# Patient Record
Sex: MALE | Race: WHITE | Employment: FULL TIME | URBAN - METROPOLITAN AREA
[De-identification: names, ages, dates, MRNs, and addresses within clinical notes are randomized per-mention and may not be internally consistent; named-entity substitution may affect disease eponyms.]

---

## 2017-05-08 ENCOUNTER — APPOINTMENT (OUTPATIENT)
Dept: GENERAL RADIOLOGY | Age: 42
End: 2017-05-08

## 2017-05-08 ENCOUNTER — HOSPITAL ENCOUNTER (OUTPATIENT)
Age: 42
Setting detail: OBSERVATION
Discharge: HOME | End: 2017-05-10
Attending: EMERGENCY MEDICINE | Admitting: INTERNAL MEDICINE
Payer: MEDICAID

## 2017-05-08 DIAGNOSIS — I25.119 CORONARY ARTERY DISEASE WITH ANGINA PECTORIS, UNSPECIFIED VESSEL OR LESION TYPE, UNSPECIFIED WHETHER NATIVE OR TRANSPLANTED HEART (HCC): ICD-10-CM

## 2017-05-08 DIAGNOSIS — R07.9 CHEST PAIN, UNSPECIFIED: Primary | ICD-10-CM

## 2017-05-08 PROBLEM — Z13.1 DM (DIABETES MELLITUS SCREEN): Status: ACTIVE | Noted: 2017-05-08

## 2017-05-08 PROBLEM — F41.9 ANXIETY: Status: ACTIVE | Noted: 2017-05-08

## 2017-05-08 PROBLEM — I25.2 HISTORY OF MI (MYOCARDIAL INFARCTION): Status: ACTIVE | Noted: 2017-05-08

## 2017-05-08 PROBLEM — Z95.5 PRESENCE OF STENT IN LAD CORONARY ARTERY: Status: ACTIVE | Noted: 2017-05-08

## 2017-05-08 LAB
ABSOLUTE EOS #: 0.2 K/UL (ref 0–0.4)
ABSOLUTE LYMPH #: 1.4 K/UL (ref 1–4.8)
ABSOLUTE MONO #: 0.5 K/UL (ref 0.1–1.3)
AMPHETAMINE SCREEN URINE: NEGATIVE
ANION GAP SERPL CALCULATED.3IONS-SCNC: 15 MMOL/L (ref 9–17)
BARBITURATE SCREEN URINE: NEGATIVE
BASOPHILS # BLD: 1 %
BASOPHILS ABSOLUTE: 0 K/UL (ref 0–0.2)
BENZODIAZEPINE SCREEN, URINE: NEGATIVE
BUN BLDV-MCNC: 10 MG/DL (ref 6–20)
BUN/CREAT BLD: ABNORMAL (ref 9–20)
BUPRENORPHINE URINE: ABNORMAL
CALCIUM SERPL-MCNC: 8.9 MG/DL (ref 8.6–10.4)
CANNABINOID SCREEN URINE: POSITIVE
CHLORIDE BLD-SCNC: 100 MMOL/L (ref 98–107)
CO2: 23 MMOL/L (ref 20–31)
COCAINE METABOLITE, URINE: NEGATIVE
CREAT SERPL-MCNC: 0.73 MG/DL (ref 0.7–1.2)
D-DIMER QUANTITATIVE: 0.21 MG/L FEU
DIFFERENTIAL TYPE: NORMAL
EOSINOPHILS RELATIVE PERCENT: 3 %
GFR AFRICAN AMERICAN: >60 ML/MIN
GFR NON-AFRICAN AMERICAN: >60 ML/MIN
GFR SERPL CREATININE-BSD FRML MDRD: ABNORMAL ML/MIN/{1.73_M2}
GFR SERPL CREATININE-BSD FRML MDRD: ABNORMAL ML/MIN/{1.73_M2}
GLUCOSE BLD-MCNC: 166 MG/DL (ref 70–99)
GLUCOSE BLD-MCNC: 223 MG/DL (ref 75–110)
GLUCOSE BLD-MCNC: 229 MG/DL (ref 75–110)
HCT VFR BLD CALC: 46.6 % (ref 41–53)
HEMOGLOBIN: 16 G/DL (ref 13.5–17.5)
LYMPHOCYTES # BLD: 24 %
MAGNESIUM: 1.7 MG/DL (ref 1.6–2.6)
MCH RBC QN AUTO: 33.2 PG (ref 26–34)
MCHC RBC AUTO-ENTMCNC: 34.4 G/DL (ref 31–37)
MCV RBC AUTO: 96.5 FL (ref 80–100)
MDMA URINE: ABNORMAL
METHADONE SCREEN, URINE: NEGATIVE
METHAMPHETAMINE, URINE: ABNORMAL
MONOCYTES # BLD: 9 %
OPIATES, URINE: NEGATIVE
OXYCODONE SCREEN URINE: NEGATIVE
PDW BLD-RTO: 13 % (ref 11.5–14.9)
PHENCYCLIDINE, URINE: NEGATIVE
PLATELET # BLD: 286 K/UL (ref 150–450)
PLATELET ESTIMATE: NORMAL
PMV BLD AUTO: 6.9 FL (ref 6–12)
POTASSIUM SERPL-SCNC: 4.3 MMOL/L (ref 3.7–5.3)
PROPOXYPHENE, URINE: ABNORMAL
RBC # BLD: 4.83 M/UL (ref 4.5–5.9)
RBC # BLD: NORMAL 10*6/UL
SEG NEUTROPHILS: 63 %
SEGMENTED NEUTROPHILS ABSOLUTE COUNT: 3.7 K/UL (ref 1.3–9.1)
SODIUM BLD-SCNC: 138 MMOL/L (ref 135–144)
TEST INFORMATION: ABNORMAL
TRICYCLIC ANTIDEPRESSANTS, UR: ABNORMAL
TROPONIN INTERP: NORMAL
TROPONIN T: <0.03 NG/ML
WBC # BLD: 5.8 K/UL (ref 3.5–11)
WBC # BLD: NORMAL 10*3/UL

## 2017-05-08 PROCEDURE — 83735 ASSAY OF MAGNESIUM: CPT

## 2017-05-08 PROCEDURE — 99223 1ST HOSP IP/OBS HIGH 75: CPT | Performed by: INTERNAL MEDICINE

## 2017-05-08 PROCEDURE — 96374 THER/PROPH/DIAG INJ IV PUSH: CPT

## 2017-05-08 PROCEDURE — 6360000002 HC RX W HCPCS: Performed by: STUDENT IN AN ORGANIZED HEALTH CARE EDUCATION/TRAINING PROGRAM

## 2017-05-08 PROCEDURE — 80048 BASIC METABOLIC PNL TOTAL CA: CPT

## 2017-05-08 PROCEDURE — 85379 FIBRIN DEGRADATION QUANT: CPT

## 2017-05-08 PROCEDURE — 80307 DRUG TEST PRSMV CHEM ANLYZR: CPT

## 2017-05-08 PROCEDURE — G0378 HOSPITAL OBSERVATION PER HR: HCPCS

## 2017-05-08 PROCEDURE — 84484 ASSAY OF TROPONIN QUANT: CPT

## 2017-05-08 PROCEDURE — 6370000000 HC RX 637 (ALT 250 FOR IP): Performed by: STUDENT IN AN ORGANIZED HEALTH CARE EDUCATION/TRAINING PROGRAM

## 2017-05-08 PROCEDURE — 94760 N-INVAS EAR/PLS OXIMETRY 1: CPT

## 2017-05-08 PROCEDURE — 83036 HEMOGLOBIN GLYCOSYLATED A1C: CPT

## 2017-05-08 PROCEDURE — 6370000000 HC RX 637 (ALT 250 FOR IP): Performed by: FAMILY MEDICINE

## 2017-05-08 PROCEDURE — 71010 XR CHEST PORTABLE: CPT

## 2017-05-08 PROCEDURE — 99285 EMERGENCY DEPT VISIT HI MDM: CPT

## 2017-05-08 PROCEDURE — 85025 COMPLETE CBC W/AUTO DIFF WBC: CPT

## 2017-05-08 PROCEDURE — 2580000003 HC RX 258: Performed by: STUDENT IN AN ORGANIZED HEALTH CARE EDUCATION/TRAINING PROGRAM

## 2017-05-08 PROCEDURE — 82947 ASSAY GLUCOSE BLOOD QUANT: CPT

## 2017-05-08 PROCEDURE — 6370000000 HC RX 637 (ALT 250 FOR IP): Performed by: EMERGENCY MEDICINE

## 2017-05-08 PROCEDURE — 36415 COLL VENOUS BLD VENIPUNCTURE: CPT

## 2017-05-08 RX ORDER — MORPHINE SULFATE 2 MG/ML
2 INJECTION, SOLUTION INTRAMUSCULAR; INTRAVENOUS
Status: DISCONTINUED | OUTPATIENT
Start: 2017-05-08 | End: 2017-05-10 | Stop reason: HOSPADM

## 2017-05-08 RX ORDER — MAGNESIUM HYDROXIDE/ALUMINUM HYDROXICE/SIMETHICONE 120; 1200; 1200 MG/30ML; MG/30ML; MG/30ML
15 SUSPENSION ORAL ONCE
Status: COMPLETED | OUTPATIENT
Start: 2017-05-08 | End: 2017-05-08

## 2017-05-08 RX ORDER — POTASSIUM CHLORIDE 20MEQ/15ML
40 LIQUID (ML) ORAL PRN
Status: DISCONTINUED | OUTPATIENT
Start: 2017-05-08 | End: 2017-05-10 | Stop reason: HOSPADM

## 2017-05-08 RX ORDER — MORPHINE SULFATE 4 MG/ML
4 INJECTION, SOLUTION INTRAMUSCULAR; INTRAVENOUS
Status: DISCONTINUED | OUTPATIENT
Start: 2017-05-08 | End: 2017-05-10 | Stop reason: HOSPADM

## 2017-05-08 RX ORDER — DEXTROSE MONOHYDRATE 25 G/50ML
12.5 INJECTION, SOLUTION INTRAVENOUS PRN
Status: DISCONTINUED | OUTPATIENT
Start: 2017-05-08 | End: 2017-05-10 | Stop reason: HOSPADM

## 2017-05-08 RX ORDER — DEXTROSE MONOHYDRATE 50 MG/ML
100 INJECTION, SOLUTION INTRAVENOUS PRN
Status: DISCONTINUED | OUTPATIENT
Start: 2017-05-08 | End: 2017-05-10 | Stop reason: HOSPADM

## 2017-05-08 RX ORDER — AMINOPHYLLINE DIHYDRATE 25 MG/ML
100 INJECTION, SOLUTION INTRAVENOUS
Status: ACTIVE | OUTPATIENT
Start: 2017-05-09 | End: 2017-05-09

## 2017-05-08 RX ORDER — 0.9 % SODIUM CHLORIDE 0.9 %
250 INTRAVENOUS SOLUTION INTRAVENOUS ONCE
Status: DISCONTINUED | OUTPATIENT
Start: 2017-05-09 | End: 2017-05-10 | Stop reason: HOSPADM

## 2017-05-08 RX ORDER — SODIUM CHLORIDE 0.9 % (FLUSH) 0.9 %
10 SYRINGE (ML) INJECTION PRN
Status: ACTIVE | OUTPATIENT
Start: 2017-05-09 | End: 2017-05-09

## 2017-05-08 RX ORDER — ASPIRIN 81 MG/1
324 TABLET, CHEWABLE ORAL ONCE
Status: COMPLETED | OUTPATIENT
Start: 2017-05-08 | End: 2017-05-08

## 2017-05-08 RX ORDER — ACETAMINOPHEN 325 MG/1
650 TABLET ORAL EVERY 4 HOURS PRN
Status: DISCONTINUED | OUTPATIENT
Start: 2017-05-08 | End: 2017-05-10 | Stop reason: HOSPADM

## 2017-05-08 RX ORDER — DOCUSATE SODIUM 100 MG/1
100 CAPSULE, LIQUID FILLED ORAL 2 TIMES DAILY
Status: DISCONTINUED | OUTPATIENT
Start: 2017-05-08 | End: 2017-05-10 | Stop reason: HOSPADM

## 2017-05-08 RX ORDER — POTASSIUM CHLORIDE 7.45 MG/ML
10 INJECTION INTRAVENOUS PRN
Status: DISCONTINUED | OUTPATIENT
Start: 2017-05-08 | End: 2017-05-10 | Stop reason: HOSPADM

## 2017-05-08 RX ORDER — SODIUM CHLORIDE 0.9 % (FLUSH) 0.9 %
10 SYRINGE (ML) INJECTION PRN
Status: DISCONTINUED | OUTPATIENT
Start: 2017-05-08 | End: 2017-05-10 | Stop reason: HOSPADM

## 2017-05-08 RX ORDER — MAGNESIUM SULFATE 1 G/100ML
1 INJECTION INTRAVENOUS PRN
Status: DISCONTINUED | OUTPATIENT
Start: 2017-05-08 | End: 2017-05-10 | Stop reason: HOSPADM

## 2017-05-08 RX ORDER — BISACODYL 10 MG
10 SUPPOSITORY, RECTAL RECTAL DAILY PRN
Status: DISCONTINUED | OUTPATIENT
Start: 2017-05-08 | End: 2017-05-10 | Stop reason: HOSPADM

## 2017-05-08 RX ORDER — NITROGLYCERIN 0.4 MG/1
0.4 TABLET SUBLINGUAL EVERY 5 MIN PRN
Status: DISCONTINUED | OUTPATIENT
Start: 2017-05-08 | End: 2017-05-10 | Stop reason: HOSPADM

## 2017-05-08 RX ORDER — FAMOTIDINE 20 MG/1
20 TABLET, FILM COATED ORAL 2 TIMES DAILY
Status: DISCONTINUED | OUTPATIENT
Start: 2017-05-08 | End: 2017-05-10 | Stop reason: HOSPADM

## 2017-05-08 RX ORDER — HYDROXYZINE PAMOATE 25 MG/1
25 CAPSULE ORAL 4 TIMES DAILY PRN
COMMUNITY
End: 2017-11-03 | Stop reason: SDUPTHER

## 2017-05-08 RX ORDER — NITROGLYCERIN 0.4 MG/1
0.4 TABLET SUBLINGUAL EVERY 5 MIN PRN
Status: ACTIVE | OUTPATIENT
Start: 2017-05-09 | End: 2017-05-09

## 2017-05-08 RX ORDER — ACETAMINOPHEN 325 MG/1
650 TABLET ORAL EVERY 4 HOURS PRN
Status: CANCELLED | OUTPATIENT
Start: 2017-05-08

## 2017-05-08 RX ORDER — NITROGLYCERIN 0.4 MG/1
0.4 TABLET SUBLINGUAL EVERY 5 MIN PRN
Status: CANCELLED | OUTPATIENT
Start: 2017-05-08

## 2017-05-08 RX ORDER — SODIUM CHLORIDE 0.9 % (FLUSH) 0.9 %
10 SYRINGE (ML) INJECTION EVERY 12 HOURS SCHEDULED
Status: DISCONTINUED | OUTPATIENT
Start: 2017-05-08 | End: 2017-05-10 | Stop reason: HOSPADM

## 2017-05-08 RX ORDER — ONDANSETRON 2 MG/ML
4 INJECTION INTRAMUSCULAR; INTRAVENOUS EVERY 6 HOURS PRN
Status: DISCONTINUED | OUTPATIENT
Start: 2017-05-08 | End: 2017-05-10 | Stop reason: HOSPADM

## 2017-05-08 RX ORDER — POTASSIUM CHLORIDE 20 MEQ/1
40 TABLET, EXTENDED RELEASE ORAL PRN
Status: DISCONTINUED | OUTPATIENT
Start: 2017-05-08 | End: 2017-05-10 | Stop reason: HOSPADM

## 2017-05-08 RX ORDER — METOPROLOL TARTRATE 5 MG/5ML
2.5 INJECTION INTRAVENOUS PRN
Status: ACTIVE | OUTPATIENT
Start: 2017-05-09 | End: 2017-05-09

## 2017-05-08 RX ORDER — ASPIRIN 81 MG/1
81 TABLET, CHEWABLE ORAL DAILY
Status: CANCELLED | OUTPATIENT
Start: 2017-05-09

## 2017-05-08 RX ORDER — NICOTINE 21 MG/24HR
1 PATCH, TRANSDERMAL 24 HOURS TRANSDERMAL DAILY
Status: DISCONTINUED | OUTPATIENT
Start: 2017-05-08 | End: 2017-05-10 | Stop reason: HOSPADM

## 2017-05-08 RX ORDER — HYDROCODONE BITARTRATE AND ACETAMINOPHEN 5; 325 MG/1; MG/1
1 TABLET ORAL EVERY 4 HOURS PRN
Status: DISCONTINUED | OUTPATIENT
Start: 2017-05-08 | End: 2017-05-10 | Stop reason: HOSPADM

## 2017-05-08 RX ORDER — NITROGLYCERIN 0.4 MG/1
0.4 TABLET SUBLINGUAL EVERY 5 MIN PRN
Status: DISCONTINUED | OUTPATIENT
Start: 2017-05-08 | End: 2017-05-10 | Stop reason: SDUPTHER

## 2017-05-08 RX ORDER — SODIUM CHLORIDE 0.9 % (FLUSH) 0.9 %
10 SYRINGE (ML) INJECTION PRN
Status: CANCELLED | OUTPATIENT
Start: 2017-05-08

## 2017-05-08 RX ORDER — NICOTINE POLACRILEX 4 MG
15 LOZENGE BUCCAL PRN
Status: DISCONTINUED | OUTPATIENT
Start: 2017-05-08 | End: 2017-05-10 | Stop reason: HOSPADM

## 2017-05-08 RX ORDER — ASPIRIN 81 MG/1
81 TABLET, CHEWABLE ORAL DAILY
Status: DISCONTINUED | OUTPATIENT
Start: 2017-05-09 | End: 2017-05-10 | Stop reason: HOSPADM

## 2017-05-08 RX ORDER — HYDROXYZINE HYDROCHLORIDE 25 MG/1
25 TABLET, FILM COATED ORAL 4 TIMES DAILY PRN
Status: DISCONTINUED | OUTPATIENT
Start: 2017-05-08 | End: 2017-05-10 | Stop reason: HOSPADM

## 2017-05-08 RX ORDER — SODIUM CHLORIDE 0.9 % (FLUSH) 0.9 %
10 SYRINGE (ML) INJECTION EVERY 12 HOURS SCHEDULED
Status: CANCELLED | OUTPATIENT
Start: 2017-05-08

## 2017-05-08 RX ADMIN — NITROGLYCERIN 0.4 MG: 0.4 TABLET SUBLINGUAL at 14:57

## 2017-05-08 RX ADMIN — ALUMINUM HYDROXIDE, MAGNESIUM HYDROXIDE, AND SIMETHICONE 15 ML: 200; 200; 20 SUSPENSION ORAL at 16:05

## 2017-05-08 RX ADMIN — HYDROXYZINE HYDROCHLORIDE 25 MG: 25 TABLET, FILM COATED ORAL at 23:16

## 2017-05-08 RX ADMIN — Medication 10 ML: at 19:56

## 2017-05-08 RX ADMIN — FAMOTIDINE 20 MG: 20 TABLET ORAL at 19:55

## 2017-05-08 RX ADMIN — INSULIN LISPRO 2 UNITS: 100 INJECTION, SOLUTION INTRAVENOUS; SUBCUTANEOUS at 17:44

## 2017-05-08 RX ADMIN — METOPROLOL TARTRATE 25 MG: 25 TABLET ORAL at 19:55

## 2017-05-08 RX ADMIN — ASPIRIN 81 MG 324 MG: 81 TABLET ORAL at 14:57

## 2017-05-08 RX ADMIN — HYDROXYZINE HYDROCHLORIDE 25 MG: 25 TABLET, FILM COATED ORAL at 17:38

## 2017-05-08 RX ADMIN — MORPHINE SULFATE 2 MG: 2 INJECTION, SOLUTION INTRAMUSCULAR; INTRAVENOUS at 19:55

## 2017-05-08 RX ADMIN — ACETAMINOPHEN 650 MG: 325 TABLET, FILM COATED ORAL at 23:16

## 2017-05-08 RX ADMIN — INSULIN LISPRO 1 UNITS: 100 INJECTION, SOLUTION INTRAVENOUS; SUBCUTANEOUS at 21:10

## 2017-05-08 RX ADMIN — NITROGLYCERIN 0.4 MG: 0.4 TABLET SUBLINGUAL at 15:33

## 2017-05-08 RX ADMIN — DOCUSATE SODIUM 100 MG: 100 CAPSULE, LIQUID FILLED ORAL at 19:55

## 2017-05-08 ASSESSMENT — PAIN SCALES - GENERAL
PAINLEVEL_OUTOF10: 4
PAINLEVEL_OUTOF10: 8
PAINLEVEL_OUTOF10: 10
PAINLEVEL_OUTOF10: 4
PAINLEVEL_OUTOF10: 5
PAINLEVEL_OUTOF10: 3

## 2017-05-08 ASSESSMENT — PAIN DESCRIPTION - DESCRIPTORS: DESCRIPTORS: ACHING;PRESSURE

## 2017-05-08 ASSESSMENT — ENCOUNTER SYMPTOMS
NAUSEA: 1
EYE DISCHARGE: 0
ABDOMINAL PAIN: 0
BLOOD IN STOOL: 0
SHORTNESS OF BREATH: 1
DIARRHEA: 0
VOMITING: 0
SPUTUM PRODUCTION: 0
BLURRED VISION: 0
WHEEZING: 0
COUGH: 0

## 2017-05-08 ASSESSMENT — PAIN DESCRIPTION - ORIENTATION: ORIENTATION: LEFT

## 2017-05-08 ASSESSMENT — PAIN DESCRIPTION - LOCATION
LOCATION: CHEST
LOCATION: CHEST

## 2017-05-08 ASSESSMENT — PAIN DESCRIPTION - ONSET: ONSET: ON-GOING

## 2017-05-08 ASSESSMENT — PAIN DESCRIPTION - PAIN TYPE
TYPE: ACUTE PAIN
TYPE: ACUTE PAIN

## 2017-05-08 ASSESSMENT — PAIN DESCRIPTION - PROGRESSION: CLINICAL_PROGRESSION: GRADUALLY IMPROVING

## 2017-05-08 ASSESSMENT — PAIN DESCRIPTION - FREQUENCY: FREQUENCY: CONTINUOUS

## 2017-05-09 ENCOUNTER — APPOINTMENT (OUTPATIENT)
Dept: NUCLEAR MEDICINE | Age: 42
End: 2017-05-09

## 2017-05-09 LAB
ANION GAP SERPL CALCULATED.3IONS-SCNC: 11 MMOL/L (ref 9–17)
BUN BLDV-MCNC: 18 MG/DL (ref 6–20)
BUN/CREAT BLD: ABNORMAL (ref 9–20)
CALCIUM SERPL-MCNC: 9.1 MG/DL (ref 8.6–10.4)
CHLORIDE BLD-SCNC: 101 MMOL/L (ref 98–107)
CHOLESTEROL/HDL RATIO: 7.3
CHOLESTEROL: 196 MG/DL
CO2: 27 MMOL/L (ref 20–31)
CREAT SERPL-MCNC: 0.83 MG/DL (ref 0.7–1.2)
ESTIMATED AVERAGE GLUCOSE: 154 MG/DL
GFR AFRICAN AMERICAN: >60 ML/MIN
GFR NON-AFRICAN AMERICAN: >60 ML/MIN
GFR SERPL CREATININE-BSD FRML MDRD: ABNORMAL ML/MIN/{1.73_M2}
GFR SERPL CREATININE-BSD FRML MDRD: ABNORMAL ML/MIN/{1.73_M2}
GLUCOSE BLD-MCNC: 136 MG/DL (ref 75–110)
GLUCOSE BLD-MCNC: 148 MG/DL (ref 75–110)
GLUCOSE BLD-MCNC: 156 MG/DL (ref 70–99)
GLUCOSE BLD-MCNC: 218 MG/DL (ref 75–110)
HBA1C MFR BLD: 7 % (ref 4–6)
HCT VFR BLD CALC: 48.9 % (ref 41–53)
HDLC SERPL-MCNC: 27 MG/DL
HEMOGLOBIN: 16.4 G/DL (ref 13.5–17.5)
LDL CHOLESTEROL: 116 MG/DL (ref 0–130)
MCH RBC QN AUTO: 32.8 PG (ref 26–34)
MCHC RBC AUTO-ENTMCNC: 33.6 G/DL (ref 31–37)
MCV RBC AUTO: 97.5 FL (ref 80–100)
MYOGLOBIN: 24 NG/ML (ref 28–72)
MYOGLOBIN: 29 NG/ML (ref 28–72)
MYOGLOBIN: 47 NG/ML (ref 28–72)
PDW BLD-RTO: 13.3 % (ref 11.5–14.9)
PLATELET # BLD: 282 K/UL (ref 150–450)
PMV BLD AUTO: 7 FL (ref 6–12)
POTASSIUM SERPL-SCNC: 4.5 MMOL/L (ref 3.7–5.3)
RBC # BLD: 5.01 M/UL (ref 4.5–5.9)
SODIUM BLD-SCNC: 139 MMOL/L (ref 135–144)
TRIGL SERPL-MCNC: 264 MG/DL
TROPONIN INTERP: ABNORMAL
TROPONIN INTERP: NORMAL
TROPONIN INTERP: NORMAL
TROPONIN T: <0.03 NG/ML
VLDLC SERPL CALC-MCNC: ABNORMAL MG/DL (ref 1–30)
WBC # BLD: 7.1 K/UL (ref 3.5–11)

## 2017-05-09 PROCEDURE — 6360000002 HC RX W HCPCS: Performed by: STUDENT IN AN ORGANIZED HEALTH CARE EDUCATION/TRAINING PROGRAM

## 2017-05-09 PROCEDURE — 78452 HT MUSCLE IMAGE SPECT MULT: CPT

## 2017-05-09 PROCEDURE — 80061 LIPID PANEL: CPT

## 2017-05-09 PROCEDURE — 83874 ASSAY OF MYOGLOBIN: CPT

## 2017-05-09 PROCEDURE — A9500 TC99M SESTAMIBI: HCPCS | Performed by: INTERNAL MEDICINE

## 2017-05-09 PROCEDURE — 93017 CV STRESS TEST TRACING ONLY: CPT

## 2017-05-09 PROCEDURE — 96376 TX/PRO/DX INJ SAME DRUG ADON: CPT

## 2017-05-09 PROCEDURE — 93005 ELECTROCARDIOGRAM TRACING: CPT

## 2017-05-09 PROCEDURE — 2580000003 HC RX 258: Performed by: STUDENT IN AN ORGANIZED HEALTH CARE EDUCATION/TRAINING PROGRAM

## 2017-05-09 PROCEDURE — 82947 ASSAY GLUCOSE BLOOD QUANT: CPT

## 2017-05-09 PROCEDURE — 80048 BASIC METABOLIC PNL TOTAL CA: CPT

## 2017-05-09 PROCEDURE — 6360000002 HC RX W HCPCS: Performed by: FAMILY MEDICINE

## 2017-05-09 PROCEDURE — 6370000000 HC RX 637 (ALT 250 FOR IP): Performed by: FAMILY MEDICINE

## 2017-05-09 PROCEDURE — 84484 ASSAY OF TROPONIN QUANT: CPT

## 2017-05-09 PROCEDURE — G0378 HOSPITAL OBSERVATION PER HR: HCPCS

## 2017-05-09 PROCEDURE — 6370000000 HC RX 637 (ALT 250 FOR IP): Performed by: STUDENT IN AN ORGANIZED HEALTH CARE EDUCATION/TRAINING PROGRAM

## 2017-05-09 PROCEDURE — 85027 COMPLETE CBC AUTOMATED: CPT

## 2017-05-09 PROCEDURE — 3430000000 HC RX DIAGNOSTIC RADIOPHARMACEUTICAL: Performed by: INTERNAL MEDICINE

## 2017-05-09 PROCEDURE — 96375 TX/PRO/DX INJ NEW DRUG ADDON: CPT

## 2017-05-09 PROCEDURE — 2580000003 HC RX 258: Performed by: FAMILY MEDICINE

## 2017-05-09 PROCEDURE — 36415 COLL VENOUS BLD VENIPUNCTURE: CPT

## 2017-05-09 RX ORDER — AMINOPHYLLINE DIHYDRATE 25 MG/ML
100 INJECTION, SOLUTION INTRAVENOUS
Status: ACTIVE | OUTPATIENT
Start: 2017-05-09 | End: 2017-05-09

## 2017-05-09 RX ORDER — 0.9 % SODIUM CHLORIDE 0.9 %
250 INTRAVENOUS SOLUTION INTRAVENOUS ONCE
Status: DISCONTINUED | OUTPATIENT
Start: 2017-05-09 | End: 2017-05-10 | Stop reason: HOSPADM

## 2017-05-09 RX ORDER — METOPROLOL TARTRATE 5 MG/5ML
2.5 INJECTION INTRAVENOUS PRN
Status: CANCELLED | OUTPATIENT
Start: 2017-05-09 | End: 2017-05-10

## 2017-05-09 RX ORDER — METOPROLOL TARTRATE 5 MG/5ML
2.5 INJECTION INTRAVENOUS PRN
Status: ACTIVE | OUTPATIENT
Start: 2017-05-09 | End: 2017-05-10

## 2017-05-09 RX ORDER — AMINOPHYLLINE DIHYDRATE 25 MG/ML
100 INJECTION, SOLUTION INTRAVENOUS
Status: CANCELLED | OUTPATIENT
Start: 2017-05-09 | End: 2017-05-09

## 2017-05-09 RX ORDER — 0.9 % SODIUM CHLORIDE 0.9 %
250 INTRAVENOUS SOLUTION INTRAVENOUS ONCE
Status: CANCELLED | OUTPATIENT
Start: 2017-05-09 | End: 2017-05-09

## 2017-05-09 RX ORDER — ATORVASTATIN CALCIUM 40 MG/1
40 TABLET, FILM COATED ORAL NIGHTLY
Status: DISCONTINUED | OUTPATIENT
Start: 2017-05-09 | End: 2017-05-10 | Stop reason: HOSPADM

## 2017-05-09 RX ORDER — NITROGLYCERIN 0.4 MG/1
0.4 TABLET SUBLINGUAL EVERY 5 MIN PRN
Status: CANCELLED | OUTPATIENT
Start: 2017-05-09 | End: 2017-05-10

## 2017-05-09 RX ORDER — SODIUM CHLORIDE 0.9 % (FLUSH) 0.9 %
10 SYRINGE (ML) INJECTION PRN
Status: ACTIVE | OUTPATIENT
Start: 2017-05-09 | End: 2017-05-10

## 2017-05-09 RX ORDER — NITROGLYCERIN 0.4 MG/1
0.4 TABLET SUBLINGUAL EVERY 5 MIN PRN
Status: ACTIVE | OUTPATIENT
Start: 2017-05-09 | End: 2017-05-10

## 2017-05-09 RX ORDER — SODIUM CHLORIDE 0.9 % (FLUSH) 0.9 %
10 SYRINGE (ML) INJECTION PRN
Status: CANCELLED | OUTPATIENT
Start: 2017-05-09 | End: 2017-05-10

## 2017-05-09 RX ADMIN — FAMOTIDINE 20 MG: 20 TABLET ORAL at 08:22

## 2017-05-09 RX ADMIN — FAMOTIDINE 20 MG: 20 TABLET ORAL at 20:04

## 2017-05-09 RX ADMIN — Medication 10 ML: at 10:01

## 2017-05-09 RX ADMIN — METOPROLOL TARTRATE 25 MG: 25 TABLET ORAL at 14:56

## 2017-05-09 RX ADMIN — HYDROCODONE BITARTRATE AND ACETAMINOPHEN 1 TABLET: 5; 325 TABLET ORAL at 11:09

## 2017-05-09 RX ADMIN — TETRAKIS(2-METHOXYISOBUTYLISOCYANIDE)COPPER(I) TETRAFLUOROBORATE 30.5 MILLICURIE: 1 INJECTION, POWDER, LYOPHILIZED, FOR SOLUTION INTRAVENOUS at 10:08

## 2017-05-09 RX ADMIN — ONDANSETRON 4 MG: 2 INJECTION INTRAMUSCULAR; INTRAVENOUS at 11:49

## 2017-05-09 RX ADMIN — MORPHINE SULFATE 2 MG: 2 INJECTION, SOLUTION INTRAMUSCULAR; INTRAVENOUS at 21:54

## 2017-05-09 RX ADMIN — NITROGLYCERIN 0.4 MG: 0.4 TABLET SUBLINGUAL at 07:49

## 2017-05-09 RX ADMIN — HYDROCODONE BITARTRATE AND ACETAMINOPHEN 1 TABLET: 5; 325 TABLET ORAL at 23:40

## 2017-05-09 RX ADMIN — NITROGLYCERIN 0.4 MG: 0.4 TABLET SUBLINGUAL at 12:29

## 2017-05-09 RX ADMIN — REGADENOSON 0.4 MG: 0.08 INJECTION, SOLUTION INTRAVENOUS at 10:01

## 2017-05-09 RX ADMIN — ATORVASTATIN CALCIUM 40 MG: 40 TABLET, FILM COATED ORAL at 20:04

## 2017-05-09 RX ADMIN — Medication 10 ML: at 20:04

## 2017-05-09 RX ADMIN — MORPHINE SULFATE 2 MG: 2 INJECTION, SOLUTION INTRAMUSCULAR; INTRAVENOUS at 12:32

## 2017-05-09 RX ADMIN — INSULIN LISPRO 1 UNITS: 100 INJECTION, SOLUTION INTRAVENOUS; SUBCUTANEOUS at 20:05

## 2017-05-09 RX ADMIN — Medication 10 ML: at 15:10

## 2017-05-09 RX ADMIN — HYDROXYZINE HYDROCHLORIDE 25 MG: 25 TABLET, FILM COATED ORAL at 07:37

## 2017-05-09 RX ADMIN — HYDROCODONE BITARTRATE AND ACETAMINOPHEN 1 TABLET: 5; 325 TABLET ORAL at 15:10

## 2017-05-09 RX ADMIN — HYDROXYZINE HYDROCHLORIDE 25 MG: 25 TABLET, FILM COATED ORAL at 14:55

## 2017-05-09 RX ADMIN — ASPIRIN 81 MG 81 MG: 81 TABLET ORAL at 07:53

## 2017-05-09 RX ADMIN — NITROGLYCERIN 0.4 MG: 0.4 TABLET SUBLINGUAL at 07:14

## 2017-05-09 RX ADMIN — HYDROXYZINE HYDROCHLORIDE 25 MG: 25 TABLET, FILM COATED ORAL at 21:54

## 2017-05-09 RX ADMIN — TETRAKIS(2-METHOXYISOBUTYLISOCYANIDE)COPPER(I) TETRAFLUOROBORATE 10.3 MILLICURIE: 1 INJECTION, POWDER, LYOPHILIZED, FOR SOLUTION INTRAVENOUS at 09:08

## 2017-05-09 ASSESSMENT — PAIN SCALES - GENERAL
PAINLEVEL_OUTOF10: 6
PAINLEVEL_OUTOF10: 5
PAINLEVEL_OUTOF10: 5
PAINLEVEL_OUTOF10: 10
PAINLEVEL_OUTOF10: 10
PAINLEVEL_OUTOF10: 6
PAINLEVEL_OUTOF10: 6
PAINLEVEL_OUTOF10: 8

## 2017-05-09 ASSESSMENT — ENCOUNTER SYMPTOMS
DIARRHEA: 0
NAUSEA: 0
VOMITING: 0
SHORTNESS OF BREATH: 0
BLURRED VISION: 0
ABDOMINAL PAIN: 0

## 2017-05-09 ASSESSMENT — PAIN DESCRIPTION - PAIN TYPE
TYPE: ACUTE PAIN

## 2017-05-09 ASSESSMENT — PAIN DESCRIPTION - ORIENTATION: ORIENTATION: RIGHT;POSTERIOR

## 2017-05-09 ASSESSMENT — PAIN DESCRIPTION - LOCATION
LOCATION: CHEST
LOCATION: SHOULDER
LOCATION: CHEST

## 2017-05-10 VITALS
WEIGHT: 170 LBS | HEIGHT: 68 IN | HEART RATE: 70 BPM | SYSTOLIC BLOOD PRESSURE: 129 MMHG | BODY MASS INDEX: 25.76 KG/M2 | RESPIRATION RATE: 18 BRPM | OXYGEN SATURATION: 97 % | TEMPERATURE: 97.7 F | DIASTOLIC BLOOD PRESSURE: 76 MMHG

## 2017-05-10 LAB
ANION GAP SERPL CALCULATED.3IONS-SCNC: 12 MMOL/L (ref 9–17)
BUN BLDV-MCNC: 17 MG/DL (ref 6–20)
BUN/CREAT BLD: ABNORMAL (ref 9–20)
CALCIUM SERPL-MCNC: 9 MG/DL (ref 8.6–10.4)
CHLORIDE BLD-SCNC: 100 MMOL/L (ref 98–107)
CO2: 26 MMOL/L (ref 20–31)
CREAT SERPL-MCNC: 0.79 MG/DL (ref 0.7–1.2)
EKG ATRIAL RATE: 110 BPM
EKG ATRIAL RATE: 78 BPM
EKG P AXIS: 33 DEGREES
EKG P AXIS: 39 DEGREES
EKG P-R INTERVAL: 132 MS
EKG P-R INTERVAL: 138 MS
EKG Q-T INTERVAL: 316 MS
EKG Q-T INTERVAL: 358 MS
EKG QRS DURATION: 80 MS
EKG QRS DURATION: 84 MS
EKG QTC CALCULATION (BAZETT): 408 MS
EKG QTC CALCULATION (BAZETT): 427 MS
EKG R AXIS: 48 DEGREES
EKG R AXIS: 69 DEGREES
EKG T AXIS: 34 DEGREES
EKG T AXIS: 38 DEGREES
EKG VENTRICULAR RATE: 110 BPM
EKG VENTRICULAR RATE: 78 BPM
GFR AFRICAN AMERICAN: >60 ML/MIN
GFR NON-AFRICAN AMERICAN: >60 ML/MIN
GFR SERPL CREATININE-BSD FRML MDRD: ABNORMAL ML/MIN/{1.73_M2}
GFR SERPL CREATININE-BSD FRML MDRD: ABNORMAL ML/MIN/{1.73_M2}
GLUCOSE BLD-MCNC: 136 MG/DL (ref 70–99)
GLUCOSE BLD-MCNC: 238 MG/DL (ref 75–110)
HCT VFR BLD CALC: 47.9 % (ref 41–53)
HEMOGLOBIN: 16 G/DL (ref 13.5–17.5)
MCH RBC QN AUTO: 32.6 PG (ref 26–34)
MCHC RBC AUTO-ENTMCNC: 33.4 G/DL (ref 31–37)
MCV RBC AUTO: 97.4 FL (ref 80–100)
PDW BLD-RTO: 12.9 % (ref 11.5–14.9)
PLATELET # BLD: 276 K/UL (ref 150–450)
PMV BLD AUTO: 7.2 FL (ref 6–12)
POTASSIUM SERPL-SCNC: 4.5 MMOL/L (ref 3.7–5.3)
RBC # BLD: 4.92 M/UL (ref 4.5–5.9)
SODIUM BLD-SCNC: 138 MMOL/L (ref 135–144)
WBC # BLD: 5.2 K/UL (ref 3.5–11)

## 2017-05-10 PROCEDURE — 2580000003 HC RX 258: Performed by: STUDENT IN AN ORGANIZED HEALTH CARE EDUCATION/TRAINING PROGRAM

## 2017-05-10 PROCEDURE — 36415 COLL VENOUS BLD VENIPUNCTURE: CPT

## 2017-05-10 PROCEDURE — G0378 HOSPITAL OBSERVATION PER HR: HCPCS

## 2017-05-10 PROCEDURE — 93017 CV STRESS TEST TRACING ONLY: CPT

## 2017-05-10 PROCEDURE — 82947 ASSAY GLUCOSE BLOOD QUANT: CPT

## 2017-05-10 PROCEDURE — 6370000000 HC RX 637 (ALT 250 FOR IP): Performed by: STUDENT IN AN ORGANIZED HEALTH CARE EDUCATION/TRAINING PROGRAM

## 2017-05-10 PROCEDURE — 85027 COMPLETE CBC AUTOMATED: CPT

## 2017-05-10 PROCEDURE — 99239 HOSP IP/OBS DSCHRG MGMT >30: CPT | Performed by: INTERNAL MEDICINE

## 2017-05-10 PROCEDURE — 80048 BASIC METABOLIC PNL TOTAL CA: CPT

## 2017-05-10 PROCEDURE — 6370000000 HC RX 637 (ALT 250 FOR IP): Performed by: FAMILY MEDICINE

## 2017-05-10 PROCEDURE — 96376 TX/PRO/DX INJ SAME DRUG ADON: CPT

## 2017-05-10 PROCEDURE — 6360000002 HC RX W HCPCS: Performed by: STUDENT IN AN ORGANIZED HEALTH CARE EDUCATION/TRAINING PROGRAM

## 2017-05-10 RX ORDER — ATORVASTATIN CALCIUM 40 MG/1
40 TABLET, FILM COATED ORAL NIGHTLY
Qty: 30 TABLET | Refills: 3 | Status: SHIPPED | OUTPATIENT
Start: 2017-05-10 | End: 2017-11-03 | Stop reason: SDUPTHER

## 2017-05-10 RX ADMIN — MORPHINE SULFATE 2 MG: 2 INJECTION, SOLUTION INTRAMUSCULAR; INTRAVENOUS at 11:37

## 2017-05-10 RX ADMIN — MORPHINE SULFATE 2 MG: 2 INJECTION, SOLUTION INTRAMUSCULAR; INTRAVENOUS at 14:23

## 2017-05-10 RX ADMIN — MORPHINE SULFATE 2 MG: 2 INJECTION, SOLUTION INTRAMUSCULAR; INTRAVENOUS at 01:47

## 2017-05-10 RX ADMIN — FAMOTIDINE 20 MG: 20 TABLET ORAL at 08:11

## 2017-05-10 RX ADMIN — HYDROXYZINE HYDROCHLORIDE 25 MG: 25 TABLET, FILM COATED ORAL at 14:23

## 2017-05-10 RX ADMIN — MORPHINE SULFATE 4 MG: 4 INJECTION, SOLUTION INTRAMUSCULAR; INTRAVENOUS at 08:11

## 2017-05-10 RX ADMIN — Medication 10 ML: at 08:11

## 2017-05-10 RX ADMIN — INSULIN LISPRO 2 UNITS: 100 INJECTION, SOLUTION INTRAVENOUS; SUBCUTANEOUS at 11:32

## 2017-05-10 RX ADMIN — ASPIRIN 81 MG 81 MG: 81 TABLET ORAL at 08:11

## 2017-05-10 RX ADMIN — HYDROXYZINE HYDROCHLORIDE 25 MG: 25 TABLET, FILM COATED ORAL at 08:11

## 2017-05-10 RX ADMIN — HYDROCODONE BITARTRATE AND ACETAMINOPHEN 1 TABLET: 5; 325 TABLET ORAL at 06:16

## 2017-05-10 ASSESSMENT — PAIN DESCRIPTION - PAIN TYPE
TYPE: ACUTE PAIN

## 2017-05-10 ASSESSMENT — PAIN SCALES - GENERAL
PAINLEVEL_OUTOF10: 1
PAINLEVEL_OUTOF10: 6
PAINLEVEL_OUTOF10: 2
PAINLEVEL_OUTOF10: 6
PAINLEVEL_OUTOF10: 1
PAINLEVEL_OUTOF10: 3
PAINLEVEL_OUTOF10: 10
PAINLEVEL_OUTOF10: 5
PAINLEVEL_OUTOF10: 6
PAINLEVEL_OUTOF10: 8

## 2017-05-10 ASSESSMENT — PAIN DESCRIPTION - ORIENTATION
ORIENTATION: RIGHT

## 2017-05-10 ASSESSMENT — PAIN DESCRIPTION - LOCATION
LOCATION: SHOULDER

## 2017-05-10 ASSESSMENT — ENCOUNTER SYMPTOMS
VOMITING: 0
ABDOMINAL PAIN: 0
SHORTNESS OF BREATH: 0
DIARRHEA: 0
BLURRED VISION: 0
NAUSEA: 0

## 2017-05-11 LAB — GLUCOSE BLD-MCNC: 155 MG/DL (ref 75–110)

## 2017-07-22 ENCOUNTER — HOSPITAL ENCOUNTER (EMERGENCY)
Age: 42
Discharge: HOME | End: 2017-07-22
Attending: EMERGENCY MEDICINE
Payer: MEDICAID

## 2017-07-22 VITALS
DIASTOLIC BLOOD PRESSURE: 83 MMHG | TEMPERATURE: 98.1 F | WEIGHT: 170 LBS | HEART RATE: 106 BPM | RESPIRATION RATE: 16 BRPM | HEIGHT: 68 IN | BODY MASS INDEX: 25.76 KG/M2 | OXYGEN SATURATION: 97 % | SYSTOLIC BLOOD PRESSURE: 141 MMHG

## 2017-07-22 DIAGNOSIS — J45.901 ASTHMA EXACERBATION: Primary | ICD-10-CM

## 2017-07-22 PROCEDURE — 94664 DEMO&/EVAL PT USE INHALER: CPT

## 2017-07-22 PROCEDURE — 6370000000 HC RX 637 (ALT 250 FOR IP): Performed by: PHYSICIAN ASSISTANT

## 2017-07-22 PROCEDURE — 94640 AIRWAY INHALATION TREATMENT: CPT

## 2017-07-22 PROCEDURE — 99284 EMERGENCY DEPT VISIT MOD MDM: CPT

## 2017-07-22 PROCEDURE — 94760 N-INVAS EAR/PLS OXIMETRY 1: CPT

## 2017-07-22 RX ORDER — ALBUTEROL SULFATE 90 UG/1
2 AEROSOL, METERED RESPIRATORY (INHALATION) EVERY 6 HOURS PRN
Qty: 1 INHALER | Refills: 0 | Status: SHIPPED | OUTPATIENT
Start: 2017-07-22 | End: 2017-07-22

## 2017-07-22 RX ORDER — ALBUTEROL SULFATE 90 UG/1
2 AEROSOL, METERED RESPIRATORY (INHALATION) EVERY 6 HOURS PRN
Qty: 1 INHALER | Refills: 0 | Status: SHIPPED | OUTPATIENT
Start: 2017-07-22 | End: 2017-11-03 | Stop reason: SDUPTHER

## 2017-07-22 RX ORDER — IPRATROPIUM BROMIDE AND ALBUTEROL SULFATE 2.5; .5 MG/3ML; MG/3ML
1 SOLUTION RESPIRATORY (INHALATION)
Status: DISCONTINUED | OUTPATIENT
Start: 2017-07-22 | End: 2017-07-22 | Stop reason: HOSPADM

## 2017-07-22 RX ADMIN — IPRATROPIUM BROMIDE AND ALBUTEROL SULFATE 1 AMPULE: .5; 3 SOLUTION RESPIRATORY (INHALATION) at 15:46

## 2017-07-24 ENCOUNTER — APPOINTMENT (OUTPATIENT)
Dept: GENERAL RADIOLOGY | Age: 42
End: 2017-07-24
Payer: MEDICAID

## 2017-07-24 ENCOUNTER — APPOINTMENT (OUTPATIENT)
Dept: CT IMAGING | Age: 42
End: 2017-07-24
Payer: MEDICAID

## 2017-07-24 ENCOUNTER — HOSPITAL ENCOUNTER (OUTPATIENT)
Age: 42
Setting detail: OBSERVATION
Discharge: HOME | End: 2017-07-25
Attending: EMERGENCY MEDICINE | Admitting: INTERNAL MEDICINE
Payer: MEDICAID

## 2017-07-24 DIAGNOSIS — R07.9 CHEST PAIN, UNSPECIFIED TYPE: Primary | ICD-10-CM

## 2017-07-24 LAB
ABSOLUTE EOS #: 0.1 K/UL (ref 0–0.4)
ABSOLUTE LYMPH #: 1.2 K/UL (ref 1–4.8)
ABSOLUTE MONO #: 0.8 K/UL (ref 0.1–1.3)
ANION GAP SERPL CALCULATED.3IONS-SCNC: 15 MMOL/L (ref 9–17)
BASOPHILS # BLD: 1 %
BASOPHILS ABSOLUTE: 0.1 K/UL (ref 0–0.2)
BUN BLDV-MCNC: 7 MG/DL (ref 6–20)
BUN/CREAT BLD: ABNORMAL (ref 9–20)
CALCIUM SERPL-MCNC: 8.9 MG/DL (ref 8.6–10.4)
CHLORIDE BLD-SCNC: 96 MMOL/L (ref 98–107)
CO2: 24 MMOL/L (ref 20–31)
CREAT SERPL-MCNC: 0.8 MG/DL (ref 0.7–1.2)
DIFFERENTIAL TYPE: ABNORMAL
EOSINOPHILS RELATIVE PERCENT: 2 %
GFR AFRICAN AMERICAN: >60 ML/MIN
GFR NON-AFRICAN AMERICAN: >60 ML/MIN
GFR SERPL CREATININE-BSD FRML MDRD: ABNORMAL ML/MIN/{1.73_M2}
GFR SERPL CREATININE-BSD FRML MDRD: ABNORMAL ML/MIN/{1.73_M2}
GLUCOSE BLD-MCNC: 166 MG/DL (ref 75–110)
GLUCOSE BLD-MCNC: 409 MG/DL (ref 70–99)
HCT VFR BLD CALC: 46.7 % (ref 41–53)
HEMOGLOBIN: 16.2 G/DL (ref 13.5–17.5)
INR BLD: 1
LYMPHOCYTES # BLD: 15 %
MAGNESIUM: 2.1 MG/DL (ref 1.6–2.6)
MCH RBC QN AUTO: 34.9 PG (ref 26–34)
MCHC RBC AUTO-ENTMCNC: 34.7 G/DL (ref 31–37)
MCV RBC AUTO: 100.6 FL (ref 80–100)
MONOCYTES # BLD: 11 %
MYOGLOBIN: 36 NG/ML (ref 28–72)
MYOGLOBIN: 36 NG/ML (ref 28–72)
PARTIAL THROMBOPLASTIN TIME: 25.8 SEC (ref 23–31)
PDW BLD-RTO: 13.4 % (ref 11.5–14.9)
PHOSPHORUS: 3.9 MG/DL (ref 2.5–4.5)
PLATELET # BLD: 293 K/UL (ref 150–450)
PLATELET ESTIMATE: ABNORMAL
PMV BLD AUTO: 7.6 FL (ref 6–12)
POTASSIUM SERPL-SCNC: 4.4 MMOL/L (ref 3.7–5.3)
PROTHROMBIN TIME: 10.4 SEC (ref 9.7–12)
RBC # BLD: 4.64 M/UL (ref 4.5–5.9)
RBC # BLD: ABNORMAL 10*6/UL
SEG NEUTROPHILS: 71 %
SEGMENTED NEUTROPHILS ABSOLUTE COUNT: 5.5 K/UL (ref 1.3–9.1)
SODIUM BLD-SCNC: 135 MMOL/L (ref 135–144)
TROPONIN INTERP: NORMAL
TROPONIN INTERP: NORMAL
TROPONIN T: <0.03 NG/ML
TROPONIN T: <0.03 NG/ML
TSH SERPL DL<=0.05 MIU/L-ACNC: 2.18 MIU/L (ref 0.3–5)
WBC # BLD: 7.7 K/UL (ref 3.5–11)
WBC # BLD: ABNORMAL 10*3/UL

## 2017-07-24 PROCEDURE — 6370000000 HC RX 637 (ALT 250 FOR IP): Performed by: EMERGENCY MEDICINE

## 2017-07-24 PROCEDURE — 99285 EMERGENCY DEPT VISIT HI MDM: CPT

## 2017-07-24 PROCEDURE — 71260 CT THORAX DX C+: CPT

## 2017-07-24 PROCEDURE — 85025 COMPLETE CBC W/AUTO DIFF WBC: CPT

## 2017-07-24 PROCEDURE — 83735 ASSAY OF MAGNESIUM: CPT

## 2017-07-24 PROCEDURE — 84484 ASSAY OF TROPONIN QUANT: CPT

## 2017-07-24 PROCEDURE — 99220 PR INITIAL OBSERVATION CARE/DAY 70 MINUTES: CPT | Performed by: INTERNAL MEDICINE

## 2017-07-24 PROCEDURE — 85610 PROTHROMBIN TIME: CPT

## 2017-07-24 PROCEDURE — G0378 HOSPITAL OBSERVATION PER HR: HCPCS

## 2017-07-24 PROCEDURE — 6370000000 HC RX 637 (ALT 250 FOR IP): Performed by: FAMILY MEDICINE

## 2017-07-24 PROCEDURE — 71010 XR CHEST PORTABLE: CPT

## 2017-07-24 PROCEDURE — 82947 ASSAY GLUCOSE BLOOD QUANT: CPT

## 2017-07-24 PROCEDURE — S0028 INJECTION, FAMOTIDINE, 20 MG: HCPCS | Performed by: FAMILY MEDICINE

## 2017-07-24 PROCEDURE — 96374 THER/PROPH/DIAG INJ IV PUSH: CPT

## 2017-07-24 PROCEDURE — 80048 BASIC METABOLIC PNL TOTAL CA: CPT

## 2017-07-24 PROCEDURE — 36415 COLL VENOUS BLD VENIPUNCTURE: CPT

## 2017-07-24 PROCEDURE — 96375 TX/PRO/DX INJ NEW DRUG ADDON: CPT

## 2017-07-24 PROCEDURE — 2580000003 HC RX 258: Performed by: FAMILY MEDICINE

## 2017-07-24 PROCEDURE — 85730 THROMBOPLASTIN TIME PARTIAL: CPT

## 2017-07-24 PROCEDURE — 83874 ASSAY OF MYOGLOBIN: CPT

## 2017-07-24 PROCEDURE — 6360000002 HC RX W HCPCS: Performed by: FAMILY MEDICINE

## 2017-07-24 PROCEDURE — 6360000004 HC RX CONTRAST MEDICATION: Performed by: EMERGENCY MEDICINE

## 2017-07-24 PROCEDURE — 2580000003 HC RX 258: Performed by: EMERGENCY MEDICINE

## 2017-07-24 PROCEDURE — 2500000003 HC RX 250 WO HCPCS: Performed by: FAMILY MEDICINE

## 2017-07-24 PROCEDURE — 84443 ASSAY THYROID STIM HORMONE: CPT

## 2017-07-24 PROCEDURE — 94664 DEMO&/EVAL PT USE INHALER: CPT

## 2017-07-24 PROCEDURE — 84100 ASSAY OF PHOSPHORUS: CPT

## 2017-07-24 PROCEDURE — 96372 THER/PROPH/DIAG INJ SC/IM: CPT

## 2017-07-24 PROCEDURE — 6370000000 HC RX 637 (ALT 250 FOR IP): Performed by: INTERNAL MEDICINE

## 2017-07-24 PROCEDURE — 93005 ELECTROCARDIOGRAM TRACING: CPT

## 2017-07-24 RX ORDER — DEXTROSE MONOHYDRATE 50 MG/ML
100 INJECTION, SOLUTION INTRAVENOUS PRN
Status: DISCONTINUED | OUTPATIENT
Start: 2017-07-24 | End: 2017-07-25 | Stop reason: HOSPADM

## 2017-07-24 RX ORDER — ACETAMINOPHEN 325 MG/1
650 TABLET ORAL EVERY 4 HOURS PRN
Status: DISCONTINUED | OUTPATIENT
Start: 2017-07-24 | End: 2017-07-25 | Stop reason: HOSPADM

## 2017-07-24 RX ORDER — ASPIRIN 81 MG/1
81 TABLET ORAL DAILY
Status: DISCONTINUED | OUTPATIENT
Start: 2017-07-24 | End: 2017-07-25 | Stop reason: HOSPADM

## 2017-07-24 RX ORDER — ALBUTEROL SULFATE 90 UG/1
2 AEROSOL, METERED RESPIRATORY (INHALATION) EVERY 6 HOURS PRN
Status: DISCONTINUED | OUTPATIENT
Start: 2017-07-24 | End: 2017-07-25 | Stop reason: HOSPADM

## 2017-07-24 RX ORDER — NITROGLYCERIN 0.4 MG/1
0.4 TABLET SUBLINGUAL EVERY 5 MIN PRN
Status: DISCONTINUED | OUTPATIENT
Start: 2017-07-24 | End: 2017-07-24

## 2017-07-24 RX ORDER — ONDANSETRON 2 MG/ML
4 INJECTION INTRAMUSCULAR; INTRAVENOUS EVERY 6 HOURS PRN
Status: DISCONTINUED | OUTPATIENT
Start: 2017-07-24 | End: 2017-07-25 | Stop reason: HOSPADM

## 2017-07-24 RX ORDER — NICOTINE 21 MG/24HR
1 PATCH, TRANSDERMAL 24 HOURS TRANSDERMAL DAILY
Status: DISCONTINUED | OUTPATIENT
Start: 2017-07-24 | End: 2017-07-25 | Stop reason: HOSPADM

## 2017-07-24 RX ORDER — DEXTROSE MONOHYDRATE 25 G/50ML
12.5 INJECTION, SOLUTION INTRAVENOUS PRN
Status: DISCONTINUED | OUTPATIENT
Start: 2017-07-24 | End: 2017-07-25 | Stop reason: HOSPADM

## 2017-07-24 RX ORDER — NICOTINE POLACRILEX 4 MG
15 LOZENGE BUCCAL PRN
Status: DISCONTINUED | OUTPATIENT
Start: 2017-07-24 | End: 2017-07-25 | Stop reason: HOSPADM

## 2017-07-24 RX ORDER — 0.9 % SODIUM CHLORIDE 0.9 %
100 INTRAVENOUS SOLUTION INTRAVENOUS ONCE
Status: COMPLETED | OUTPATIENT
Start: 2017-07-24 | End: 2017-07-24

## 2017-07-24 RX ORDER — ATORVASTATIN CALCIUM 40 MG/1
40 TABLET, FILM COATED ORAL NIGHTLY
Status: DISCONTINUED | OUTPATIENT
Start: 2017-07-24 | End: 2017-07-25 | Stop reason: HOSPADM

## 2017-07-24 RX ORDER — SODIUM CHLORIDE 0.9 % (FLUSH) 0.9 %
10 SYRINGE (ML) INJECTION EVERY 12 HOURS SCHEDULED
Status: DISCONTINUED | OUTPATIENT
Start: 2017-07-24 | End: 2017-07-25 | Stop reason: HOSPADM

## 2017-07-24 RX ORDER — NITROGLYCERIN 0.4 MG/1
0.4 TABLET SUBLINGUAL EVERY 5 MIN PRN
Status: DISCONTINUED | OUTPATIENT
Start: 2017-07-24 | End: 2017-07-25 | Stop reason: HOSPADM

## 2017-07-24 RX ORDER — ASPIRIN 81 MG/1
324 TABLET, CHEWABLE ORAL ONCE
Status: COMPLETED | OUTPATIENT
Start: 2017-07-24 | End: 2017-07-24

## 2017-07-24 RX ORDER — SODIUM CHLORIDE 0.9 % (FLUSH) 0.9 %
10 SYRINGE (ML) INJECTION PRN
Status: DISCONTINUED | OUTPATIENT
Start: 2017-07-24 | End: 2017-07-24

## 2017-07-24 RX ORDER — MORPHINE SULFATE 2 MG/ML
2 INJECTION, SOLUTION INTRAMUSCULAR; INTRAVENOUS
Status: DISCONTINUED | OUTPATIENT
Start: 2017-07-24 | End: 2017-07-25 | Stop reason: HOSPADM

## 2017-07-24 RX ORDER — MORPHINE SULFATE 4 MG/ML
4 INJECTION, SOLUTION INTRAMUSCULAR; INTRAVENOUS
Status: DISCONTINUED | OUTPATIENT
Start: 2017-07-24 | End: 2017-07-25 | Stop reason: HOSPADM

## 2017-07-24 RX ORDER — SODIUM CHLORIDE 0.9 % (FLUSH) 0.9 %
10 SYRINGE (ML) INJECTION PRN
Status: DISCONTINUED | OUTPATIENT
Start: 2017-07-24 | End: 2017-07-25 | Stop reason: HOSPADM

## 2017-07-24 RX ADMIN — Medication 10 ML: at 16:00

## 2017-07-24 RX ADMIN — ONDANSETRON 4 MG: 2 INJECTION INTRAMUSCULAR; INTRAVENOUS at 22:27

## 2017-07-24 RX ADMIN — MORPHINE SULFATE 2 MG: 2 INJECTION, SOLUTION INTRAMUSCULAR; INTRAVENOUS at 18:47

## 2017-07-24 RX ADMIN — METOPROLOL TARTRATE 25 MG: 25 TABLET ORAL at 22:13

## 2017-07-24 RX ADMIN — Medication 10 ML: at 22:17

## 2017-07-24 RX ADMIN — FAMOTIDINE 20 MG: 10 INJECTION, SOLUTION INTRAVENOUS at 22:13

## 2017-07-24 RX ADMIN — IOVERSOL 100 ML: 741 INJECTION INTRA-ARTERIAL; INTRAVENOUS at 16:00

## 2017-07-24 RX ADMIN — INSULIN LISPRO 1 UNITS: 100 INJECTION, SOLUTION INTRAVENOUS; SUBCUTANEOUS at 22:11

## 2017-07-24 RX ADMIN — ALBUTEROL SULFATE 2 PUFF: 90 AEROSOL, METERED RESPIRATORY (INHALATION) at 19:18

## 2017-07-24 RX ADMIN — MORPHINE SULFATE 4 MG: 4 INJECTION, SOLUTION INTRAMUSCULAR; INTRAVENOUS at 22:01

## 2017-07-24 RX ADMIN — NITROGLYCERIN 0.4 MG: 0.4 TABLET SUBLINGUAL at 16:28

## 2017-07-24 RX ADMIN — ATORVASTATIN CALCIUM 40 MG: 40 TABLET, FILM COATED ORAL at 22:12

## 2017-07-24 RX ADMIN — ASPIRIN 81 MG 324 MG: 81 TABLET ORAL at 14:17

## 2017-07-24 RX ADMIN — NITROGLYCERIN 0.4 MG: 0.4 TABLET SUBLINGUAL at 18:12

## 2017-07-24 RX ADMIN — ENOXAPARIN SODIUM 40 MG: 40 INJECTION SUBCUTANEOUS at 19:16

## 2017-07-24 RX ADMIN — SODIUM CHLORIDE 100 ML: 9 INJECTION, SOLUTION INTRAVENOUS at 16:00

## 2017-07-24 ASSESSMENT — ENCOUNTER SYMPTOMS
COUGH: 0
WHEEZING: 0
SHORTNESS OF BREATH: 0
SORE THROAT: 0
DIARRHEA: 0
ABDOMINAL PAIN: 0
VOMITING: 0
TROUBLE SWALLOWING: 0
NAUSEA: 1
CHEST TIGHTNESS: 1
BLOOD IN STOOL: 0

## 2017-07-24 ASSESSMENT — PAIN SCALES - GENERAL
PAINLEVEL_OUTOF10: 6
PAINLEVEL_OUTOF10: 10
PAINLEVEL_OUTOF10: 7
PAINLEVEL_OUTOF10: 8
PAINLEVEL_OUTOF10: 8
PAINLEVEL_OUTOF10: 10
PAINLEVEL_OUTOF10: 6
PAINLEVEL_OUTOF10: 8
PAINLEVEL_OUTOF10: 6

## 2017-07-24 ASSESSMENT — PAIN DESCRIPTION - PAIN TYPE
TYPE: ACUTE PAIN

## 2017-07-24 ASSESSMENT — PAIN DESCRIPTION - DESCRIPTORS
DESCRIPTORS: ACHING
DESCRIPTORS: ACHING;SHARP

## 2017-07-24 ASSESSMENT — PAIN DESCRIPTION - ORIENTATION
ORIENTATION: LEFT
ORIENTATION: LEFT

## 2017-07-24 ASSESSMENT — PAIN DESCRIPTION - LOCATION
LOCATION: CHEST

## 2017-07-24 NOTE — IP AVS SNAPSHOT
Patient Information     Patient Name BRANDEN Tavarez 1975         This is your updated medication list to keep with you all times      TAKE these medications     * albuterol sulfate  (90 Base) MCG/ACT inhaler       * albuterol sulfate  (90 Base) MCG/ACT inhaler   Commonly known as:  PROVENTIL HFA   Inhale 1-2 puffs into the lungs every 4 hours as needed for Wheezing or Shortness of Breath (Space out to every 6 hours as symptoms improve). Space out to every 6 hours as symptoms improve. * albuterol sulfate  (90 Base) MCG/ACT inhaler   Commonly known as:  PROVENTIL HFA   Inhale 2 puffs into the lungs every 6 hours as needed for Wheezing       aspirin 81 MG tablet       atorvastatin 40 MG tablet   Commonly known as:  LIPITOR   Take 1 tablet by mouth nightly       hydrOXYzine 25 MG capsule   Commonly known as:  VISTARIL       metFORMIN 500 MG tablet   Commonly known as:  GLUCOPHAGE   Take 1 tablet by mouth 2 times daily (with meals)       metoprolol tartrate 25 MG tablet   Commonly known as:  LOPRESSOR   Take 1 tablet by mouth 2 times daily       nitroGLYCERIN 0.4 MG SL tablet   Commonly known as:  NITROSTAT   up to max of 3 total doses. If no relief after 1 dose, call 911. * Notice: This list has 3 medication(s) that are the same as other medications prescribed for you. Read the directions carefully, and ask your doctor or other care provider to review them with you.

## 2017-07-24 NOTE — IP AVS SNAPSHOT
aged 15-65 years at least once (lifetime) who have never been HIV tested. 4/4/1990    Tetanus Combination Vaccine (1 - Tdap) 4/4/1994    Pneumococcal Vaccine - Pneumovax for adults aged 19-64 years with: chronic heart disease, chronic lung disease, diabetes mellitus, alcoholism, chronic liver disease, or cigarette smoking. (1 of 1 - PPSV23) 4/4/1994    Yearly Flu Vaccine (1) 8/1/2017    Diabetes Screening 5/8/2020    Cholesterol Screening 5/9/2022                 Care Plan Once You Return Home    This section includes instructions you will need to follow once you leave the hospital.  Your care team will discuss these with you, so you and those caring for you know how to best care for your health needs at home. This section may also include educational information about certain health topics that may be of help to you. Diet Instructions     ? Good nutrition is important when healing from an illness, injury, or surgery. Follow any nutrition recommendations given to you during your hospital stay. ? If you were given an oral nutrition supplement while in the hospital, continue to take this supplement at home. You can take it with meals, in-between meals, and/or before bedtime. These supplements can be purchased at most local grocery stores, pharmacies, and chain super-stores. ? If you have any questions about your diet or nutrition, call the hospital and ask for the dietitian. Activity Instructions     As tolerated            Important information for a smoker       SMOKING: QUIT SMOKING. THIS IS THE MOST IMPORTANT ACTION YOU CAN TAKE TO IMPROVE YOUR CURRENT AND FUTURE HEALTH. Call the [address] [place] [address] at [place] NOW ([phone])    Smoking harms nonsmokers. When nonsmokers are around people who smoke, they absorb nicotine, carbon monoxide, and other ingredients of tobacco smoke.      DO NOT SMOKE AROUND CHILDREN Children exposed to secondhand smoke are at an increased risk of:  Sudden Infant Death Syndrome (SIDS), acute respiratory infections, inflammation of the middle ear, and severe asthma. Over a longer time, it causes heart disease and lung cancer. There is no safe level of exposure to secondhand smoke. Glasses [employer]hart Signup     [facility] allows you to send messages to your doctor, view your test results, renew your prescriptions, schedule appointments, view visit notes, and more. How Do I Sign Up? 1. In your Internet browser, go to [email or web address]  2. Click on the Sign Up Now link in the Sign In box. You will see the New Member Sign Up page. 3. Enter your [facility] Access Code exactly as it appears below. You will not need to use this code after youve completed the sign-up process. If you do not sign up before the expiration date, you must request a new code. [facility] Access Code: [email or web address]  Expires: 9/20/2017  4:00 PM    4. Enter your Social Security Number ([id number]) and Date of Birth (mm/dd/yyyy) as indicated and click Submit. You will be taken to the next sign-up page. 5. Create a [facility] ID. This will be your [facility] login ID and cannot be changed, so think of one that is secure and easy to remember. 6. Create a [facility] password. You can change your password at any time. 7. Enter your Password Reset Question and Answer. This can be used at a later time if you forget your password. 8. Enter your e-mail address. You will receive e-mail notification when new information is available in [address]. 9. Click Sign Up. You can now view your medical record. Additional Information  If you have questions, please contact the physician practice where you receive care. Remember, [facility] is NOT to be used for urgent needs. For medical emergencies, dial 911. For questions regarding your [facility] account call [phone].  If you If you have worse or different chest pain or pressure that lasts more than 5 minutes or you passed out (lost consciousness), call 911 or seek other emergency help right away. A medical visit is only one step in your treatment. Even if you feel better, you still need to do what your doctor recommends, such as going to all suggested follow-up appointments and taking medicines exactly as directed. This will help you recover and help prevent future problems. How can you care for yourself at home? · Rest until you feel better. · Take your medicine exactly as prescribed. Call your doctor if you think you are having a problem with your medicine. · Do not drive after taking a prescription pain medicine. When should you call for help? Call 911 if:  · You passed out (lost consciousness). · You have severe difficulty breathing. · You have symptoms of a heart attack. These may include:  ¨ Chest pain or pressure, or a strange feeling in your chest.  ¨ Sweating. ¨ Shortness of breath. ¨ Nausea or vomiting. ¨ Pain, pressure, or a strange feeling in your back, neck, jaw, or upper belly or in one or both shoulders or arms. ¨ Lightheadedness or sudden weakness. ¨ A fast or irregular heartbeat. After you call 911, the  may tell you to chew 1 adult-strength or 2 to 4 low-dose aspirin. Wait for an ambulance. Do not try to drive yourself. Call your doctor today if:  · You have any trouble breathing. · Your chest pain gets worse. · You are dizzy or lightheaded, or you feel like you may faint. · You are not getting better as expected. · You are having new or different chest pain. Where can you learn more? Go to [email or web address] and sign in to your [employer] account. Enter A120 in the Tachyus box to learn more about \"Chest Pain: Care Instructions. \"     If you do not have an account, please click on the \"Sign Up Now\" link.   Current as of: May 27, 2016 · Drink plenty of fluids to help your body flush out the dye. If you have kidney, heart, or liver disease and have to limit fluids, talk with your doctor before you increase the amount of fluids you drink. · Keep eating a heart-healthy diet that has lots of fruits, vegetables, and whole grains. If you have not been eating this way, talk to your doctor. You also may want to talk to a dietitian. This expert can help you to learn about healthy foods and plan meals. Medicines  · Your doctor will tell you if and when you can restart your medicines. He or she will also give you instructions about taking any new medicines. · If you take blood thinners, such as warfarin (Coumadin), clopidogrel (Plavix), or aspirin, be sure to talk to your doctor. He or she will tell you if and when to start taking those medicines again. Make sure that you understand exactly what your doctor wants you to do. · Your doctor may prescribe a blood-thinning medicine like aspirin or clopidogrel (Plavix). It is very important that you take these medicines exactly as directed in order to keep the coronary artery open and reduce your risk of a heart attack. Be safe with medicines. Call your doctor if you think you are having a problem with your medicine. Care of the catheter site  · For the first 3 days, keep a bandage over the spot where the catheter was inserted. · Put ice or a cold pack on the area for 10 to 20 minutes at a time to help with soreness or swelling. Put a thin cloth between the ice and your skin. Follow-up care is a key part of your treatment and safety. Be sure to make and go to all appointments, and call your doctor if you are having problems. It's also a good idea to know your test results and keep a list of the medicines you take. When should you call for help? Call 911 anytime you think you may need emergency care. For example, call if:  · You passed out (lost consciousness). · You have severe trouble breathing.

## 2017-07-24 NOTE — H&P
every 6 hours as symptoms improve. 3/4/15  Yes Holley Flynn MD   albuterol sulfate HFA (PROVENTIL HFA) 108 (90 Base) MCG/ACT inhaler Inhale 2 puffs into the lungs every 6 hours as needed for Wheezing 7/22/17   Lila Perry PA-C   metoprolol tartrate (LOPRESSOR) 25 MG tablet Take 1 tablet by mouth 2 times daily 5/10/17   Meghana Boykin MD   albuterol (PROVENTIL HFA;VENTOLIN HFA) 108 (90 BASE) MCG/ACT inhaler Inhale 2 puffs into the lungs every 6 hours as needed for Wheezing. Historical Provider, MD       ALLERGIES     Review of patient's allergies indicates no known allergies. SOCIAL HISTORY      reports that he has been smoking Cigarettes. He has been smoking about 0.50 packs per day. He does not have any smokeless tobacco history on file. He reports that he drinks alcohol. He reports that he does not use illicit drugs. FAMILY HISTORY     family history includes Arrhythmia in his brother; Diabetes in his father and mother. REVIEW OF SYSTEMS     Review of Systems   Constitutional: Negative for appetite change, chills, fever and unexpected weight change. HENT: Negative for congestion, sore throat and trouble swallowing. Eyes: Negative for visual disturbance. Respiratory: Positive for chest tightness. Negative for cough, shortness of breath and wheezing. Cardiovascular: Positive for chest pain. Negative for leg swelling. Gastrointestinal: Positive for nausea. Negative for abdominal pain, blood in stool, diarrhea and vomiting. Genitourinary: Negative for difficulty urinating, dysuria and hematuria. Skin: Negative for rash. Neurological: Positive for dizziness. Negative for syncope. PHYSICAL EXAM      BP (!) 151/80  Pulse 91  Temp 98.4 °F (36.9 °C) (Oral)   Resp 16  Ht 5' 8\" (1.727 m)  Wt 170 lb (77.1 kg)  SpO2 96%  BMI 25.85 kg/m2    Physical Exam   Constitutional: He is oriented to person, place, and time and well-developed, well-nourished, and in no distress.  No distress. Cardiovascular: Normal rate, regular rhythm and normal heart sounds. No murmur heard. Pulmonary/Chest: Effort normal and breath sounds normal. He has no wheezes. He exhibits tenderness. Abdominal: Soft. Bowel sounds are normal. There is no tenderness. Musculoskeletal: He exhibits no edema. Neurological: He is alert and oriented to person, place, and time. Skin: Skin is warm and dry. No rash noted. He is not diaphoretic.        DIAGNOSTICS     Laboratory Testing:  Recent Results (from the past 24 hour(s))   EKG 12 lead    Collection Time: 07/24/17  1:11 PM   Result Value Ref Range    Ventricular Rate 102 BPM    Atrial Rate 102 BPM    P-R Interval 148 ms    QRS Duration 82 ms    Q-T Interval 308 ms    QTc Calculation (Bazett) 401 ms    P Axis 53 degrees    R Axis 76 degrees    T Axis 35 degrees   CBC Auto Differential    Collection Time: 07/24/17  1:15 PM   Result Value Ref Range    WBC 7.7 3.5 - 11.0 k/uL    RBC 4.64 4.5 - 5.9 m/uL    Hemoglobin 16.2 13.5 - 17.5 g/dL    Hematocrit 46.7 41 - 53 %    .6 (H) 80 - 100 fL    MCH 34.9 (H) 26 - 34 pg    MCHC 34.7 31 - 37 g/dL    RDW 13.4 11.5 - 14.9 %    Platelets 715 473 - 117 k/uL    MPV 7.6 6.0 - 12.0 fL    Differential Type NOT REPORTED     Seg Neutrophils 71 %    Lymphocytes 15 %    Monocytes 11 %    Eosinophils % 2 %    Basophils 1 %    Segs Absolute 5.50 1.3 - 9.1 k/uL    Absolute Lymph # 1.20 1.0 - 4.8 k/uL    Absolute Mono # 0.80 0.1 - 1.3 k/uL    Absolute Eos # 0.10 0.0 - 0.4 k/uL    Basophils # 0.10 0.0 - 0.2 k/uL    WBC Morphology NOT REPORTED     RBC Morphology NOT REPORTED     Platelet Estimate NOT REPORTED    Basic Metabolic Prof    Collection Time: 07/24/17  1:15 PM   Result Value Ref Range    Glucose 409 (HH) 70 - 99 mg/dL    BUN 7 6 - 20 mg/dL    CREATININE 0.80 0.70 - 1.20 mg/dL    Bun/Cre Ratio NOT REPORTED 9 - 20    Calcium 8.9 8.6 - 10.4 mg/dL    Sodium 135 135 - 144 mmol/L    Potassium 4.4 3.7 - 5.3 mmol/L    Chloride 96 (L) 98 - 107 mmol/L    CO2 24 20 - 31 mmol/L    Anion Gap 15 9 - 17 mmol/L    GFR Non-African American >60 >60 mL/min    GFR African American >60 >60 mL/min    GFR Comment          GFR Staging NOT REPORTED    Trop/Myoglobin    Collection Time: 07/24/17  1:15 PM   Result Value Ref Range    Troponin T <0.03 <0.03 ng/mL    Troponin Interp          Myoglobin 36 28 - 72 ng/mL   APTT    Collection Time: 07/24/17  1:15 PM   Result Value Ref Range    PTT 25.8 23.0 - 31.0 sec   PT    Collection Time: 07/24/17  1:15 PM   Result Value Ref Range    Protime 10.4 9.7 - 12.0 sec    INR 1.0        Imaging/Diagonstics:  Xr Chest Portable  Result Date: 7/24/2017  Stable negative chest.     Ct Chest Pulmonary Embolism W Contrast  Result Date: 7/24/2017  No evidence of pulmonary embolism or acute pulmonary abnormality. ASSESSMENT     Principal Problem:    Chest pain  Active Problems:    Presence of stent in LAD coronary artery    History of MI (myocardial infarction)    DM (diabetes mellitus screen)      PLAN   1. Chest pain, troponin negative x1, no EKG change  - continue trending troponins q6x2  - CTA neg for PE  - asa, morphine prn  - h/o CP with neg troponins and neg stress in May  - cardio consult for recs on repeat stress test    2. H/o MI and LAD stent in 2010  - continue home statin, lopressor    3. DM type II , on metformin  - hyperglycemia in ED, 409  - HbA1c in May 7.0    4. Asthma  - resp eval and tx  - albuterol prn        DVT prophylaxis: Lovenox 40 mg SC  GI prophylaxis: Famotidine 20 mg BID    Consultations:   Consults: IP CONSULT TO SOCIAL WORK  IP CONSULT TO CARDIOLOGY  PT/OT    This plan will be discussed with the rounding attending: Dr. Oziel rosario. Mynor Monae MD PGY-1  7/24/2017 4:40 PM  Internal Medicine Inpatient Service  [facility]      IM Attending    Pt seen and examined today   I have discussed the care of pt , including pertinent history and exam findings,  with the resident.  I have

## 2017-07-24 NOTE — ED NOTES
Pt. Arrives to ED with complaints of chest pain. Pt. States he has had the CP since around 5 am.  Pt. States it started in the lt. Chest and progressed to the right as well and radiating down both arms. It is now just a heavy pressure in his lt. Chest.  Pt. States he does have a prior history of MI.      Sameer Dutton RN  07/24/17 3632

## 2017-07-24 NOTE — ED PROVIDER NOTES
content normal.   Nursing note and vitals reviewed. MEDICAL DECISION MAKING:     Patient with known coronary artery disease risk factors and chest pain without recent workup will get admitted to the hospital as a coronary artery disease rule out. DIAGNOSTIC RESULTS     EKG: All EKG's are interpreted by the Emergency Department Physician who either signs or Co-signs this chart in the absence of a cardiologist.    Normal sinus rhythm nonspecific nondiagnostic EKG with normal intervals and normal axis. RADIOLOGY:All plain film, CT, MRI, and formal ultrasound images (except ED bedside ultrasound) are read by the radiologist and the images and interpretations are directly viewed by the emergency physician. CT Chest Pulmonary Embolism W Contrast   Final Result   No evidence of pulmonary embolism or acute pulmonary abnormality. XR Chest Portable   Final Result   Stable negative chest.                   LABS: All lab results were reviewed by myself, and all abnormals are listed below.   Labs Reviewed   CBC WITH AUTO DIFFERENTIAL - Abnormal; Notable for the following:        Result Value    .6 (*)     MCH 34.9 (*)     All other components within normal limits   BASIC METABOLIC PANEL - Abnormal; Notable for the following:     Glucose 409 (*)     Chloride 96 (*)     All other components within normal limits   BASIC METABOLIC PANEL - Abnormal; Notable for the following:     Glucose 158 (*)     All other components within normal limits   CBC WITH AUTO DIFFERENTIAL - Abnormal; Notable for the following:     .1 (*)     All other components within normal limits   POC GLUCOSE FINGERSTICK - Abnormal; Notable for the following:     POC Glucose 166 (*)     All other components within normal limits   POC GLUCOSE FINGERSTICK - Abnormal; Notable for the following:     POC Glucose 160 (*)     All other components within normal limits   POC GLUCOSE FINGERSTICK - Abnormal; Notable for the following: POC Glucose 161 (*)     All other components within normal limits   POC GLUCOSE FINGERSTICK - Abnormal; Notable for the following:     POC Glucose 143 (*)     All other components within normal limits   TROP/MYOGLOBIN   APTT   PROTIME-INR   MAGNESIUM   PHOSPHORUS   TSH WITH REFLEX   TROP/MYOGLOBIN   TROP/MYOGLOBIN         EMERGENCY DEPARTMENT COURSE:   Vitals:    Vitals:    07/24/17 1856 07/24/17 2158 07/25/17 0616 07/25/17 1753   BP: (!) 143/89 (!) 185/91 (!) 155/97 (!) 154/96   Pulse: 83 92 74 73   Resp: 16 14 20 18   Temp: 98.7 °F (37.1 °C) 98.7 °F (37.1 °C) 97.9 °F (36.6 °C) 98.7 °F (37.1 °C)   TempSrc: Oral Oral Oral Oral   SpO2: 97% 98% 98% 100%   Weight:       Height:           The patient was given the following medications while in the emergency department:  Orders Placed This Encounter   Medications    aspirin chewable tablet 324 mg    DISCONTD: nitroGLYCERIN (NITROSTAT) SL tablet 0.4 mg    DISCONTD: insulin lispro (HUMALOG) injection vial 0-6 Units    DISCONTD: insulin lispro (HUMALOG) injection vial 0-3 Units    0.9 % sodium chloride bolus    DISCONTD: sodium chloride flush 0.9 % injection 10 mL    ioversol (OPTIRAY) 74 % injection 100 mL    DISCONTD: sodium chloride flush 0.9 % injection 10 mL    DISCONTD: sodium chloride flush 0.9 % injection 10 mL    DISCONTD: acetaminophen (TYLENOL) tablet 650 mg    DISCONTD: morphine (PF) injection 2 mg    DISCONTD: morphine (PF) injection 4 mg    DISCONTD: magnesium hydroxide (MILK OF MAGNESIA) 400 MG/5ML suspension 30 mL    DISCONTD: ondansetron (ZOFRAN) injection 4 mg    DISCONTD: famotidine (PEPCID) injection 20 mg    DISCONTD: enoxaparin (LOVENOX) injection 40 mg    DISCONTD: insulin lispro (HUMALOG) injection vial 0-12 Units    DISCONTD: insulin lispro (HUMALOG) injection vial 0-6 Units    DISCONTD: glucose (GLUTOSE) 40 % oral gel 15 g    DISCONTD: dextrose 50 % solution 12.5 g    DISCONTD: glucagon (rDNA) injection 1 mg    DISCONTD:

## 2017-07-25 ENCOUNTER — HOSPITAL ENCOUNTER (OUTPATIENT)
Dept: CARDIAC CATH/INVASIVE PROCEDURES | Age: 42
Discharge: HOME | End: 2017-07-25
Payer: MEDICAID

## 2017-07-25 VITALS
BODY MASS INDEX: 26.53 KG/M2 | HEART RATE: 73 BPM | WEIGHT: 175.04 LBS | HEIGHT: 68 IN | DIASTOLIC BLOOD PRESSURE: 96 MMHG | TEMPERATURE: 98.7 F | RESPIRATION RATE: 18 BRPM | SYSTOLIC BLOOD PRESSURE: 154 MMHG | OXYGEN SATURATION: 100 %

## 2017-07-25 VITALS
DIASTOLIC BLOOD PRESSURE: 61 MMHG | RESPIRATION RATE: 16 BRPM | SYSTOLIC BLOOD PRESSURE: 120 MMHG | HEART RATE: 72 BPM | TEMPERATURE: 97.9 F | OXYGEN SATURATION: 95 %

## 2017-07-25 LAB
ABSOLUTE EOS #: 0.3 K/UL (ref 0–0.4)
ABSOLUTE LYMPH #: 2 K/UL (ref 1–4.8)
ABSOLUTE MONO #: 0.8 K/UL (ref 0.1–1.3)
ANION GAP SERPL CALCULATED.3IONS-SCNC: 12 MMOL/L (ref 9–17)
BASOPHILS # BLD: 1 %
BASOPHILS ABSOLUTE: 0.1 K/UL (ref 0–0.2)
BUN BLDV-MCNC: 14 MG/DL (ref 6–20)
BUN/CREAT BLD: ABNORMAL (ref 9–20)
CALCIUM SERPL-MCNC: 9.3 MG/DL (ref 8.6–10.4)
CHLORIDE BLD-SCNC: 99 MMOL/L (ref 98–107)
CO2: 28 MMOL/L (ref 20–31)
CREAT SERPL-MCNC: 0.9 MG/DL (ref 0.7–1.2)
DIFFERENTIAL TYPE: ABNORMAL
EKG ATRIAL RATE: 102 BPM
EKG P AXIS: 53 DEGREES
EKG P-R INTERVAL: 148 MS
EKG Q-T INTERVAL: 308 MS
EKG QRS DURATION: 82 MS
EKG QTC CALCULATION (BAZETT): 401 MS
EKG R AXIS: 76 DEGREES
EKG T AXIS: 35 DEGREES
EKG VENTRICULAR RATE: 102 BPM
EOSINOPHILS RELATIVE PERCENT: 4 %
GFR AFRICAN AMERICAN: >60 ML/MIN
GFR NON-AFRICAN AMERICAN: >60 ML/MIN
GFR SERPL CREATININE-BSD FRML MDRD: ABNORMAL ML/MIN/{1.73_M2}
GFR SERPL CREATININE-BSD FRML MDRD: ABNORMAL ML/MIN/{1.73_M2}
GLUCOSE BLD-MCNC: 143 MG/DL (ref 75–110)
GLUCOSE BLD-MCNC: 158 MG/DL (ref 70–99)
GLUCOSE BLD-MCNC: 160 MG/DL (ref 75–110)
GLUCOSE BLD-MCNC: 161 MG/DL (ref 75–110)
HCT VFR BLD CALC: 48 % (ref 41–53)
HEMOGLOBIN: 16.3 G/DL (ref 13.5–17.5)
LYMPHOCYTES # BLD: 29 %
MCH RBC QN AUTO: 34 PG (ref 26–34)
MCHC RBC AUTO-ENTMCNC: 34 G/DL (ref 31–37)
MCV RBC AUTO: 100.1 FL (ref 80–100)
MONOCYTES # BLD: 12 %
MYOGLOBIN: 28 NG/ML (ref 28–72)
PDW BLD-RTO: 13.3 % (ref 11.5–14.9)
PLATELET # BLD: 302 K/UL (ref 150–450)
PLATELET ESTIMATE: ABNORMAL
PMV BLD AUTO: 7 FL (ref 6–12)
POTASSIUM SERPL-SCNC: 4.9 MMOL/L (ref 3.7–5.3)
RBC # BLD: 4.8 M/UL (ref 4.5–5.9)
RBC # BLD: ABNORMAL 10*6/UL
SEG NEUTROPHILS: 54 %
SEGMENTED NEUTROPHILS ABSOLUTE COUNT: 3.8 K/UL (ref 1.3–9.1)
SODIUM BLD-SCNC: 139 MMOL/L (ref 135–144)
TROPONIN INTERP: NORMAL
TROPONIN T: <0.03 NG/ML
WBC # BLD: 6.9 K/UL (ref 3.5–11)
WBC # BLD: ABNORMAL 10*3/UL

## 2017-07-25 PROCEDURE — C1769 GUIDE WIRE: HCPCS

## 2017-07-25 PROCEDURE — 7100000011 HC PHASE II RECOVERY - ADDTL 15 MIN

## 2017-07-25 PROCEDURE — C1894 INTRO/SHEATH, NON-LASER: HCPCS

## 2017-07-25 PROCEDURE — 93458 L HRT ARTERY/VENTRICLE ANGIO: CPT | Performed by: INTERNAL MEDICINE

## 2017-07-25 PROCEDURE — 6370000000 HC RX 637 (ALT 250 FOR IP): Performed by: INTERNAL MEDICINE

## 2017-07-25 PROCEDURE — 36415 COLL VENOUS BLD VENIPUNCTURE: CPT

## 2017-07-25 PROCEDURE — 7100000010 HC PHASE II RECOVERY - FIRST 15 MIN

## 2017-07-25 PROCEDURE — 2580000003 HC RX 258: Performed by: FAMILY MEDICINE

## 2017-07-25 PROCEDURE — 2500000003 HC RX 250 WO HCPCS: Performed by: FAMILY MEDICINE

## 2017-07-25 PROCEDURE — 85025 COMPLETE CBC W/AUTO DIFF WBC: CPT

## 2017-07-25 PROCEDURE — S0028 INJECTION, FAMOTIDINE, 20 MG: HCPCS | Performed by: FAMILY MEDICINE

## 2017-07-25 PROCEDURE — 99225 PR SBSQ OBSERVATION CARE/DAY 25 MINUTES: CPT | Performed by: INTERNAL MEDICINE

## 2017-07-25 PROCEDURE — 6370000000 HC RX 637 (ALT 250 FOR IP): Performed by: FAMILY MEDICINE

## 2017-07-25 PROCEDURE — 6360000002 HC RX W HCPCS

## 2017-07-25 PROCEDURE — 96376 TX/PRO/DX INJ SAME DRUG ADON: CPT

## 2017-07-25 PROCEDURE — 96372 THER/PROPH/DIAG INJ SC/IM: CPT

## 2017-07-25 PROCEDURE — G0378 HOSPITAL OBSERVATION PER HR: HCPCS

## 2017-07-25 PROCEDURE — 82947 ASSAY GLUCOSE BLOOD QUANT: CPT

## 2017-07-25 PROCEDURE — G8978 MOBILITY CURRENT STATUS: HCPCS

## 2017-07-25 PROCEDURE — C1725 CATH, TRANSLUMIN NON-LASER: HCPCS

## 2017-07-25 PROCEDURE — 97161 PT EVAL LOW COMPLEX 20 MIN: CPT

## 2017-07-25 PROCEDURE — 6360000002 HC RX W HCPCS: Performed by: FAMILY MEDICINE

## 2017-07-25 PROCEDURE — G8979 MOBILITY GOAL STATUS: HCPCS

## 2017-07-25 PROCEDURE — 80048 BASIC METABOLIC PNL TOTAL CA: CPT

## 2017-07-25 RX ORDER — ACETAMINOPHEN 325 MG/1
650 TABLET ORAL EVERY 4 HOURS PRN
OUTPATIENT
Start: 2017-07-25

## 2017-07-25 RX ORDER — SODIUM CHLORIDE 0.9 % (FLUSH) 0.9 %
10 SYRINGE (ML) INJECTION PRN
OUTPATIENT
Start: 2017-07-25

## 2017-07-25 RX ORDER — ONDANSETRON 2 MG/ML
4 INJECTION INTRAMUSCULAR; INTRAVENOUS EVERY 6 HOURS PRN
OUTPATIENT
Start: 2017-07-25

## 2017-07-25 RX ORDER — SODIUM CHLORIDE 9 MG/ML
INJECTION, SOLUTION INTRAVENOUS CONTINUOUS
OUTPATIENT
Start: 2017-07-25 | End: 2017-07-28

## 2017-07-25 RX ORDER — NITROGLYCERIN 0.4 MG/1
TABLET SUBLINGUAL
Qty: 25 TABLET | Refills: 3 | Status: SHIPPED | OUTPATIENT
Start: 2017-07-25 | End: 2020-10-18

## 2017-07-25 RX ORDER — SODIUM CHLORIDE 0.9 % (FLUSH) 0.9 %
10 SYRINGE (ML) INJECTION EVERY 12 HOURS SCHEDULED
OUTPATIENT
Start: 2017-07-25

## 2017-07-25 RX ADMIN — FAMOTIDINE 20 MG: 10 INJECTION, SOLUTION INTRAVENOUS at 08:19

## 2017-07-25 RX ADMIN — MORPHINE SULFATE 2 MG: 2 INJECTION, SOLUTION INTRAMUSCULAR; INTRAVENOUS at 01:29

## 2017-07-25 RX ADMIN — ONDANSETRON 4 MG: 2 INJECTION INTRAMUSCULAR; INTRAVENOUS at 10:12

## 2017-07-25 RX ADMIN — Medication 10 ML: at 10:12

## 2017-07-25 RX ADMIN — INSULIN LISPRO 2 UNITS: 100 INJECTION, SOLUTION INTRAVENOUS; SUBCUTANEOUS at 08:26

## 2017-07-25 RX ADMIN — MORPHINE SULFATE 4 MG: 4 INJECTION, SOLUTION INTRAMUSCULAR; INTRAVENOUS at 18:19

## 2017-07-25 RX ADMIN — MORPHINE SULFATE 4 MG: 4 INJECTION, SOLUTION INTRAMUSCULAR; INTRAVENOUS at 11:26

## 2017-07-25 RX ADMIN — ENOXAPARIN SODIUM 40 MG: 40 INJECTION SUBCUTANEOUS at 08:19

## 2017-07-25 RX ADMIN — MORPHINE SULFATE 4 MG: 4 INJECTION, SOLUTION INTRAMUSCULAR; INTRAVENOUS at 08:18

## 2017-07-25 RX ADMIN — MORPHINE SULFATE 2 MG: 2 INJECTION, SOLUTION INTRAMUSCULAR; INTRAVENOUS at 04:33

## 2017-07-25 RX ADMIN — METOPROLOL TARTRATE 25 MG: 25 TABLET ORAL at 08:19

## 2017-07-25 RX ADMIN — ASPIRIN 81 MG: 81 TABLET, COATED ORAL at 08:19

## 2017-07-25 ASSESSMENT — ENCOUNTER SYMPTOMS
ABDOMINAL PAIN: 0
COUGH: 0
WHEEZING: 0
NAUSEA: 1
ABDOMINAL DISTENTION: 0
DIARRHEA: 0
STRIDOR: 0
CHEST TIGHTNESS: 1
BACK PAIN: 0
SHORTNESS OF BREATH: 0
CONSTIPATION: 0

## 2017-07-25 ASSESSMENT — PAIN DESCRIPTION - FREQUENCY: FREQUENCY: CONTINUOUS

## 2017-07-25 ASSESSMENT — PAIN DESCRIPTION - DESCRIPTORS
DESCRIPTORS: SHARP;PRESSURE
DESCRIPTORS: SHARP
DESCRIPTORS: SHARP;ACHING

## 2017-07-25 ASSESSMENT — PAIN DESCRIPTION - PAIN TYPE
TYPE: ACUTE PAIN

## 2017-07-25 ASSESSMENT — PAIN SCALES - GENERAL
PAINLEVEL_OUTOF10: 9
PAINLEVEL_OUTOF10: 7
PAINLEVEL_OUTOF10: 8
PAINLEVEL_OUTOF10: 4
PAINLEVEL_OUTOF10: 9
PAINLEVEL_OUTOF10: 0
PAINLEVEL_OUTOF10: 10
PAINLEVEL_OUTOF10: 0

## 2017-07-25 ASSESSMENT — PAIN DESCRIPTION - ONSET: ONSET: ON-GOING

## 2017-07-25 ASSESSMENT — PAIN DESCRIPTION - LOCATION
LOCATION: CHEST

## 2017-07-25 ASSESSMENT — PAIN DESCRIPTION - PROGRESSION: CLINICAL_PROGRESSION: GRADUALLY IMPROVING

## 2017-07-25 NOTE — CONSULTS
[facility]   Consult Note                 Date:   7/25/2017  Date of admission:  7/24/2017  1:06 PM  MRN:   [id number]  YOB: 1975    Reason for Consult:  Chest pain    HISTORY OF PRESENT ILLNESS:    The patient is a 43 y.o.  male who is admitted to the hospital for:Patient woke up  chest pain   retrosternal area with radiation to left arm , neck ,associated with shortness of breath , palpitation,  nausea / vomiting or diaphoresis . Yes and No relation with exertion  . Yes worsening recently . Had  H/O  previous    Angina, MI , stent to LAD 2010. Past Medical History:   has a past medical history of Asthma; CAD (coronary artery disease); and Diabetes mellitus ([facility] [address] Past Surgical History:   has a past surgical history that includes Coronary angioplasty with stent (2010). Home Medications:    Prior to Admission medications    Medication Sig Start Date End Date Taking? Authorizing Provider   atorvastatin (LIPITOR) 40 MG tablet Take 1 tablet by mouth nightly 5/10/17  Yes Buffy Mcbride MD   metFORMIN (GLUCOPHAGE) 500 MG tablet Take 1 tablet by mouth 2 times daily (with meals) 5/10/17  Yes Buffy Mcbride MD   hydrOXYzine (VISTARIL) 25 MG capsule Take 25 mg by mouth 4 times daily as needed for Itching   Yes Historical Provider, MD   aspirin 81 MG tablet Take 81 mg by mouth daily. Yes Historical Provider, MD   albuterol (PROVENTIL HFA) 108 (90 BASE) MCG/ACT inhaler Inhale 1-2 puffs into the lungs every 4 hours as needed for Wheezing or Shortness of Breath (Space out to every 6 hours as symptoms improve). Space out to every 6 hours as symptoms improve.  3/4/15  Yes Saundra Nyhan Case, MD   albuterol sulfate HFA (PROVENTIL HFA) 108 (90 Base) MCG/ACT inhaler Inhale 2 puffs into the lungs every 6 hours as needed for Wheezing 7/22/17   Nancy Ritter PA-C   metoprolol tartrate (LOPRESSOR) 25 MG tablet Take 1 tablet by mouth 2 times daily 5/10/17   Buffy Mcbride MD   albuterol LIVER PROFILE:No results for input(s): AST, ALT, LABALBU in the last 72 hours. Intake/Output Summary (Last 24 hours) at 07/25/17 1122  Last data filed at 07/24/17 2300   Gross per 24 hour   Intake              240 ml   Output                0 ml   Net              240 ml       IMPRESSION:    Patient Active Problem List   Diagnosis    Chest pain    Presence of stent in LAD coronary artery    History of MI (myocardial infarction)    DM (diabetes mellitus screen)    Anxiety           RECOMMENDATIONS:  GIVEN TYPICAL SYMPTOMS OF UNSTABLE ANGINA , NOT ON ANY HEART MEDS , DIABETIC , SMOKER   PLAN TO DO THE CATH TODAY . HTN ON HIGHER SIDE , ON  BB WITH PARAMETERS   PT SHOULD ALSO BE ON ACE-I WILL START AFTER THE CATH  SMOKING CESSATION   RISK MODIFICATIONS            Discussed with patient and nursing.     Andrew M[facility]quez [facility]        [phone]

## 2017-07-25 NOTE — PROGRESS NOTES
Pt rates pain at 9/10. Pt describes sharp chest pain. No distress noted. Pt resting comfortably in bed playing on phone and watching tv. Will continue to monitor.

## 2017-07-25 NOTE — PROGRESS NOTES
Incentive Spirometry education and demonstration given by Respiratory Therapy. Minimum Predicted Vital Capacity is 2500 mL. Pt achieving 3500 mL at time of instruction.       Pauline Becca  9:11 PM

## 2017-07-25 NOTE — PLAN OF CARE
Problem: Pain:  Goal: Pain level will decrease  Pain level will decrease   Outcome: Ongoing  Nitro and MS given for c/0 of left chest pain with some relief of jacqueline

## 2017-07-25 NOTE — DISCHARGE SUMMARY
[address] [facility]    Discharge Summary      NAME:  Mk Bo  :  1975  MRN:  [id number]    Admit date:  2017  Discharge date:   2017    Admitting Physician:  Shreya Chapman MD    Primary Diagnosis on Admission:   Present on Admission:   Chest pain   Presence of stent in LAD coronary artery   History of MI (myocardial infarction)   DM (diabetes mellitus screen)      Secondary Diagnoses:   does not have any pertinent problems on file. Admission Condition: poor  Discharged Condition: stable    Hospital Course: The patient was admitted for the management of chest pain. On admission he was found to have negative Troponins and no acute changes on EKG suggesting an ACS, however due to his extensive cardiac history and risk factors cardiology consultation suggested a cardiac cath. Cath showed mild CAD, and patient was started on appropriate preventative medications. Today on day of discharge pt feels better with no further complaints. Vitals and Labs are at pts baseline. All consultants involved during this admission are agreeable to d/c. Consults:  cardiology    Significant Diagnostic/theraputic interventions: EKG, cath      Lab Results   Component Value Date    WBC 6.9 2017    HGB 16.3 2017    HCT 48.0 2017     2017    CHOL 196 2017    TRIG 264 (H) 2017    HDL 27 (L) 2017     2017    K 4.9 2017    CL 99 2017    CREATININE 0.90 2017    BUN 14 2017    CO2 28 2017    TSH 2.18 2017    INR 1.0 2017    LABA1C 7.0 (H) 2017         Disposition:  home    Instructions to Patient:   -take all meds as prescribed  -return to ED if symptoms worsen  f/u with PCP in 1 week      Follow up with No primary care provider on file. in 1 week    No follow-up provider specified.     Discharge Medications:     Kasandra Watson

## 2017-07-25 NOTE — PROGRESS NOTES
Physical Therapy    Facility/Department: [facility] MED SURG  Initial Assessment    NAME: Carmen Doyle  : 1975  MRN: [id number]    Date of Service: 2017    Patient Diagnosis(es):   Patient Active Problem List    Diagnosis Date Noted    Chest pain 2017    Presence of stent in LAD coronary artery 2017    History of MI (myocardial infarction) 2017    DM (diabetes mellitus screen) 2017    Anxiety 2017       Past Medical History:   Diagnosis Date    Asthma     CAD (coronary artery disease)     Diabetes mellitus ([facility] [address]      Past Surgical History:   Procedure Laterality Date    CORONARY ANGIOPLASTY WITH STENT PLACEMENT           Restrictions  Restrictions/Precautions  Restrictions/Precautions:  (NPO)  Required Braces or Orthoses?: No  Implants present? : Metal implants (cardiac stent)  Vision/Hearing  Vision: Impaired  Vision Exceptions:  (uses contacts)  Hearing: Within functional limits     Subjective  General  Patient assessed for rehabilitation services?: Yes  Response To Previous Treatment: Not applicable  Family / Caregiver Present: No  Referring Practitioner: Dr. Remy Shi  Referral Date : 17  Diagnosis: chest pain  Follows Commands: Within Functional Limits  Subjective  Subjective: pt reports that he had chest tightness on Monday morning. Pt reports that he has had chest pain and nausea since. Pt reports that he had a mild heart attack 7 years ago with stent placed.   Pain Screening  Patient Currently in Pain: Yes  Pain Assessment  Pain Assessment: 0-10  Pain Level: 4  Pain Type: Acute pain  Pain Location: Chest  Pain Descriptors: Sharp;Pressure  Pain Frequency: Continuous  Pain Onset: On-going  Clinical Progression: Gradually improving  Patient's Stated Pain Goal: No pain  Pain Intervention(s): Medication (see eMar);Repositioned (just had meds)  Response to Pain Intervention: Patient Satisfied  Multiple Pain Sites: No  Vital Signs  Patient Currently in Pain: Yes       Orientation  Orientation  Overall Orientation Status: Within Normal Limits    Social/Functional History  Social/Functional History  Lives With:  (roommate)  Type of Home: Apartment (ground level apartment)  Home Access: Level entry  Bathroom Shower/Tub: Tub/Shower unit, Curtain  Bathroom Toilet: Standard  Bathroom Equipment: Grab bars in shower, Hand-held shower  Home Equipment:  (no DME)  ADL Assistance: Independent  Homemaking Assistance: Independent (pt is primary for cooking, cleaning and laundry)  Homemaking Responsibilities: Yes (pt is primary for cooking, cleaning and laundry)  Ambulation Assistance: Independent (no device)  Transfer Assistance: Independent  Active : Yes  Mode of Transportation: Car  Occupation: Unemployed  Objective     Observation/Palpation  Posture: Good    AROM RLE (degrees)  RLE AROM: WFL  AROM LLE (degrees)  LLE AROM : WFL  AROM RUE (degrees)  RUE General AROM: see OT for UE assessment  AROM LUE (degrees)  LUE General AROM: see OT for UE assessment  Strength RLE  Strength RLE: WFL  Comment: 5/5  Strength LLE  Strength LLE: WFL  Comment: 5/5  Strength RUE  Comment: see OT for UE assessment  Strength LUE  Comment: see OT for UE assessment     Sensation  Overall Sensation Status:  (C/O intermittant numbness bilateral hands and right great toe)  Bed mobility  Rolling to Left: Independent  Supine to Sit: Independent  Sit to Supine: Independent  Scooting: Independent  Comment: dangled at the EOB x 5 mins independently  Transfers  Sit to Stand: Independent  Stand to sit:  Independent  Stand Pivot Transfers: Independent  Ambulation  Ambulation?: Yes  Ambulation 1  Surface: level tile  Device: No Device  Quality of Gait: further gait distance deferred due to C/O nausea and dizziness- pt was returned to bed  Distance: 15' away from bed independently but then returned w/ CGA to bed due to C/O dizziness  Comments: pt does not feel that he needs PT at current time- C/O dizziness from NPO status  Stairs/Curb  Stairs?: No (denies need for practice)     Balance  Sitting - Static: Good  Sitting - Dynamic: Good  Standing - Static: Good  Standing - Dynamic: Good;-        Assessment   Treatment Diagnosis: chest pain  Specific instructions for Next Treatment: 7-25-17 D/C PT as pt is independent in bed mobility, transfers and gait, pt feels no need for skilled PT  Prognosis: Excellent  Decision Making: Low Complexity  History: presented w/ C/O chest pain, dizziness and nausea  Exam: ROM, MMT, balance and mobility assessed  Clinical Presentation: pt had dizzy spell while walking and was returned to bed, pt feels that he does not need skilled PT at this time  Patient Education: POC  Barriers to Learning: none  No Skilled PT: Independent with functional mobility   REQUIRES PT FOLLOW UP: No  Activity Tolerance  Activity Tolerance: Other (C/O dizziness limiting distance to day but otherwise independent)  PT Equipment Recommendations  Equipment Needed: No     Discharge Recommendations:  Home with assist PRN      Plan   Plan  Times per week: D/C PT as pt is independent in bed mobility, transfers and gait, pt feels no need for skilled PT  Specific instructions for Next Treatment: 7-25-17 D/C PT as pt is independent in bed mobility, transfers and gait, pt feels no need for skilled PT  Safety Devices  Type of devices: Call light within reach, Left in bed    G-Code  PT G-Codes  Functional Assessment Tool Used: [place] Functional Outcomes  Score: 21  Functional Limitation: Mobility: Walking and moving around  Mobility: Walking and Moving Around Current Status ():  At least 20 percent but less than 40 percent impaired, limited or restricted  Mobility: Walking and Moving Around Goal Status (): 0 percent impaired, limited or restricted    OutComes Score                                             Goals  Short term goals  Time Frame for Short term goals: D/C PT as pt is independent in bed mobility, transfers and gait, pt feels no need for skilled PT  Patient Goals   Patient goals : return home at D/C       Therapy Time   Individual Concurrent Group Co-treatment   Time In 0842         Time Out 0855         Minutes Gloria, PT

## 2017-07-25 NOTE — PROGRESS NOTES
[address].    PROGRESS NOTE             Date:   7/25/2017  Patient name:  Matthew Wilcox  Date of admission:  7/24/2017  1:06 PM  MRN:   [id number]  YOB: 1975    SUBJECTIVE/ LAST 24 HOURS     No acute overnight events. Patient seen and examined at bedside. Reports continued chest pain, same as yesterday. Denies SOB, nausea, vomiting, diarrhea, constipation. Denies further needs at this time. NPO due to possible repeat stress test.     CHIEF COMPLAINT     Chief Complaint   Patient presents with    Chest Pain     History Obtained From:  Patient and chart review. HISTORY OF PRESENT ILLNESS      The patient is a 43 y.o.  male who is admitted to the hospital for    [facility]       has a past medical history of Asthma; CAD (coronary artery disease); and Diabetes mellitus ([facility] [address] PAST SURGICAL HISTORY       has a past surgical history that includes Coronary angioplasty with stent (2010). HOME MEDICATIONS        Prior to Admission medications    Medication Sig Start Date End Date Taking? Authorizing Provider   atorvastatin (LIPITOR) 40 MG tablet Take 1 tablet by mouth nightly 5/10/17  Yes Michelle Zepeda MD   metFORMIN (GLUCOPHAGE) 500 MG tablet Take 1 tablet by mouth 2 times daily (with meals) 5/10/17  Yes Michelle Zepeda MD   hydrOXYzine (VISTARIL) 25 MG capsule Take 25 mg by mouth 4 times daily as needed for Itching   Yes Historical Provider, MD   aspirin 81 MG tablet Take 81 mg by mouth daily. Yes Historical Provider, MD   albuterol (PROVENTIL HFA) 108 (90 BASE) MCG/ACT inhaler Inhale 1-2 puffs into the lungs every 4 hours as needed for Wheezing or Shortness of Breath (Space out to every 6 hours as symptoms improve). Space out to every 6 hours as symptoms improve.  3/4/15  Yes Joni Flynn MD   albuterol sulfate HFA (PROVENTIL HFA) 108 (90 Base) MCG/ACT inhaler Inhale 2 puffs into the lungs every 6 hours as needed for Wheezing 7/22/17   Christiano Wright PA-C   metoprolol tartrate (LOPRESSOR) 25 MG tablet Take 1 tablet by mouth 2 times daily 5/10/17   Maryan Lucio MD   albuterol (PROVENTIL HFA;VENTOLIN HFA) 108 (90 BASE) MCG/ACT inhaler Inhale 2 puffs into the lungs every 6 hours as needed for Wheezing. Historical Provider, MD       ALLERGIES      Review of patient's allergies indicates no known allergies. SOCIAL HISTORY       reports that he has been smoking Cigarettes. He has been smoking about 1.00 pack per day. He does not have any smokeless tobacco history on file. He reports that he drinks alcohol. He reports that he does not use illicit drugs. FAMILY HISTORY      family history includes Arrhythmia in his brother; Diabetes in his father and mother. REVIEW OF SYSTEMS      Review of Systems   Constitutional: Negative for diaphoresis and fatigue. HENT: Negative for congestion. Respiratory: Positive for chest tightness. Negative for cough, shortness of breath, wheezing and stridor. Cardiovascular: Positive for chest pain. Negative for palpitations and leg swelling. Gastrointestinal: Positive for nausea. Negative for abdominal distention, abdominal pain, constipation and diarrhea. Genitourinary: Negative for dysuria. Musculoskeletal: Negative for arthralgias and back pain. Neurological: Negative for dizziness, light-headedness and headaches. Psychiatric/Behavioral: Negative for agitation and confusion. PHYSICAL EXAM    BP (!) 155/97  Pulse 74  Temp 97.9 °F (36.6 °C) (Oral)   Resp 20  Ht 5' 8\" (1.727 m)  Wt 175 lb 0.7 oz (79.4 kg)  SpO2 98%  BMI 26.62 kg/m2     · General appearance: well nourished  · HEENT: Head: Normocephalic, no lesions, without obvious abnormality.  no icterus no redness  · Lungs: clear to auscultation bilaterally  · Heart: regular rate and rhythm, S1, S2 normal, no murmur, click, rub or gallop  · Abdomen: soft, non-tender; bowel sounds normal; no masses,  no organomegaly  · Extremities: extremities normal, atraumatic, no cyanosis or edema  · Neurological: No facial droop  · Skin: No rash, no lump   · Psych: Normal affect   · Lymphatic: No lymphadenopathy no splenomegaly     DIAGNOSTICS      Laboratory Testing:  Recent Results (from the past 24 hour(s))   EKG 12 lead    Collection Time: 07/24/17  1:11 PM   Result Value Ref Range    Ventricular Rate 102 BPM    Atrial Rate 102 BPM    P-R Interval 148 ms    QRS Duration 82 ms    Q-T Interval 308 ms    QTc Calculation (Bazett) 401 ms    P Axis 53 degrees    R Axis 76 degrees    T Axis 35 degrees   CBC Auto Differential    Collection Time: 07/24/17  1:15 PM   Result Value Ref Range    WBC 7.7 3.5 - 11.0 k/uL    RBC 4.64 4.5 - 5.9 m/uL    Hemoglobin 16.2 13.5 - 17.5 g/dL    Hematocrit 46.7 41 - 53 %    .6 (H) 80 - 100 fL    MCH 34.9 (H) 26 - 34 pg    MCHC 34.7 31 - 37 g/dL    RDW 13.4 11.5 - 14.9 %    Platelets 153 876 - 361 k/uL    MPV 7.6 6.0 - 12.0 fL    Differential Type NOT REPORTED     Seg Neutrophils 71 %    Lymphocytes 15 %    Monocytes 11 %    Eosinophils % 2 %    Basophils 1 %    Segs Absolute 5.50 1.3 - 9.1 k/uL    Absolute Lymph # 1.20 1.0 - 4.8 k/uL    Absolute Mono # 0.80 0.1 - 1.3 k/uL    Absolute Eos # 0.10 0.0 - 0.4 k/uL    Basophils # 0.10 0.0 - 0.2 k/uL    WBC Morphology NOT REPORTED     RBC Morphology NOT REPORTED     Platelet Estimate NOT REPORTED    Basic Metabolic Prof    Collection Time: 07/24/17  1:15 PM   Result Value Ref Range    Glucose 409 (HH) 70 - 99 mg/dL    BUN 7 6 - 20 mg/dL    CREATININE 0.80 0.70 - 1.20 mg/dL    Bun/Cre Ratio NOT REPORTED 9 - 20    Calcium 8.9 8.6 - 10.4 mg/dL    Sodium 135 135 - 144 mmol/L    Potassium 4.4 3.7 - 5.3 mmol/L    Chloride 96 (L) 98 - 107 mmol/L    CO2 24 20 - 31 mmol/L    Anion Gap 15 9 - 17 mmol/L    GFR Non-African American >60 >60 mL/min    GFR African American >60 >60 mL/min    GFR Comment          GFR Staging NOT REPORTED Trop/Myoglobin    Collection Time: 07/24/17  1:15 PM   Result Value Ref Range    Troponin T <0.03 <0.03 ng/mL    Troponin Interp          Myoglobin 36 28 - 72 ng/mL   APTT    Collection Time: 07/24/17  1:15 PM   Result Value Ref Range    PTT 25.8 23.0 - 31.0 sec   PT    Collection Time: 07/24/17  1:15 PM   Result Value Ref Range    Protime 10.4 9.7 - 12.0 sec    INR 1.0    Magnesium    Collection Time: 07/24/17  6:35 PM   Result Value Ref Range    Magnesium 2.1 1.6 - 2.6 mg/dL   Phosphorus, Inorg.     Collection Time: 07/24/17  6:35 PM   Result Value Ref Range    Phosphorus 3.9 2.5 - 4.5 mg/dL   TSH w/reflex to FT4    Collection Time: 07/24/17  6:35 PM   Result Value Ref Range    TSH 2.18 0.30 - 5.00 mIU/L   TROP/MYOGLOBIN    Collection Time: 07/24/17  6:35 PM   Result Value Ref Range    Troponin T <0.03 <0.03 ng/mL    Troponin Interp          Myoglobin 36 28 - 72 ng/mL   POC Glucose Fingerstick    Collection Time: 07/24/17  8:02 PM   Result Value Ref Range    POC Glucose 166 (H) 75 - 110 mg/dL   TROP/MYOGLOBIN    Collection Time: 07/24/17 11:54 PM   Result Value Ref Range    Troponin T <0.03 <0.03 ng/mL    Troponin Interp          Myoglobin 28 28 - 72 ng/mL   Basic metabolic panel daily start tomorrow    Collection Time: 07/25/17  5:47 AM   Result Value Ref Range    Glucose 158 (H) 70 - 99 mg/dL    BUN 14 6 - 20 mg/dL    CREATININE 0.90 0.70 - 1.20 mg/dL    Bun/Cre Ratio NOT REPORTED 9 - 20    Calcium 9.3 8.6 - 10.4 mg/dL    Sodium 139 135 - 144 mmol/L    Potassium 4.9 3.7 - 5.3 mmol/L    Chloride 99 98 - 107 mmol/L    CO2 28 20 - 31 mmol/L    Anion Gap 12 9 - 17 mmol/L    GFR Non-African American >60 >60 mL/min    GFR African American >60 >60 mL/min    GFR Comment          GFR Staging NOT REPORTED    CBC auto differential daily start tomorrow    Collection Time: 07/25/17  5:47 AM   Result Value Ref Range    WBC 6.9 3.5 - 11.0 k/uL    RBC 4.80 4.5 - 5.9 m/uL    Hemoglobin 16.3 13.5 - 17.5 g/dL    Hematocrit 48.0

## 2017-07-25 NOTE — CARE COORDINATION
CASE MANAGEMENT NOTE:    Admission Date:  7/24/2017 Rajinder Cuevas is a 43 y.o.  male    Admitted for : Chest pain [R07.9]    Met with:  Patient    PCP:  None, wants assisting finding one, Agreeable to Dr. Yee La, informed about the oop copay, is agreeable, Is starting new job                                Insurance:  Self Pay, [employer], from [employer], following      Current Residence/ Living Arrangements:  independently at home             Current Services PTA:  No    Is patient agreeable to VNS: No    Freedom of choice provided: No         VNS chosen:  No    DME:  none    Home Oxygen: No    Nebulizer: No    Supplier: N/A    Potential Assistance Needed: Yes, Needs PCP, follow for possible Med Assist    SNF needed: No    Pharmacy:  [facility]       Does Patient want to use MEDS to BEDS? Yes, will follow if med assist is needed    Family Members/Caregivers that pt would like involved in their care:    No    If yes, list name here:      Transportation Provider:  Patient                      Discharge Plan:  7/25/17 Self Pay Pt., [employer] following. Lives in an apt. W/ friend. No DME. Needs PCP, Agreeable to Dr. Yee La, Cardiology to see, ? Stress test, couldn't afford some meds. Will be starting new job soon.  Will continue to follow for needs//KB               Readmission Risk              Readmission Risk:        2       Age 72 or Greater:  0    Admitted from SNF or Requires Paid or Family Care:  0    Currently has CHF,COPD,ARF,CRI,or is on dialysis:  0    Takes more than 5 Prescription Medications:  0    Takes Digoxin,Insulin,Anticoagulants,Narcotics or ASA/Plavix:  [address] [place] [address] in Past 12 Months:  0    On Disability:  0    Patient Considers own Health:  0          Electronically signed by: Daquan Nicole RN on 7/25/2017 at 10:30 AM

## 2017-08-22 ASSESSMENT — ENCOUNTER SYMPTOMS
FACIAL SWELLING: 0
COUGH: 0
SORE THROAT: 0
COLOR CHANGE: 0
CHEST TIGHTNESS: 0
NAUSEA: 0
DIARRHEA: 0
SHORTNESS OF BREATH: 0
CONSTIPATION: 0
SINUS PRESSURE: 0
VOMITING: 0

## 2017-09-14 ENCOUNTER — APPOINTMENT (OUTPATIENT)
Dept: GENERAL RADIOLOGY | Age: 42
End: 2017-09-14
Payer: MEDICAID

## 2017-09-14 ENCOUNTER — HOSPITAL ENCOUNTER (EMERGENCY)
Age: 42
Discharge: HOME | End: 2017-09-14
Attending: EMERGENCY MEDICINE
Payer: MEDICAID

## 2017-09-14 VITALS
SYSTOLIC BLOOD PRESSURE: 141 MMHG | TEMPERATURE: 98.2 F | HEART RATE: 96 BPM | OXYGEN SATURATION: 96 % | WEIGHT: 170 LBS | BODY MASS INDEX: 25.76 KG/M2 | DIASTOLIC BLOOD PRESSURE: 82 MMHG | RESPIRATION RATE: 19 BRPM | HEIGHT: 68 IN

## 2017-09-14 DIAGNOSIS — F17.200 SMOKING ADDICTION: ICD-10-CM

## 2017-09-14 DIAGNOSIS — R00.0 TACHYCARDIA: Primary | ICD-10-CM

## 2017-09-14 DIAGNOSIS — R73.9 HYPERGLYCEMIA: ICD-10-CM

## 2017-09-14 LAB
ABSOLUTE EOS #: 0.6 K/UL (ref 0–0.4)
ABSOLUTE LYMPH #: 1.6 K/UL (ref 1–4.8)
ABSOLUTE MONO #: 0.7 K/UL (ref 0.1–1.3)
ALBUMIN SERPL-MCNC: 4.5 G/DL (ref 3.5–5.2)
ALBUMIN/GLOBULIN RATIO: ABNORMAL (ref 1–2.5)
ALP BLD-CCNC: 73 U/L (ref 40–129)
ALT SERPL-CCNC: 27 U/L (ref 5–41)
AMPHETAMINE SCREEN URINE: NEGATIVE
ANION GAP SERPL CALCULATED.3IONS-SCNC: 14 MMOL/L (ref 9–17)
AST SERPL-CCNC: 16 U/L
BARBITURATE SCREEN URINE: NEGATIVE
BASOPHILS # BLD: 1 %
BASOPHILS ABSOLUTE: 0 K/UL (ref 0–0.2)
BENZODIAZEPINE SCREEN, URINE: NEGATIVE
BILIRUB SERPL-MCNC: 0.79 MG/DL (ref 0.3–1.2)
BILIRUBIN URINE: NEGATIVE
BUN BLDV-MCNC: 16 MG/DL (ref 6–20)
BUN/CREAT BLD: ABNORMAL (ref 9–20)
BUPRENORPHINE URINE: NORMAL
CALCIUM SERPL-MCNC: 10.5 MG/DL (ref 8.6–10.4)
CANNABINOID SCREEN URINE: NEGATIVE
CHLORIDE BLD-SCNC: 96 MMOL/L (ref 98–107)
CO2: 27 MMOL/L (ref 20–31)
COCAINE METABOLITE, URINE: NEGATIVE
COLOR: YELLOW
COMMENT UA: ABNORMAL
CREAT SERPL-MCNC: 0.97 MG/DL (ref 0.7–1.2)
D-DIMER QUANTITATIVE: 0.21 MG/L FEU
DIFFERENTIAL TYPE: ABNORMAL
EOSINOPHILS RELATIVE PERCENT: 9 %
GFR AFRICAN AMERICAN: >60 ML/MIN
GFR NON-AFRICAN AMERICAN: >60 ML/MIN
GFR SERPL CREATININE-BSD FRML MDRD: ABNORMAL ML/MIN/{1.73_M2}
GFR SERPL CREATININE-BSD FRML MDRD: ABNORMAL ML/MIN/{1.73_M2}
GLUCOSE BLD-MCNC: 326 MG/DL (ref 70–99)
GLUCOSE URINE: ABNORMAL
HCT VFR BLD CALC: 49.4 % (ref 41–53)
HEMOGLOBIN: 17.1 G/DL (ref 13.5–17.5)
KETONES, URINE: NEGATIVE
LEUKOCYTE ESTERASE, URINE: NEGATIVE
LYMPHOCYTES # BLD: 24 %
MCH RBC QN AUTO: 33.9 PG (ref 26–34)
MCHC RBC AUTO-ENTMCNC: 34.5 G/DL (ref 31–37)
MCV RBC AUTO: 98.2 FL (ref 80–100)
MDMA URINE: NORMAL
METHADONE SCREEN, URINE: NEGATIVE
METHAMPHETAMINE, URINE: NORMAL
MONOCYTES # BLD: 10 %
NITRITE, URINE: NEGATIVE
OPIATES, URINE: NEGATIVE
OXYCODONE SCREEN URINE: NEGATIVE
PDW BLD-RTO: 12.8 % (ref 11.5–14.9)
PH UA: 7 (ref 5–8)
PHENCYCLIDINE, URINE: NEGATIVE
PLATELET # BLD: 307 K/UL (ref 150–450)
PLATELET ESTIMATE: ABNORMAL
PMV BLD AUTO: 7.1 FL (ref 6–12)
POTASSIUM SERPL-SCNC: 4.5 MMOL/L (ref 3.7–5.3)
PROPOXYPHENE, URINE: NORMAL
PROTEIN UA: NEGATIVE
RBC # BLD: 5.03 M/UL (ref 4.5–5.9)
RBC # BLD: ABNORMAL 10*6/UL
SEG NEUTROPHILS: 56 %
SEGMENTED NEUTROPHILS ABSOLUTE COUNT: 3.8 K/UL (ref 1.3–9.1)
SODIUM BLD-SCNC: 137 MMOL/L (ref 135–144)
SPECIFIC GRAVITY UA: 1.03 (ref 1–1.03)
TEST INFORMATION: NORMAL
TOTAL PROTEIN: 6.9 G/DL (ref 6.4–8.3)
TRICYCLIC ANTIDEPRESSANTS, UR: NORMAL
TROPONIN INTERP: NORMAL
TROPONIN INTERP: NORMAL
TROPONIN T: <0.03 NG/ML
TROPONIN T: <0.03 NG/ML
TURBIDITY: CLEAR
URINE HGB: NEGATIVE
UROBILINOGEN, URINE: NORMAL
WBC # BLD: 6.8 K/UL (ref 3.5–11)
WBC # BLD: ABNORMAL 10*3/UL

## 2017-09-14 PROCEDURE — 99285 EMERGENCY DEPT VISIT HI MDM: CPT

## 2017-09-14 PROCEDURE — 85025 COMPLETE CBC W/AUTO DIFF WBC: CPT

## 2017-09-14 PROCEDURE — 2580000003 HC RX 258: Performed by: EMERGENCY MEDICINE

## 2017-09-14 PROCEDURE — 96372 THER/PROPH/DIAG INJ SC/IM: CPT

## 2017-09-14 PROCEDURE — 71020 XR CHEST STANDARD TWO VW: CPT

## 2017-09-14 PROCEDURE — 85379 FIBRIN DEGRADATION QUANT: CPT

## 2017-09-14 PROCEDURE — 84484 ASSAY OF TROPONIN QUANT: CPT

## 2017-09-14 PROCEDURE — 96374 THER/PROPH/DIAG INJ IV PUSH: CPT

## 2017-09-14 PROCEDURE — 93005 ELECTROCARDIOGRAM TRACING: CPT

## 2017-09-14 PROCEDURE — 36415 COLL VENOUS BLD VENIPUNCTURE: CPT

## 2017-09-14 PROCEDURE — 81003 URINALYSIS AUTO W/O SCOPE: CPT

## 2017-09-14 PROCEDURE — 6370000000 HC RX 637 (ALT 250 FOR IP): Performed by: EMERGENCY MEDICINE

## 2017-09-14 PROCEDURE — 6360000002 HC RX W HCPCS: Performed by: EMERGENCY MEDICINE

## 2017-09-14 PROCEDURE — 80053 COMPREHEN METABOLIC PANEL: CPT

## 2017-09-14 PROCEDURE — 80307 DRUG TEST PRSMV CHEM ANLYZR: CPT

## 2017-09-14 RX ORDER — ASPIRIN 81 MG/1
324 TABLET, CHEWABLE ORAL ONCE
Status: COMPLETED | OUTPATIENT
Start: 2017-09-14 | End: 2017-09-14

## 2017-09-14 RX ORDER — 0.9 % SODIUM CHLORIDE 0.9 %
1000 INTRAVENOUS SOLUTION INTRAVENOUS ONCE
Status: COMPLETED | OUTPATIENT
Start: 2017-09-14 | End: 2017-09-14

## 2017-09-14 RX ORDER — KETOROLAC TROMETHAMINE 30 MG/ML
30 INJECTION, SOLUTION INTRAMUSCULAR; INTRAVENOUS ONCE
Status: COMPLETED | OUTPATIENT
Start: 2017-09-14 | End: 2017-09-14

## 2017-09-14 RX ADMIN — SODIUM CHLORIDE 1000 ML: 9 INJECTION, SOLUTION INTRAVENOUS at 16:16

## 2017-09-14 RX ADMIN — INSULIN LISPRO 4 UNITS: 100 INJECTION, SOLUTION INTRAVENOUS; SUBCUTANEOUS at 17:44

## 2017-09-14 RX ADMIN — KETOROLAC TROMETHAMINE 30 MG: 30 INJECTION, SOLUTION INTRAMUSCULAR at 17:44

## 2017-09-14 RX ADMIN — ASPIRIN 81 MG 324 MG: 81 TABLET ORAL at 14:41

## 2017-09-14 ASSESSMENT — PAIN DESCRIPTION - ORIENTATION: ORIENTATION: MID

## 2017-09-14 ASSESSMENT — ENCOUNTER SYMPTOMS
NAUSEA: 0
EYE PAIN: 0
BACK PAIN: 0
ABDOMINAL PAIN: 0
DIARRHEA: 0
SHORTNESS OF BREATH: 0
VOMITING: 0
COUGH: 0
SORE THROAT: 0

## 2017-09-14 ASSESSMENT — PAIN DESCRIPTION - LOCATION: LOCATION: BACK

## 2017-09-14 ASSESSMENT — PAIN SCALES - GENERAL: PAINLEVEL_OUTOF10: 5

## 2017-09-14 ASSESSMENT — PAIN DESCRIPTION - PAIN TYPE: TYPE: ACUTE PAIN

## 2017-09-20 LAB
EKG ATRIAL RATE: 115 BPM
EKG P AXIS: 38 DEGREES
EKG P-R INTERVAL: 130 MS
EKG Q-T INTERVAL: 316 MS
EKG QRS DURATION: 80 MS
EKG QTC CALCULATION (BAZETT): 437 MS
EKG R AXIS: 80 DEGREES
EKG T AXIS: 3 DEGREES
EKG VENTRICULAR RATE: 115 BPM

## 2017-10-13 ENCOUNTER — HOSPITAL ENCOUNTER (EMERGENCY)
Age: 42
Discharge: HOME | End: 2017-10-13
Attending: EMERGENCY MEDICINE
Payer: MEDICAID

## 2017-10-13 ENCOUNTER — APPOINTMENT (OUTPATIENT)
Dept: GENERAL RADIOLOGY | Age: 42
End: 2017-10-13
Payer: MEDICAID

## 2017-10-13 VITALS
SYSTOLIC BLOOD PRESSURE: 135 MMHG | WEIGHT: 170 LBS | OXYGEN SATURATION: 97 % | RESPIRATION RATE: 20 BRPM | DIASTOLIC BLOOD PRESSURE: 74 MMHG | BODY MASS INDEX: 25.76 KG/M2 | HEIGHT: 68 IN | HEART RATE: 104 BPM | TEMPERATURE: 98.2 F

## 2017-10-13 DIAGNOSIS — M77.8 LEFT ELBOW TENDONITIS: Primary | ICD-10-CM

## 2017-10-13 PROCEDURE — 99283 EMERGENCY DEPT VISIT LOW MDM: CPT

## 2017-10-13 PROCEDURE — 96372 THER/PROPH/DIAG INJ SC/IM: CPT

## 2017-10-13 PROCEDURE — 73080 X-RAY EXAM OF ELBOW: CPT

## 2017-10-13 PROCEDURE — 6360000002 HC RX W HCPCS: Performed by: NURSE PRACTITIONER

## 2017-10-13 RX ORDER — NAPROXEN 500 MG/1
500 TABLET ORAL 2 TIMES DAILY
Qty: 20 TABLET | Refills: 0 | Status: SHIPPED | OUTPATIENT
Start: 2017-10-13 | End: 2018-04-10 | Stop reason: ALTCHOICE

## 2017-10-13 RX ORDER — KETOROLAC TROMETHAMINE 30 MG/ML
30 INJECTION, SOLUTION INTRAMUSCULAR; INTRAVENOUS ONCE
Status: COMPLETED | OUTPATIENT
Start: 2017-10-13 | End: 2017-10-13

## 2017-10-13 RX ORDER — ACETAMINOPHEN AND CODEINE PHOSPHATE 300; 30 MG/1; MG/1
1 TABLET ORAL EVERY 8 HOURS PRN
Qty: 10 TABLET | Refills: 0 | Status: SHIPPED | OUTPATIENT
Start: 2017-10-13 | End: 2017-11-03 | Stop reason: ALTCHOICE

## 2017-10-13 RX ADMIN — KETOROLAC TROMETHAMINE 30 MG: 30 INJECTION, SOLUTION INTRAMUSCULAR at 14:00

## 2017-10-13 ASSESSMENT — ENCOUNTER SYMPTOMS
VOMITING: 0
NAUSEA: 0
TROUBLE SWALLOWING: 0
SHORTNESS OF BREATH: 0
COUGH: 0

## 2017-10-13 ASSESSMENT — PAIN SCALES - GENERAL: PAINLEVEL_OUTOF10: 10

## 2017-10-13 NOTE — ED PROVIDER NOTES
[address]  eMERGENCY dEPARTMENT eNCOUnter      Pt Name: Paulina Landeros  MRN: [id number]  [place] 1975  Date of evaluation: 10/13/2017  Provider: Car Figueroa [id number]       Chief Complaint   Patient presents with    Arm Injury     left elbow pain         HISTORY OF PRESENT ILLNESS  (Location/Symptom, Timing/Onset, Context/Setting, Quality, Duration, Modifying Factors, Severity.)   Paulina Landeros is a 43 y.o. male who presents to the emergency department for evaluation of  orthopedic pain:    Location/Symptom:  Left elbow pain  Timing/Onset:  2 days  Context/Setting:  Patient presents to ED with complaints of left elbow pain. States he was working on the other day and injured it. Patient has had some left lateral elbow pain for the past 2 weeks but has gotten much worse after injury 2 days ago. Reports pain in left lateral elbow. No numbness or tingling. States it hurts to lift things and extend his wrist.  No swelling. No erythema. No ecchymosis. No fever or chills. Quality:   Achy, sharp  Duration:   constant  Modifying Factors: Worse with movement, better with rest  Severity:   Mild-moderate      Nursing Notes were reviewed and I agree. REVIEW OF SYSTEMS    (2-9 systems for level 4, 10 or more for level 5)     Review of Systems   Constitutional: Negative for chills and fever. HENT: Negative for trouble swallowing. Respiratory: Negative for cough and shortness of breath. Cardiovascular: Negative for chest pain and palpitations. Gastrointestinal: Negative for nausea and vomiting. Musculoskeletal:        Left elbow pain     Except as noted above the remainder of the review of systems was reviewed and negative. PAST MEDICAL HISTORY         Diagnosis Date    Asthma     CAD (coronary artery disease)     Diabetes mellitus ([facility] [address]      Reviewed.   SURGICAL HISTORY           Procedure Laterality Date    CORONARY ANGIOPLASTY WITH STENT PLACEMENT been smoking about 1.00 pack per day. He has never used smokeless tobacco. He reports that he drinks alcohol. He reports that he does not use drugs. Reviewed. PHYSICAL EXAM    (up to 7 for level 4, 8 or more for level 5)     ED Triage Vitals [10/13/17 1345]   BP Temp Temp Source Pulse Resp SpO2 Height Weight   (!) 141/91 98.2 °F (36.8 °C) Oral 116 20 97 % 5' 8\" (1.727 m) 170 lb (77.1 kg)       Physical Exam   Constitutional: He is oriented to person, place, and time. He appears well-developed and well-nourished. HENT:   Head: Normocephalic and atraumatic. Right Ear: External ear normal.   Left Ear: External ear normal.   Nose: Nose normal.   Eyes: Right eye exhibits no discharge. Left eye exhibits no discharge. No scleral icterus. Neck: Normal range of motion. Cardiovascular: Regular rhythm. Tachycardia present. Pulmonary/Chest: Effort normal and breath sounds normal. No stridor. No respiratory distress. Musculoskeletal: Normal range of motion. He exhibits no edema. Left elbow: He exhibits normal range of motion, no swelling and no effusion. Tenderness found. Lateral epicondyle tenderness noted. Left lateral elbow tenderness on palpation. No swelling, no erythema. Full range of motion. Pain worsened by wrist extension. Radial pulse +2. Cap refill less than 2 seconds. Sensation intact. Neurological: He is alert and oriented to person, place, and time. Coordination normal.   Skin: Skin is warm and dry. He is not diaphoretic. Psychiatric: He has a normal mood and affect. His behavior is normal.       DIAGNOSTIC RESULTS     RADIOLOGY:   [] Visualized by me  And No att. providers found       Xr Elbow Left (min 3 Views)    Result Date: 10/13/2017  EXAMINATION: 3 VIEWS OF THE LEFT ELBOW 10/13/2017 1:55 pm COMPARISON: None. HISTORY: ORDERING SYSTEM PROVIDED HISTORY: injury Acuity: Acute Type of Exam: Initial Mechanism of Injury: Left lateral elbow pain x 2 days after working out. FINDINGS: There is no evidence of acute fracture. There is normal alignment. No acute joint abnormality. No focal osseous lesion. No focal soft tissue abnormality. Negative left elbow with no acute osseous abnormality. LABS:  Labs Reviewed - No data to display    All other labs were within normal range or not returned as of this dictation. EMERGENCY DEPARTMENT COURSE and DIFFERENTIAL DIAGNOSIS/MDM:   Patient to ED for evaluation of Elbow injury. X-ray negative for acute osseous abnormality. Symptoms likely related to overuse/tendinitis. Will provide Ace bandage and short course of pain medication. Recommend ice pack. Avoid strenuous activity and heavy lifting as symptoms improve. Okay to discharge home. Follow-up with ortho as directed. Return to ED if worse. Vitals:    Vitals:    10/13/17 1345 10/13/17 1352 10/13/17 1436   BP: (!) 141/91  135/74   Pulse: 116 113 104   Resp: 20     Temp: 98.2 °F (36.8 °C)     TempSrc: Oral     SpO2: 97%     Weight: 170 lb (77.1 kg)     Height: 5' 8\" (1.727 m)           Vitals reviewed. Tachycardic pulse. Patient states that his pulse is always elevated and he has been evaluated for it recently. Denies chest pain or shortness of breath. States that he smoked prior to arrival to ED. PROCEDURES:  Ace wrap per RN    FINAL IMPRESSION      1.  Left elbow tendonitis          DISPOSITION/PLAN   DISPOSITION     PATIENT REFERRED TO:  Booker Ontiveros MD  [address] [place] [address]  [address]  [address] [place] [address]  [phone]            DISCHARGE MEDICATIONS:  Discharge Medication List as of 10/13/2017  2:28 PM      START taking these medications    Details   acetaminophen-codeine (TYLENOL/CODEINE #3) 300-30 MG per tablet Take 1 tablet by mouth every 8 hours as needed for Pain, Disp-10 tablet, R-0Print      naproxen (NAPROSYN) 500 MG tablet Take 1 tablet by mouth 2 times daily, Disp-20 tablet, R-0Print             (Please note that portions of this note were completed with a voice recognition program.  Efforts were made to edit the dictations but occasionally words are mis-transcribed.)    Cristy Yates, [place], [place]  10/13/17 4887

## 2017-10-24 ENCOUNTER — HOSPITAL ENCOUNTER (EMERGENCY)
Age: 42
Discharge: HOME | End: 2017-10-24
Attending: EMERGENCY MEDICINE
Payer: MEDICAID

## 2017-10-24 VITALS
WEIGHT: 172 LBS | DIASTOLIC BLOOD PRESSURE: 98 MMHG | TEMPERATURE: 98.9 F | OXYGEN SATURATION: 99 % | HEART RATE: 121 BPM | RESPIRATION RATE: 18 BRPM | SYSTOLIC BLOOD PRESSURE: 158 MMHG | BODY MASS INDEX: 26.07 KG/M2 | HEIGHT: 68 IN

## 2017-10-24 DIAGNOSIS — F41.1 ANXIETY STATE: Primary | ICD-10-CM

## 2017-10-24 DIAGNOSIS — E11.65 TYPE 2 DIABETES MELLITUS WITH HYPERGLYCEMIA, WITHOUT LONG-TERM CURRENT USE OF INSULIN (HCC): ICD-10-CM

## 2017-10-24 LAB
CHP ED QC CHECK: YES
GLUCOSE BLD-MCNC: 291 MG/DL
GLUCOSE BLD-MCNC: 291 MG/DL (ref 75–110)

## 2017-10-24 PROCEDURE — 6370000000 HC RX 637 (ALT 250 FOR IP): Performed by: EMERGENCY MEDICINE

## 2017-10-24 PROCEDURE — 99283 EMERGENCY DEPT VISIT LOW MDM: CPT

## 2017-10-24 PROCEDURE — 82947 ASSAY GLUCOSE BLOOD QUANT: CPT

## 2017-10-24 RX ORDER — HYDROXYZINE HYDROCHLORIDE 25 MG/1
25 TABLET, FILM COATED ORAL ONCE
Status: COMPLETED | OUTPATIENT
Start: 2017-10-24 | End: 2017-10-24

## 2017-10-24 RX ADMIN — METFORMIN HYDROCHLORIDE 500 MG: 500 TABLET, FILM COATED ORAL at 17:58

## 2017-10-24 RX ADMIN — HYDROXYZINE HYDROCHLORIDE 25 MG: 25 TABLET, FILM COATED ORAL at 17:59

## 2017-10-24 NOTE — ED PROVIDER NOTES
as needed for Wheezing or Shortness of Breath (Space out to every 6 hours as symptoms improve). Space out to every 6 hours as symptoms improve., Disp-1 Inhaler, R-0      !! albuterol sulfate HFA (PROVENTIL HFA) 108 (90 Base) MCG/ACT inhaler Inhale 2 puffs into the lungs every 6 hours as needed for Wheezing, Disp-1 Inhaler, R-0Print      atorvastatin (LIPITOR) 40 MG tablet Take 1 tablet by mouth nightly, Disp-30 tablet, R-3Print      metoprolol tartrate (LOPRESSOR) 25 MG tablet Take 1 tablet by mouth 2 times daily, Disp-60 tablet, R-3Print      !! albuterol (PROVENTIL HFA;VENTOLIN HFA) 108 (90 BASE) MCG/ACT inhaler Inhale 2 puffs into the lungs every 6 hours as needed for Wheezing. !! - Potential duplicate medications found. Please discuss with provider. ALLERGIES     has No Known Allergies. FAMILY HISTORY     indicated that his mother is alive. He indicated that his father is alive. He indicated that his sister is alive. He indicated that his brother is alive. SOCIAL HISTORY      reports that he has been smoking Cigarettes. He has been smoking about 1.00 pack per day. He has never used smokeless tobacco. He reports that he drinks alcohol. He reports that he does not use drugs. PHYSICAL EXAM     INITIAL VITALS: BP (!) 158/98   Pulse 121   Temp 98.9 °F (37.2 °C) (Oral)   Resp 18   Ht 5' 8\" (1.727 m)   Wt 172 lb (78 kg)   SpO2 99%   BMI 26.15 kg/m²    Physical Exam   Constitutional: He is oriented to person, place, and time. He appears well-developed and well-nourished. No distress. HENT:   Head: Normocephalic and atraumatic. Nose: Nose normal.   Mouth/Throat: Oropharynx is clear and moist.   Eyes: Conjunctivae and EOM are normal. Pupils are equal, round, and reactive to light. Neck: Normal range of motion. Neck supple. Cardiovascular: Regular rhythm, normal heart sounds and intact distal pulses. Exam reveals no gallop and no friction rub. No murmur heard.   Tachycardia

## 2017-11-03 ENCOUNTER — OFFICE VISIT (OUTPATIENT)
Dept: FAMILY MEDICINE CLINIC | Age: 42
End: 2017-11-03
Payer: MEDICAID

## 2017-11-03 ENCOUNTER — TELEPHONE (OUTPATIENT)
Dept: FAMILY MEDICINE CLINIC | Age: 42
End: 2017-11-03

## 2017-11-03 VITALS
OXYGEN SATURATION: 98 % | BODY MASS INDEX: 25.01 KG/M2 | DIASTOLIC BLOOD PRESSURE: 78 MMHG | WEIGHT: 165 LBS | HEART RATE: 120 BPM | TEMPERATURE: 98.3 F | SYSTOLIC BLOOD PRESSURE: 139 MMHG | RESPIRATION RATE: 16 BRPM | HEIGHT: 68 IN

## 2017-11-03 VITALS
DIASTOLIC BLOOD PRESSURE: 70 MMHG | BODY MASS INDEX: 25.83 KG/M2 | SYSTOLIC BLOOD PRESSURE: 124 MMHG | RESPIRATION RATE: 20 BRPM | HEART RATE: 107 BPM | OXYGEN SATURATION: 97 % | WEIGHT: 164.6 LBS | HEIGHT: 67 IN | TEMPERATURE: 97.7 F

## 2017-11-03 DIAGNOSIS — M77.12 LATERAL EPICONDYLITIS OF BOTH ELBOWS: ICD-10-CM

## 2017-11-03 DIAGNOSIS — R00.0 TACHYCARDIA: ICD-10-CM

## 2017-11-03 DIAGNOSIS — I10 ESSENTIAL HYPERTENSION: ICD-10-CM

## 2017-11-03 DIAGNOSIS — M25.521 PAIN OF BOTH ELBOWS: ICD-10-CM

## 2017-11-03 DIAGNOSIS — E83.52 HYPERCALCEMIA: ICD-10-CM

## 2017-11-03 DIAGNOSIS — R06.00 DYSPNEA, UNSPECIFIED TYPE: ICD-10-CM

## 2017-11-03 DIAGNOSIS — F41.9 ANXIETY: ICD-10-CM

## 2017-11-03 DIAGNOSIS — Z11.4 SCREENING FOR HIV (HUMAN IMMUNODEFICIENCY VIRUS): ICD-10-CM

## 2017-11-03 DIAGNOSIS — M54.2 NECK PAIN: Primary | ICD-10-CM

## 2017-11-03 DIAGNOSIS — E11.41 TYPE 2 DIABETES MELLITUS WITH DIABETIC MONONEUROPATHY, WITHOUT LONG-TERM CURRENT USE OF INSULIN (HCC): Primary | ICD-10-CM

## 2017-11-03 DIAGNOSIS — M79.10 MYALGIA: ICD-10-CM

## 2017-11-03 DIAGNOSIS — E78.2 MIXED HYPERLIPIDEMIA: ICD-10-CM

## 2017-11-03 DIAGNOSIS — M25.522 PAIN OF BOTH ELBOWS: ICD-10-CM

## 2017-11-03 DIAGNOSIS — M77.11 LATERAL EPICONDYLITIS OF BOTH ELBOWS: ICD-10-CM

## 2017-11-03 LAB — HBA1C MFR BLD: 6.8 %

## 2017-11-03 PROCEDURE — 99204 OFFICE O/P NEW MOD 45 MIN: CPT | Performed by: NURSE PRACTITIONER

## 2017-11-03 PROCEDURE — G8484 FLU IMMUNIZE NO ADMIN: HCPCS | Performed by: FAMILY MEDICINE

## 2017-11-03 PROCEDURE — 4004F PT TOBACCO SCREEN RCVD TLK: CPT | Performed by: NURSE PRACTITIONER

## 2017-11-03 PROCEDURE — G8427 DOCREV CUR MEDS BY ELIG CLIN: HCPCS | Performed by: NURSE PRACTITIONER

## 2017-11-03 PROCEDURE — 3044F HG A1C LEVEL LT 7.0%: CPT | Performed by: NURSE PRACTITIONER

## 2017-11-03 PROCEDURE — G8598 ASA/ANTIPLAT THER USED: HCPCS | Performed by: NURSE PRACTITIONER

## 2017-11-03 PROCEDURE — G8419 CALC BMI OUT NRM PARAM NOF/U: HCPCS | Performed by: FAMILY MEDICINE

## 2017-11-03 PROCEDURE — 90732 PPSV23 VACC 2 YRS+ SUBQ/IM: CPT | Performed by: NURSE PRACTITIONER

## 2017-11-03 PROCEDURE — G8484 FLU IMMUNIZE NO ADMIN: HCPCS | Performed by: NURSE PRACTITIONER

## 2017-11-03 PROCEDURE — 4004F PT TOBACCO SCREEN RCVD TLK: CPT | Performed by: FAMILY MEDICINE

## 2017-11-03 PROCEDURE — 99214 OFFICE O/P EST MOD 30 MIN: CPT | Performed by: FAMILY MEDICINE

## 2017-11-03 PROCEDURE — G8419 CALC BMI OUT NRM PARAM NOF/U: HCPCS | Performed by: NURSE PRACTITIONER

## 2017-11-03 PROCEDURE — G8427 DOCREV CUR MEDS BY ELIG CLIN: HCPCS | Performed by: FAMILY MEDICINE

## 2017-11-03 PROCEDURE — G8598 ASA/ANTIPLAT THER USED: HCPCS | Performed by: FAMILY MEDICINE

## 2017-11-03 PROCEDURE — 90471 IMMUNIZATION ADMIN: CPT | Performed by: NURSE PRACTITIONER

## 2017-11-03 PROCEDURE — 83036 HEMOGLOBIN GLYCOSYLATED A1C: CPT | Performed by: NURSE PRACTITIONER

## 2017-11-03 RX ORDER — HYDROXYZINE PAMOATE 25 MG/1
25 CAPSULE ORAL 3 TIMES DAILY PRN
Qty: 90 CAPSULE | Refills: 1 | Status: SHIPPED | OUTPATIENT
Start: 2017-11-03 | End: 2017-12-29 | Stop reason: SDUPTHER

## 2017-11-03 RX ORDER — NITROGLYCERIN 0.4 MG/1
TABLET SUBLINGUAL
Qty: 25 TABLET | Refills: 3 | Status: CANCELLED | OUTPATIENT
Start: 2017-11-03

## 2017-11-03 RX ORDER — ALBUTEROL SULFATE 90 UG/1
1-2 AEROSOL, METERED RESPIRATORY (INHALATION) EVERY 6 HOURS PRN
Qty: 1 INHALER | Refills: 0 | Status: SHIPPED | OUTPATIENT
Start: 2017-11-03 | End: 2017-11-27 | Stop reason: SDUPTHER

## 2017-11-03 RX ORDER — ATORVASTATIN CALCIUM 20 MG/1
40 TABLET, FILM COATED ORAL NIGHTLY
Qty: 90 TABLET | Refills: 1 | Status: SHIPPED | OUTPATIENT
Start: 2017-11-03 | End: 2017-11-03 | Stop reason: CLARIF

## 2017-11-03 RX ORDER — METHYLPREDNISOLONE 4 MG/1
TABLET ORAL
Qty: 1 KIT | Refills: 0 | Status: SHIPPED | OUTPATIENT
Start: 2017-11-03 | End: 2018-02-05 | Stop reason: ALTCHOICE

## 2017-11-03 RX ORDER — CYCLOBENZAPRINE HCL 10 MG
10 TABLET ORAL 2 TIMES DAILY
Qty: 20 TABLET | Refills: 0 | Status: SHIPPED | OUTPATIENT
Start: 2017-11-03 | End: 2017-11-13

## 2017-11-03 RX ORDER — IBUPROFEN 800 MG/1
800 TABLET ORAL EVERY 6 HOURS PRN
Qty: 90 TABLET | Refills: 3 | Status: SHIPPED | OUTPATIENT
Start: 2017-11-03 | End: 2020-05-11 | Stop reason: ALTCHOICE

## 2017-11-03 RX ORDER — TRAMADOL HYDROCHLORIDE 50 MG/1
50 TABLET ORAL NIGHTLY
Qty: 10 TABLET | Refills: 0 | Status: SHIPPED | OUTPATIENT
Start: 2017-11-03 | End: 2017-11-13

## 2017-11-03 ASSESSMENT — ENCOUNTER SYMPTOMS
ABDOMINAL PAIN: 0
GASTROINTESTINAL NEGATIVE: 1
COUGH: 0
SHORTNESS OF BREATH: 0
BACK PAIN: 1
EYES NEGATIVE: 1
NAUSEA: 0
ALLERGIC/IMMUNOLOGIC NEGATIVE: 1
RESPIRATORY NEGATIVE: 1

## 2017-11-03 NOTE — ADDENDUM NOTE
Encounter addended by:  Héctor Herring MD on: 11/3/2017  4:12 PM<BR>    Actions taken: Pend clinical note

## 2017-11-03 NOTE — PROGRESS NOTES
[address] [facility]   [address] [address]  Dept: [phone]  Dept Fax: [phone]    Elaina Finney is a 43 y.o. male who presents today for his medical conditions/complaints as noted below. Elaina Finney is c/o of   Chief Complaint   Patient presents with    Elbow Injury     pt states he is having pain in both elbows. The right elbow began about 4 yrs ago and the left about 3 yes ago. pt states it is painful to move to the side and when picking anything up    Neck Pain     pt states his neck has been hurting about a year ago and now through out the day it is sharp pain that last for about 5 min and comes and goes         HPI:     This patient is a 80-year-old white male with a chronic pain history significant for pain in his neck and both elbows. Patient states that these injuries occurred a long time ago while working and doing heavy labor. Presents today with bilateral elbow pain 7 out of 10 and laterally along with neck stiffness. His pain scale averages from 6-8 out of 10. His pain is described as sharp achy with radiations to the upper arm. On occasions he has lower back discomfort. Patient states that he has had steroid injections in his elbows bilaterally which did help his pain at that time. We'll evaluate and treat. We'll make the appropriate referrals. Neck Pain    This is a chronic problem. The current episode started more than 1 year ago. The problem occurs intermittently. The problem has been gradually worsening. The pain is associated with lifting a heavy object. The pain is present in the midline and occipital region. The quality of the pain is described as aching and shooting. The pain is at a severity of 7/10. The pain is moderate. The symptoms are aggravated by twisting, position and bending. The pain is worse during the day. Stiffness is present all day.  Associated symptoms include chest pain, numbness, paresis, tingling and weakness. He has tried NSAIDs for the symptoms. The treatment provided mild relief. Hemoglobin A1C (%)   Date Value   11/03/2017 6.8   05/08/2017 7.0 (H)             ( goal A1C is < 7)   No results found for: LABMICR  LDL Cholesterol (mg/dL)   Date Value   05/09/2017 116       (goal LDL is <100)   AST (U/L)   Date Value   09/14/2017 16     ALT (U/L)   Date Value   09/14/2017 27     BUN (mg/dL)   Date Value   09/14/2017 16     BP Readings from Last 3 Encounters:   11/03/17 (!) 159/100   11/03/17 124/70   10/24/17 (!) 158/98          (goal 120/80)    Past Medical History:   Diagnosis Date    Anxiety     Asthma     CAD (coronary artery disease)     Chronic back pain     Diabetes mellitus ([facility] [address]     GERD (gastroesophageal reflux disease)      heart burn    Neuropathy (HCC)       Past Surgical History:   Procedure Laterality Date    CORONARY ANGIOPLASTY WITH STENT PLACEMENT  2010    TONSILLECTOMY         Family History   Problem Relation Age of Onset    Diabetes Mother     Diabetes Father     Arrhythmia Brother        Social History   Substance Use Topics    Smoking status: Current Every Day Smoker     Packs/day: 0.50     Years: 15.00     Types: Cigarettes    Smokeless tobacco: Never Used    Alcohol use Yes      Comment:  socially occ beer      Current Outpatient Prescriptions   Medication Sig Dispense Refill    albuterol sulfate HFA (PROVENTIL HFA) 108 (90 Base) MCG/ACT inhaler Inhale 1-2 puffs into the lungs every 6 hours as needed for Wheezing or Shortness of Breath (Space out to every 6 hours as symptoms improve) Space out to every 6 hours as symptoms improve.  1 Inhaler 0    metFORMIN (GLUCOPHAGE) 500 MG tablet Take 1 tablet by mouth every morning 2 tabs in the morning and 1 tablet in the evening, with meals 180 tablet 1    metoprolol tartrate (LOPRESSOR) 25 MG tablet Take half tab po bid 90 tablet 1    hydrOXYzine (VISTARIL) 25 MG capsule Take 1 capsule by mouth 3 times daily as needed for Itching 90 capsule 1    albuterol-ipratropium (COMBIVENT RESPIMAT)  MCG/ACT AERS inhaler Inhale 1 puff into the lungs every 6 hours as needed for Wheezing 1 Inhaler 3    ibuprofen (ADVIL;MOTRIN) 800 MG tablet Take 1 tablet by mouth every 6 hours as needed for Pain 90 tablet 3    cyclobenzaprine (FLEXERIL) 10 MG tablet Take 1 tablet by mouth 2 times daily for 10 days 20 tablet 0    traMADol (ULTRAM) 50 MG tablet Take 1 tablet by mouth nightly for 10 days 10 tablet 0    methylPREDNISolone (MEDROL, MARCO,) 4 MG tablet By mouth. 1 kit 0    nitroGLYCERIN (NITROSTAT) 0.4 MG SL tablet up to max of 3 total doses. If no relief after 1 dose, call 911. 25 tablet 3    aspirin 81 MG tablet Take 81 mg by mouth daily.  naproxen (NAPROSYN) 500 MG tablet Take 1 tablet by mouth 2 times daily 20 tablet 0     No current facility-administered medications for this visit. No Known Allergies    Health Maintenance   Topic Date Due    Diabetic foot exam  04/04/1985    Diabetic retinal exam  04/04/1985    HIV screen  04/04/1990    Diabetic microalbuminuria test  04/04/1993    DTaP/Tdap/Td vaccine (1 - Tdap) 04/04/1994    Flu vaccine (1) 11/30/2017 (Originally 9/1/2017)    Lipid screen  05/09/2018    Diabetic hemoglobin A1C test  11/03/2018    Pneumococcal med risk  Completed       Subjective:      Review of Systems   HENT: Negative. Eyes: Negative. Respiratory: Negative. Cardiovascular: Positive for chest pain. Gastrointestinal: Negative. Endocrine: Negative. Genitourinary: Negative. Musculoskeletal: Positive for arthralgias, back pain, joint swelling, myalgias, neck pain and neck stiffness. Pain in both elbows laterally. Skin: Negative. Allergic/Immunologic: Negative. Neurological: Positive for tingling, weakness and numbness. Hematological: Negative. Psychiatric/Behavioral: Negative.         Objective:     Physical Exam   Constitutional: He is oriented to person, place, and time. He appears well-developed and well-nourished. HENT:   Head: Normocephalic and atraumatic. Right Ear: External ear normal.   Left Ear: External ear normal.   Nose: Nose normal.   Mouth/Throat: Oropharynx is clear and moist.   Eyes: Conjunctivae and EOM are normal. Pupils are equal, round, and reactive to light. Neck: Normal range of motion. Neck supple. Cardiovascular: Normal rate, regular rhythm, normal heart sounds and intact distal pulses. Pulmonary/Chest: Effort normal.   Abdominal: Soft. Bowel sounds are normal.   Musculoskeletal:        Cervical back: He exhibits decreased range of motion, tenderness, bony tenderness, pain and spasm. Back:         Right forearm: He exhibits tenderness, bony tenderness and swelling. Left forearm: He exhibits tenderness, bony tenderness and swelling. Arms:  Neurological: He is alert and oriented to person, place, and time. He has normal reflexes. Skin: Skin is warm and dry. Psychiatric: He has a normal mood and affect. His behavior is normal. Judgment and thought content normal.   Nursing note and vitals reviewed. BP (!) 159/100 (Site: Left Arm, Position: Sitting, Cuff Size: Large Adult)   Pulse 120   Temp 98.3 °F (36.8 °C) (Oral)   Resp 16   Ht 5' 8\" (1.727 m)   Wt 165 lb (74.8 kg)   SpO2 98%   BMI 25.09 kg/m²     Assessment:      1. Neck pain  Jaswant Al MD, Physical Medicine and Rehabilitation [facility] External Referral To Orthopedic Surgery    ibuprofen (ADVIL;MOTRIN) 800 MG tablet    cyclobenzaprine (FLEXERIL) 10 MG tablet    traMADol (ULTRAM) 50 MG tablet    methylPREDNISolone (MEDROL, MARCO,) 4 MG tablet    CBC With Auto Differential    Comprehensive Metabolic Panel    Sedimentation Rate    C-Reactive Protein    TSH with Reflex   2.  Pain of both Elif Preston MD, Physical Medicine and Rehabilitation [place]    Amb External Referral To Orthopedic Surgery    ibuprofen (ADVIL;MOTRIN) 800 MG tablet    cyclobenzaprine (FLEXERIL) 10 MG tablet    traMADol (ULTRAM) 50 MG tablet    methylPREDNISolone (MEDROL, AMRCO,) 4 MG tablet    CBC With Auto Differential    Comprehensive Metabolic Panel    Sedimentation Rate    C-Reactive Protein    TSH with Reflex   3. Deloris Mann MD, Physical Medicine and Rehabilitation [place]    Amb External Referral To Orthopedic Surgery    ibuprofen (ADVIL;MOTRIN) 800 MG tablet    cyclobenzaprine (FLEXERIL) 10 MG tablet    traMADol (ULTRAM) 50 MG tablet    methylPREDNISolone (MEDROL, MARCO,) 4 MG tablet    CBC With Auto Differential    Comprehensive Metabolic Panel    Sedimentation Rate    C-Reactive Protein    TSH with Reflex   4. Lateral epicondylitis of both elbows  Art Mccauley MD, Physical Medicine and Rehabilitation [place]    Amb External Referral To Orthopedic Surgery    ibuprofen (ADVIL;MOTRIN) 800 MG tablet    cyclobenzaprine (FLEXERIL) 10 MG tablet    traMADol (ULTRAM) 50 MG tablet    methylPREDNISolone (MEDROL, MARCO,) 4 MG tablet    CBC With Auto Differential    Comprehensive Metabolic Panel    Sedimentation Rate    C-Reactive Protein    TSH with Reflex             Plan:      Return if symptoms worsen or fail to improve.     Orders Placed This Encounter   Procedures    CBC With Auto Differential     Standing Status:   Future     Standing Expiration Date:   11/3/2018    Comprehensive Metabolic Panel     Standing Status:   Future     Standing Expiration Date:   11/3/2018    Sedimentation Rate     Standing Status:   Future     Standing Expiration Date:   11/3/2018    C-Reactive Protein     Standing Status:   Future     Standing Expiration Date:   11/3/2018    TSH with Reflex     Standing Status:   Future     Standing Expiration Date:   11/3/2018   Cl Warren MD, Physical Medicine and Rehabilitation [place]     Referral Priority:   Routine     Referral Type:   Consult for

## 2017-11-03 NOTE — PROGRESS NOTES
Subjective:      Patient ID: Kris Rand is a 43 y.o. male. HPI  43year old white male presents with management of followin. DM HTN HLD tachycardia and anxiety: has been diabetic for 7 years and was on low dose metformin but has been off for a while secondary to no insurance. A1c is 6.8 today. Is on metoprolol but states it makes him very fatigued. Noted to have tachycardia which has been going for years. Was evaluated in ER recently for atypical chest pain and exertional sob and cardiac work up was unremarkable. Pt is a current smoker but has not desire to quit. Currently is on rescue inhaler and states he takes it 4 times daily. 2. Noted to have hypercalcemia per chart review. TSH was normal.   3. Bilateral epicondylitis - pain for years. Used to follow up with ortho for joint injections. He felt better but pain came back recently. Has difficulty lifting objects. Review of Systems   Constitutional: Negative for chills, diaphoresis and fever. Eyes: Negative for visual disturbance. Respiratory: Negative for cough and shortness of breath. Cardiovascular: Negative for chest pain and palpitations. Gastrointestinal: Negative for abdominal pain and nausea. Endocrine: Negative for polydipsia and polyuria. Genitourinary: Negative for dysuria and hematuria. Musculoskeletal: Positive for arthralgias (bilateral elbow pain). Skin: Negative for wound. Neurological: Positive for numbness (in hands). Negative for dizziness and weakness. Psychiatric/Behavioral: Negative for agitation and behavioral problems. The patient is nervous/anxious. Objective:   Physical Exam   Constitutional: He appears well-nourished. No distress. HENT:   Nose: Nose normal.   Mouth/Throat: Oropharynx is clear and moist.   Eyes: Conjunctivae are normal.   Neck: Neck supple. Cardiovascular: Normal rate and normal heart sounds.     tachycardia   Pulmonary/Chest: Effort normal and breath sounds normal. No respiratory distress. Abdominal: Soft. There is no tenderness. Musculoskeletal: He exhibits tenderness (in bilateral epicondyles. has good ROM. hand  equal. sensation intact, cap refills wnl.). He exhibits no edema. Lymphadenopathy:     He has no cervical adenopathy. Neurological: He is alert. No cranial nerve deficit. Skin: Skin is warm and dry. Psychiatric: His behavior is normal. Judgment and thought content normal.   Appears anxious   Nursing note and vitals reviewed. Assessment:          1. Type 2 diabetes mellitus with diabetic mononeuropathy, without long-term current use of insulin ([address]    2. Essential hypertension    3. Mixed hyperlipidemia    4. Tachycardia    5. Dyspnea, unspecified type    6. Anxiety    7. Hypercalcemia    8. Screening for HIV (human immunodeficiency virus)    9. Lateral epicondylitis of both elbows        Plan:      BP Readings from Last 3 Encounters:   11/03/17 124/70   10/24/17 (!) 158/98   10/13/17 135/74     /70   Pulse 107   Temp 97.7 °F (36.5 °C) (Tympanic)   Resp 20   Ht 5' 7.25\" (1.708 m)   Wt 164 lb 9.6 oz (74.7 kg)   SpO2 97%   BMI 25.59 kg/m²   Lab Results   Component Value Date    WBC 6.8 09/14/2017    HGB 17.1 09/14/2017    HCT 49.4 09/14/2017     09/14/2017    CHOL 196 05/09/2017    TRIG 264 (H) 05/09/2017    HDL 27 (L) 05/09/2017    ALT 27 09/14/2017    AST 16 09/14/2017     09/14/2017    K 4.5 09/14/2017    CL 96 (L) 09/14/2017    CREATININE 0.97 09/14/2017    BUN 16 09/14/2017    CO2 27 09/14/2017    TSH 2.18 07/24/2017    INR 1.0 07/24/2017    LABA1C 6.8 11/03/2017     Lab Results   Component Value Date    CALCIUM 10.5 (H) 09/14/2017    PHOS 3.9 07/24/2017     Lab Results   Component Value Date    LDLCHOLESTEROL 116 05/09/2017         1. Type 2 diabetes mellitus with diabetic mononeuropathy, without long-term current use of insulin (HCC)  - stable. Cont metformin.   - metFORMIN (GLUCOPHAGE) 500 MG tablet;  Take 500 mg by mouth 2 times daily (with meals)  - POCT glycosylated hemoglobin (Hb A1C)  - Microalbumin, Ur; Future    2. Essential hypertension/ 4. Tachycardia  - lower metoprolol to 12.5 mg bid  - resume metoprolol tartrate (LOPRESSOR) 25 MG tablet; Take half tab po bid  Dispense: 90 tablet; Refill: 1    3. Mixed hyperlipidemia  - cont statin ( lower to 20 mg)   - atorvastatin (LIPITOR) 20 MG tablet; Take 1 tablets by mouth nightly  Dispense: 90 tablet; Refill: 1  - Lipid Panel; Future    5. Dyspnea, unspecified type  - discussed the ADRs of rescue inhalers. Start combivent. - lbuterol-ipratropium (COMBIVENT RESPIMAT)  MCG/ACT AERS inhaler; Inhale 1 puff into the lungs every 6 hours as needed for Wheezing  Dispense: 1 Inhaler; Refill: 3  - FULL PFT STUDY; Future  - counseling on smoking cessation    6. Anxiety  - cont hydrOXYzine (VISTARIL) 25 MG capsule; Take 1 capsule by mouth 3 times daily as needed for Itching  Dispense: 90 capsule; Refill: 1    7. Hypercalcemia  - PTH, Intact With Ionized Calcium; Future    8. Lateral epicondylitis of both elbows  - [facility] [place] L[place], [id number] [facility]*    9. Screening for HIV (human immunodeficiency virus)  - HIV Screen; Future      Requested Prescriptions     Signed Prescriptions Disp Refills    albuterol sulfate HFA (PROVENTIL HFA) 108 (90 Base) MCG/ACT inhaler 1 Inhaler 0     Sig: Inhale 1-2 puffs into the lungs every 6 hours as needed for Wheezing or Shortness of Breath (Space out to every 6 hours as symptoms improve) Space out to every 6 hours as symptoms improve.     metFORMIN (GLUCOPHAGE) 500 MG tablet 180 tablet 1     Sig: Take 1 tablet by mouth every morning 2 tabs in the morning and 1 tablet in the evening, with meals    metoprolol tartrate (LOPRESSOR) 25 MG tablet 90 tablet 1     Sig: Take half tab po bid    hydrOXYzine (VISTARIL) 25 MG capsule 90 capsule 1     Sig: Take 1 capsule by mouth 3 times daily as needed for Itching    albuterol-ipratropium (COMBIVENT RESPIMAT)  MCG/ACT AERS inhaler 1 Inhaler 3     Sig: Inhale 1 puff into the lungs every 6 hours as needed for Wheezing       Medications Discontinued During This Encounter   Medication Reason    acetaminophen-codeine (TYLENOL/CODEINE #3) 300-30 MG per tablet Therapy completed    albuterol (PROVENTIL HFA;VENTOLIN HFA) 108 (90 BASE) MCG/ACT inhaler Duplicate Order    albuterol sulfate HFA (PROVENTIL HFA) 108 (90 Base) MCG/ACT inhaler Duplicate Order    metFORMIN (GLUCOPHAGE) 500 MG tablet Dose adjustment    metFORMIN (GLUCOPHAGE) 500 MG tablet Therapy completed    metoprolol tartrate (LOPRESSOR) 25 MG tablet Therapy completed    albuterol (PROVENTIL HFA) 108 (90 BASE) MCG/ACT inhaler Reorder    atorvastatin (LIPITOR) 40 MG tablet Reorder    metFORMIN (GLUCOPHAGE) 474 MG tablet Duplicate Order    metFORMIN (GLUCOPHAGE) 253 MG tablet Duplicate Order    atorvastatin (LIPITOR) 20 MG tablet Error       David received counseling on the following healthy behaviors: nutrition, exercise and tobacco cessation  Reviewed prior labs and health maintenance. Continue current medications, diet and exercise. Discussed use, benefit, and side effects of prescribed medications. Barriers to medication compliance addressed. Patient given educational materials - see patient instructions. All patient questions answered. Patient voiced understanding.

## 2017-11-03 NOTE — PROGRESS NOTES
Subjective:      Patient ID: Patrick Hinkle is a 43 y.o. male.     HPI    Review of Systems    Objective:   Physical Exam    Assessment:            Plan:

## 2017-11-27 DIAGNOSIS — R00.0 TACHYCARDIA: ICD-10-CM

## 2017-11-27 RX ORDER — ALBUTEROL SULFATE 90 UG/1
1-2 AEROSOL, METERED RESPIRATORY (INHALATION) EVERY 6 HOURS PRN
Qty: 1 INHALER | Refills: 3 | Status: SHIPPED | OUTPATIENT
Start: 2017-11-27 | End: 2018-02-05 | Stop reason: SDUPTHER

## 2017-12-29 DIAGNOSIS — F41.9 ANXIETY: ICD-10-CM

## 2017-12-29 RX ORDER — HYDROXYZINE PAMOATE 25 MG/1
25 CAPSULE ORAL 3 TIMES DAILY PRN
Qty: 90 CAPSULE | Refills: 1 | Status: SHIPPED | OUTPATIENT
Start: 2017-12-29 | End: 2018-01-12

## 2018-02-05 ENCOUNTER — OFFICE VISIT (OUTPATIENT)
Dept: FAMILY MEDICINE CLINIC | Age: 43
End: 2018-02-05
Payer: MEDICAID

## 2018-02-05 VITALS
OXYGEN SATURATION: 97 % | HEIGHT: 68 IN | WEIGHT: 177.4 LBS | HEART RATE: 109 BPM | BODY MASS INDEX: 26.89 KG/M2 | RESPIRATION RATE: 20 BRPM | DIASTOLIC BLOOD PRESSURE: 84 MMHG | TEMPERATURE: 97.1 F | SYSTOLIC BLOOD PRESSURE: 120 MMHG

## 2018-02-05 DIAGNOSIS — Z95.5 PRESENCE OF STENT IN LAD CORONARY ARTERY: ICD-10-CM

## 2018-02-05 DIAGNOSIS — J45.20 MILD INTERMITTENT ASTHMA WITHOUT COMPLICATION: ICD-10-CM

## 2018-02-05 DIAGNOSIS — F41.9 ANXIETY: ICD-10-CM

## 2018-02-05 DIAGNOSIS — F17.200 SMOKING: ICD-10-CM

## 2018-02-05 DIAGNOSIS — E11.9 TYPE 2 DIABETES MELLITUS WITHOUT COMPLICATION, WITHOUT LONG-TERM CURRENT USE OF INSULIN (HCC): Primary | ICD-10-CM

## 2018-02-05 DIAGNOSIS — E78.2 MIXED HYPERLIPIDEMIA: ICD-10-CM

## 2018-02-05 PROBLEM — R07.9 CHEST PAIN: Status: RESOLVED | Noted: 2017-05-08 | Resolved: 2018-02-05

## 2018-02-05 PROCEDURE — G8484 FLU IMMUNIZE NO ADMIN: HCPCS | Performed by: FAMILY MEDICINE

## 2018-02-05 PROCEDURE — 99214 OFFICE O/P EST MOD 30 MIN: CPT | Performed by: FAMILY MEDICINE

## 2018-02-05 PROCEDURE — 4004F PT TOBACCO SCREEN RCVD TLK: CPT | Performed by: FAMILY MEDICINE

## 2018-02-05 PROCEDURE — G8427 DOCREV CUR MEDS BY ELIG CLIN: HCPCS | Performed by: FAMILY MEDICINE

## 2018-02-05 PROCEDURE — G8419 CALC BMI OUT NRM PARAM NOF/U: HCPCS | Performed by: FAMILY MEDICINE

## 2018-02-05 PROCEDURE — 3046F HEMOGLOBIN A1C LEVEL >9.0%: CPT | Performed by: FAMILY MEDICINE

## 2018-02-05 RX ORDER — HYDROXYZINE PAMOATE 25 MG/1
25 CAPSULE ORAL 3 TIMES DAILY PRN
COMMUNITY
End: 2018-02-05 | Stop reason: SDUPTHER

## 2018-02-05 RX ORDER — ALBUTEROL SULFATE 90 UG/1
1-2 AEROSOL, METERED RESPIRATORY (INHALATION) EVERY 6 HOURS PRN
Qty: 1 INHALER | Refills: 3 | Status: SHIPPED | OUTPATIENT
Start: 2018-02-05 | End: 2018-06-08 | Stop reason: SDUPTHER

## 2018-02-05 RX ORDER — ATORVASTATIN CALCIUM 40 MG/1
40 TABLET, FILM COATED ORAL DAILY
Qty: 30 TABLET | Refills: 3 | Status: SHIPPED | OUTPATIENT
Start: 2018-02-05 | End: 2018-07-02 | Stop reason: SDUPTHER

## 2018-02-05 RX ORDER — HYDROXYZINE PAMOATE 50 MG/1
50 CAPSULE ORAL 3 TIMES DAILY PRN
Qty: 180 CAPSULE | Refills: 2 | Status: SHIPPED | OUTPATIENT
Start: 2018-02-05 | End: 2020-05-11 | Stop reason: SDUPTHER

## 2018-02-05 ASSESSMENT — PATIENT HEALTH QUESTIONNAIRE - PHQ9
1. LITTLE INTEREST OR PLEASURE IN DOING THINGS: 0
SUM OF ALL RESPONSES TO PHQ9 QUESTIONS 1 & 2: 0
SUM OF ALL RESPONSES TO PHQ QUESTIONS 1-9: 0
2. FEELING DOWN, DEPRESSED OR HOPELESS: 0

## 2018-02-05 ASSESSMENT — ENCOUNTER SYMPTOMS
NAUSEA: 0
BACK PAIN: 0
SINUS PAIN: 0
WHEEZING: 0
COUGH: 0
ABDOMINAL DISTENTION: 0
EYE REDNESS: 0
BLOOD IN STOOL: 0
PHOTOPHOBIA: 0
VOMITING: 0
SHORTNESS OF BREATH: 0
RHINORRHEA: 0
SINUS PRESSURE: 0
CONSTIPATION: 0
ABDOMINAL PAIN: 0

## 2018-02-05 NOTE — PROGRESS NOTES
Refill: 3  - Hemoglobin A1C; Future  - Microalbumin, Ur  - HIV Screen; Future  - Lipid Panel; Future    2. Presence of stent in LAD coronary artery  -Stable continue same medications    3. Mixed hyperlipidemia  -Started on Lipitor and will repeat lipid panel. Discussed with patient about the benefits of statins  - Lipid Panel; Future  - atorvastatin (LIPITOR) 40 MG tablet; Take 1 tablet by mouth daily  Dispense: 30 tablet; Refill: 3    4. Mild intermittent asthma without complication  -Asthma controlled, no PFTs done, continue same therapy for now. - albuterol-ipratropium (COMBIVENT RESPIMAT)  MCG/ACT AERS inhaler; Inhale 1 puff into the lungs every 6 hours as needed for Wheezing  Dispense: 1 Inhaler; Refill: 3  - albuterol sulfate HFA (PROVENTIL HFA) 108 (90 Base) MCG/ACT inhaler; Inhale 1-2 puffs into the lungs every 6 hours as needed for Wheezing or Shortness of Breath (Space out to every 6 hours as symptoms improve)  Dispense: 1 Inhaler; Refill: 3  - FULL PFT STUDY WITH PRE AND POST; Future    5. Anxiety  -Discussed with patient and about the different medications and the side effects of benzodiazepines, discussed with patient we will about psychiatrist.  Increase Vistaril 50 mg.  - hydrOXYzine (VISTARIL) 50 MG capsule; Take 1 capsule by mouth 3 times daily as needed for Anxiety  Dispense: 180 capsule; Refill: 2  - Genesis Jordan MD, Psychiatry [facility]    6. Smoking  -Counseling and education done, will discuss more in next appointment.       Orders Placed This Encounter   Procedures    Hemoglobin A1C     Standing Status:   Future     Standing Expiration Date:   2/5/2019   Chepe Antoine    HIV Screen     Standing Status:   Future     Standing Expiration Date:   2/5/2019    Lipid Panel     Standing Status:   Future     Standing Expiration Date:   2/5/2019     Order Specific Question:   Is Patient Fasting?/# of Hours     Answer:   8   Genesis Jordan MD, Psychiatry [facility]     Referral Priority:   Routine     Referral Type:   Consult for Advice and Opinion     Referral Reason:   Specialty Services Required     Referred to Provider:   Catalina Dupree MD     Requested Specialty:   Psychiatry     Number of Visits Requested:   1    FULL PFT STUDY WITH PRE AND POST     Standing Status:   Future     Standing Expiration Date:   2/5/2019         Medications Discontinued During This Encounter   Medication Reason    methylPREDNISolone (MEDROL, MARCO,) 4 MG tablet Therapy completed    metFORMIN (GLUCOPHAGE) 500 MG tablet Reorder    albuterol-ipratropium (COMBIVENT RESPIMAT)  MCG/ACT AERS inhaler Reorder    albuterol sulfate HFA (PROVENTIL HFA) 108 (90 Base) MCG/ACT inhaler Reorder    hydrOXYzine (VISTARIL) 25 MG capsule Reorder       Jenny Leigh received counseling on the following healthy behaviors: nutrition, exercise, medication adherence and tobacco cessation  Reviewed prior labs and health maintenance  Continue current medications, diet and exercise. Discussed use, benefit, and side effects of prescribed medications. Barriers to medication compliance addressed. Patient given educational materials - see patient instructions  Was a self-tracking handout given in paper form or via [employer]?  Yes    Requested Prescriptions     Signed Prescriptions Disp Refills    metFORMIN (GLUCOPHAGE) 500 MG tablet 60 tablet 3     Sig: Take 1 tablet by mouth 2 times daily (with meals)    albuterol-ipratropium (COMBIVENT RESPIMAT)  MCG/ACT AERS inhaler 1 Inhaler 3     Sig: Inhale 1 puff into the lungs every 6 hours as needed for Wheezing    albuterol sulfate HFA (PROVENTIL HFA) 108 (90 Base) MCG/ACT inhaler 1 Inhaler 3     Sig: Inhale 1-2 puffs into the lungs every 6 hours as needed for Wheezing or Shortness of Breath (Space out to every 6 hours as symptoms improve)    hydrOXYzine (VISTARIL) 50 MG capsule 180 capsule 2     Sig: Take 1 capsule by mouth 3 times daily as needed for Anxiety    atorvastatin (LIPITOR) 40 MG tablet 30 tablet 3     Sig: Take 1 tablet by mouth daily       All patient questions answered. Patient voiced understanding. Quality Measures    Body mass index is 26.97 kg/m². Normal. Weight control planned discussed Healthy diet and regular exercise. BP: 120/84 (manual) Blood pressure is normal. Treatment plan consists of No treatment change needed. Lab Results   Component Value Date    LDLCHOLESTEROL 116 05/09/2017    (goal LDL reduction with dx if diabetes is 50% LDL reduction)      PHQ Scores 2/5/2018   PHQ2 Score 0   PHQ9 Score 0     Interpretation of Total Score Depression Severity: 1-4 = Minimal depression, 5-9 = Mild depression, 10-14 = Moderate depression, 15-19 = Moderately severe depression, 20-27 = Severe depression      The patient's past medical, surgical, social, and family history as well as his   current medications and allergies were reviewed as documented in today's encounter. Medications, labs, diagnostic studies, consultations and follow-up as documented in this encounter. Return in about 4 weeks (around 3/5/2018) for for lab work, htn, hld. .    Patient was seen with total face to face time of  25 minutes. More than 50% of this visit was counseling and education. Future Appointments  Date Time Provider Tamika Zuluaga   3/6/2018 10:00 AM Valeria Paniagua MD  jessica Painting     This note was completed by using the assistance of a speech-recognition program. However, inadvertent computerized transcription errors may be present. Although every effort was made to ensure accuracy, no guarantees can be provided that every mistake has been identified and corrected by editing.   Electronically signed by Valeria Paniagua MD on 2/5/2018  10:27 AM

## 2018-04-10 ENCOUNTER — OFFICE VISIT (OUTPATIENT)
Dept: DERMATOLOGY | Age: 43
End: 2018-04-10
Payer: MEDICAID

## 2018-04-10 VITALS
BODY MASS INDEX: 25.79 KG/M2 | OXYGEN SATURATION: 96 % | HEIGHT: 68 IN | WEIGHT: 170.2 LBS | SYSTOLIC BLOOD PRESSURE: 148 MMHG | HEART RATE: 110 BPM | DIASTOLIC BLOOD PRESSURE: 90 MMHG

## 2018-04-10 DIAGNOSIS — L72.0 EPIDERMOID CYST: Primary | ICD-10-CM

## 2018-04-10 PROCEDURE — G8419 CALC BMI OUT NRM PARAM NOF/U: HCPCS | Performed by: DERMATOLOGY

## 2018-04-10 PROCEDURE — 4004F PT TOBACCO SCREEN RCVD TLK: CPT | Performed by: DERMATOLOGY

## 2018-04-10 PROCEDURE — G8427 DOCREV CUR MEDS BY ELIG CLIN: HCPCS | Performed by: DERMATOLOGY

## 2018-04-10 PROCEDURE — 99202 OFFICE O/P NEW SF 15 MIN: CPT | Performed by: DERMATOLOGY

## 2018-05-02 ENCOUNTER — PROCEDURE VISIT (OUTPATIENT)
Dept: DERMATOLOGY | Age: 43
End: 2018-05-02
Payer: MEDICAID

## 2018-05-02 ENCOUNTER — HOSPITAL ENCOUNTER (OUTPATIENT)
Age: 43
Setting detail: SPECIMEN
Discharge: HOME | End: 2018-05-02
Payer: MEDICAID

## 2018-05-02 VITALS — DIASTOLIC BLOOD PRESSURE: 89 MMHG | SYSTOLIC BLOOD PRESSURE: 139 MMHG

## 2018-05-02 DIAGNOSIS — L72.3 INFLAMED EPIDERMOID CYST OF SKIN: Primary | ICD-10-CM

## 2018-05-02 PROCEDURE — 11443 EXC FACE-MM B9+MARG 2.1-3 CM: CPT | Performed by: DERMATOLOGY

## 2018-05-02 PROCEDURE — 12052 INTMD RPR FACE/MM 2.6-5.0 CM: CPT | Performed by: DERMATOLOGY

## 2018-05-02 RX ORDER — LIDOCAINE HYDROCHLORIDE AND EPINEPHRINE 10; 10 MG/ML; UG/ML
6 INJECTION, SOLUTION INFILTRATION; PERINEURAL ONCE
Status: COMPLETED | OUTPATIENT
Start: 2018-05-02 | End: 2018-05-02

## 2018-05-02 RX ADMIN — LIDOCAINE HYDROCHLORIDE AND EPINEPHRINE 6 ML: 10; 10 INJECTION, SOLUTION INFILTRATION; PERINEURAL at 10:49

## 2018-05-04 ENCOUNTER — TELEPHONE (OUTPATIENT)
Dept: DERMATOLOGY | Age: 43
End: 2018-05-04

## 2018-05-04 LAB — DERMATOLOGY PATHOLOGY REPORT: NORMAL

## 2018-05-09 ENCOUNTER — OFFICE VISIT (OUTPATIENT)
Dept: DERMATOLOGY | Age: 43
End: 2018-05-09

## 2018-05-09 DIAGNOSIS — I10 ESSENTIAL HYPERTENSION: ICD-10-CM

## 2018-05-09 DIAGNOSIS — R00.0 TACHYCARDIA: ICD-10-CM

## 2018-05-09 DIAGNOSIS — Z48.02 VISIT FOR SUTURE REMOVAL: Primary | ICD-10-CM

## 2018-05-09 PROCEDURE — 99024 POSTOP FOLLOW-UP VISIT: CPT | Performed by: DERMATOLOGY

## 2018-05-27 ENCOUNTER — HOSPITAL ENCOUNTER (EMERGENCY)
Age: 43
Discharge: HOME | End: 2018-05-27
Attending: EMERGENCY MEDICINE
Payer: MEDICAID

## 2018-05-27 ENCOUNTER — APPOINTMENT (OUTPATIENT)
Dept: GENERAL RADIOLOGY | Age: 43
End: 2018-05-27
Payer: MEDICAID

## 2018-05-27 VITALS
OXYGEN SATURATION: 97 % | BODY MASS INDEX: 25.76 KG/M2 | WEIGHT: 170 LBS | DIASTOLIC BLOOD PRESSURE: 95 MMHG | RESPIRATION RATE: 16 BRPM | HEART RATE: 106 BPM | SYSTOLIC BLOOD PRESSURE: 146 MMHG | TEMPERATURE: 98.3 F | HEIGHT: 68 IN

## 2018-05-27 DIAGNOSIS — S40.011A CONTUSION OF RIGHT SHOULDER, INITIAL ENCOUNTER: ICD-10-CM
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
## 2018-05-27 DIAGNOSIS — S50.01XA CONTUSION OF RIGHT ELBOW, INITIAL ENCOUNTER: ICD-10-CM

## 2018-05-27 PROCEDURE — 99283 EMERGENCY DEPT VISIT LOW MDM: CPT

## 2018-05-27 PROCEDURE — 73030 X-RAY EXAM OF SHOULDER: CPT

## 2018-05-27 RX ORDER — CYCLOBENZAPRINE HCL 10 MG
10 TABLET ORAL 3 TIMES DAILY PRN
Qty: 15 TABLET | Refills: 0 | Status: SHIPPED | OUTPATIENT
Start: 2018-05-27 | End: 2020-05-11 | Stop reason: SDUPTHER

## 2018-05-27 RX ORDER — IBUPROFEN 800 MG/1
800 TABLET ORAL EVERY 8 HOURS PRN
Qty: 20 TABLET | Refills: 0 | Status: SHIPPED | OUTPATIENT
Start: 2018-05-27 | End: 2020-10-18

## 2018-05-27 ASSESSMENT — ENCOUNTER SYMPTOMS
NAUSEA: 0
TROUBLE SWALLOWING: 0
COUGH: 0
SHORTNESS OF BREATH: 0
VOMITING: 0

## 2018-05-27 ASSESSMENT — PAIN SCALES - GENERAL: PAINLEVEL_OUTOF10: 10

## 2018-05-27 ASSESSMENT — PAIN DESCRIPTION - LOCATION: LOCATION: SHOULDER

## 2018-05-27 ASSESSMENT — PAIN DESCRIPTION - ORIENTATION: ORIENTATION: RIGHT

## 2018-06-08 DIAGNOSIS — J45.20 MILD INTERMITTENT ASTHMA WITHOUT COMPLICATION: ICD-10-CM

## 2018-06-08 RX ORDER — ALBUTEROL SULFATE 90 UG/1
1-2 AEROSOL, METERED RESPIRATORY (INHALATION) EVERY 6 HOURS PRN
Qty: 1 INHALER | Refills: 3 | Status: SHIPPED | OUTPATIENT
Start: 2018-06-08 | End: 2020-05-11 | Stop reason: SDUPTHER

## 2018-06-20 ENCOUNTER — TELEPHONE (OUTPATIENT)
Dept: FAMILY MEDICINE CLINIC | Age: 43
End: 2018-06-20

## 2018-06-20 DIAGNOSIS — E11.9 TYPE 2 DIABETES MELLITUS WITHOUT COMPLICATION, WITHOUT LONG-TERM CURRENT USE OF INSULIN (HCC): Primary | ICD-10-CM

## 2018-07-02 DIAGNOSIS — E78.2 MIXED HYPERLIPIDEMIA: ICD-10-CM

## 2018-07-02 RX ORDER — ATORVASTATIN CALCIUM 40 MG/1
40 TABLET, FILM COATED ORAL DAILY
Qty: 30 TABLET | Refills: 3 | Status: SHIPPED | OUTPATIENT
Start: 2018-07-02 | End: 2020-05-11 | Stop reason: ALTCHOICE

## 2018-07-23 ENCOUNTER — HOSPITAL ENCOUNTER (EMERGENCY)
Age: 43
Discharge: HOME | End: 2018-07-23
Attending: EMERGENCY MEDICINE

## 2018-07-23 VITALS
WEIGHT: 170 LBS | OXYGEN SATURATION: 98 % | DIASTOLIC BLOOD PRESSURE: 90 MMHG | BODY MASS INDEX: 25.76 KG/M2 | TEMPERATURE: 98.6 F | SYSTOLIC BLOOD PRESSURE: 138 MMHG | RESPIRATION RATE: 18 BRPM | HEART RATE: 115 BPM | HEIGHT: 68 IN

## 2018-07-23 DIAGNOSIS — H10.32 ACUTE CONJUNCTIVITIS OF LEFT EYE, UNSPECIFIED ACUTE CONJUNCTIVITIS TYPE: Primary | ICD-10-CM

## 2018-07-23 PROCEDURE — 99282 EMERGENCY DEPT VISIT SF MDM: CPT

## 2018-07-23 PROCEDURE — 6370000000 HC RX 637 (ALT 250 FOR IP): Performed by: PHYSICIAN ASSISTANT

## 2018-07-23 RX ORDER — ERYTHROMYCIN 5 MG/G
OINTMENT OPHTHALMIC ONCE
Status: COMPLETED | OUTPATIENT
Start: 2018-07-23 | End: 2018-07-23

## 2018-07-23 RX ORDER — ERYTHROMYCIN 5 MG/G
OINTMENT OPHTHALMIC
Qty: 1 TUBE | Refills: 0 | Status: SHIPPED | OUTPATIENT
Start: 2018-07-23 | End: 2018-08-02

## 2018-07-23 RX ORDER — GABAPENTIN 300 MG/1
300 CAPSULE ORAL 2 TIMES DAILY
COMMUNITY
End: 2020-05-11 | Stop reason: ALTCHOICE

## 2018-07-23 RX ADMIN — ERYTHROMYCIN: 5 OINTMENT OPHTHALMIC at 17:31

## 2018-07-23 ASSESSMENT — PAIN DESCRIPTION - DESCRIPTORS: DESCRIPTORS: BURNING

## 2018-07-23 ASSESSMENT — PAIN SCALES - GENERAL: PAINLEVEL_OUTOF10: 10

## 2018-07-23 ASSESSMENT — PAIN DESCRIPTION - LOCATION: LOCATION: EYE

## 2018-07-23 ASSESSMENT — PAIN DESCRIPTION - PAIN TYPE: TYPE: ACUTE PAIN

## 2018-07-23 ASSESSMENT — PAIN DESCRIPTION - ORIENTATION: ORIENTATION: LEFT

## 2018-07-23 NOTE — ED PROVIDER NOTES
[address] ED  eMERGENCY dEPARTMENT eNCOUnter      Pt Name: Tim Mtz  MRN: [id number]  [place] 1975  Date of evaluation: 7/23/2018  Provider: Angel Wilkinson 128 COMPLAINT       Chief Complaint   Patient presents with    Eye Pain     left; onset today           HISTORY OF PRESENT ILLNESS  (Location/Symptom, Timing/Onset, Context/Setting, Quality, Duration, Modifying Factors, Severity.)   Tim Mtz is a 37 y.o. male who presents to the emergency department c/o left eye irritation that started this morning. States eye had discharge this morning and was matted shut. Denies any eye injury or any vision changes. Denies any foreign body sensation. Denies any other complaint       Nursing Notes were reviewed. REVIEW OF SYSTEMS    (2-9 systems for level 4, 10 or more for level 5)     Review of Systems   Complaining of left eye irritation    Except as noted above the remainder of the review of systems was reviewed and negative. PAST MEDICAL HISTORY     Past Medical History:   Diagnosis Date    Anxiety     Asthma     CAD (coronary artery disease)     Chronic back pain     Diabetes mellitus (HCC)     GERD (gastroesophageal reflux disease)      heart burn    Neuropathy ([facility] [address]      None otherwise stated in nurses notes    SURGICAL HISTORY       Past Surgical History:   Procedure Laterality Date    CORONARY ANGIOPLASTY WITH STENT PLACEMENT  2010    TONSILLECTOMY       None otherwise stated in nurses notes    CURRENT MEDICATIONS       Previous Medications    ALBUTEROL SULFATE HFA (PROVENTIL HFA) 108 (90 BASE) MCG/ACT INHALER    Inhale 1-2 puffs into the lungs every 6 hours as needed for Wheezing or Shortness of Breath (Space out to every 6 hours as symptoms improve)    ALBUTEROL-IPRATROPIUM (COMBIVENT RESPIMAT)  MCG/ACT AERS INHALER    Inhale 1 puff into the lungs every 6 hours as needed for Wheezing    ASPIRIN 81 MG TABLET    Take 81 mg by mouth daily. ATORVASTATIN (LIPITOR) 40 MG TABLET    Take 1 tablet by mouth daily    CYCLOBENZAPRINE (FLEXERIL) 10 MG TABLET    Take 1 tablet by mouth 3 times daily as needed for Muscle spasms    GABAPENTIN (NEURONTIN) 300 MG CAPSULE    Take 300 mg by mouth 2 times daily. Dawna Ascencion HYDROXYZINE (VISTARIL) 50 MG CAPSULE    Take 1 capsule by mouth 3 times daily as needed for Anxiety    IBUPROFEN (ADVIL;MOTRIN) 800 MG TABLET    Take 1 tablet by mouth every 6 hours as needed for Pain    IBUPROFEN (ADVIL;MOTRIN) 800 MG TABLET    Take 1 tablet by mouth every 8 hours as needed for Pain    METFORMIN (GLUCOPHAGE) 500 MG TABLET    Take 1 tablet by mouth 2 times daily (with meals)    METOPROLOL TARTRATE (LOPRESSOR) 25 MG TABLET    Take half tab po bid    NITROGLYCERIN (NITROSTAT) 0.4 MG SL TABLET    up to max of 3 total doses. If no relief after 1 dose, call 911. ALLERGIES     Patient has no known allergies. FAMILY HISTORY       Family History   Problem Relation Age of Onset    Diabetes Mother     Diabetes Father     Arrhythmia Brother      Family Status   Relation Status    Mother Alive    Father Alive    Brother Alive    Sister Alive      None otherwise stated in nurses notes    SOCIAL HISTORY      reports that he has been smoking Cigarettes. He has a 7.50 pack-year smoking history. He has never used smokeless tobacco. He reports that he drinks alcohol. He reports that he does not use drugs. lives at home with others     PHYSICAL EXAM    (up to 7 for level 4, 8 or more for level 5)     ED Triage Vitals [07/23/18 1711]   BP Temp Temp Source Pulse Resp SpO2 Height Weight   (!) 138/101 98.6 °F (37 °C) Oral 126 18 98 % 5' 8\" (1.727 m) 170 lb (77.1 kg)       Physical Exam   Nursing note and vitals reviewed. Constitutional: Oriented to person, place, and time and well-developed, well-nourished. Head: Normocephalic and atraumatic. Ear: External ears normal.   Nose: Nose normal and midline.    Eyes: Conjunctivae of left eye injected with yellow discharge, no FB, EOM are normal. Pupils are equal, round, and reactive to light. Neck: Normal range of motion. Cardiovascular: Normal rate, regular rhythm, normal heart sounds and intact distal pulses. Pulmonary/Chest: Effort normal and breath sounds normal. No respiratory distress. No wheezes. No rales. No chest tenderness. Musculoskeletal: Normal range of motion. Neurological: Alert and oriented to person, place, and time. GCS score is 15. Skin: Skin is warm and dry. No rash noted. No erythema. No pallor. DIAGNOSTIC RESULTS       LABS:  Labs Reviewed - No data to display    All other labs were within normal range or not returned as of this dictation. EMERGENCY DEPARTMENT COURSE and DIFFERENTIAL DIAGNOSIS/MDM:   Vitals:    Vitals:    07/23/18 1711 07/23/18 1747   BP: (!) 138/101 (!) 138/90   Pulse: 126 115   Resp: 18    Temp: 98.6 °F (37 °C)    TempSrc: Oral    SpO2: 98% 98%   Weight: 170 lb (77.1 kg)    Height: 5' 8\" (1.727 m)        Will d/c with antibiotic eyedrops. Instructed patient not to share any towels, and to make sure they wash their hands thoroughly if he touched her eye. Instructed to follow-up with eye doctor within the next 2 days to ensure resolution. Instructed not to wear contacts if they are used and to dispose of the current contacts if patient has been wearing them. Unable to obtain visual acuity due to patient not having contacts or glasses. Pre-hypertension/Hypertension: The patient has been informed that they may have pre-hypertension or Hypertension based on a blood pressure reading in the emergency department.  I recommend that the patient call the primary care provider listed on their discharge instructions or a physician of their choice this week to arrange follow up for further evaluation of possible pre-hypertension or Hypertension      Patient instructed to return to the emergency room if symptoms worsen, return, or any other concern right away which is agreed by the patient    ED MEDS:  Orders Placed This Encounter   Medications    erythromycin (ROMYCIN) ophthalmic ointment    erythromycin (ROMYCIN) 5 MG/GM ophthalmic ointment     Sig: Apply to affected eye 3 times a day for 5-7 days until symptoms clear. Dispense:  1 Tube     Refill:  0         CONSULTS:  None    PROCEDURES:  None      FINAL IMPRESSION      1. Acute conjunctivitis of left eye, unspecified acute conjunctivitis type          DISPOSITION/PLAN   DISPOSITION Decision To Discharge    PATIENT REFERRED TO:  Nusrat Park MD  [facility] [address] [facility] [address]  [phone]    Schedule an appointment as soon as possible for a visit       [facility] ED  [facility] [address] [phone]    For worsening symptoms, or any other concern      DISCHARGE MEDICATIONS:  New Prescriptions    ERYTHROMYCIN (ROMYCIN) 5 MG/GM OPHTHALMIC OINTMENT    Apply to affected eye 3 times a day for 5-7 days until symptoms clear.        (Please note that portions of this note were completed with a voice recognition program.  Efforts were made to edit the dictations but occasionally words are mis-transcribed.)    Nigel Norman, [address] [place], [place]  07/23/18 4011

## 2018-07-24 ENCOUNTER — CARE COORDINATION (OUTPATIENT)
Dept: CARE COORDINATION | Age: 43
End: 2018-07-24

## 2018-09-17 DIAGNOSIS — E11.9 TYPE 2 DIABETES MELLITUS WITHOUT COMPLICATION, WITHOUT LONG-TERM CURRENT USE OF INSULIN (HCC): ICD-10-CM

## 2018-09-26 PROBLEM — Z13.1 ENCOUNTER FOR SCREENING FOR DIABETES MELLITUS: Status: RESOLVED | Noted: 2017-05-08 | Resolved: 2018-09-26

## 2019-02-27 ENCOUNTER — HOSPITAL ENCOUNTER (EMERGENCY)
Facility: HOSPITAL | Age: 44
Discharge: HOME | End: 2019-02-27
Attending: EMERGENCY MEDICINE | Admitting: EMERGENCY MEDICINE

## 2019-02-27 ENCOUNTER — APPOINTMENT (OUTPATIENT)
Dept: GENERAL RADIOLOGY | Facility: HOSPITAL | Age: 44
End: 2019-02-27

## 2019-02-27 VITALS
WEIGHT: 170 LBS | SYSTOLIC BLOOD PRESSURE: 148 MMHG | TEMPERATURE: 98.3 F | HEIGHT: 68 IN | OXYGEN SATURATION: 95 % | DIASTOLIC BLOOD PRESSURE: 93 MMHG | RESPIRATION RATE: 16 BRPM | HEART RATE: 106 BPM | BODY MASS INDEX: 25.76 KG/M2

## 2019-02-27 DIAGNOSIS — J45.21 MILD INTERMITTENT ASTHMA WITH ACUTE EXACERBATION: Primary | ICD-10-CM

## 2019-02-27 DIAGNOSIS — R73.09 ELEVATED GLUCOSE: ICD-10-CM

## 2019-02-27 DIAGNOSIS — F41.9 ANXIETY: ICD-10-CM

## 2019-02-27 DIAGNOSIS — J45.30 MILD PERSISTENT ASTHMA WITHOUT COMPLICATION: ICD-10-CM

## 2019-02-27 LAB
ALBUMIN SERPL-MCNC: 4.75 G/DL (ref 3.2–4.8)
ALBUMIN/GLOB SERPL: 2.2 G/DL (ref 1.5–2.5)
ALP SERPL-CCNC: 81 U/L (ref 25–100)
ALT SERPL W P-5'-P-CCNC: 23 U/L (ref 7–40)
ANION GAP SERPL CALCULATED.3IONS-SCNC: 8 MMOL/L (ref 3–11)
AST SERPL-CCNC: 15 U/L (ref 0–33)
BASOPHILS # BLD AUTO: 0.02 10*3/MM3 (ref 0–0.2)
BASOPHILS NFR BLD AUTO: 0.3 % (ref 0–1)
BILIRUB SERPL-MCNC: 0.3 MG/DL (ref 0.3–1.2)
BNP SERPL-MCNC: 4 PG/ML (ref 0–100)
BUN BLD-MCNC: 9 MG/DL (ref 9–23)
BUN/CREAT SERPL: 10.3 (ref 7–25)
CALCIUM SPEC-SCNC: 9.5 MG/DL (ref 8.7–10.4)
CHLORIDE SERPL-SCNC: 104 MMOL/L (ref 99–109)
CO2 SERPL-SCNC: 24 MMOL/L (ref 20–31)
CREAT BLD-MCNC: 0.87 MG/DL (ref 0.6–1.3)
DEPRECATED RDW RBC AUTO: 45.7 FL (ref 37–54)
EOSINOPHIL # BLD AUTO: 0.1 10*3/MM3 (ref 0–0.3)
EOSINOPHIL NFR BLD AUTO: 1.3 % (ref 0–3)
ERYTHROCYTE [DISTWIDTH] IN BLOOD BY AUTOMATED COUNT: 13.5 % (ref 11.3–14.5)
GFR SERPL CREATININE-BSD FRML MDRD: 96 ML/MIN/1.73
GLOBULIN UR ELPH-MCNC: 2.2 GM/DL
GLUCOSE BLD-MCNC: 239 MG/DL (ref 70–100)
HCT VFR BLD AUTO: 44.3 % (ref 38.9–50.9)
HGB BLD-MCNC: 15.5 G/DL (ref 13.1–17.5)
HOLD SPECIMEN: NORMAL
HOLD SPECIMEN: NORMAL
IMM GRANULOCYTES # BLD AUTO: 0.03 10*3/MM3 (ref 0–0.05)
IMM GRANULOCYTES NFR BLD AUTO: 0.4 % (ref 0–0.6)
LYMPHOCYTES # BLD AUTO: 1.9 10*3/MM3 (ref 0.6–4.8)
LYMPHOCYTES NFR BLD AUTO: 24.6 % (ref 24–44)
MCH RBC QN AUTO: 32.4 PG (ref 27–31)
MCHC RBC AUTO-ENTMCNC: 35 G/DL (ref 32–36)
MCV RBC AUTO: 92.5 FL (ref 80–99)
MONOCYTES # BLD AUTO: 0.49 10*3/MM3 (ref 0–1)
MONOCYTES NFR BLD AUTO: 6.4 % (ref 0–12)
NEUTROPHILS # BLD AUTO: 5.2 10*3/MM3 (ref 1.5–8.3)
NEUTROPHILS NFR BLD AUTO: 67.4 % (ref 41–71)
PLATELET # BLD AUTO: 292 10*3/MM3 (ref 150–450)
PMV BLD AUTO: 9.3 FL (ref 6–12)
POTASSIUM BLD-SCNC: 4 MMOL/L (ref 3.5–5.5)
PROT SERPL-MCNC: 6.9 G/DL (ref 5.7–8.2)
RBC # BLD AUTO: 4.79 10*6/MM3 (ref 4.2–5.76)
SODIUM BLD-SCNC: 136 MMOL/L (ref 132–146)
TROPONIN I SERPL-MCNC: 0 NG/ML (ref 0–0.07)
WBC NRBC COR # BLD: 7.71 10*3/MM3 (ref 3.5–10.8)
WHOLE BLOOD HOLD SPECIMEN: NORMAL
WHOLE BLOOD HOLD SPECIMEN: NORMAL

## 2019-02-27 PROCEDURE — 80053 COMPREHEN METABOLIC PANEL: CPT | Performed by: EMERGENCY MEDICINE

## 2019-02-27 PROCEDURE — 25010000002 METHYLPREDNISOLONE PER 40 MG: Performed by: EMERGENCY MEDICINE

## 2019-02-27 PROCEDURE — 96374 THER/PROPH/DIAG INJ IV PUSH: CPT

## 2019-02-27 PROCEDURE — 99285 EMERGENCY DEPT VISIT HI MDM: CPT

## 2019-02-27 PROCEDURE — 93005 ELECTROCARDIOGRAM TRACING: CPT | Performed by: EMERGENCY MEDICINE

## 2019-02-27 PROCEDURE — 93005 ELECTROCARDIOGRAM TRACING: CPT

## 2019-02-27 PROCEDURE — 71045 X-RAY EXAM CHEST 1 VIEW: CPT

## 2019-02-27 PROCEDURE — 85025 COMPLETE CBC W/AUTO DIFF WBC: CPT | Performed by: EMERGENCY MEDICINE

## 2019-02-27 PROCEDURE — 84484 ASSAY OF TROPONIN QUANT: CPT

## 2019-02-27 PROCEDURE — 83880 ASSAY OF NATRIURETIC PEPTIDE: CPT | Performed by: EMERGENCY MEDICINE

## 2019-02-27 RX ORDER — ALBUTEROL SULFATE 90 UG/1
2 AEROSOL, METERED RESPIRATORY (INHALATION) ONCE
Status: COMPLETED | OUTPATIENT
Start: 2019-02-27 | End: 2019-02-27

## 2019-02-27 RX ORDER — SODIUM CHLORIDE 0.9 % (FLUSH) 0.9 %
10 SYRINGE (ML) INJECTION AS NEEDED
Status: DISCONTINUED | OUTPATIENT
Start: 2019-02-27 | End: 2019-02-27 | Stop reason: HOSPADM

## 2019-02-27 RX ORDER — HYDROXYZINE PAMOATE 25 MG/1
25 CAPSULE ORAL 4 TIMES DAILY PRN
Qty: 120 CAPSULE | Refills: 0 | Status: SHIPPED | OUTPATIENT
Start: 2019-02-27

## 2019-02-27 RX ORDER — ALBUTEROL SULFATE 90 UG/1
2 AEROSOL, METERED RESPIRATORY (INHALATION) EVERY 4 HOURS PRN
Qty: 6.7 G | Refills: 0 | Status: SHIPPED | OUTPATIENT
Start: 2019-02-27

## 2019-02-27 RX ORDER — METHYLPREDNISOLONE SODIUM SUCCINATE 40 MG/ML
80 INJECTION, POWDER, LYOPHILIZED, FOR SOLUTION INTRAMUSCULAR; INTRAVENOUS ONCE
Status: COMPLETED | OUTPATIENT
Start: 2019-02-27 | End: 2019-02-27

## 2019-02-27 RX ADMIN — ALBUTEROL SULFATE 2 PUFF: 90 AEROSOL, METERED RESPIRATORY (INHALATION) at 13:01

## 2019-02-27 RX ADMIN — METHYLPREDNISOLONE SODIUM SUCCINATE 80 MG: 40 INJECTION, POWDER, FOR SOLUTION INTRAMUSCULAR; INTRAVENOUS at 13:02

## 2019-03-29 ENCOUNTER — HOSPITAL ENCOUNTER (EMERGENCY)
Facility: HOSPITAL | Age: 44
Discharge: HOME | End: 2019-03-29
Attending: EMERGENCY MEDICINE | Admitting: EMERGENCY MEDICINE

## 2019-03-29 VITALS
OXYGEN SATURATION: 98 % | SYSTOLIC BLOOD PRESSURE: 148 MMHG | WEIGHT: 170 LBS | BODY MASS INDEX: 25.76 KG/M2 | RESPIRATION RATE: 18 BRPM | TEMPERATURE: 97.9 F | HEART RATE: 95 BPM | DIASTOLIC BLOOD PRESSURE: 75 MMHG | HEIGHT: 68 IN

## 2019-03-29 DIAGNOSIS — J45.901 MILD ASTHMA WITH EXACERBATION, UNSPECIFIED WHETHER PERSISTENT: Primary | ICD-10-CM

## 2019-03-29 PROCEDURE — 99283 EMERGENCY DEPT VISIT LOW MDM: CPT

## 2019-03-29 PROCEDURE — 94640 AIRWAY INHALATION TREATMENT: CPT

## 2019-03-29 RX ORDER — ALBUTEROL SULFATE 90 UG/1
2 AEROSOL, METERED RESPIRATORY (INHALATION) EVERY 4 HOURS PRN
Qty: 1 INHALER | Refills: 0 | Status: SHIPPED | OUTPATIENT
Start: 2019-03-29

## 2019-03-29 RX ORDER — ALBUTEROL SULFATE 90 UG/1
2 AEROSOL, METERED RESPIRATORY (INHALATION) ONCE
Status: COMPLETED | OUTPATIENT
Start: 2019-03-29 | End: 2019-03-29

## 2019-03-29 RX ORDER — IPRATROPIUM BROMIDE AND ALBUTEROL SULFATE 2.5; .5 MG/3ML; MG/3ML
3 SOLUTION RESPIRATORY (INHALATION) ONCE
Status: COMPLETED | OUTPATIENT
Start: 2019-03-29 | End: 2019-03-29

## 2019-03-29 RX ADMIN — ALBUTEROL SULFATE 2 PUFF: 90 AEROSOL, METERED RESPIRATORY (INHALATION) at 11:48

## 2019-03-29 RX ADMIN — IPRATROPIUM BROMIDE AND ALBUTEROL SULFATE 3 ML: 2.5; .5 SOLUTION RESPIRATORY (INHALATION) at 10:45

## 2019-09-16 ENCOUNTER — TELEPHONE (OUTPATIENT)
Dept: FAMILY MEDICINE CLINIC | Age: 44
End: 2019-09-16

## 2019-10-07 ENCOUNTER — HOSPITAL ENCOUNTER (EMERGENCY)
Age: 44
Discharge: HOME | End: 2019-10-07
Attending: EMERGENCY MEDICINE

## 2019-10-07 VITALS
RESPIRATION RATE: 18 BRPM | BODY MASS INDEX: 25.76 KG/M2 | HEART RATE: 99 BPM | DIASTOLIC BLOOD PRESSURE: 93 MMHG | TEMPERATURE: 98.3 F | OXYGEN SATURATION: 99 % | WEIGHT: 170 LBS | SYSTOLIC BLOOD PRESSURE: 151 MMHG | HEIGHT: 68 IN

## 2019-10-07 DIAGNOSIS — S39.012A STRAIN OF LUMBAR REGION, INITIAL ENCOUNTER: Primary | ICD-10-CM

## 2019-10-07 PROCEDURE — 6360000002 HC RX W HCPCS: Performed by: PHYSICIAN ASSISTANT

## 2019-10-07 PROCEDURE — 99282 EMERGENCY DEPT VISIT SF MDM: CPT

## 2019-10-07 PROCEDURE — 96372 THER/PROPH/DIAG INJ SC/IM: CPT

## 2019-10-07 RX ORDER — KETOROLAC TROMETHAMINE 30 MG/ML
30 INJECTION, SOLUTION INTRAMUSCULAR; INTRAVENOUS ONCE
Status: DISCONTINUED | OUTPATIENT
Start: 2019-10-07 | End: 2019-10-07

## 2019-10-07 RX ORDER — KETOROLAC TROMETHAMINE 30 MG/ML
30 INJECTION, SOLUTION INTRAMUSCULAR; INTRAVENOUS ONCE
Status: COMPLETED | OUTPATIENT
Start: 2019-10-07 | End: 2019-10-07

## 2019-10-07 RX ORDER — CYCLOBENZAPRINE HCL 10 MG
10 TABLET ORAL 2 TIMES DAILY PRN
Qty: 10 TABLET | Refills: 0 | Status: SHIPPED | OUTPATIENT
Start: 2019-10-07 | End: 2019-10-12

## 2019-10-07 RX ORDER — PREDNISONE 20 MG/1
20 TABLET ORAL DAILY
Qty: 5 TABLET | Refills: 0 | Status: SHIPPED | OUTPATIENT
Start: 2019-10-07 | End: 2019-10-12

## 2019-10-07 RX ADMIN — KETOROLAC TROMETHAMINE 30 MG: 30 INJECTION, SOLUTION INTRAMUSCULAR at 15:02

## 2019-10-07 ASSESSMENT — PAIN SCALES - GENERAL: PAINLEVEL_OUTOF10: 6

## 2019-10-10 ASSESSMENT — ENCOUNTER SYMPTOMS
ABDOMINAL SWELLING: 0
ABDOMINAL PAIN: 0
BACK PAIN: 1
BOWEL INCONTINENCE: 0

## 2020-04-06 ENCOUNTER — APPOINTMENT (OUTPATIENT)
Dept: GENERAL RADIOLOGY | Age: 45
End: 2020-04-06
Payer: MEDICAID

## 2020-04-06 ENCOUNTER — HOSPITAL ENCOUNTER (EMERGENCY)
Age: 45
Discharge: HOME | End: 2020-04-06
Attending: EMERGENCY MEDICINE
Payer: MEDICAID

## 2020-04-06 VITALS
TEMPERATURE: 97.8 F | WEIGHT: 170 LBS | HEART RATE: 102 BPM | OXYGEN SATURATION: 96 % | HEIGHT: 68 IN | SYSTOLIC BLOOD PRESSURE: 156 MMHG | RESPIRATION RATE: 18 BRPM | BODY MASS INDEX: 25.76 KG/M2 | DIASTOLIC BLOOD PRESSURE: 95 MMHG

## 2020-04-06 LAB
ABSOLUTE EOS #: 0.2 K/UL (ref 0–0.4)
ABSOLUTE IMMATURE GRANULOCYTE: ABNORMAL K/UL (ref 0–0.3)
ABSOLUTE LYMPH #: 1.1 K/UL (ref 1–4.8)
ABSOLUTE MONO #: 0.5 K/UL (ref 0.1–1.3)
ALBUMIN SERPL-MCNC: 4.1 G/DL (ref 3.5–5.2)
ALBUMIN/GLOBULIN RATIO: ABNORMAL (ref 1–2.5)
ALP BLD-CCNC: 74 U/L (ref 40–129)
ALT SERPL-CCNC: 25 U/L (ref 5–41)
ANION GAP SERPL CALCULATED.3IONS-SCNC: 13 MMOL/L (ref 9–17)
AST SERPL-CCNC: 24 U/L
BASOPHILS # BLD: 1 % (ref 0–2)
BASOPHILS ABSOLUTE: 0 K/UL (ref 0–0.2)
BILIRUB SERPL-MCNC: 0.42 MG/DL (ref 0.3–1.2)
BUN BLDV-MCNC: 9 MG/DL (ref 6–20)
BUN/CREAT BLD: ABNORMAL (ref 9–20)
CALCIUM SERPL-MCNC: 9.2 MG/DL (ref 8.6–10.4)
CHLORIDE BLD-SCNC: 103 MMOL/L (ref 98–107)
CO2: 23 MMOL/L (ref 20–31)
CREAT SERPL-MCNC: 0.75 MG/DL (ref 0.7–1.2)
DIFFERENTIAL TYPE: ABNORMAL
EOSINOPHILS RELATIVE PERCENT: 3 % (ref 0–4)
GFR AFRICAN AMERICAN: >60 ML/MIN
GFR NON-AFRICAN AMERICAN: >60 ML/MIN
GFR SERPL CREATININE-BSD FRML MDRD: ABNORMAL ML/MIN/{1.73_M2}
GFR SERPL CREATININE-BSD FRML MDRD: ABNORMAL ML/MIN/{1.73_M2}
GLUCOSE BLD-MCNC: 202 MG/DL (ref 75–110)
GLUCOSE BLD-MCNC: 209 MG/DL (ref 70–99)
HCT VFR BLD CALC: 46.8 % (ref 41–53)
HEMOGLOBIN: 16.1 G/DL (ref 13.5–17.5)
IMMATURE GRANULOCYTES: ABNORMAL %
LYMPHOCYTES # BLD: 23 % (ref 24–44)
MCH RBC QN AUTO: 33.6 PG (ref 26–34)
MCHC RBC AUTO-ENTMCNC: 34.4 G/DL (ref 31–37)
MCV RBC AUTO: 97.7 FL (ref 80–100)
MONOCYTES # BLD: 10 % (ref 1–7)
NRBC AUTOMATED: ABNORMAL PER 100 WBC
PDW BLD-RTO: 13.5 % (ref 11.5–14.9)
PLATELET # BLD: 284 K/UL (ref 150–450)
PLATELET ESTIMATE: ABNORMAL
PMV BLD AUTO: 6.8 FL (ref 6–12)
POTASSIUM SERPL-SCNC: 4.1 MMOL/L (ref 3.7–5.3)
RBC # BLD: 4.79 M/UL (ref 4.5–5.9)
RBC # BLD: ABNORMAL 10*6/UL
SEG NEUTROPHILS: 63 % (ref 36–66)
SEGMENTED NEUTROPHILS ABSOLUTE COUNT: 3 K/UL (ref 1.3–9.1)
SODIUM BLD-SCNC: 139 MMOL/L (ref 135–144)
TOTAL PROTEIN: 6.9 G/DL (ref 6.4–8.3)
WBC # BLD: 4.7 K/UL (ref 3.5–11)
WBC # BLD: ABNORMAL 10*3/UL

## 2020-04-06 PROCEDURE — 80053 COMPREHEN METABOLIC PANEL: CPT

## 2020-04-06 PROCEDURE — 99285 EMERGENCY DEPT VISIT HI MDM: CPT

## 2020-04-06 PROCEDURE — 36415 COLL VENOUS BLD VENIPUNCTURE: CPT

## 2020-04-06 PROCEDURE — 2580000003 HC RX 258: Performed by: EMERGENCY MEDICINE

## 2020-04-06 PROCEDURE — 71045 X-RAY EXAM CHEST 1 VIEW: CPT

## 2020-04-06 PROCEDURE — 82947 ASSAY GLUCOSE BLOOD QUANT: CPT

## 2020-04-06 PROCEDURE — 93005 ELECTROCARDIOGRAM TRACING: CPT | Performed by: EMERGENCY MEDICINE

## 2020-04-06 PROCEDURE — 85025 COMPLETE CBC W/AUTO DIFF WBC: CPT

## 2020-04-06 RX ORDER — 0.9 % SODIUM CHLORIDE 0.9 %
1000 INTRAVENOUS SOLUTION INTRAVENOUS ONCE
Status: COMPLETED | OUTPATIENT
Start: 2020-04-06 | End: 2020-04-06

## 2020-04-06 RX ADMIN — SODIUM CHLORIDE 1000 ML: 9 INJECTION, SOLUTION INTRAVENOUS at 12:03

## 2020-04-06 NOTE — ED NOTES
Pt ambulated in hallway without difficulty. Pt had no dizziness, gait steady.      Derrek Ba RN  04/06/20 8973

## 2020-04-07 ENCOUNTER — CARE COORDINATION (OUTPATIENT)
Dept: CARE COORDINATION | Age: 45
End: 2020-04-07

## 2020-04-07 LAB
EKG ATRIAL RATE: 101 BPM
EKG P AXIS: 25 DEGREES
EKG P-R INTERVAL: 134 MS
EKG Q-T INTERVAL: 330 MS
EKG QRS DURATION: 82 MS
EKG QTC CALCULATION (BAZETT): 427 MS
EKG R AXIS: 43 DEGREES
EKG T AXIS: 30 DEGREES
EKG VENTRICULAR RATE: 101 BPM

## 2020-04-07 PROCEDURE — 93010 ELECTROCARDIOGRAM REPORT: CPT | Performed by: INTERNAL MEDICINE

## 2020-04-08 ENCOUNTER — CARE COORDINATION (OUTPATIENT)
Dept: CARE COORDINATION | Age: 45
End: 2020-04-08

## 2020-09-15 ENCOUNTER — TELEPHONE (OUTPATIENT)
Dept: GASTROENTEROLOGY | Age: 45
End: 2020-09-15

## 2020-09-15 NOTE — TELEPHONE ENCOUNTER
LVM to call and schedule from referral - 1st attempt  Letter sent to call and schedule from referral - 2nd attempt

## 2020-10-17 ENCOUNTER — HOSPITAL ENCOUNTER (EMERGENCY)
Age: 45
Discharge: HOME | End: 2020-10-18
Attending: EMERGENCY MEDICINE
Payer: MEDICARE

## 2020-10-17 VITALS
HEART RATE: 136 BPM | WEIGHT: 175 LBS | OXYGEN SATURATION: 95 % | BODY MASS INDEX: 26.52 KG/M2 | RESPIRATION RATE: 20 BRPM | SYSTOLIC BLOOD PRESSURE: 156 MMHG | TEMPERATURE: 98.1 F | HEIGHT: 68 IN | DIASTOLIC BLOOD PRESSURE: 107 MMHG

## 2020-10-17 PROCEDURE — 99285 EMERGENCY DEPT VISIT HI MDM: CPT

## 2020-10-17 PROCEDURE — 96372 THER/PROPH/DIAG INJ SC/IM: CPT

## 2020-10-17 PROCEDURE — 99284 EMERGENCY DEPT VISIT MOD MDM: CPT

## 2020-10-17 ASSESSMENT — PAIN SCALES - GENERAL: PAINLEVEL_OUTOF10: 10

## 2020-10-17 ASSESSMENT — PAIN DESCRIPTION - ORIENTATION: ORIENTATION: RIGHT

## 2020-10-17 ASSESSMENT — PAIN DESCRIPTION - ONSET: ONSET: SUDDEN

## 2020-10-17 ASSESSMENT — PAIN DESCRIPTION - LOCATION: LOCATION: HAND

## 2020-10-18 ENCOUNTER — APPOINTMENT (OUTPATIENT)
Dept: GENERAL RADIOLOGY | Age: 45
End: 2020-10-18
Payer: MEDICARE

## 2020-10-18 PROCEDURE — 6360000002 HC RX W HCPCS: Performed by: EMERGENCY MEDICINE

## 2020-10-18 PROCEDURE — 6370000000 HC RX 637 (ALT 250 FOR IP): Performed by: EMERGENCY MEDICINE

## 2020-10-18 PROCEDURE — 73130 X-RAY EXAM OF HAND: CPT

## 2020-10-18 PROCEDURE — 90715 TDAP VACCINE 7 YRS/> IM: CPT | Performed by: EMERGENCY MEDICINE

## 2020-10-18 PROCEDURE — 90471 IMMUNIZATION ADMIN: CPT | Performed by: EMERGENCY MEDICINE

## 2020-10-18 RX ORDER — IBUPROFEN 800 MG/1
800 TABLET ORAL EVERY 8 HOURS PRN
Qty: 30 TABLET | Refills: 0 | Status: SHIPPED | OUTPATIENT
Start: 2020-10-18 | End: 2022-09-08

## 2020-10-18 RX ORDER — ACETAMINOPHEN 500 MG
1000 TABLET ORAL ONCE
Status: COMPLETED | OUTPATIENT
Start: 2020-10-18 | End: 2020-10-18

## 2020-10-18 RX ORDER — HYDROXYZINE HYDROCHLORIDE 50 MG/ML
50 INJECTION, SOLUTION INTRAMUSCULAR ONCE
Status: COMPLETED | OUTPATIENT
Start: 2020-10-18 | End: 2020-10-18

## 2020-10-18 RX ORDER — IBUPROFEN 800 MG/1
800 TABLET ORAL ONCE
Status: COMPLETED | OUTPATIENT
Start: 2020-10-18 | End: 2020-10-18

## 2020-10-18 RX ORDER — ACETAMINOPHEN 500 MG
1000 TABLET ORAL EVERY 8 HOURS PRN
Qty: 30 TABLET | Refills: 0 | Status: SHIPPED | OUTPATIENT
Start: 2020-10-18 | End: 2021-06-10 | Stop reason: ALTCHOICE

## 2020-10-18 RX ADMIN — ACETAMINOPHEN 1000 MG: 500 TABLET, FILM COATED ORAL at 00:13

## 2020-10-18 RX ADMIN — TETANUS TOXOID, REDUCED DIPHTHERIA TOXOID AND ACELLULAR PERTUSSIS VACCINE, ADSORBED 0.5 ML: 5; 2.5; 8; 8; 2.5 SUSPENSION INTRAMUSCULAR at 00:50

## 2020-10-18 RX ADMIN — IBUPROFEN 800 MG: 800 TABLET, FILM COATED ORAL at 00:13

## 2020-10-18 RX ADMIN — HYDROXYZINE HYDROCHLORIDE 50 MG: 50 INJECTION, SOLUTION INTRAMUSCULAR at 00:51

## 2020-10-18 ASSESSMENT — ENCOUNTER SYMPTOMS
SORE THROAT: 0
SHORTNESS OF BREATH: 0
COUGH: 0

## 2020-10-18 NOTE — ED PROVIDER NOTES
[facility] ED  Emergency Department Encounter  Emergency Medicine Attending Note     Pt Name: Jace Garcia  MRN: [id number]  [place] 1975   Date of evaluation: 10/17/2020  PCP:  Chris Martinez MD    CHIEF COMPLAINT       Chief Complaint   Patient presents with    Hand Injury       HISTORY OF PRESENT ILLNESS  (Location/Symptom, Timing/Onset, Context/Setting, Quality, Duration, Modifying Factors, Severity.)      Jace Garcia is a 39 y.o. male who presents with right hand pain after punching a wall. Patient's father  today, and he was upset and punched a wall. Complaining of right hand pain is concerned that he may have broken his hand. States it is painful over the knuckles, and pain with movement. Has not taken anything for the pain yet. Uncertain was last tetanus shot was. PAST MEDICAL / SURGICAL / SOCIAL / FAMILY HISTORY     Past Medical History:  has a past medical history of Anxiety, Asthma, CAD (coronary artery disease), Chronic back pain, Diabetes mellitus ([address] GERD (gastroesophageal reflux disease), and Neuropathy. Past Surgical History:  has a past surgical history that includes Coronary angioplasty with stent (); Tonsillectomy; and Cardiac surgery. Allergies:  Statins     Home Meds:   Prior to Visit Medications    Medication Sig Taking?  Authorizing Provider   acetaminophen (TYLENOL) 500 MG tablet Take 2 tablets by mouth every 8 hours as needed for Pain or Fever Yes Jovan Warren MD   ibuprofen (ADVIL;MOTRIN) 800 MG tablet Take 1 tablet by mouth every 8 hours as needed for Pain Yes Jovan Warren MD   cyclobenzaprine (FLEXERIL) 10 MG tablet Take 1 tablet by mouth 3 times daily as needed for Muscle spasms Yes Chris Martinez MD   omeprazole (PRILOSEC) 20 MG delayed release capsule Take 1 capsule by mouth 2 times daily (before meals) Yes SEAN Sheriff NP   glipiZIDE (GLUCOTROL) 10 MG tablet TAKE 1 TABLET BY MOUTH IN THE MORNING AND 1 TABLET AT SUPPER Yes Historical Provider, MD   albuterol sulfate HFA (PROVENTIL HFA) 108 (90 Base) MCG/ACT inhaler Inhale 1-2 puffs into the lungs every 6 hours as needed for Wheezing or Shortness of Breath (Space out to every 6 hours as symptoms improve) Yes Anisa Alcantara MD   hydrOXYzine (VISTARIL) 50 MG capsule Take 1 capsule by mouth 3 times daily as needed for Anxiety Yes Anisa Alcantara MD   metFORMIN (GLUCOPHAGE) 500 MG tablet Take 1 tablet by mouth 2 times daily (with meals) Yes Anisa Alcantara MD   nicotine polacrilex (GOODSENSE NICOTINE) 2 MG gum Take 1 each by mouth as needed for Smoking cessation  SEAN Sierra NP   sildenafil (VIAGRA) 100 MG tablet Take 1 tablet by mouth daily as needed for Erectile Dysfunction  Anisa Alcantara MD   metoprolol tartrate (LOPRESSOR) 25 MG tablet Take half tab po bid  Anisa Alcantara MD   aspirin 81 MG tablet Take 81 mg by mouth daily. Historical Provider, MD     Please note that medications prescribed at discharge will auto-populate into this medication list when note is refreshed. Please look at prescription date andprescriber to clarify. Family History:family history includes Arrhythmia in his brother; Diabetes in his father and mother. Social History: He reports that he has been smoking cigarettes. He has a 9.00 pack-year smoking history. He has never used smokeless tobacco. He reports current alcohol use. He reports current drug use. Drug: Marijuana. He has no history on file for sexual activity. REVIEW OF SYSTEMS    (2-9 systems for level 4, 10 or more for level 5)      Review of Systems   Constitutional: Negative for fever. HENT: Negative for sore throat. Respiratory: Negative for cough and shortness of breath. Musculoskeletal: Positive for arthralgias and myalgias. Skin: Positive for wound. Neurological: Negative for weakness and numbness.    Psychiatric/Behavioral: The patient is nervous/anxious. PHYSICAL EXAM   (up to 7 for level 4, 8 or more for level 5)      Initial Vitals   ED Triage Vitals [10/17/20 2355]   BP Temp Temp Source Pulse Resp SpO2 Height Weight   (!) 156/107 98.1 °F (36.7 °C) Oral 136 20 95 % 5' 8\" (1.727 m) 175 lb (79.4 kg)       Physical Exam  Vitals signs reviewed. Constitutional:       General: He is not in acute distress. Appearance: Normal appearance. HENT:      Head: Normocephalic and atraumatic. Mouth/Throat:      Comments: Patient is currently wearing a facial mask. Given that patient is not complaining of sore throat or difficulty swallowing, mask was left in place to decrease risk of aersolization of particles in the setting of current CoVID-19 pandemic    Cardiovascular:      Pulses: Normal pulses. Musculoskeletal:      Comments: Patient has edema and ecchymosis over the MCP joints of third fourth and fifth digits on the right hand with open abrasions over the fourth and fifth. Pain with extension of the digits, but is able to fully extend them. No wrist tenderness. Skin:     Capillary Refill: Capillary refill takes less than 2 seconds. Comments: Abrasions over the knuckles. Neurological:      Mental Status: He is alert and oriented to person, place, and time. Sensory: No sensory deficit. Psychiatric:      Comments: Tearful and anxious         DIFFERENTIAL DIAGNOSIS/IMPRESSION     DDX: Abrasion, contusion, fracture    Impression: 39 y.o. male who presents with right hand pain after punching a wall. Patient's father  today, he is upset and punched a wall. Has swelling and tenderness over the MCP joint of the third fourth and fifth digit of the right hand with abrasions over the fifth digit. Has normal sensation and pulses intact. Contusion versus boxer's fracture. X-ray, analgesics, ice, and update tetanus shot given open wound.     DIAGNOSTIC RESULTS     EKG: All EKG's are interpreted by the Emergency Department Physician who either signs or Co-signs this chart in the absence of a cardiologist.    Not clinically indicated at this time. LABS: Labswere reviewed by me and abnormal results are displayed above     Labs Reviewed - No data to display    RADIOLOGY: All plain film, CT, MRI, and formal ultrasound images (except ED bedside ultrasound) are read by the radiologist, see reports below, unless otherwise noted in ED Course, MDM or here. Xr Hand Right (min 3 Views)    Result Date: 10/18/2020  EXAMINATION: THREE XRAY VIEWS OF THE RIGHT HAND 10/18/2020 12:22 am COMPARISON: None. HISTORY: ORDERING SYSTEM PROVIDED HISTORY: Punched a wall TECHNOLOGIST PROVIDED HISTORY: Punched a wall Reason for Exam: Pt states he punched a wall tonight, now c/o pain to posterior surface of hand, abrasions to 4th and 5th knuckles. Acuity: Acute Type of Exam: Initial Mechanism of Injury: Pt states he punched a wall tonight, now c/o pain to posterior surface of hand, abrasions to 4th and 5th knuckles. FINDINGS: Osseous structures are normally aligned. No acute fracture or dislocation is present. Soft tissue swelling is seen dorsal to the 5th metacarpal head. No foreign bodies are noted. Soft tissue swelling, without evidence of an acute fracture or dislocation, right hand        BEDSIDE ULTRASOUND:  Not clinically indicated at this time.       ED COURSE      ED Medication Orders (From admission, onward)    Start Ordered     Status Ordering Provider    10/18/20 0045 10/18/20 0042  hydrOXYzine (VISTARIL) injection 50 mg  ONCE      Last MAR action:  Given - by Tia Davis on 10/18/20 at [address]    10/18/20 0045 10/18/20 0042  Tetanus-Diphth-Acell Pertussis (BOOSTRIX) injection 0.5 mL  ONCE      Last MAR action:  Given - by Ammons Parisay on 10/18/20 at [address] [facility]    10/18/20 0015 10/18/20 0011  acetaminophen (TYLENOL) tablet 1,000 mg  ONCE      Last MAR action:  Given - by Ammons Candy on 10/18/20 at [facility] J[facility] MIGUEL MENDEZ    10/18/20 0015 10/18/20 0011  ibuprofen (ADVIL;MOTRIN) tablet 800 mg  ONCE      Last MAR action:  Given - by Tia Davis on 10/18/20 at [place], [facility]IG[facility] E          EMERGENCYDEPARTMENT COURSE:    Xrays negative for any acute fracture. Patient updated on the findings. I did discuss with the patient that xrays can miss early fractures and if they still have significant pain after 7 days they should follow-up with their primary care for repeat x-rays. He is feeling very anxious and only takes Atarax at home, will give a shot of Vistaril to help with anxiety. Given prescription for Tylenol and Motrin. Follow-up PCP. Return if any concerns arise. PROCEDURES:  None     CONSULTS:  None    CRITICAL CARE  None     FINAL IMPRESSION      1.  Contusion of right hand, initial encounter          DISPOSITION / PLAN     DISPOSITION Decision To Discharge 10/18/2020 12:42:29 AM      PATIENT REFERRED TO:  Maira Kennedy MD  [facility] [address]  [address] [address]  [phone]    Schedule an appointment as soon as possible for a visit       [facility] ED  [place] [address] [phone]  Go to   As needed, If symptoms worsen      DISCHARGE MEDICATIONS:  Discharge Medication List as of 10/18/2020 12:54 AM      START taking these medications    Details   acetaminophen (TYLENOL) 500 MG tablet Take 2 tablets by mouth every 8 hours as needed for Pain or Fever, Disp-30 tablet,R-0Print             Diana Suh MD  Emergency Medicine Attending      (Please note that portions of this note were completed with a voice recognition program.  Efforts were made to edit the dictations but occasionallywords are mis-transcribed.)         Diana Suh MD  10/18/20 5185

## 2020-10-18 NOTE — ED NOTES
Mode of arrival (squad #, walk in, police, etc) : walk-in        Chief complaint(s): hand injury        Arrival Note (brief scenario, treatment PTA, etc). : Pt arrives to ED c/o right hand pain following punching a wall. Small, shallow lacs visible on two knuckles. C= \"Have you ever felt that you should Cut down on your drinking? \"  No  A= \"Have people Annoyed you by criticizing your drinking? \"  No  G= \"Have you ever felt bad or Guilty about your drinking? \"  No  E= \"Have you ever had a drink as an Eye-opener first thing in the morning to steady your nerves or to help a hangover? \"  No      Deferred []      Reason for deferring: N/A    *If yes to two or more: probable alcohol abuse. Esperanza Dumont RN  10/18/20 7418

## 2020-10-23 ENCOUNTER — TELEPHONE (OUTPATIENT)
Dept: GASTROENTEROLOGY | Age: 45
End: 2020-10-23

## 2021-02-17 ENCOUNTER — HOSPITAL ENCOUNTER (EMERGENCY)
Age: 46
Discharge: HOME | End: 2021-02-17
Attending: EMERGENCY MEDICINE
Payer: COMMERCIAL

## 2021-02-17 VITALS
HEIGHT: 68 IN | BODY MASS INDEX: 25.76 KG/M2 | OXYGEN SATURATION: 97 % | HEART RATE: 96 BPM | WEIGHT: 170 LBS | TEMPERATURE: 98.2 F | SYSTOLIC BLOOD PRESSURE: 134 MMHG | DIASTOLIC BLOOD PRESSURE: 86 MMHG | RESPIRATION RATE: 16 BRPM

## 2021-02-17 DIAGNOSIS — M54.50 ACUTE EXACERBATION OF CHRONIC LOW BACK PAIN: Primary | ICD-10-CM

## 2021-02-17 DIAGNOSIS — G89.29 ACUTE EXACERBATION OF CHRONIC LOW BACK PAIN: Primary | ICD-10-CM

## 2021-02-17 PROCEDURE — 6370000000 HC RX 637 (ALT 250 FOR IP): Performed by: EMERGENCY MEDICINE

## 2021-02-17 PROCEDURE — 99282 EMERGENCY DEPT VISIT SF MDM: CPT

## 2021-02-17 RX ORDER — LIDOCAINE 50 MG/G
1 PATCH TOPICAL DAILY
Qty: 5 PATCH | Refills: 0 | Status: SHIPPED | OUTPATIENT
Start: 2021-02-17 | End: 2021-02-22

## 2021-02-17 RX ORDER — CYCLOBENZAPRINE HCL 5 MG
5 TABLET ORAL 2 TIMES DAILY PRN
Qty: 10 TABLET | Refills: 0 | Status: SHIPPED | OUTPATIENT
Start: 2021-02-17 | End: 2021-02-27

## 2021-02-17 RX ORDER — LIDOCAINE 4 G/G
1 PATCH TOPICAL ONCE
Status: DISCONTINUED | OUTPATIENT
Start: 2021-02-17 | End: 2021-02-17 | Stop reason: HOSPADM

## 2021-02-17 ASSESSMENT — ENCOUNTER SYMPTOMS
CONSTIPATION: 0
COUGH: 0
DIARRHEA: 0
SHORTNESS OF BREATH: 0
TROUBLE SWALLOWING: 0
COLOR CHANGE: 0
BACK PAIN: 1
BLOOD IN STOOL: 0
ABDOMINAL PAIN: 0
NAUSEA: 0
SORE THROAT: 0
VOMITING: 0

## 2021-02-17 ASSESSMENT — PAIN SCALES - GENERAL: PAINLEVEL_OUTOF10: 10

## 2021-02-17 NOTE — ED PROVIDER NOTES
[address]  EMERGENCY DEPARTMENT ENCOUNTER    Pt Name: Stephy Mayo  MRN: [id number]  YOB: 1975  Date of evaluation:2/17/21  PCP: Jennifer Man MD    CHIEF COMPLAINT       Chief Complaint   Patient presents with    Back Pain       HISTORY OF PRESENT ILLNESS    Stephy Mayo is a 39 y.o. male who presents with a chief complaint of back pain. Patient states he was shoveling snow yesterday and ever since last night has had pain in his lower back and also his mid back on the right side. Pain is exacerbated by movement. Nothing really alleviates the symptoms. He denies any pain in his legs. No numbness, tingling, weakness in his lower extremities. No saddle anesthesia. No fevers, chills other illnesses. No abdominal pain, nausea or vomiting. States he has a long history of back pain and sometimes even small little musculoskeletal movements will \"throw out my back\" for several days. Symptoms are acute on chronic. Symptoms are moderate. Nothing else make symptoms better or worse. Patient has no other complaints at this time. REVIEW OF SYSTEMS       Review of Systems   Constitutional: Negative for chills, fatigue and fever. HENT: Negative for congestion, ear pain, sore throat and trouble swallowing. Eyes: Negative for visual disturbance. Respiratory: Negative for cough and shortness of breath. Cardiovascular: Negative for chest pain, palpitations and leg swelling. Gastrointestinal: Negative for abdominal pain, blood in stool, constipation, diarrhea, nausea and vomiting. Genitourinary: Negative for dysuria and flank pain. Musculoskeletal: Positive for back pain. Negative for arthralgias, myalgias and neck pain. Skin: Negative for color change, rash and wound. Neurological: Negative for dizziness, weakness, light-headedness, numbness and headaches. Psychiatric/Behavioral: Negative for confusion. All other systems reviewed and are negative.     Negative in 10 essential Systems except as mentioned above and in the HPI. PAST MEDICAL HISTORY     Past Medical History:   Diagnosis Date    Anxiety     Asthma     CAD (coronary artery disease)     Chronic back pain     Diabetes mellitus ([address]     GERD (gastroesophageal reflux disease)      heart burn    Hx of heart artery stent     heart stent x 1    Neuropathy          SURGICAL HISTORY      has a past surgical history that includes Coronary angioplasty with stent (2010); Tonsillectomy; and Cardiac surgery. CURRENT MEDICATIONS       Previous Medications    ACETAMINOPHEN (TYLENOL) 500 MG TABLET    Take 2 tablets by mouth every 8 hours as needed for Pain or Fever    ALBUTEROL SULFATE HFA (PROVENTIL HFA) 108 (90 BASE) MCG/ACT INHALER    Inhale 1-2 puffs into the lungs every 6 hours as needed for Wheezing or Shortness of Breath (Space out to every 6 hours as symptoms improve)    ASPIRIN 81 MG TABLET    Take 81 mg by mouth daily.     CYCLOBENZAPRINE (FLEXERIL) 10 MG TABLET    Take 1 tablet by mouth 3 times daily as needed for Muscle spasms    GLIPIZIDE (GLUCOTROL) 10 MG TABLET    TAKE 1 TABLET BY MOUTH IN THE MORNING AND 1 TABLET AT SUPPER    HYDROXYZINE (VISTARIL) 50 MG CAPSULE    Take 1 capsule by mouth 3 times daily as needed for Anxiety    IBUPROFEN (ADVIL;MOTRIN) 800 MG TABLET    Take 1 tablet by mouth every 8 hours as needed for Pain    METFORMIN (GLUCOPHAGE) 500 MG TABLET    Take 1 tablet by mouth 2 times daily (with meals)    METOPROLOL TARTRATE (LOPRESSOR) 25 MG TABLET    Take half tab po bid    OMEPRAZOLE (PRILOSEC) 20 MG DELAYED RELEASE CAPSULE    Take 1 capsule by mouth 2 times daily (before meals)    SILDENAFIL (VIAGRA) 100 MG TABLET    Take 1 tablet by mouth daily as needed for Erectile Dysfunction    TESTOSTERONE (ANDROGEL; TESTIM) 50 MG/5GM (1%) GEL 1% GEL    APPLY 1 PACKET TO SHOULDERS ONCE DAILY    VITAMIN D (ERGOCALCIFEROL) 1.25 MG (35156 UT) CAPS CAPSULE    TAKE 1 CAPSULE BY MOUTH EVERY OTHER WEEK       ALLERGIES     is allergic to statins. FAMILY HISTORY     He indicated that his mother is alive. He indicated that his father is alive. He indicated that his sister is alive. He indicated that his brother is alive. family history includes Arrhythmia in his brother; Diabetes in his father and mother. SOCIAL HISTORY      reports that he has been smoking cigarettes. He has a 10.50 pack-year smoking history. He has never used smokeless tobacco. He reports current alcohol use. He reports current drug use. Drug: Marijuana. PHYSICAL EXAM     INITIAL VITALS:  height is 5' 8\" (1.727 m) and weight is 170 lb (77.1 kg). His oral temperature is 98.2 °F (36.8 °C). His blood pressure is 134/86 and his pulse is 96. His respiration is 16 and oxygen saturation is 97%. Physical Exam  Vitals signs and nursing note reviewed. Constitutional:       General: He is not in acute distress. HENT:      Head: Normocephalic and atraumatic. Eyes:      Conjunctiva/sclera: Conjunctivae normal.      Pupils: Pupils are equal, round, and reactive to light. Neck:      Musculoskeletal: Neck supple. Cardiovascular:      Rate and Rhythm: Normal rate and regular rhythm. Heart sounds: Normal heart sounds. No murmur. Pulmonary:      Effort: Pulmonary effort is normal. No respiratory distress. Breath sounds: Normal breath sounds. Abdominal:      General: Bowel sounds are normal. There is no distension. Palpations: Abdomen is soft. Tenderness: There is no abdominal tenderness. Musculoskeletal:      Thoracic back: He exhibits tenderness. He exhibits no bony tenderness. Lumbar back: He exhibits tenderness. He exhibits no bony tenderness. Back:    Lymphadenopathy:      Cervical: No cervical adenopathy. Skin:     General: Skin is warm and dry. Findings: No rash. Neurological:      General: No focal deficit present.       Mental Status: He is alert and oriented to person, place, and time. Sensory: No sensory deficit. Motor: No weakness. Psychiatric:         Judgment: Judgment normal.           DIFFERENTIAL DIAGNOSIS/MDM:   70-year-old male presents with back pain ever since shoveling snow last night. He is afebrile, nontoxic, normal vital signs. No acute distress. He has a normal neurologic exam.  He has no weakness, sensory loss in his lower extremities. Pain is exacerbated when he is sitting up and with palpation of his lumbar paraspinal muscles bilaterally. I strongly suspect musculoskeletal back pain exacerbated by the physical activity yesterday. We will treat with NSAIDs, muscle relaxers, lidocaine patch. He has had establish care with a PCP. Advised him to follow-up with Dr. Gregoria Morin, return here if any symptoms worsen. May need a referral to PT or possibly orthopedic surgery if he continues to have these back problems. He is agreeable with plan will be discharged home today. DIAGNOSTIC RESULTS     EKG: All EKG's are interpreted by the Emergency Department Physician who either signs or Co-signs this chart in the absence of a cardiologist.        RADIOLOGY:   I directly visualized the following  images and reviewed the radiologist interpretations:  No orders to display           ED BEDSIDE ULTRASOUND:      LABS:  Labs Reviewed - No data to display      EMERGENCY DEPARTMENT COURSE:   Vitals:    Vitals:    02/17/21 0815   BP: 134/86   Pulse: 96   Resp: 16   Temp: 98.2 °F (36.8 °C)   TempSrc: Oral   SpO2: 97%   Weight: 170 lb (77.1 kg)   Height: 5' 8\" (1.727 m)              CRITICALCARE:      CONSULTS:  None      PROCEDURES:      FINAL IMPRESSION      1.  Acute exacerbation of chronic low back pain            DISPOSITION/PLAN   DISPOSITION Decision To Discharge 02/17/2021 08:18:49 AM          PATIENT REFERRED TO:  Aaron Ann, [address]  [address] [facility]  [phone]    Schedule an appointment as soon as possible for a visit       [facility]  [address]  [phone]    As needed, If symptoms worsen      DISCHARGE MEDICATIONS:  New Prescriptions    CYCLOBENZAPRINE (FLEXERIL) 5 MG TABLET    Take 1 tablet by mouth 2 times daily as needed for Muscle spasms    LIDOCAINE (LIDODERM) 5 %    Place 1 patch onto the skin daily for 5 days 12 hours on, 12 hours off.        (Please note that portions ofthis note were completed with a voice recognition program.  Efforts were made to edit the dictations but occasionally words are mis-transcribed.)    Erika Hampton DO  Attending Emergency Physician          Erika Hampton DO  02/17/21 1577

## 2021-02-17 NOTE — ED NOTES
Pt comes to this ER with c/o lower back pain that started after he was shoveling snow yesterday. Pt denies any other injuries, and he has no obvious deformities visible. Pt arrives A+O x 4, GCS = 15, PMS x 4 intact, eupneic, and PWD.      Patsy Singh RN  02/17/21 8201

## 2021-02-18 ENCOUNTER — CARE COORDINATION (OUTPATIENT)
Dept: CARE COORDINATION | Age: 46
End: 2021-02-18

## 2021-02-18 NOTE — CARE COORDINATION
ED follow up call attempted for Covid-19 screen. Patient was not available. Message left requesting return call. Call back information provided. Patient was seen for acute exacerbation of chronic low back pain. No indication of any viral type illness. Will resolve episode if no response received.

## 2021-05-17 ENCOUNTER — HOSPITAL ENCOUNTER (EMERGENCY)
Age: 46
Discharge: HOME | End: 2021-05-18
Attending: EMERGENCY MEDICINE
Payer: COMMERCIAL

## 2021-05-17 ENCOUNTER — APPOINTMENT (OUTPATIENT)
Dept: GENERAL RADIOLOGY | Age: 46
End: 2021-05-17
Payer: COMMERCIAL

## 2021-05-17 VITALS
HEIGHT: 68 IN | BODY MASS INDEX: 26.37 KG/M2 | SYSTOLIC BLOOD PRESSURE: 141 MMHG | HEART RATE: 96 BPM | RESPIRATION RATE: 19 BRPM | DIASTOLIC BLOOD PRESSURE: 85 MMHG | TEMPERATURE: 98 F | WEIGHT: 174 LBS | OXYGEN SATURATION: 100 %

## 2021-05-17 DIAGNOSIS — B34.9 VIRAL ILLNESS: Primary | ICD-10-CM

## 2021-05-17 LAB
ABSOLUTE EOS #: 0.1 K/UL (ref 0–0.4)
ABSOLUTE IMMATURE GRANULOCYTE: ABNORMAL K/UL (ref 0–0.3)
ABSOLUTE LYMPH #: 1.6 K/UL (ref 1–4.8)
ABSOLUTE MONO #: 0.7 K/UL (ref 0.1–1.3)
ALBUMIN SERPL-MCNC: 4.3 G/DL (ref 3.5–5.2)
ALBUMIN/GLOBULIN RATIO: ABNORMAL (ref 1–2.5)
ALP BLD-CCNC: 77 U/L (ref 40–129)
ALT SERPL-CCNC: 24 U/L (ref 5–41)
ANION GAP SERPL CALCULATED.3IONS-SCNC: 15 MMOL/L (ref 9–17)
AST SERPL-CCNC: 16 U/L
BASOPHILS # BLD: 1 % (ref 0–2)
BASOPHILS ABSOLUTE: 0.1 K/UL (ref 0–0.2)
BILIRUB SERPL-MCNC: 0.52 MG/DL (ref 0.3–1.2)
BNP INTERPRETATION: NORMAL
BUN BLDV-MCNC: 9 MG/DL (ref 6–20)
BUN/CREAT BLD: ABNORMAL (ref 9–20)
CALCIUM SERPL-MCNC: 9.4 MG/DL (ref 8.6–10.4)
CHLORIDE BLD-SCNC: 97 MMOL/L (ref 98–107)
CO2: 24 MMOL/L (ref 20–31)
CREAT SERPL-MCNC: 0.96 MG/DL (ref 0.7–1.2)
D-DIMER QUANTITATIVE: 0.3 MG/L FEU (ref 0–0.59)
DIFFERENTIAL TYPE: ABNORMAL
EOSINOPHILS RELATIVE PERCENT: 2 % (ref 0–4)
GFR AFRICAN AMERICAN: >60 ML/MIN
GFR NON-AFRICAN AMERICAN: >60 ML/MIN
GFR SERPL CREATININE-BSD FRML MDRD: ABNORMAL ML/MIN/{1.73_M2}
GFR SERPL CREATININE-BSD FRML MDRD: ABNORMAL ML/MIN/{1.73_M2}
GLUCOSE BLD-MCNC: 180 MG/DL (ref 70–99)
HCT VFR BLD CALC: 44.3 % (ref 41–53)
HEMOGLOBIN: 15 G/DL (ref 13.5–17.5)
IMMATURE GRANULOCYTES: ABNORMAL %
LACTIC ACID: 2.1 MMOL/L (ref 0.5–2.2)
LACTIC ACID: 3.1 MMOL/L (ref 0.5–2.2)
LYMPHOCYTES # BLD: 22 % (ref 24–44)
MAGNESIUM: 1.9 MG/DL (ref 1.6–2.6)
MCH RBC QN AUTO: 32.3 PG (ref 26–34)
MCHC RBC AUTO-ENTMCNC: 33.9 G/DL (ref 31–37)
MCV RBC AUTO: 95.4 FL (ref 80–100)
MONOCYTES # BLD: 9 % (ref 1–7)
NRBC AUTOMATED: ABNORMAL PER 100 WBC
PDW BLD-RTO: 12.9 % (ref 11.5–14.9)
PLATELET # BLD: 295 K/UL (ref 150–450)
PLATELET ESTIMATE: ABNORMAL
PMV BLD AUTO: 6.9 FL (ref 6–12)
POTASSIUM SERPL-SCNC: 4.3 MMOL/L (ref 3.7–5.3)
PRO-BNP: 33 PG/ML
RBC # BLD: 4.64 M/UL (ref 4.5–5.9)
RBC # BLD: ABNORMAL 10*6/UL
SARS-COV-2, RAPID: NOT DETECTED
SEG NEUTROPHILS: 66 % (ref 36–66)
SEGMENTED NEUTROPHILS ABSOLUTE COUNT: 5.1 K/UL (ref 1.3–9.1)
SODIUM BLD-SCNC: 136 MMOL/L (ref 135–144)
SPECIMEN DESCRIPTION: NORMAL
TOTAL PROTEIN: 7.3 G/DL (ref 6.4–8.3)
TROPONIN INTERP: NORMAL
TROPONIN T: NORMAL NG/ML
TROPONIN, HIGH SENSITIVITY: 7 NG/L (ref 0–22)
WBC # BLD: 7.6 K/UL (ref 3.5–11)
WBC # BLD: ABNORMAL 10*3/UL

## 2021-05-17 PROCEDURE — 84484 ASSAY OF TROPONIN QUANT: CPT

## 2021-05-17 PROCEDURE — 6370000000 HC RX 637 (ALT 250 FOR IP): Performed by: EMERGENCY MEDICINE

## 2021-05-17 PROCEDURE — 87040 BLOOD CULTURE FOR BACTERIA: CPT

## 2021-05-17 PROCEDURE — 36415 COLL VENOUS BLD VENIPUNCTURE: CPT

## 2021-05-17 PROCEDURE — 80053 COMPREHEN METABOLIC PANEL: CPT

## 2021-05-17 PROCEDURE — 83880 ASSAY OF NATRIURETIC PEPTIDE: CPT

## 2021-05-17 PROCEDURE — 87635 SARS-COV-2 COVID-19 AMP PRB: CPT

## 2021-05-17 PROCEDURE — 2580000003 HC RX 258: Performed by: EMERGENCY MEDICINE

## 2021-05-17 PROCEDURE — 83605 ASSAY OF LACTIC ACID: CPT

## 2021-05-17 PROCEDURE — 83735 ASSAY OF MAGNESIUM: CPT

## 2021-05-17 PROCEDURE — 85025 COMPLETE CBC W/AUTO DIFF WBC: CPT

## 2021-05-17 PROCEDURE — 93005 ELECTROCARDIOGRAM TRACING: CPT | Performed by: STUDENT IN AN ORGANIZED HEALTH CARE EDUCATION/TRAINING PROGRAM

## 2021-05-17 PROCEDURE — 99285 EMERGENCY DEPT VISIT HI MDM: CPT

## 2021-05-17 PROCEDURE — 85379 FIBRIN DEGRADATION QUANT: CPT

## 2021-05-17 PROCEDURE — 82800 BLOOD PH: CPT

## 2021-05-17 PROCEDURE — 71045 X-RAY EXAM CHEST 1 VIEW: CPT

## 2021-05-17 PROCEDURE — 2580000003 HC RX 258: Performed by: STUDENT IN AN ORGANIZED HEALTH CARE EDUCATION/TRAINING PROGRAM

## 2021-05-17 RX ORDER — 0.9 % SODIUM CHLORIDE 0.9 %
1000 INTRAVENOUS SOLUTION INTRAVENOUS ONCE
Status: COMPLETED | OUTPATIENT
Start: 2021-05-17 | End: 2021-05-17

## 2021-05-17 RX ORDER — 0.9 % SODIUM CHLORIDE 0.9 %
500 INTRAVENOUS SOLUTION INTRAVENOUS ONCE
Status: COMPLETED | OUTPATIENT
Start: 2021-05-17 | End: 2021-05-17

## 2021-05-17 RX ORDER — LORAZEPAM 1 MG/1
1 TABLET ORAL ONCE
Status: COMPLETED | OUTPATIENT
Start: 2021-05-17 | End: 2021-05-17

## 2021-05-17 RX ORDER — OXYMETAZOLINE HYDROCHLORIDE 0.05 G/100ML
2 SPRAY NASAL ONCE
Status: COMPLETED | OUTPATIENT
Start: 2021-05-17 | End: 2021-05-17

## 2021-05-17 RX ADMIN — SODIUM CHLORIDE 500 ML: 9 INJECTION, SOLUTION INTRAVENOUS at 21:28

## 2021-05-17 RX ADMIN — LORAZEPAM 1 MG: 1 TABLET ORAL at 21:27

## 2021-05-17 RX ADMIN — OXYMETAZOLINE HCL 2 SPRAY: 0.05 SPRAY NASAL at 22:11

## 2021-05-17 RX ADMIN — SODIUM CHLORIDE 1000 ML: 9 INJECTION, SOLUTION INTRAVENOUS at 21:28

## 2021-05-17 RX ADMIN — SODIUM CHLORIDE 500 ML: 9 INJECTION, SOLUTION INTRAVENOUS at 20:46

## 2021-05-17 NOTE — LETTER
[facility]  [address] [place] [address]  Phone: [phone]             May 18, 2021    Patient: Dario Sandoval   YOB: 1975   Date of Visit: 5/17/2021       To Whom It May Concern:    Marsha Anderson was seen and treated in our emergency department on 5/17/2021. He may return to work once symptoms have resolved.       Sincerely,             Signature:__________________________________

## 2021-05-18 LAB
EKG ATRIAL RATE: 100 BPM
EKG P AXIS: 58 DEGREES
EKG P-R INTERVAL: 146 MS
EKG Q-T INTERVAL: 332 MS
EKG QRS DURATION: 84 MS
EKG QTC CALCULATION (BAZETT): 428 MS
EKG R AXIS: 58 DEGREES
EKG T AXIS: 39 DEGREES
EKG VENTRICULAR RATE: 100 BPM

## 2021-05-18 PROCEDURE — 93010 ELECTROCARDIOGRAM REPORT: CPT | Performed by: INTERNAL MEDICINE

## 2021-05-18 RX ORDER — BENZONATATE 100 MG/1
100 CAPSULE ORAL 3 TIMES DAILY PRN
Qty: 30 CAPSULE | Refills: 0 | Status: SHIPPED | OUTPATIENT
Start: 2021-05-18 | End: 2021-05-28

## 2021-05-18 NOTE — ED TRIAGE NOTES
Pt presents in ED c/o SOB ongoing since noon today; pt stated that he is a medication compliant diabetic; pt denies further complaints; upon assessment pt hunched over in the wheelchair hyperventilating; fiance present. Pt placed on cardiac monitor.

## 2021-05-18 NOTE — ED PROVIDER NOTES
EMERGENCY DEPARTMENT ENCOUNTER   ATTENDING ATTESTATION     Pt Name: Dario Sandoval  MRN: [id number]  [place] 1975  Date of evaluation: 21       Dario Sandoval is a 55 y.o. male who presents with Shortness of Breath and Concern For COVID-19      MDM:   9:36 PM EDT  This is a 15-year-old male with a history of anxiety CAD diabetes and asthma who comes in today with difficulty in breathing. Patient appears very anxious. Spoke to navneet at length who says that the patient has been very anxious because he has had multiple family members who have either  or not done so well with Covid. Rapid Covid test is negative. Lab work is unremarkable except for a lactic acidosis of 3.1 most likely secondary to his tachypnea. We will give him a dose of Ativan and reassess lungs are clear. 12:02 AM EDT  Patient's respiratory status improved much with a dose of Ativan his lactate has come down suspect patient became anxious after concerned that he had Covid. No pneumonia no leukocytosis clear lungs patient will be discharged. Vitals:   Vitals:    21 2115 21 2130 21 2145 21 2200   BP:  (!) 142/91  (!) 141/85   Pulse: 99 114 88 96   Resp: 20 27 19 19   Temp:       SpO2: 100%  100% 100%   Weight:       Height:             I personally evaluated and examined the patient in conjunction with the resident and agree with the assessment, treatment plan, and disposition of the patient as recorded by the resident. I performed a history and physical examination of the patient and discussed management with the resident. I reviewed the residents note and agree with the documented findings and plan of care. Any areas of disagreement are noted on the chart. I was personally present for the key portions of any procedures. I have documented in the chart those procedures where I was not present during the key portions.  I have personally reviewed all images and agree with the resident's interpretation. I have reviewed the emergency nurses triage note. I agree with the chief complaint, past medical history, past surgical history, allergies, medications, social and family history as documented unless otherwise noted.     Suzanna Lewis MD  Attending Emergency Physician             Suzanna Lewis MD  05/18/21 8779

## 2021-05-18 NOTE — ED PROVIDER NOTES
symptoms improve) 3/16/21   SEAN Renee NP   cyclobenzaprine (FLEXERIL) 10 MG tablet Take 1 tablet by mouth 3 times daily as needed for Muscle spasms 3/16/21   SEAN Renee NP   metFORMIN (GLUCOPHAGE) 500 MG tablet Take 1 tablet by mouth 2 times daily (with meals) 3/16/21   SEAN Renee NP   hydrOXYzine (VISTARIL) 50 MG capsule Take 1 capsule by mouth 3 times daily as needed for Anxiety 3/16/21   SEAN Renee NP   naproxen (NAPROSYN) 500 MG tablet Take 1 tablet by mouth 2 times daily (with meals) 3/16/21   SEAN Sheriff NP   testosterone (ANDROGEL; TESTIM) 50 MG/5GM (1%) GEL 1% gel APPLY 1 PACKET TO SHOULDERS ONCE DAILY 9/29/20   Historical Provider, MD   vitamin D (ERGOCALCIFEROL) 1.25 MG (90078 UT) CAPS capsule TAKE 1 CAPSULE BY MOUTH EVERY OTHER WEEK 11/5/20   Historical Provider, MD   acetaminophen (TYLENOL) 500 MG tablet Take 2 tablets by mouth every 8 hours as needed for Pain or Fever 10/18/20   Paul Mcdaniel MD   ibuprofen (ADVIL;MOTRIN) 800 MG tablet Take 1 tablet by mouth every 8 hours as needed for Pain 10/18/20   Paul Mcdaniel MD   sildenafil (VIAGRA) 100 MG tablet Take 1 tablet by mouth daily as needed for Erectile Dysfunction 5/12/20   Radha Pappas MD   glipiZIDE (GLUCOTROL) 10 MG tablet TAKE 1 TABLET BY MOUTH IN THE MORNING AND 1 TABLET AT SUPPER 4/28/20   Historical Provider, MD   metoprolol tartrate (LOPRESSOR) 25 MG tablet Take half tab po bid 5/11/20   Radha Pappas MD   aspirin 81 MG tablet Take 81 mg by mouth daily. Historical Provider, MD       REVIEW OF SYSTEMS    (2-9 systems for level 4, 10 or more for level 5)      Review of Systems   Positive for generalized fatigue, generalized weakness, chills, rhinorrhea, sore throat, cough, loss of taste and smell, shortness of breath, nausea. Patient denies fevers, chest pain, abdominal pain, dysuria, diarrhea, lower extremity swelling or pain.     PHYSICAL EXAM   (up to 7 for level 4, 8 or more for level 5)      INITIAL VITALS:   BP (!) 141/85   Pulse 96   Temp 98 °F (36.7 °C)   Resp 19   Ht 5' 8\" (1.727 m)   Wt 174 lb (78.9 kg)   SpO2 100%   BMI 26.46 kg/m²     Physical Exam   Patient is alert and oriented, openly communicative, appears to be anxious, tachypneic, tachycardic, head is atraumatic, ears are clear, patient is actively coughing on exam, EOMI, PERRLA, lungs are clear in all fields, no rales, no wheezing, tachycardic, regular rhythm, no extra heart sounds, no abdominal tenderness, distal pulses intact and equal bilaterally, no lower extremity swelling or pain. DIFFERENTIAL  DIAGNOSIS     PLAN (LABS / IMAGING / EKG):  Orders Placed This Encounter   Procedures    COVID-19, Rapid    Culture, Blood 1    Culture, Blood 1    XR CHEST PORTABLE    CBC Auto Differential    Comprehensive Metabolic Panel    Magnesium    Troponin    D-Dimer, Quantitative    Brain Natriuretic Peptide    Lactic Acid    Lactic Acid    Please repeat lactate after 2L fluids thank you    EKG 12 Lead       MEDICATIONS ORDERED:  Orders Placed This Encounter   Medications    0.9 % sodium chloride bolus    0.9 % sodium chloride bolus    LORazepam (ATIVAN) tablet 1 mg    oxymetazoline (AFRIN) 0.05 % nasal spray 2 spray    0.9 % sodium chloride bolus    benzonatate (TESSALON PERLES) 100 MG capsule     Sig: Take 1 capsule by mouth 3 times daily as needed for Cough     Dispense:  30 capsule     Refill:  0       DDX:     DIAGNOSTIC RESULTS / EMERGENCY DEPARTMENT COURSE / MDM   LAB RESULTS:  Results for orders placed or performed during the hospital encounter of 05/17/21   COVID-19, Rapid    Specimen: Nasopharyngeal Swab   Result Value Ref Range    Specimen Description . NASOPHARYNGEAL SWAB     SARS-CoV-2, Rapid Not Detected Not Detected   CBC Auto Differential   Result Value Ref Range    WBC 7.6 3.5 - 11.0 k/uL    RBC 4.64 4.5 - 5.9 m/uL    Hemoglobin 15.0 13.5 - 17.5 g/dL    Hematocrit 44.3 41 - 53 % MCV 95.4 80 - 100 fL    MCH 32.3 26 - 34 pg    MCHC 33.9 31 - 37 g/dL    RDW 12.9 11.5 - 14.9 %    Platelets 515 622 - 011 k/uL    MPV 6.9 6.0 - 12.0 fL    NRBC Automated NOT REPORTED per 100 WBC    Differential Type NOT REPORTED     Seg Neutrophils 66 36 - 66 %    Lymphocytes 22 (L) 24 - 44 %    Monocytes 9 (H) 1 - 7 %    Eosinophils % 2 0 - 4 %    Basophils 1 0 - 2 %    Immature Granulocytes NOT REPORTED 0 %    Segs Absolute 5.10 1.3 - 9.1 k/uL    Absolute Lymph # 1.60 1.0 - 4.8 k/uL    Absolute Mono # 0.70 0.1 - 1.3 k/uL    Absolute Eos # 0.10 0.0 - 0.4 k/uL    Basophils Absolute 0.10 0.0 - 0.2 k/uL    Absolute Immature Granulocyte NOT REPORTED 0.00 - 0.30 k/uL    WBC Morphology NOT REPORTED     RBC Morphology NOT REPORTED     Platelet Estimate NOT REPORTED    Comprehensive Metabolic Panel   Result Value Ref Range    Glucose 180 (H) 70 - 99 mg/dL    BUN 9 6 - 20 mg/dL    CREATININE 0.96 0.70 - 1.20 mg/dL    Bun/Cre Ratio NOT REPORTED 9 - 20    Calcium 9.4 8.6 - 10.4 mg/dL    Sodium 136 135 - 144 mmol/L    Potassium 4.3 3.7 - 5.3 mmol/L    Chloride 97 (L) 98 - 107 mmol/L    CO2 24 20 - 31 mmol/L    Anion Gap 15 9 - 17 mmol/L    Alkaline Phosphatase 77 40 - 129 U/L    ALT 24 5 - 41 U/L    AST 16 <40 U/L    Total Bilirubin 0.52 0.3 - 1.2 mg/dL    Total Protein 7.3 6.4 - 8.3 g/dL    Albumin 4.3 3.5 - 5.2 g/dL    Albumin/Globulin Ratio NOT REPORTED 1.0 - 2.5    GFR Non-African American >60 >60 mL/min    GFR African American >60 >60 mL/min    GFR Comment          GFR Staging NOT REPORTED    Magnesium   Result Value Ref Range    Magnesium 1.9 1.6 - 2.6 mg/dL   Troponin   Result Value Ref Range    Troponin, High Sensitivity 7 0 - 22 ng/L    Troponin T NOT REPORTED <0.03 ng/mL    Troponin Interp NOT REPORTED    D-Dimer, Quantitative   Result Value Ref Range    D-Dimer, Quant 0.30 0.00 - 0.59 mg/L FEU   Brain Natriuretic Peptide   Result Value Ref Range    Pro-BNP 33 <300 pg/mL    BNP Interpretation Pro-BNP Reference

## 2021-05-19 ENCOUNTER — CARE COORDINATION (OUTPATIENT)
Dept: CARE COORDINATION | Age: 46
End: 2021-05-19

## 2021-05-19 NOTE — ACP (ADVANCE CARE PLANNING)
Advance Care Planning   Healthcare Decision Maker:    Primary Decision Maker: Francis Martinez - [facility] - [phone]    Click here to complete Healthcare Decision Makers including selection of the Healthcare Decision Maker Relationship (ie \"Primary\"). Today we documented desired Decision Maker(s), who is (are) NOT Legal Next of Kin. ACP documents are required for decision maker authority.   Patient has requested referral to ACP Clinical Specialist.

## 2021-05-19 NOTE — CARE COORDINATION
Patient contacted regarding COVID-19 exposure, diagnosis, pulse oximeter ordered at discharge and monoclonal antibody infusion follow up. Discussed COVID-19 related testing which was available at this time. Test results were negative. Patient informed of results, if available? Yes      Ambulatory Care Manager contacted the patient by telephone to perform post discharge assessment. Call within 2 business days of discharge: Yes. Verified name and  with patient as identifiers. Provided introduction to self, and explanation of the CTN/ACM role, and reason for call due to risk factors for infection and/or exposure to COVID-19. Symptoms reviewed with patient who verbalized the following symptoms: fatigue, pain or aching joints, shortness of breath, loss of taste or smell and congestion. Due to no new or worsening symptoms encounter was not routed to provider for escalation. Discussed follow-up appointments. If no appointment was previously scheduled, appointment scheduling offered: [facility] follow up appointment(s): No future appointments. Non-[facility] follow up appointment(s):      Non-face-to-face services provided:  Reviewed and followed up on pending diagnostic tests and treatments-Covid-19 testing - NEGATIVE  Education of patient/family/caregiver/guardian to support self-management-CDC and self-quarantine guidelines     Advance Care Planning:   Does patient have an Advance Directive:  referral to internal ACP facilitator. Educated patient about risk for severe COVID-19 due to risk factors according to CDC guidelines. ACM reviewed discharge instructions, medical action plan and red flag symptoms patient who verbalized understanding. Discussed COVID vaccination status No. Education provided on COVID-19 vaccination as appropriate. Discussed exposure protocols and quarantine with CDC Guidelines.  Patient was given an opportunity to verbalize any questions and concerns and agrees to contact ACM or health care provider for questions related to their healthcare. Reviewed and educated patient on any new and changed medications related to discharge diagnosis     Was patient discharged with a pulse oximeter? No Discussed and confirmed pulse oximeter discharge instructions and when to notify provider or seek emergency care. For patients discharged due to monoclonal antibody infusion or pulse ox at discharge; information provided for remote patient monitoring, and agrees to enroll for follow up No.      ACM provided contact information. Plan for follow-up call in 5-7 days based on severity of symptoms and risk factors. Advance Care Planning  People with COVID-19 may have no symptoms, mild symptoms, such as fever, cough, and shortness of breath or they may have more severe illness, developing severe and fatal pneumonia. As a result, Advance Care Planning with attention to naming a health care decision maker (someone you trust to make healthcare decisions for you if you could not speak for yourself) and sharing other health care preferences is important BEFORE a possible health crisis. Please contact your Primary Care Provider to discuss Advance Care Planning. Preventing the Spread of Coronavirus Disease 2019 in Homes and Residential Communities  For the most recent information go to [email or web address]    Prevention steps for People with confirmed or suspected COVID-19 (including persons under investigation) who do not need to be hospitalized  and   People with confirmed COVID-19 who were hospitalized and determined to be medically stable to go home    Your healthcare provider and public health staff will evaluate whether you can be cared for at home. If it is determined that you do not need to be hospitalized and can be isolated at home, you will be monitored by staff from your local or state health department.  You should follow the prevention steps below until a healthcare provider or local or [facility] health department says you can return to your normal activities. Stay home except to get medical care  People who are mildly ill with COVID-19 are able to isolate at home during their illness. You should restrict activities outside your home, except for getting medical care. Do not go to work, school, or public areas. Avoid using public transportation, ride-sharing, or taxis. Separate yourself from other people and animals in your home  People: As much as possible, you should stay in a specific room and away from other people in your home. Also, you should use a separate bathroom, if available. Animals: You should restrict contact with pets and other animals while you are sick with COVID-19, just like you would around other people. Although there have not been reports of pets or other animals becoming sick with COVID-19, it is still recommended that people sick with COVID-19 limit contact with animals until more information is known about the virus. When possible, have another member of your household care for your animals while you are sick. If you are sick with COVID-19, avoid contact with your pet, including petting, snuggling, being kissed or licked, and sharing food. If you must care for your pet or be around animals while you are sick, wash your hands before and after you interact with pets and wear a facemask. Call ahead before visiting your doctor  If you have a medical appointment, call the healthcare provider and tell them that you have or may have COVID-19. This will help the healthcare providers office take steps to keep other people from getting infected or exposed. Wear a facemask  You should wear a facemask when you are around other people (e.g., sharing a room or vehicle) or pets and before you enter a healthcare providers office.  If you are not able to wear a facemask (for example, because it causes trouble breathing), then people who live with you provider and tell them that you have, or are being evaluated for, COVID-19. Put on a facemask before you enter the facility. These steps will help the healthcare providers office to keep other people in the office or waiting room from getting infected or exposed. Ask your healthcare provider to call the local or state health department. Persons who are placed under active monitoring or facilitated self-monitoring should follow instructions provided by their local health department or occupational health professionals, as appropriate. When working with your local health department check their available hours. If you have a medical emergency and need to call 911, notify the dispatch personnel that you have, or are being evaluated for COVID-19. If possible, put on a facemask before emergency medical services arrive. Discontinuing home isolation  Patients with confirmed COVID-19 should remain under home isolation precautions until the risk of secondary transmission to others is thought to be low. The decision to discontinue home isolation precautions should be made on a case-by-case basis, in consultation with healthcare providers and state and local health departments.

## 2021-05-24 ENCOUNTER — CARE COORDINATION (OUTPATIENT)
Dept: CARE COORDINATION | Age: 46
End: 2021-05-24

## 2021-05-24 LAB
CULTURE: NORMAL
CULTURE: NORMAL
Lab: NORMAL
Lab: NORMAL
SPECIMEN DESCRIPTION: NORMAL
SPECIMEN DESCRIPTION: NORMAL

## 2021-05-24 NOTE — ACP (ADVANCE CARE PLANNING)
Advance Care Planning    Ambulatory ACP Specialist Patient Outreach    Date:  5/24/2021  ACP Specialist:  Boogie Quinonez    Outreach call to patient in follow-up to ACP Specialist referral from: Holley Kiran MD/[facility]     [] PCP  [] Provider   [x] Ambulatory Care Management [] Other for Reason:    [] Continued Conversation for ACP decision making / Goals of Care  [] Code Status Discussion  [] Completion of Adv Directive  [] Completion of Portable DNR order  [] Other (Specify)    Date Referral Received:05/19/21  Today's Outreach:  [x] First   [] Second  [] Third                               Third outreach made by []  phone  [x] email []   [email or web address]     Intervention:  [] Spoke with Patient  [x] Left VM requesting return call      Outcome: There was no answer, HC left a message and provided contact information for a return call. Next Step:   [] ACP scheduled conversation  [x] Outreach again in one week               [] Email / Mail ACP Info Sheets  [] Email / Mail Advance Directive            [] Close Referral. Routing closure to referring provider/staff and to ACP Specialist .      Thank you for this referral.

## 2021-08-12 ENCOUNTER — TELEPHONE (OUTPATIENT)
Dept: ORTHOPEDIC SURGERY | Age: 46
End: 2021-08-12

## 2021-08-12 ENCOUNTER — OFFICE VISIT (OUTPATIENT)
Dept: ORTHOPEDIC SURGERY | Age: 46
End: 2021-08-12
Payer: COMMERCIAL

## 2021-08-12 VITALS — BODY MASS INDEX: 26.52 KG/M2 | WEIGHT: 175 LBS | HEIGHT: 68 IN

## 2021-08-12 DIAGNOSIS — M54.41 CHRONIC RIGHT-SIDED LOW BACK PAIN WITH RIGHT-SIDED SCIATICA: ICD-10-CM

## 2021-08-12 DIAGNOSIS — G89.29 CHRONIC RIGHT-SIDED LOW BACK PAIN WITH RIGHT-SIDED SCIATICA: ICD-10-CM

## 2021-08-12 DIAGNOSIS — M54.50 LUMBAR PAIN: Primary | ICD-10-CM

## 2021-08-12 PROCEDURE — 99204 OFFICE O/P NEW MOD 45 MIN: CPT | Performed by: PHYSICIAN ASSISTANT

## 2021-08-12 PROCEDURE — G8419 CALC BMI OUT NRM PARAM NOF/U: HCPCS | Performed by: PHYSICIAN ASSISTANT

## 2021-08-12 PROCEDURE — G8427 DOCREV CUR MEDS BY ELIG CLIN: HCPCS | Performed by: PHYSICIAN ASSISTANT

## 2021-08-12 PROCEDURE — 4004F PT TOBACCO SCREEN RCVD TLK: CPT | Performed by: PHYSICIAN ASSISTANT

## 2021-08-12 NOTE — TELEPHONE ENCOUNTER
MABLE Hartman letting her know that since Dr. Randy Fu is in the [facility] system, she already has access to all of David's records and images from our office. Advised her to call us back with any additional questions.

## 2021-08-12 NOTE — TELEPHONE ENCOUNTER
Minnie is requesting David's report along with images be faxed to Dr Ward's office. Fax - [phone]. Thank you.

## 2021-08-12 NOTE — PROGRESS NOTES
[facility], [address] [place] [address] Amy Pickering, [address] [place] [address], [address] [facility]           Dept Phone: [phone]           Dept Fax:  [phone] [address] [facility], [place]          Dept Phone: [phone]           Dept Fax:  [phone]      Chief Compliant:  Chief Complaint   Patient presents with    Back Problem        History of Present Illness: This is a 55 y.o. male who presents to the clinic today for evaluation of chronic low back pain. Patient reports she has had pain in his low back for nearly 10 years however since last winter has been significantly worse. Patient reports exacerbation of pain greatly over the last 1 month in fact he has been off of work for the last 3 weeks secondary to this pain. 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 Pain is mostly to the right paralumbar area as well as the right SI area. Occasionally notes pain down the anterior aspect of the right thigh with intermittent numbness and tingling. Patient reports the tingling has only happened a couple times over the last month and is not present all the time. Pain is aggravated by lifting, prolonged standing and twisting and turning of the low back. Pain does seem to be relieved by rest.    Patient has tried ibuprofen with minimal relief of pain    Patient has completed approximately 1 month of chiropractic care with minimal relief of pain. He has not completed physical therapy or injections. No history of surgical intervention of low back. .     Past History:    Current Outpatient Medications:     cyclobenzaprine (FLEXERIL) 10 MG tablet, Take 1 tablet by mouth three times daily as needed for muscle spasm, Disp: 60 tablet, Rfl: 0   metoprolol tartrate (LOPRESSOR) 25 MG tablet, Take 1/2 (one-half) tablet by mouth twice daily, Disp: 30 tablet, Rfl: 11    sildenafil (VIAGRA) 100 MG tablet, Take 1 tablet by mouth daily as needed for Erectile Dysfunction, Disp: 10 tablet, Rfl: 11    metFORMIN (GLUCOPHAGE) 500 MG tablet, Take 1 tablet by mouth 2 times daily (with meals), Disp: 180 tablet, Rfl: 3    omeprazole (PRILOSEC) 20 MG delayed release capsule, Take 1 capsule by mouth 2 times daily (before meals), Disp: 180 capsule, Rfl: 1    albuterol sulfate HFA (PROVENTIL HFA) 108 (90 Base) MCG/ACT inhaler, Inhale 1-2 puffs into the lungs every 6 hours as needed for Wheezing or Shortness of Breath (Space out to every 6 hours as symptoms improve), Disp: 3 Inhaler, Rfl: 3    hydrOXYzine (VISTARIL) 50 MG capsule, Take 1 capsule by mouth 3 times daily as needed for Anxiety, Disp: 180 capsule, Rfl: 3    naproxen (NAPROSYN) 500 MG tablet, Take 1 tablet by mouth 2 times daily (with meals), Disp: 180 tablet, Rfl: 1    testosterone (ANDROGEL; TESTIM) 50 MG/5GM (1%) GEL 1% gel, APPLY 1 PACKET TO SHOULDERS ONCE DAILY, Disp: , Rfl:     vitamin D (ERGOCALCIFEROL) 1.25 MG (01741 UT) CAPS capsule, TAKE 1 CAPSULE BY MOUTH EVERY OTHER WEEK, Disp: , Rfl:     ibuprofen (ADVIL;MOTRIN) 800 MG tablet, Take 1 tablet by mouth every 8 hours as needed for Pain, Disp: 30 tablet, Rfl: 0    glipiZIDE (GLUCOTROL) 10 MG tablet, TAKE 1 TABLET BY MOUTH IN THE MORNING AND 1 TABLET AT SUPPER, Disp: , Rfl:     aspirin 81 MG tablet, Take 81 mg by mouth daily. , Disp: , Rfl:   Allergies   Allergen Reactions    Statins      \"don't feel right\"     Social History     Socioeconomic History    Marital status: Single     Spouse name: Not on file    Number of children: Not on file    Years of education: Not on file    Highest education level: Not on file   Occupational History    Not on file   Tobacco Use    Smoking status: Current Every Day Smoker     Packs/day: 0.50     Years: 21.00 for ear ache, epistaxis, or sore throat. Respiratory/Cardio: Negative for Chest pain, palpitations, SOB, or cough. Gastrointestinal: Negative for abdominal pain, N/V/D. Genitourinary: Negative for dysuria, frequency, urgency, or hematuria. Neurological: Negative for headache, numbness, or weakness. Integumentary: Negative for rash, itching, laceration, or abrasion. Musculoskeletal: Positive for Back Problem       Physical Exam:  Constitutional: Patient is oriented to person, place, and time. Patient appears well-developed and well nourished. HENT: Negative otherwise noted  Head: Normocephalic and Atraumatic  Nose: Normal  Eyes: Conjunctivae and EOM are normal  Neck: Normal range of motion Neck supple. Respiratory/Cardio: Effort normal. No respiratory distress. Musculoskeletal:    LUMBAR/SACRAL EXAMINATION:  · Inspection: Local inspection shows no step-off or bruising. Lumbar alignment is normal.  Sagittal and Coronal balance is neutral.      · Palpation: Moderate tenderness to the right paraspinal musculature. No evidence of tenderness at the midline. No tenderness bilaterally trochanters. There is no step-off or paraspinal spasm. · Range of Motion: Lumbar flexion, extension and rotation are mildly limited due to pain. · Strength:   Strength testing is 5/5 in all muscle groups tested. · Special Tests:   Straight leg raise and crossed SLR negative. Leg length and pelvis level.  0 out of 5 Ashlyn's signs. · Skin: There are no rashes, ulcerations or lesions. · Reflexes: Reflexes are symmetrically 2+ at the patellar and ankle tendons. Clonus absent bilaterally at the feet. · Gait & station: normal, patient ambulates without assistance  · Additional Examinations:   · RIGHT LOWER EXTREMITY: Inspection/examination of the right lower extremity does not show any tenderness, deformity or injury. Range of motion is unremarkable. There is no gross instability.   There are no rashes, ulcerations or lesions. Strength and tone are normal.  · LEFT LOWER EXTREMITY:  Inspection/examination of the left lower extremity does not show any tenderness, deformity or injury. Range of motion is unremarkable. There is no gross instability. There are no rashes, ulcerations or lesions. Strength and tone are normal.    Neurological: Patient is alert and oriented to person, place, and time. Normal strenght. No sensory deficit. Skin: Skin is warm and dry  Psychiatric: Behavior is normal. Thought content normal.  Nursing note and vitals reviewed. Labs and Imaging:     XR taken today:  No results found. Previous Imaging:  Radiographs done at patient's chiropractor's office are physically available for review these were done on 7/26/2021 and independently reviewed by me demonstrate mild lumbar DDD most at L4-5 and L5-S1. Neural foraminal narrowing at L4-5 and L5-S1 exaggeration of lumbar lordosis noted. No evidence of acute fracture    X-rays taken in clinic today and preliminarily reviewed by me:  Lumbar flexion extension views taken in clinic today on 8/12/2021 independently reviewed by me again demonstrate mild lumbar disc space narrowing L4-5 and L5-S1 with mild neural foraminal narrowing. There is no evidence of spondylitic instability. Orders Placed This Encounter   Procedures    XR LUMBAR SPINE FLEXION AND EXTENSION ONLY     Standing Status:   Future     Number of Occurrences:   1     Standing Expiration Date:   8/12/2022       Assessment and Plan:  1. Lumbar pain    2. Chronic right-sided low back pain with right-sided sciatica          PLAN:  Kalyn Ceballos is a 55 y.o. old malewho presents today for evaluation of low back pain. Pain has been present for evaluation of chronic right-sided low back pain. .  Differential includes lumbar strain, fracture, spondylitic spondylolisthesis, lumbar DDD, lumbar lumbar radiculopathy, epidural abscess, epidural hematoma and cauda equina syndrome. Based on examination there is no evidence of cord compression syndrome or infectious etiology. Examination is consistent with lumbar DDD versus lumbar disc bulge/herniation. Without evidence of fracture or spondylitic deformity on radiographs today and notes reviewed previously. Patient has failed chiropractic care and multiple medication management with minimal improvement of pain. We discussed treatment options available to him, including nonoperative and operative intervention. At this time I believe patient will benefit from further diagnostic evaluation with an MRI of the lumbar spine. Did discuss possibility of lumbar epidural steroid injections and would be happy to provide referral however we will see what the MRI shows to ensure there is no lumbar disc herniation given this patient's history and examination. Please note that this chart was generated using voice recognition Dragon dictation software. Although every effort was made to ensure the accuracy of this automated transcription, some errors in transcription may have occurred.

## 2021-08-16 ENCOUNTER — TELEPHONE (OUTPATIENT)
Dept: ORTHOPEDIC SURGERY | Age: 46
End: 2021-08-16

## 2021-08-16 DIAGNOSIS — M54.41 CHRONIC RIGHT-SIDED LOW BACK PAIN WITH RIGHT-SIDED SCIATICA: Primary | ICD-10-CM

## 2021-08-16 DIAGNOSIS — M54.50 LUMBAR PAIN: ICD-10-CM

## 2021-08-16 DIAGNOSIS — G89.29 CHRONIC RIGHT-SIDED LOW BACK PAIN WITH RIGHT-SIDED SCIATICA: Primary | ICD-10-CM

## 2021-08-16 NOTE — TELEPHONE ENCOUNTER
PT says that Fawn Briscoe was supposed to place an order for prednisone and muscle relaxer for him after last apt. [facility].

## 2021-08-18 RX ORDER — PREDNISONE 10 MG/1
10 TABLET ORAL
Qty: 15 TABLET | Refills: 0 | Status: SHIPPED | OUTPATIENT
Start: 2021-08-18 | End: 2021-08-23

## 2021-08-28 ENCOUNTER — HOSPITAL ENCOUNTER (OUTPATIENT)
Dept: MRI IMAGING | Age: 46
Discharge: HOME | End: 2021-08-30
Payer: COMMERCIAL

## 2021-08-28 DIAGNOSIS — G89.29 CHRONIC RIGHT-SIDED LOW BACK PAIN WITH RIGHT-SIDED SCIATICA: ICD-10-CM

## 2021-08-28 DIAGNOSIS — M54.41 CHRONIC RIGHT-SIDED LOW BACK PAIN WITH RIGHT-SIDED SCIATICA: ICD-10-CM

## 2021-08-28 PROCEDURE — 72148 MRI LUMBAR SPINE W/O DYE: CPT

## 2021-08-31 ENCOUNTER — TELEPHONE (OUTPATIENT)
Dept: ORTHOPEDIC SURGERY | Age: 46
End: 2021-08-31

## 2021-08-31 ENCOUNTER — OFFICE VISIT (OUTPATIENT)
Dept: ORTHOPEDIC SURGERY | Age: 46
End: 2021-08-31
Payer: COMMERCIAL

## 2021-08-31 VITALS — WEIGHT: 175 LBS | RESPIRATION RATE: 18 BRPM | HEIGHT: 68 IN | BODY MASS INDEX: 26.52 KG/M2

## 2021-08-31 DIAGNOSIS — G89.29 CHRONIC RIGHT-SIDED LOW BACK PAIN WITH RIGHT-SIDED SCIATICA: ICD-10-CM

## 2021-08-31 DIAGNOSIS — M54.50 LUMBAR PAIN: ICD-10-CM

## 2021-08-31 DIAGNOSIS — M51.36 LUMBAR DEGENERATIVE DISC DISEASE: Primary | ICD-10-CM

## 2021-08-31 DIAGNOSIS — M54.41 CHRONIC RIGHT-SIDED LOW BACK PAIN WITH RIGHT-SIDED SCIATICA: ICD-10-CM

## 2021-08-31 PROCEDURE — 99213 OFFICE O/P EST LOW 20 MIN: CPT | Performed by: ORTHOPAEDIC SURGERY

## 2021-08-31 PROCEDURE — G8419 CALC BMI OUT NRM PARAM NOF/U: HCPCS | Performed by: ORTHOPAEDIC SURGERY

## 2021-08-31 PROCEDURE — 4004F PT TOBACCO SCREEN RCVD TLK: CPT | Performed by: ORTHOPAEDIC SURGERY

## 2021-08-31 PROCEDURE — G8427 DOCREV CUR MEDS BY ELIG CLIN: HCPCS | Performed by: ORTHOPAEDIC SURGERY

## 2021-08-31 RX ORDER — GABAPENTIN 300 MG/1
300 CAPSULE ORAL 3 TIMES DAILY
Qty: 90 CAPSULE | Refills: 3 | Status: SHIPPED | OUTPATIENT
Start: 2021-08-31 | End: 2022-01-26

## 2021-08-31 NOTE — TELEPHONE ENCOUNTER
Patients wife called and stated that pt is to have EMG completed and they called Dr Abraham Kenney and he is full until November- but pt is able to get in and have Dr Moses Brasher office sooner and they need the order faxed to them as soon as possible- fax number is [phone], thank you

## 2021-08-31 NOTE — PROGRESS NOTES
3    omeprazole (PRILOSEC) 20 MG delayed release capsule, Take 1 capsule by mouth 2 times daily (before meals), Disp: 180 capsule, Rfl: 1    albuterol sulfate HFA (PROVENTIL HFA) 108 (90 Base) MCG/ACT inhaler, Inhale 1-2 puffs into the lungs every 6 hours as needed for Wheezing or Shortness of Breath (Space out to every 6 hours as symptoms improve), Disp: 3 Inhaler, Rfl: 3    hydrOXYzine (VISTARIL) 50 MG capsule, Take 1 capsule by mouth 3 times daily as needed for Anxiety, Disp: 180 capsule, Rfl: 3    naproxen (NAPROSYN) 500 MG tablet, Take 1 tablet by mouth 2 times daily (with meals), Disp: 180 tablet, Rfl: 1    testosterone (ANDROGEL; TESTIM) 50 MG/5GM (1%) GEL 1% gel, APPLY 1 PACKET TO SHOULDERS ONCE DAILY, Disp: , Rfl:     vitamin D (ERGOCALCIFEROL) 1.25 MG (51935 UT) CAPS capsule, TAKE 1 CAPSULE BY MOUTH EVERY OTHER WEEK, Disp: , Rfl:     ibuprofen (ADVIL;MOTRIN) 800 MG tablet, Take 1 tablet by mouth every 8 hours as needed for Pain, Disp: 30 tablet, Rfl: 0    glipiZIDE (GLUCOTROL) 10 MG tablet, TAKE 1 TABLET BY MOUTH IN THE MORNING AND 1 TABLET AT SUPPER, Disp: , Rfl:     aspirin 81 MG tablet, Take 81 mg by mouth daily. , Disp: , Rfl:   Allergies   Allergen Reactions    Statins      \"don't feel right\"     Social History     Socioeconomic History    Marital status: Single     Spouse name: Not on file    Number of children: Not on file    Years of education: Not on file    Highest education level: Not on file   Occupational History    Not on file   Tobacco Use    Smoking status: Current Every Day Smoker     Packs/day: 0.50     Years: 21.00     Pack years: 10.50     Types: Cigarettes    Smokeless tobacco: Never Used   Vaping Use    Vaping Use: Never used   Substance and Sexual Activity    Alcohol use: Yes     Comment:  socially occ beer    Drug use: Yes     Types: Marijuana    Sexual activity: Not on file   Other Topics Concern    Not on file   Social History Narrative    Not on file Social Determinants of Health     Financial Resource Strain:     Difficulty of Paying Living Expenses:    Food Insecurity:     Worried About Running Out of Food in the Last Year:     [address] in the Last Year:    Transportation Needs:     Lack of Transportation (Medical):  Lack of Transportation (Non-Medical):    Physical Activity:     Days of Exercise per Week:     Minutes of Exercise per Session:    Stress:     Feeling of Stress :    Social Connections:     Frequency of Communication with Friends and Family:     Frequency of Social Gatherings with Friends and Family:     Attends Hinduism Services:     Active Member of Clubs or Organizations:     Attends Club or Organization Meetings:     Marital Status:    Intimate Partner Violence:     Fear of Current or Ex-Partner:     Emotionally Abused:     Physically Abused:     Sexually Abused:      Past Medical History:   Diagnosis Date    Anxiety     Asthma     CAD (coronary artery disease)     Chronic back pain     Diabetes mellitus ([address]     GERD (gastroesophageal reflux disease)      heart burn    Hx of heart artery stent     heart stent x 1    Neuropathy      Past Surgical History:   Procedure Laterality Date    CARDIAC SURGERY      CORONARY ANGIOPLASTY WITH STENT PLACEMENT  2010    heart stent x 1    TONSILLECTOMY       Family History   Problem Relation Age of Onset    Diabetes Mother     Diabetes Father     Arrhythmia Brother         Physical Exam:  Vitals signs and nursing note reviewed. Constitutional:       Appearance: well-developed. HENT:      Head: Normocephalic and atraumatic. Nose: Nose normal.   Eyes:      Conjunctiva/sclera: Conjunctivae normal.   Neck:      Musculoskeletal: Normal range of motion and neck supple. Pulmonary:      Effort: Pulmonary effort is normal. No respiratory distress. Musculoskeletal:      Comments: Normal gait     Skin:     General: Skin is warm and dry.    Neurological: Mental Status: Alert and oriented to person, place, and time. Sensory: No sensory deficit. Psychiatric:         Behavior: Behavior normal.         Thought Content: Thought content normal.        Provider Attestation:  Woo Berrios, personally performed the services described in this documentation. All medical record entries made by the scribe were at my direction and in my presence. I have reviewed the chart and discharge instructions and agree that the records reflect my personal performance and is accurate and complete. Jessica Bolton MD 8/31/21     Scribe Attestation:  By signing my name below, Gayatri Bhavesh, attest that this documentation has been prepared under the direction and in the presence of Dr. Alisia Celestin. Electronically signed: Luz Madrid, 8/31/21     Please note that this chart was generated using voice recognition Dragon dictation software. Although every effort was made to ensure the accuracy of this automated transcription, some errors in transcription may have occurred.

## 2021-09-10 ENCOUNTER — TELEPHONE (OUTPATIENT)
Dept: ORTHOPEDIC SURGERY | Age: 46
End: 2021-09-10

## 2021-09-10 NOTE — TELEPHONE ENCOUNTER
Patient was seen by Dr Lizzy Bass 8/31/21 as a follow up to MRI    ----- [facility] [place] [facility] at 8/30/2021  8:19 AM EDT -----  Moderate lumbar DDD with disc protrusion at L4-5. Please have patient follow up with Dr. Lizzy Bass to discuss MRI results and treatment options.

## 2021-09-20 ENCOUNTER — TELEPHONE (OUTPATIENT)
Dept: PAIN MANAGEMENT | Age: 46
End: 2021-09-20

## 2021-09-20 ENCOUNTER — TELEPHONE (OUTPATIENT)
Dept: ORTHOPEDIC SURGERY | Age: 46
End: 2021-09-20

## 2021-09-20 NOTE — TELEPHONE ENCOUNTER
Patient's spouse called in requesting that another referral be sent to Dr Mini Isaac for physical rehab. Patient has completed his EMG and the last referral was closed so the office is requesting a new referral. Please advise and address thank you!

## 2021-09-20 NOTE — TELEPHONE ENCOUNTER
[facility]. Pt. Outreached to schedule consultation. Apt. Given for 10/12/ 2021 1350. Pt instructed to wear a mask on entrance into the hospital, location, and entances open for patients at this time. Pt. Instructed to not bring a  unless he had a care giver. Pt. Informed writer he would be coming alone. No questions or concerns verbalized.

## 2021-09-22 NOTE — TELEPHONE ENCOUNTER
I went in and changed the status of the referral from Closed to Open, so it would fall back into their work que. Called [facility] and left voice message that the referral is Open and ready to be scheduled.

## 2021-09-30 ENCOUNTER — OFFICE VISIT (OUTPATIENT)
Dept: ORTHOPEDIC SURGERY | Age: 46
End: 2021-09-30
Payer: COMMERCIAL

## 2021-09-30 VITALS — WEIGHT: 175 LBS | HEIGHT: 68 IN | RESPIRATION RATE: 16 BRPM | BODY MASS INDEX: 26.52 KG/M2

## 2021-09-30 DIAGNOSIS — M54.50 LUMBAR PAIN: ICD-10-CM

## 2021-09-30 DIAGNOSIS — M51.36 LUMBAR DEGENERATIVE DISC DISEASE: ICD-10-CM

## 2021-09-30 DIAGNOSIS — M54.41 CHRONIC RIGHT-SIDED LOW BACK PAIN WITH RIGHT-SIDED SCIATICA: Primary | ICD-10-CM

## 2021-09-30 DIAGNOSIS — G89.29 CHRONIC RIGHT-SIDED LOW BACK PAIN WITH RIGHT-SIDED SCIATICA: Primary | ICD-10-CM

## 2021-09-30 PROCEDURE — G8419 CALC BMI OUT NRM PARAM NOF/U: HCPCS | Performed by: ORTHOPAEDIC SURGERY

## 2021-09-30 PROCEDURE — G8427 DOCREV CUR MEDS BY ELIG CLIN: HCPCS | Performed by: ORTHOPAEDIC SURGERY

## 2021-09-30 PROCEDURE — 99213 OFFICE O/P EST LOW 20 MIN: CPT | Performed by: ORTHOPAEDIC SURGERY

## 2021-09-30 PROCEDURE — 4004F PT TOBACCO SCREEN RCVD TLK: CPT | Performed by: ORTHOPAEDIC SURGERY

## 2021-09-30 NOTE — PROGRESS NOTES
Patient ID: Alex Carson is a 55 y.o. male    Chief Compliant:  No chief complaint on file. Diagnostic imaging:        Assessment and Plan:  1. Chronic right-sided low back pain with right-sided sciatica    2. Lumbar pain    3. Lumbar degenerative disc disease      EMGs reveal the patient have diabetic polyneuropathy as well as some L5 radiculopathy    Patient is scheduled to be seen by Dr. Emeka Scott in 2 weeks            Smoking cessation advised    PT    Follow up 12 weeks    HPI:  This is a 55 y.o. male who presents to the clinic today for EMG results. Patient with ongoing right sided lower back pain as well as medial dysesthetic foot pain    Patient is scheduled to see Dr. Teagan Godwin in 2 weeks. Review of Systems   All other systems reviewed and are negative. Past History:    Current Outpatient Medications:     cyclobenzaprine (FLEXERIL) 10 MG tablet, Take 1 tablet by mouth three times daily as needed for muscle spasm, Disp: 60 tablet, Rfl: 0    gabapentin (NEURONTIN) 300 MG capsule, Take 1 capsule by mouth 3 times daily for 30 days. , Disp: 90 capsule, Rfl: 3    metoprolol tartrate (LOPRESSOR) 25 MG tablet, Take 1/2 (one-half) tablet by mouth twice daily, Disp: 30 tablet, Rfl: 11    sildenafil (VIAGRA) 100 MG tablet, Take 1 tablet by mouth daily as needed for Erectile Dysfunction, Disp: 10 tablet, Rfl: 11    metFORMIN (GLUCOPHAGE) 500 MG tablet, Take 1 tablet by mouth 2 times daily (with meals), Disp: 180 tablet, Rfl: 3    omeprazole (PRILOSEC) 20 MG delayed release capsule, Take 1 capsule by mouth 2 times daily (before meals), Disp: 180 capsule, Rfl: 1    albuterol sulfate HFA (PROVENTIL HFA) 108 (90 Base) MCG/ACT inhaler, Inhale 1-2 puffs into the lungs every 6 hours as needed for Wheezing or Shortness of Breath (Space out to every 6 hours as symptoms improve), Disp: 3 Inhaler, Rfl: 3    hydrOXYzine (VISTARIL) 50 MG capsule, Take 1 capsule by mouth 3 times daily as needed for Anxiety, Disp: 180 capsule, Rfl: 3    naproxen (NAPROSYN) 500 MG tablet, Take 1 tablet by mouth 2 times daily (with meals), Disp: 180 tablet, Rfl: 1    testosterone (ANDROGEL; TESTIM) 50 MG/5GM (1%) GEL 1% gel, APPLY 1 PACKET TO SHOULDERS ONCE DAILY, Disp: , Rfl:     vitamin D (ERGOCALCIFEROL) 1.25 MG (39573 UT) CAPS capsule, TAKE 1 CAPSULE BY MOUTH EVERY OTHER WEEK, Disp: , Rfl:     ibuprofen (ADVIL;MOTRIN) 800 MG tablet, Take 1 tablet by mouth every 8 hours as needed for Pain, Disp: 30 tablet, Rfl: 0    glipiZIDE (GLUCOTROL) 10 MG tablet, TAKE 1 TABLET BY MOUTH IN THE MORNING AND 1 TABLET AT SUPPER, Disp: , Rfl:     aspirin 81 MG tablet, Take 81 mg by mouth daily. , Disp: , Rfl:   Allergies   Allergen Reactions    Statins      \"don't feel right\"     Social History     Socioeconomic History    Marital status: Single     Spouse name: Not on file    Number of children: Not on file    Years of education: Not on file    Highest education level: Not on file   Occupational History    Not on file   Tobacco Use    Smoking status: Current Every Day Smoker     Packs/day: 0.50     Years: 21.00     Pack years: 10.50     Types: Cigarettes    Smokeless tobacco: Never Used   Vaping Use    Vaping Use: Never used   Substance and Sexual Activity    Alcohol use: Yes     Comment:  socially occ beer    Drug use: Yes     Types: Marijuana    Sexual activity: Not on file   Other Topics Concern    Not on file   Social History Narrative    Not on file     Social Determinants of Health     Financial Resource Strain:     Difficulty of Paying Living Expenses:    Food Insecurity:     Worried About Running Out of Food in the Last Year:     Ran Out of Food in the Last Year:    Transportation Needs:     Lack of Transportation (Medical):      Lack of Transportation (Non-Medical):    Physical Activity:     Days of Exercise per Week:     Minutes of Exercise per Session:    Stress:     Feeling of Stress :    Social Connections:     Frequency of Communication with Friends and Family:     Frequency of Social Gatherings with Friends and Family:     Attends Taoist Services:     Active Member of Clubs or Organizations:     Attends Club or Organization Meetings:     Marital Status:    Intimate Partner Violence:     Fear of Current or Ex-Partner:     Emotionally Abused:     Physically Abused:     Sexually Abused:      Past Medical History:   Diagnosis Date    Anxiety     Asthma     CAD (coronary artery disease)     Chronic back pain     Diabetes mellitus ([address]     GERD (gastroesophageal reflux disease)      heart burn    Hx of heart artery stent     heart stent x 1    Neuropathy      Past Surgical History:   Procedure Laterality Date    CARDIAC SURGERY      CORONARY ANGIOPLASTY WITH STENT PLACEMENT  2010    heart stent x 1    TONSILLECTOMY       Family History   Problem Relation Age of Onset    Diabetes Mother     Diabetes Father     Arrhythmia Brother         Physical Exam:  Vitals signs and nursing note reviewed. Constitutional:       Appearance: well-developed. HENT:      Head: Normocephalic and atraumatic. Nose: Nose normal.   Eyes:      Conjunctiva/sclera: Conjunctivae normal.   Neck:      Musculoskeletal: Normal range of motion and neck supple. Pulmonary:      Effort: Pulmonary effort is normal. No respiratory distress. Musculoskeletal:      Comments: Normal gait     Skin:     General: Skin is warm and dry. Neurological:      Mental Status: Alert and oriented to person, place, and time. Sensory: No sensory deficit. Psychiatric:         Behavior: Behavior normal.         Thought Content: Thought content normal.        Provider Attestation:  Madeleine Berrios personally performed the services described in this documentation. All medical record entries made by the scribe were at my direction and in my presence.  I have reviewed the chart and discharge instructions and agree that the records reflect my personal performance and is accurate and complete. Maylin Bass MD 9/30/21     Scribe Attestation:  By signing my name below, Eladia Daniel, attest that this documentation has been prepared under the direction and in the presence of Dr. Johanne Campos. Electronically signed: Luz Fernandez, 9/30/21     Please note that this chart was generated using voice recognition Dragon dictation software. Although every effort was made to ensure the accuracy of this automated transcription, some errors in transcription may have occurred.

## 2021-10-07 ENCOUNTER — TELEPHONE (OUTPATIENT)
Dept: PAIN MANAGEMENT | Age: 46
End: 2021-10-07

## 2021-10-07 ASSESSMENT — PAIN DESCRIPTION - PROGRESSION: CLINICAL_PROGRESSION: GRADUALLY WORSENING

## 2021-10-07 ASSESSMENT — PAIN DESCRIPTION - PAIN TYPE: TYPE: CHRONIC PAIN

## 2021-10-07 ASSESSMENT — PAIN DESCRIPTION - FREQUENCY: FREQUENCY: CONTINUOUS

## 2021-10-07 ASSESSMENT — PAIN SCALES - GENERAL: PAINLEVEL_OUTOF10: 10

## 2021-10-07 ASSESSMENT — PAIN - FUNCTIONAL ASSESSMENT: PAIN_FUNCTIONAL_ASSESSMENT: PREVENTS OR INTERFERES WITH MANY ACTIVE NOT PASSIVE ACTIVITIES

## 2021-10-07 ASSESSMENT — PAIN DESCRIPTION - ORIENTATION: ORIENTATION: LEFT

## 2021-10-07 ASSESSMENT — PAIN DESCRIPTION - DIRECTION: RADIATING_TOWARDS: LEFT LEG

## 2021-10-07 ASSESSMENT — PAIN DESCRIPTION - ONSET: ONSET: ON-GOING

## 2021-10-07 ASSESSMENT — PAIN DESCRIPTION - LOCATION: LOCATION: BACK;BUTTOCKS;HIP;LEG

## 2021-10-07 NOTE — PROGRESS NOTES
[facility] Pain Management  Patient Pain Assessment  Consultation - Dr. Brice Pappas    Primary Care Physician: Twan Kaur MD    Chief complaint:   Chief Complaint   Patient presents with    Back Pain   . Olamide Jackson is a 55 y.o. male evaluated on 10/12/2021. Patient is referred by Olga Dhillon MD      Patient identification was verified at the start of the visit: Yes    Chief complaint: Olamide Jackson is 55 y.o.,  male, with  Back Pain  . HISTORY OF PRESENT ILLNESS:  Olamide Jackson is 55 y.o. male with    Referring diagnosis  M54.41, G89.29 (ICD-10-CM) - Chronic right-sided low back pain with right-sided sciatica  M54.50 (ICD-10-CM) - Lumbar pain  M51.36 (ICD-10-CM) - Lumbar degenerative disc disease      Patient is a 26-year-old male referred to the pain clinic in consultation for pain involving low back of longstanding duration. Patient reports he had low back pain for a long time off-and-on for years. He was seen apparently in the pain clinic in [facility] and at that time apparently had a 4 charts in his low back (possible facet joint injections) which helped the pain. The pain at that time was mostly axial in nature. Patient reports he was shoveling snow in January and developed pain in the low back area. The pain started radiating to the left lower extremity for the last 5 to 6 months. Pain is gradually getting worse and reports he lost some strength in his left lower extremity. He previously has done chiropractic treatment and physical therapy without much improvement the pain. 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 Patient's pain is decreased to a certain extent leaning forwards and sitting. Back Pain  This is a chronic problem. The current episode started more than 1 month ago. The problem occurs constantly. The problem has been gradually worsening since onset.  The pain is present in the lumbar spine. The quality of the pain is described as aching (throbbing. sharp, pulsating). The pain radiates to the left foot (occational;ly to rt foot). The pain is at a severity of 10/10. The pain is severe. The pain is the same all the time. The symptoms are aggravated by bending, standing, sitting, twisting and lying down (walking, Lifting, ADLs). Stiffness is present in the morning. Associated symptoms include numbness, tingling and weakness. Pertinent negatives include no abdominal pain, chest pain, dysuria or fever. (Lt. leg) Risk factors include lack of exercise (smoker). RX Monitoring 10/12/2021   Periodic Controlled Substance Monitoring No signs of potential drug abuse or diversion identified. ;Assessed functional status.      Current Pain Assessment  Pain Assessment  Pain Assessment: 0-10  Pain Level: 10  Patient's Stated Pain Goal: 2 (increase physical activity)  Pain Type: Chronic pain  Pain Location: Back, Buttocks, Hip, Leg  Pain Orientation: Left  Pain Radiating Towards: left leg  Pain Descriptors: Tingling, Numbness, Constant, Aching, Sharp, Nagging, Jabbing, Radiating, Spasm  Pain Frequency: Continuous  Pain Onset: On-going  Clinical Progression: Gradually worsening  Functional Pain Assessment: Prevents or interferes with many active not passive activities  Non-Pharmaceutical Pain Intervention(s): Repositioned, Rest, Shower     BP (!) 144/92   Pulse 105   Temp 96.8 °F (36 °C) (Skin)   Resp 18   Ht 5' 8\" (1.727 m)   Wt 175 lb (79.4 kg)   SpO2 97%   BMI 26.61 kg/m²       ADVERSE MEDICATION EFFECTS:   Constipation: no  Bowel Regimen: No:   Diet: common adult low carb  Appetite:  ok  Sedation:  no  Urinary Retention: no    FOCUSED PAIN SCALE:  Highest : 10  Lowest :5  Average: Range-8  When and What  was your last procedure:    5-6 years ago in [facility]; 2-3 years ago at [facility] pain clinic  Was your procedure effective:  Yes steroid injection in elbow and was effective and shots in back    ACTIVITY/SOCIAL/EMOTIONAL:  Sleep Pattern: 3 hours per night.nightime awakenings  Energy Level: Tired/Fatigued  Currently attending Physical Therapy:  Yes at [facility] Exercises: stretches yoga yoga and lifting weights  Mobility: yes  Do you use assistive devices? No  Have you fallen in the last 30 days?:  No  Currently seeing a Psychiatrist or Psychologist:  No  Emotional Issues: normal   Mood: appropriate     ABERRANT BEHAVIORS SINCE LAST VISIT:  Have you ever been treated in another Pain Clinic yes  Refills for prescriptions appropriate: not applicable  Lost rx/pills: not applicable  Taking more medication than prescribed:  not applicable  Are you receiving PAIN medications from  other doctors: not applicable  Last Urine/Serum Drug Screen :  Was Serum/UDS as anticipated? No See UDS 5/18/2017. Will collect today if needed  Brought pill bottles in :not applicable   Was Pill count appropriate? :not applicable   Are currently pregnant? not applicable  Recent ER visits: No  Have you had a COVID 19 Vaccine?: No  Total Soap:5    Past Medical History      Diagnosis Date    Anxiety     Asthma     CAD (coronary artery disease)     Chronic back pain     Diabetes mellitus (HCC)     GERD (gastroesophageal reflux disease)      heart burn    History of MI (myocardial infarction) 5/8/2017    Hx of heart artery stent     heart stent x 1    Neuropathy        Surgical History  Past Surgical History:   Procedure Laterality Date    CARDIAC SURGERY      CORONARY ANGIOPLASTY WITH STENT PLACEMENT  2010    heart stent x 1    TONSILLECTOMY         Medications  Current Outpatient Medications   Medication Sig Dispense Refill    cyclobenzaprine (FLEXERIL) 10 MG tablet Take 1 tablet by mouth three times daily as needed for muscle spasm 60 tablet 0    gabapentin (NEURONTIN) 300 MG capsule Take 1 capsule by mouth 3 times daily for 30 days.  90 capsule 3    metoprolol tartrate (LOPRESSOR) 25 MG tablet Take 1/2 (one-half) tablet by mouth twice daily 30 tablet 11    sildenafil (VIAGRA) 100 MG tablet Take 1 tablet by mouth daily as needed for Erectile Dysfunction 10 tablet 11    metFORMIN (GLUCOPHAGE) 500 MG tablet Take 1 tablet by mouth 2 times daily (with meals) 180 tablet 3    omeprazole (PRILOSEC) 20 MG delayed release capsule Take 1 capsule by mouth 2 times daily (before meals) 180 capsule 1    albuterol sulfate HFA (PROVENTIL HFA) 108 (90 Base) MCG/ACT inhaler Inhale 1-2 puffs into the lungs every 6 hours as needed for Wheezing or Shortness of Breath (Space out to every 6 hours as symptoms improve) 3 Inhaler 3    hydrOXYzine (VISTARIL) 50 MG capsule Take 1 capsule by mouth 3 times daily as needed for Anxiety 180 capsule 3    naproxen (NAPROSYN) 500 MG tablet Take 1 tablet by mouth 2 times daily (with meals) 180 tablet 1    testosterone (ANDROGEL; TESTIM) 50 MG/5GM (1%) GEL 1% gel APPLY 1 PACKET TO SHOULDERS ONCE DAILY      vitamin D (ERGOCALCIFEROL) 1.25 MG (64787 UT) CAPS capsule TAKE 1 CAPSULE BY MOUTH EVERY OTHER WEEK      ibuprofen (ADVIL;MOTRIN) 800 MG tablet Take 1 tablet by mouth every 8 hours as needed for Pain 30 tablet 0    glipiZIDE (GLUCOTROL) 10 MG tablet TAKE 1 TABLET BY MOUTH IN THE MORNING AND 1 TABLET AT SUPPER      aspirin 81 MG tablet Take 81 mg by mouth daily. No current facility-administered medications for this encounter. Allergies  Statins    Family History  family history includes Arrhythmia in his brother; Diabetes in his father and mother.     Social History  Social History     Socioeconomic History    Marital status: Single     Spouse name: None    Number of children: None    Years of education: None    Highest education level: None   Occupational History    None   Tobacco Use    Smoking status: Current Every Day Smoker     Packs/day: 0.50     Years: 21.00     Pack years: 10.50     Types: Cigarettes    Smokeless tobacco: Never Used   Vaping Use  Vaping Use: Never used   Substance and Sexual Activity    Alcohol use: Yes     Comment:  socially occ beer    Drug use: Yes     Types: Marijuana     Comment: pt states a year ago    Sexual activity: None   Other Topics Concern    None   Social History Narrative    None     Social Determinants of Health     Financial Resource Strain:     Difficulty of Paying Living Expenses:    Food Insecurity:     Worried About Running Out of Food in the Last Year:     Ran Out of Food in the Last Year:    Transportation Needs:     Lack of Transportation (Medical):  Lack of Transportation (Non-Medical):    Physical Activity:     Days of Exercise per Week:     Minutes of Exercise per Session:    Stress:     Feeling of Stress :    Social Connections:     Frequency of Communication with Friends and Family:     Frequency of Social Gatherings with Friends and Family:     Attends Congregation Services:     Active Member of Clubs or Organizations:     Attends Club or Organization Meetings:     Marital Status:    Intimate Partner Violence:     Fear of Current or Ex-Partner:     Emotionally Abused:     Physically Abused:     Sexually Abused:       reports current drug use. Drug: Marijuana. REVIEW OF SYSTEMS:      Review of Systems   Constitutional: Positive for appetite change. Negative for activity change, fatigue, fever and unexpected weight change. HENT: Negative. Negative for congestion and sore throat. Eyes: Negative for photophobia and pain. Wears corrective lens   Respiratory: Negative for cough and shortness of breath. Asthma   Cardiovascular: Negative. Negative for chest pain and palpitations. History of coronary artery disease status post placement of coronary stents   Gastrointestinal: Negative for abdominal pain, constipation, diarrhea, nausea and rectal pain. Acid reflux   Endocrine: Negative for heat intolerance and polyuria.         Type II diabetes Genitourinary: Negative. Negative for dysuria and hematuria. Musculoskeletal: Positive for back pain. Negative for arthralgias. Skin: Negative. Negative for rash. Allergic/Immunologic: Positive for environmental allergies. Neurological: Positive for tingling, weakness and numbness. Hematological: Negative. Psychiatric/Behavioral: Negative. Negative for self-injury, sleep disturbance and suicidal ideas. The patient is not nervous/anxious. GENERAL PHYSICAL EXAM:  Vitals: BP (!) 144/92   Pulse 105   Temp 96.8 °F (36 °C) (Skin)   Resp 18   Ht 5' 8\" (1.727 m)   Wt 175 lb (79.4 kg)   SpO2 97%   BMI 26.61 kg/m² , Body mass index is 26.61 kg/m². Physical Exam  Constitutional:       Appearance: Normal appearance. HENT:      Head: Normocephalic and atraumatic. Nose: Nose normal.   Eyes:      Extraocular Movements: Extraocular movements intact. Pupils: Pupils are equal, round, and reactive to light. Cardiovascular:      Rate and Rhythm: Normal rate and regular rhythm. Pulses: Normal pulses. Pulmonary:      Effort: Pulmonary effort is normal.      Breath sounds: Normal breath sounds. Abdominal:      General: There is no distension. Tenderness: There is no abdominal tenderness. Musculoskeletal:      Cervical back: No rigidity or tenderness. Lumbar back: Positive left straight leg raise test. Negative right straight leg raise test.      Right lower leg: No edema. Left lower leg: No edema. Lymphadenopathy:      Cervical: No cervical adenopathy. Skin:     General: Skin is warm. Findings: No rash. Neurological:      Mental Status: He is alert and oriented to person, place, and time. Cranial Nerves: Cranial nerves are intact. No cranial nerve deficit. Sensory: Sensation is intact. No sensory deficit. Motor: Motor function is intact. No weakness, tremor or abnormal muscle tone.       Deep Tendon Reflexes:      Reflex Scores: Patellar reflexes are 2+ on the right side and 2+ on the left side. Achilles reflexes are 2+ on the right side and 2+ on the left side. Psychiatric:         Mood and Affect: Mood normal.         Speech: Speech normal.         Behavior: Behavior normal.         Thought Content: Thought content normal.      Right Ankle Exam     Muscle Strength   The patient has normal right ankle strength. Left Ankle Exam     Muscle Strength   The patient has normal left ankle strength. Right Knee Exam     Muscle Strength   The patient has normal right knee strength. Left Knee Exam     Muscle Strength   The patient has normal left knee strength. Right Hip Exam     Muscle Strength   The patient has normal right hip strength. Left Hip Exam     Muscle Strength   The patient has normal left hip strength. Back Exam     Tenderness   The patient is experiencing tenderness in the lumbar and sacroiliac. Range of Motion   Back extension: Limited and painful. Back flexion: Limited and painful. Back lateral bend right: Limited and painful. Back lateral bend left: Limited and painful. Back rotation right: Limited and painful. Back rotation left: Limited and painful.      Tests   Straight leg raise right: negative  Straight leg raise left: positive    Other   Sensation: normal  Gait: antalgic   Erythema: no back redness  Scars: absent    Comments:  Skin --normal appearance  Surgical Scar --Absent   kyphosis absent and scoliosis absent  Alignment of her shoulders, scapulae and iliac crests--symmetric  Paraspinal tenderness:Present  Lumbar lordosis-----------Normal  Movements of the lumbar spine indicated above are diminished range with pain   Facet loading---  : Right side--    Pain-Moderate                                   Left side----   Pain  Moderate  Son's test -------------- Right side---negative                                           Left side-----positive              Nurses Notes and Vital Signs reviewed.     DATA  Labs:  Benzodiazepine Screen, Urine   Date Value Ref Range Status   09/14/2017 NEGATIVE NEG Final     Comment:           (Positive cutoff 200 ng/mL)                  5/17/2021  9:09 PM - Yasmany Ferrara Incoming Lab Results From [facility]    Component Value Ref Range & Units Status Collected Lab   Glucose 180High   70 - 99 mg/dL Final 05/17/2021  8:20  [facility]   BUN 9  6 - 20 mg/dL Final 05/17/2021  8:20  [facility]   CREATININE 0.96  0.70 - 1.20 mg/dL Final 05/17/2021  8:20  [facility]   Bun/Cre Ratio NOT REPORTED  9 - 20 Final 05/17/2021  8:20  [facility]   Calcium 9.4  8.6 - 10.4 mg/dL Final 05/17/2021  8:20  [facility]   Sodium 136  135 - 144 mmol/L Final 05/17/2021  8:20  [facility]   Potassium 4.3  3.7 - 5.3 mmol/L Final 05/17/2021  8:20  [facility]   Chloride 97Low   98 - 107 mmol/L Final 05/17/2021  8:20  [facility]   CO2 24  20 - 31 mmol/L Final 05/17/2021  8:20  [facility]   Anion Gap 15  9 - 17 mmol/L Final 05/17/2021  8:20  [facility]   Alkaline Phosphatase 77  40 - 129 U/L Final 05/17/2021  8:20  [facility]   ALT 24  5 - 41 U/L Final 05/17/2021  8:20  [facility]   AST 16  <40 U/L Final 05/17/2021  8:20  [facility]   Total Bilirubin 0.52  0.3 - 1.2 mg/dL Final 05/17/2021  8:20  [facility]   Total Protein 7.3  6.4 - 8.3 g/dL Final 05/17/2021  8:20  [facility]   Albumin 4.3  3.5 - 5.2 g/dL Final 05/17/2021  8:20  [facility]   Albumin/Globulin Ratio NOT REPORTED  1.0 - 2.5 Final 05/17/2021  8:20  [facility]   GFR Non-African American >60  >60 mL/min Final 05/17/2021  8:20  [facility]   GFR African American >60  >60 mL/min Final 05/17/2021  8:20  [facility]   GFR Comment        Final           Imaging:  Radiology Images and Reports reviewed where indicated and necessary  EXAMINATION:   MRI OF THE LUMBAR SPINE WITHOUT CONTRAST, 8/28/2021 2:18 pm       TECHNIQUE:   Multiplanar multisequence MRI of the lumbar spine was performed without the   administration of intravenous contrast.       COMPARISON:   None. HISTORY:   ORDERING SYSTEM PROVIDED HISTORY: Chronic right-sided low back pain with   right-sided sciatica   TECHNOLOGIST PROVIDED HISTORY:   Reason for Exam: Chronic right-sided low back pain with right-sided sciatica;   patient states he hurt his back shoveling snow about 6 months ago; patient   c/o left leg pain and numbness into left foot       FINDINGS:   BONES/ALIGNMENT: Vertebral body heights are maintained. Alignment is normal.   Edematous degenerative endplate changes are present at L5-S1. No focal   suspect osseous lesion is evident. SPINAL CORD: The conus terminates normally at the L1 level. The visualized   spinal cord has normal signal and morphology. No evidence of mass or   abnormal fluid collection within the spinal canal.       SOFT TISSUES: Paraspinal soft tissues are unremarkable. L1-L2: Disc height and signal maintained. No neural foraminal narrowing or   spinal canal stenosis. L2-L3: Disc height and signal maintained. No neural foraminal narrowing or   spinal canal stenosis. L3-L4: Disc height and signal maintained. No neural foraminal narrowing or   spinal canal stenosis. L4-L5: Moderate disc height loss and desiccation. No left neural foraminal   narrowing. Mild right neural foraminal narrowing secondary to disc bulge. Mild spinal canal stenosis and mild bilateral lateral recess narrowing   secondary to a broad-based posterior disc protrusion with annular fissuring   that contacts the traversing L5 nerve roots.        L5-S1: Moderate disc height loss status. (Lisa Guillaume MD)    The following screens were also reviewed  SOAPP- the score is 5. (Values:cates patient is  <4minimal potential  4-7 Moderate potential  >7 High potential  for drug addiction  Counselling/Preventive measures for pain  Control:    [x]  Spine strengthening exercises are discussed with patient in detail. Patient is counseled on importance of exercise and,core strengthening. Some  specific exercises to strengthen the abdominal muscles and low back muscles Were discussed. Also aquatic (water) physical therapy and benefits were explained to patient. including \" Water supports the body and minimizes the effect of gravity, making it easier for patients to start an exercise program.\"   The following important principles were discussed with patient:  1. Limit Bed Rest--Studies show that people with short-term low-back pain who rest feel more pain and have a harder time with daily tasks than those who stay active. 2. Keep Exercising-patient is advised to stay away from strenuous activities like gardening and avoid whatever motion caused the pain in the first place.   3. Maintain Good Posture-Exercises  to maintain good posture were shown to patient. 4. To do exercises learned in PT regularly. []Information (handout) on exercise was  given to patient. [x]  Patient is counseled about  ill effects of smoking for 8 minutes -Patient is strongly urged to quit smoking   Following health benefits were discussed:  People who stop smoking greatly reduce their risk for disease and premature death. Lowered risk for lung cancer and many other types of cancer. Reduced risk for coronary heart disease, stroke, and peripheral vascular disease. Reduced coronary heart disease risk within 1 to 2 years of quitting. Reduced respiratory symptoms, such as coughing, wheezing, and shortness of breath.  The rate of decline in lung function is slower among people who quit smoking than among those who continue to smoke. Reduced risk of developing chronic obstructive pulmonary disease (COPD), one of the leading causes of death in the [place]. Reduced risk for infertility in women of reproductive age. Women who stop smoking during pregnancy also reduce their risk of having a low birth weight baby. Reduced risk of bone and joint damage. Methods to Quit Smoking  The majority of cigarette smokers quit without using evidence-based   Counseling and medication are both effective for treating tobacco dependence, and using them together is more effective than using either one alone. Patient is advised to follow up with his physician for further management. The following treatment plan was developed after discussion with patient:    We discussed Lumbar Epidural steroid Injections x 1  at L4 - L5. Patient tried and failed NSAIDS,Home exercises, Physical Therapy, Chiropractic manipulations without relief. Patient exhibited signs of radiculopathy with positive straight leg raising test on left    Patient has not had prior Lumbar Surgery. We will see the patient in 2 weeks after the procedures and re-evaluate symptoms.           Orders Placed This Encounter   Procedures    Lumbar Epidural Steroid Injection/Caudal     Standing Status:   Future     Standing Expiration Date:   10/12/2022    Lumbar Epidural Steroid Injection/Caudal     Standing Status:   Future     Standing Expiration Date:   10/12/2022    FLUORO FOR SURGICAL PROCEDURES     Standing Status:   Future     Standing Expiration Date:   10/12/2022    FLUORO FOR SURGICAL PROCEDURES     Standing Status:   Future     Standing Expiration Date:   10/12/2022    DRUG SCREEN, PAIN     Standing Status:   Standing     Number of Occurrences:   1    Saline lock IV     Standing Status:   Future     Standing Expiration Date:   4/12/2023    Saline lock IV     Standing Status:   Future     Standing Expiration Date:   4/12/2023       Decision Making Process : Patient's health history and referral records thoroughly reviewed before focused physical examination and discussion with patient. Over 50% of today's visit is spent on examining the patient and counseling. Level of complexity of date to be reviewed is Moderate. The chart date reviewed include the following: Imaging Reports. Summary of Care. Time spent reviewing with patient the below reports:   Medication safety, Treatment options. Level of diagnosis and management options of this case is multiple: involving the following management options: Interventions as needed, medication management as appropriate, future visits, activity modification, heat/ice as needed, Urine drug screen as required. [x]The patient's questions were answered to the best of my abilities. Documentation:  I communicated with the patient and/or health care decision maker about plan of care  Details of this discussion including any medical advice provided: Total Time: 45-55 minutes    I affirm this is a Patient Initiated Episode with an Established Patient who has not had a related appointment within my department in the past 7 days or scheduled within the next 24 hours. This note was created using voice recognition software. There may be inaccuracies of transcription  that are inadvertently overlooked prior to the signature. There is any questions about the transcription please contact me.     Electronically signed by Lima Luther MD on 10/12/2021 at 3:33 PM

## 2021-10-07 NOTE — TELEPHONE ENCOUNTER
[facility]. Attempted to outreach pt to review medical record. VM obtained, apt. Reminder left along with a request for return call.

## 2021-10-11 PROBLEM — I25.2 HISTORY OF MI (MYOCARDIAL INFARCTION): Status: RESOLVED | Noted: 2017-05-08 | Resolved: 2021-10-11

## 2021-10-11 ASSESSMENT — ENCOUNTER SYMPTOMS: BACK PAIN: 1

## 2021-10-12 ENCOUNTER — HOSPITAL ENCOUNTER (OUTPATIENT)
Dept: PAIN MANAGEMENT | Age: 46
Discharge: HOME | End: 2021-10-12
Payer: COMMERCIAL

## 2021-10-12 VITALS
BODY MASS INDEX: 26.52 KG/M2 | RESPIRATION RATE: 18 BRPM | WEIGHT: 175 LBS | HEART RATE: 105 BPM | HEIGHT: 68 IN | SYSTOLIC BLOOD PRESSURE: 144 MMHG | DIASTOLIC BLOOD PRESSURE: 92 MMHG | TEMPERATURE: 96.8 F | OXYGEN SATURATION: 97 %

## 2021-10-12 DIAGNOSIS — M48.061 NEUROFORAMINAL STENOSIS OF LUMBAR SPINE: ICD-10-CM

## 2021-10-12 DIAGNOSIS — M51.36 LUMBAR DEGENERATIVE DISC DISEASE: ICD-10-CM

## 2021-10-12 DIAGNOSIS — M54.16 LUMBAR RADICULOPATHY: Primary | ICD-10-CM

## 2021-10-12 PROCEDURE — 80307 DRUG TEST PRSMV CHEM ANLYZR: CPT

## 2021-10-12 PROCEDURE — 99204 OFFICE O/P NEW MOD 45 MIN: CPT | Performed by: PAIN MEDICINE

## 2021-10-12 PROCEDURE — 99205 OFFICE O/P NEW HI 60 MIN: CPT

## 2021-10-12 ASSESSMENT — ENCOUNTER SYMPTOMS
PHOTOPHOBIA: 0
CONSTIPATION: 0
DIARRHEA: 0
SHORTNESS OF BREATH: 0
ABDOMINAL PAIN: 0
SORE THROAT: 0
RECTAL PAIN: 0
NAUSEA: 0
COUGH: 0
EYE PAIN: 0

## 2021-10-18 LAB
6-ACETYLMORPHINE, UR: NOT DETECTED
7-AMINOCLONAZEPAM, URINE: NOT DETECTED
ALPHA-OH-ALPRAZ, URINE: NOT DETECTED
ALPHA-OH-MIDAZOLAM, URINE: NOT DETECTED
ALPRAZOLAM, URINE: NOT DETECTED
AMPHETAMINES, URINE: NOT DETECTED
BARBITURATES, URINE: NOT DETECTED
BENZOYLECGONINE, UR: NOT DETECTED
BUPRENORPHINE URINE: NOT DETECTED
CARISOPRODOL, UR: NOT DETECTED
CLONAZEPAM, URINE: NOT DETECTED
CODEINE, URINE: NOT DETECTED
CREATININE URINE: 207.8 MG/DL (ref 20–400)
DIAZEPAM, URINE: NOT DETECTED
DRUGS EXPECTED, UR: NORMAL
EER HI RES INTERP UR: NORMAL
ETHYL GLUCURONIDE UR: PRESENT
FENTANYL URINE: NOT DETECTED
GABAPENTIN: PRESENT
HYDROCODONE, URINE: NOT DETECTED
HYDROMORPHONE, URINE: NOT DETECTED
LORAZEPAM, URINE: NOT DETECTED
MARIJUANA METAB, UR: PRESENT
MDA, UR: NOT DETECTED
MDEA, EVE, UR: NOT DETECTED
MDMA URINE: NOT DETECTED
MEPERIDINE METAB, UR: NOT DETECTED
METHADONE, URINE: NOT DETECTED
METHAMPHETAMINE, URINE: NOT DETECTED
METHYLPHENIDATE: NOT DETECTED
MIDAZOLAM, URINE: NOT DETECTED
MORPHINE URINE: NOT DETECTED
NALOXONE URINE: NOT DETECTED
NORBUPRENORPHINE, URINE: NOT DETECTED
NORDIAZEPAM, URINE: NOT DETECTED
NORFENTANYL, URINE: NOT DETECTED
NORHYDROCODONE, URINE: NOT DETECTED
NOROXYCODONE, URINE: NOT DETECTED
NOROXYMORPHONE, URINE: NOT DETECTED
OXAZEPAM, URINE: NOT DETECTED
OXYCODONE URINE: NOT DETECTED
OXYMORPHONE, URINE: NOT DETECTED
PAIN MANAGEMENT DRUG PANEL INTERP, URINE: NORMAL
PAIN MGT DRUG PANEL, HI RES, UR: NORMAL
PCP,URINE: NOT DETECTED
PHENTERMINE, UR: NOT DETECTED
PREGABALIN: NOT DETECTED
TAPENTADOL, URINE: NOT DETECTED
TAPENTADOL-O-SULFATE, URINE: NOT DETECTED
TEMAZEPAM, URINE: NOT DETECTED
TRAMADOL, URINE: NOT DETECTED
ZOLPIDEM METABOLITE (ZCA), URINE: NOT DETECTED
ZOLPIDEM, URINE: NOT DETECTED

## 2021-10-21 ENCOUNTER — TELEPHONE (OUTPATIENT)
Dept: PAIN MANAGEMENT | Age: 46
End: 2021-10-21

## 2021-11-02 NOTE — PRE-PROCEDURE INSTRUCTIONS
Called patient for pre procedure instructions. No answer. Message left.    nformation given where to be dropped off, a person to remain in the car and which door to enter in. Temp must be taken. Patient advised to avoid vaccines 2 weeks prior and after Injection.

## 2021-11-04 ENCOUNTER — HOSPITAL ENCOUNTER (OUTPATIENT)
Dept: PAIN MANAGEMENT | Age: 46
Discharge: HOME | End: 2021-11-04
Payer: COMMERCIAL

## 2021-11-04 ENCOUNTER — HOSPITAL ENCOUNTER (OUTPATIENT)
Dept: GENERAL RADIOLOGY | Age: 46
Discharge: HOME | End: 2021-11-06
Payer: COMMERCIAL

## 2021-11-04 VITALS
DIASTOLIC BLOOD PRESSURE: 89 MMHG | HEART RATE: 101 BPM | RESPIRATION RATE: 16 BRPM | HEIGHT: 68 IN | TEMPERATURE: 97.8 F | BODY MASS INDEX: 27.43 KG/M2 | WEIGHT: 181 LBS | SYSTOLIC BLOOD PRESSURE: 139 MMHG | OXYGEN SATURATION: 95 %

## 2021-11-04 DIAGNOSIS — M48.061 NEUROFORAMINAL STENOSIS OF LUMBAR SPINE: ICD-10-CM

## 2021-11-04 DIAGNOSIS — M51.36 LUMBAR DEGENERATIVE DISC DISEASE: ICD-10-CM

## 2021-11-04 DIAGNOSIS — M54.16 LUMBAR RADICULOPATHY: Primary | ICD-10-CM

## 2021-11-04 DIAGNOSIS — M54.16 LUMBAR RADICULOPATHY: ICD-10-CM

## 2021-11-04 PROCEDURE — 6360000002 HC RX W HCPCS: Performed by: PAIN MEDICINE

## 2021-11-04 PROCEDURE — 62323 NJX INTERLAMINAR LMBR/SAC: CPT | Performed by: PAIN MEDICINE

## 2021-11-04 PROCEDURE — 3209999900 FLUORO FOR SURGICAL PROCEDURES

## 2021-11-04 PROCEDURE — 62323 NJX INTERLAMINAR LMBR/SAC: CPT

## 2021-11-04 PROCEDURE — 6360000004 HC RX CONTRAST MEDICATION: Performed by: PAIN MEDICINE

## 2021-11-04 RX ORDER — TRIAMCINOLONE ACETONIDE 40 MG/ML
INJECTION, SUSPENSION INTRA-ARTICULAR; INTRAMUSCULAR
Status: COMPLETED | OUTPATIENT
Start: 2021-11-04 | End: 2021-11-04

## 2021-11-04 RX ORDER — MIDAZOLAM HYDROCHLORIDE 1 MG/ML
INJECTION INTRAMUSCULAR; INTRAVENOUS
Status: COMPLETED | OUTPATIENT
Start: 2021-11-04 | End: 2021-11-04

## 2021-11-04 RX ADMIN — IOHEXOL 2 ML: 180 INJECTION INTRAVENOUS at 12:28

## 2021-11-04 RX ADMIN — MIDAZOLAM 2 MG: 1 INJECTION INTRAMUSCULAR; INTRAVENOUS at 12:23

## 2021-11-04 RX ADMIN — TRIAMCINOLONE ACETONIDE 80 MG: 40 INJECTION, SUSPENSION INTRA-ARTICULAR; INTRAMUSCULAR at 12:29

## 2021-11-04 ASSESSMENT — PAIN - FUNCTIONAL ASSESSMENT
PAIN_FUNCTIONAL_ASSESSMENT: 0-10
PAIN_FUNCTIONAL_ASSESSMENT: PREVENTS OR INTERFERES SOME ACTIVE ACTIVITIES AND ADLS

## 2021-11-04 ASSESSMENT — PAIN DESCRIPTION - ORIENTATION: ORIENTATION: LOWER;LEFT

## 2021-11-04 ASSESSMENT — PAIN DESCRIPTION - ONSET: ONSET: ON-GOING

## 2021-11-04 ASSESSMENT — PAIN DESCRIPTION - FREQUENCY: FREQUENCY: CONTINUOUS

## 2021-11-04 ASSESSMENT — PAIN DESCRIPTION - PAIN TYPE: TYPE: CHRONIC PAIN

## 2021-11-04 ASSESSMENT — PAIN DESCRIPTION - LOCATION: LOCATION: BACK

## 2021-11-04 ASSESSMENT — PAIN DESCRIPTION - PROGRESSION: CLINICAL_PROGRESSION: NOT CHANGED

## 2021-11-04 ASSESSMENT — PAIN SCALES - GENERAL: PAINLEVEL_OUTOF10: 8

## 2021-11-04 NOTE — PROGRESS NOTES
Discharge criteria met. Patient alert and oriented x3  Post procedure dressing dry and intact. Sensory and motor function intact as per pre-procedure. Instructions and follow up reviewed with pt at patient at discharge. Patient discharged per wheelchair.  Gait steady, Discharged with -Minnie  @ [phone]

## 2021-11-04 NOTE — PROCEDURES
Pre-Procedure Note    Patient Name: Katharine Coates   YOB: 1975  Room/Bed: Room/bed info not found  Medical Record Number: [id number]  Date: 11/4/2021       Indication:    1. Lumbar radiculopathy    2. Lumbar degenerative disc disease    3. Neuroforaminal stenosis of lumbar spine        Consent: On file. Vital Signs:   Vitals:    11/04/21 1229   BP: (!) 145/99   Pulse: 105   Resp: 16   Temp:    SpO2: 96%       Past Medical History:   has a past medical history of Anxiety, Asthma, CAD (coronary artery disease), Chronic back pain, Diabetes mellitus ([address] GERD (gastroesophageal reflux disease), History of MI (myocardial infarction), Hx of heart artery stent, and Neuropathy. Past Surgical History:   has a past surgical history that includes Coronary angioplasty with stent (2010); Tonsillectomy; and Cardiac surgery. Pre-Sedation Documentation and Exam:   Vital signs have been reviewed (see flow sheet for vitals). Mallampati Airway Assessment:  normal    ASA Classification:  Class 2 - A normal healthy patient with mild systemic disease    Sedation/ Anesthesia Plan:   intravenous sedation   as needed. Medications Planned:   midazolam (Versed) / Fentanyl  Intravenously  as needed. Patient is an appropriate candidate for plan of sedation: yes  Patient's History and Physical examination was reviewed and there is no change. Electronically signed by Sunshine Merchant MD on 11/4/2021 at 12:35 PM        Preoperative Diagnosis:    1. Lumbar radiculopathy    2. Lumbar degenerative disc disease    3. Neuroforaminal stenosis of lumbar spine        Postoperative Diagnosis:    1. Lumbar radiculopathy    2. Lumbar degenerative disc disease    3. Neuroforaminal stenosis of lumbar spine        Procedure Performed:  Lumbar epidural steroid injection under fluoroscopy guidance with IV sedation. Indication for the Procedure:   The patient failed conservative management  for pain in the low back radiating to lower extremities. .    As the patient is not responding to conservative management and it is interfering with activities of daily living we decided to proceed with lumbar epidural steroid injection. The procedure and risks were discussed with the patient and an informed consent was obtained  Current Pain Assessment  Pain Assessment  Pain Assessment: 0-10  Pain Level: 8  Patient's Stated Pain Goal: 2  Pain Type: Chronic pain  Pain Location: Back  Pain Orientation: Lower, Left  Pain Radiating Towards: left low back, buttock, leg to foot  Pain Descriptors: Aching, Sharp, Jabbing, Tingling  Pain Frequency: Continuous  Pain Onset: On-going  Clinical Progression: Not changed  Functional Pain Assessment: Prevents or interferes some active activities and ADLs  Non-Pharmaceutical Pain Intervention(s): Rest, Repositioned, Shower  POSS Score (Patient Ctrl Analgesia): 1     Procedure:    After starting an IV, the patient was sedated with 2 mg of Midazolam and 0 mcg of Fentanyl Intravenously by the RN under my direct supervision. Patient's vital signs including BP, EKG and SaO2 were monitored by the RN and they remained stable during the procedure. A meaningful communication was kept up with the patient throughout  the procedure. The patient is placed in prone position. Skin over the back was prepped and draped in sterile manner. Then using fluoroscopy the L4, L5 interspace was observed and the skin and deep tissues in the left paramedian area were infiltrated with 4 ml of 1% lidocaine. The #20-gauge, 3-1/2 inch Tuohy needle was inserted through the skin wheal and the epidural space was identified using loss of resistance technique with normal saline. This was confirmed with AP and lateral views using fluoroscopy after injecting about 2 ml of Omnipaque-180 and observing the spread of the contrast in the epidural space.    Then after negative aspiration a total of 80 mg of triamcinolone with 8 ml of normal saline was injected into the epidural space. The needle is removed and a Band-Aid was placed over the needle insertion site. Patient's vital signs remained stable and the patient tolerated the procedure well. The patient was discharged home in stable condition and will be followed in the pain clinic in the next few weeks for further planning.     Electronically signed by Ramon Linder MD on 11/4/2021 at 12:35 PM

## 2021-11-05 ENCOUNTER — TELEPHONE (OUTPATIENT)
Dept: PAIN MANAGEMENT | Age: 46
End: 2021-11-05

## 2021-11-05 NOTE — TELEPHONE ENCOUNTER
Post Procedure follow up call made. No answer. no identifying names on answering service.  No message left

## 2021-11-19 ENCOUNTER — HOSPITAL ENCOUNTER (OUTPATIENT)
Dept: PAIN MANAGEMENT | Age: 46
Discharge: HOME | End: 2021-11-19
Payer: COMMERCIAL

## 2021-11-19 DIAGNOSIS — M51.36 DDD (DEGENERATIVE DISC DISEASE), LUMBAR: ICD-10-CM

## 2021-11-19 DIAGNOSIS — M54.16 LUMBAR RADICULOPATHY: Primary | ICD-10-CM

## 2021-11-19 DIAGNOSIS — M48.061 NEUROFORAMINAL STENOSIS OF LUMBAR SPINE: ICD-10-CM

## 2021-11-19 PROBLEM — M51.369 DDD (DEGENERATIVE DISC DISEASE), LUMBAR: Status: ACTIVE | Noted: 2021-11-19

## 2021-11-19 PROCEDURE — 99441 PR PHYS/QHP TELEPHONE EVALUATION 5-10 MIN: CPT | Performed by: NURSE PRACTITIONER

## 2021-11-19 PROCEDURE — 99213 OFFICE O/P EST LOW 20 MIN: CPT

## 2021-11-19 ASSESSMENT — ENCOUNTER SYMPTOMS
EYES NEGATIVE: 1
BACK PAIN: 1
GASTROINTESTINAL NEGATIVE: 1

## 2021-11-19 NOTE — PROGRESS NOTES
[facility] PROGRESS NOTE      Patient  completed []  video visit   [x]   phone call:    7     Minutes :       []    to  review Medication Agreement    [x]  Follow up after procedure   []  Discuss treatment options      Location:  Provider:  working from    [x]    home    []   [facility] ,   patient at home    Chief Complaint: low back pain    He c/o low back pain,which is not radiating. He states pain has resolved mostly in his left leg. He reports numbness is almost gone left foot. He has no history of lumbar surgery. He has been through PT and chiropractic treatment without relief. He had lumbar epidural injection  L4, L5 on 11-4-2021 with 95% relief. He reports he has back pain for 10 years and then flared up for 6 months, He reports sleep is good. He is back to work  full time. Back Pain  This is a chronic problem. The problem occurs intermittently. The problem has been gradually improving since onset. The pain is present in the lumbar spine. The pain does not radiate. The pain is at a severity of 0/10. Exacerbated by: certain movement. Treatments tried: leaning forward. Treatment goals:  Functional status:  Goal met      Aberrancy:   Any alcoholic beverages     yes      Any illegal drugs   marijuana      Analgesia:      0               Adverse  Effects :n/a    ADL;s :employed      Data:    When was thelast UDS:   10-         Was the UDS appropriate:  [] yes [x]   no      Record/Diagnostics Review:      As above, I did review the imaging       DRUG SCREEN, PAIN  Order: [id number]   Status: Final result     Visible to patient: Yes (not seen)     Next appt:  Today at 02:00 PM in Pain [facility])     1 Result Note     Ref Range & Units 10/12/21 1458 Resulting Agency   Pain Management Drug Panel Interp, Urine  See Note  [facility]   Comment: (NOTE)   ________________________________________________________________   NO DRUGS PROVIDED ________________________________________________________________   Please call Elie Clay at [phone] for   Medical Director interpretation if applicable. Alternatively,   please consider the PAIN HYB U ([id number]) which does not require   medication information or provide compliance interpretation. ________________________________________________________________   INTERPRETIVE INFORMATION: Targeted drug profile Interp   Interpretation depends on accuracy and completeness of patient   medication information submitted by client. 6-Acetylmorphine, Ur  Not Detected  [facility]   7-Aminoclonazepam, Urine  Not Detected  [facility]   Alpha-OH-Alpraz, Urine  Not Detected  [facility]   Alprazolam, Urine  Not Detected  [facility]   Amphetamines, urine  Not Detected  [facility]   Barbiturates, Ur  Not Detected  [facility]   Benzoylecgonine, Ur  Not Detected  [facility]   Buprenorphine Urine  Not Detected  [facility]   Carisoprodol, Ur  Not Detected  [facility]   Comment: (NOTE)   The carisoprodol immunoassay has cross-reactivity to carisoprodol   and meprobamate.     Clonazepam, Urine  Not Detected  [facility]   Codeine, Urine  Not Detected  [facility]   MDA, Ur  Not Detected  [facility]   Diazepam, Urine  Not Detected  [facility]   Ethyl Glucuronide Ur  Present  [facility]   Fentanyl, Ur  Not Detected  [facility]   Hydrocodone, Urine  Not Detected  [facility]   Hydromorphone, Urine  Not Detected  [facility]   Lorazepam, Urine  Not Detected  [facility]   Marijuana Metab, Ur  Present  [facility]   MDEA, ABIEL, Ur  Not Detected  [facility]   MDMA, Urine  Not Detected  [facility]   Meperidine Metab, Ur  Not Detected  [facility]   Methadone, Urine  Not Detected  [facility]   Methamphetamine, Urine  Not Detected  [facility]   Methylphenidate  Not Detected  [facility]   Midazolam, Urine  Not Detected  [facility]   Morphine Urine  Not Detected  [facility]   Norbuprenorphine, Urine  Not Detected  [facility]   Nordiazepam, Urine  Not Detected  [facility]   Norfentanyl, Urine  Not Detected  [facility]   NORHYDROCODONE, URINE  Not Detected  [facility]   Noroxycodone, Urine  Not Detected clinical purposes. EER Hi Res Interp Ur  See Note  [facility]   Comment: (NOTE)           EXAMINATION:   MRI OF THE LUMBAR SPINE WITHOUT CONTRAST, 8/28/2021 2:18 pm       TECHNIQUE:   Multiplanar multisequence MRI of the lumbar spine was performed without the   administration of intravenous contrast.       COMPARISON:   None.       HISTORY:   ORDERING SYSTEM PROVIDED HISTORY: Chronic right-sided low back pain with   right-sided sciatica   TECHNOLOGIST PROVIDED HISTORY:   Reason for Exam: Chronic right-sided low back pain with right-sided sciatica;   patient states he hurt his back shoveling snow about 6 months ago; patient   c/o left leg pain and numbness into left foot       FINDINGS:   BONES/ALIGNMENT: Vertebral body heights are maintained.  Alignment is normal.   Edematous degenerative endplate changes are present at L5-S1.  No focal   suspect osseous lesion is evident.       SPINAL CORD: The conus terminates normally at the L1 level.  The visualized   spinal cord has normal signal and morphology.  No evidence of mass or   abnormal fluid collection within the spinal canal.       SOFT TISSUES: Paraspinal soft tissues are unremarkable.       L1-L2: Disc height and signal maintained.  No neural foraminal narrowing or   spinal canal stenosis.       L2-L3: Disc height and signal maintained.  No neural foraminal narrowing or   spinal canal stenosis.       L3-L4: Disc height and signal maintained.  No neural foraminal narrowing or   spinal canal stenosis.       L4-L5: Moderate disc height loss and desiccation.  No left neural foraminal   narrowing.  Mild right neural foraminal narrowing secondary to disc bulge.    Mild spinal canal stenosis and mild bilateral lateral recess narrowing   secondary to a broad-based posterior disc protrusion with annular fissuring   that contacts the traversing L5 nerve roots.       L5-S1: Moderate disc height loss and desiccation.  Moderate bilateral neural   foraminal narrowing secondary to disc bulge and facet hypertrophy.  No spinal   canal stenosis.         Impression   1. Moderate L4-5 and L5-S1 degenerative disc height loss and desiccation. 2. Broad-based posterior L4-5 disc protrusion with annular fissuring causes   mild spinal canal stenosis and mild bilateral lateral recess narrowing   contacting traversing L5 nerve roots. 3. Mild right L4 and moderate bilateral L5 neural foraminal narrowing.                     Pill count: appropriate    fill date : n/a  Morphine equivalent dose as reported on OARRS:     Review ofOARRS does not show any aberrant prescription behavior. Medication is helping the patient stay active. Patient denies any side effects and reports adequate analgesia. No sign of misuse/abuse. Past Medical History:   Diagnosis Date    Anxiety     Asthma     CAD (coronary artery disease)     Chronic back pain     Diabetes mellitus (HCC)     GERD (gastroesophageal reflux disease)      heart burn    History of MI (myocardial infarction) 5/8/2017    Hx of heart artery stent     heart stent x 1    Neuropathy        Past Surgical History:   Procedure Laterality Date    CARDIAC SURGERY      CORONARY ANGIOPLASTY WITH STENT PLACEMENT  2010    heart stent x 1    TONSILLECTOMY         Allergies   Allergen Reactions    Statins      \"don't feel right\"         Current Outpatient Medications:     gabapentin (NEURONTIN) 300 MG capsule, Take 1 capsule by mouth 3 times daily for 30 days. , Disp: 90 capsule, Rfl: 3    metoprolol tartrate (LOPRESSOR) 25 MG tablet, Take 1/2 (one-half) tablet by mouth twice daily, Disp: 30 tablet, Rfl: 11    metFORMIN (GLUCOPHAGE) 500 MG tablet, Take 1 tablet by mouth 2 times daily (with meals), Disp: 180 tablet, Rfl: 3    omeprazole (PRILOSEC) 20 MG delayed release capsule, Take 1 capsule by mouth 2 times daily (before meals), Disp: 180 capsule, Rfl: 1    testosterone (ANDROGEL; TESTIM) 50 MG/5GM (1%) GEL 1% gel, APPLY 1 PACKET TO SHOULDERS ONCE DAILY, Disp: , Rfl:     vitamin D (ERGOCALCIFEROL) 1.25 MG (80824 UT) CAPS capsule, TAKE 1 CAPSULE BY MOUTH EVERY OTHER WEEK, Disp: , Rfl:     glipiZIDE (GLUCOTROL) 10 MG tablet, TAKE 1 TABLET BY MOUTH IN THE MORNING AND 1 TABLET AT SUPPER, Disp: , Rfl:     aspirin 81 MG tablet, Take 81 mg by mouth daily. , Disp: , Rfl:     cyclobenzaprine (FLEXERIL) 10 MG tablet, Take 1 tablet by mouth three times daily as needed for muscle spasm, Disp: 60 tablet, Rfl: 0    sildenafil (VIAGRA) 100 MG tablet, Take 1 tablet by mouth daily as needed for Erectile Dysfunction, Disp: 10 tablet, Rfl: 11    albuterol sulfate HFA (PROVENTIL HFA) 108 (90 Base) MCG/ACT inhaler, Inhale 1-2 puffs into the lungs every 6 hours as needed for Wheezing or Shortness of Breath (Space out to every 6 hours as symptoms improve), Disp: 3 Inhaler, Rfl: 3    hydrOXYzine (VISTARIL) 50 MG capsule, Take 1 capsule by mouth 3 times daily as needed for Anxiety, Disp: 180 capsule, Rfl: 3    naproxen (NAPROSYN) 500 MG tablet, Take 1 tablet by mouth 2 times daily (with meals), Disp: 180 tablet, Rfl: 1    ibuprofen (ADVIL;MOTRIN) 800 MG tablet, Take 1 tablet by mouth every 8 hours as needed for Pain, Disp: 30 tablet, Rfl: 0    Family History   Problem Relation Age of Onset    Diabetes Mother     Diabetes Father     Arrhythmia Brother        Social History     Socioeconomic History    Marital status: Single     Spouse name: Not on file    Number of children: Not on file    Years of education: Not on file    Highest education level: Not on file   Occupational History    Not on file   Tobacco Use    Smoking status: Current Every Day Smoker     Packs/day: 0.50     Years: 21.00     Pack years: 10.50     Types: Cigarettes    Smokeless tobacco: Never Used   Vaping Use    Vaping Use: Never used   Substance and Sexual Activity    Alcohol use: Yes     Comment:  socially occ beer    Drug use: Yes     Types: Marijuana (Weed)     Comment: uses edibles last use 11-2-21    Sexual activity: Not on file   Other Topics Concern    Not on file   Social History Narrative    Not on file     Social Determinants of Health     Financial Resource Strain:     Difficulty of Paying Living Expenses: Not on file   Food Insecurity:     Worried About Running Out of Food in the Last Year: Not on file    [place] of Food in the Last Year: Not on file   Transportation Needs:     Lack of Transportation (Medical): Not on file    Lack of Transportation (Non-Medical): Not on file   Physical Activity:     Days of Exercise per Week: Not on file    Minutes of Exercise per Session: Not on file   Stress:     Feeling of Stress : Not on file   Social Connections:     Frequency of Communication with Friends and Family: Not on file    Frequency of Social Gatherings with Friends and Family: Not on file    Attends [employer] Services: Not on file    Active Member of [address] [place] [address] [employer] or Organizations: Not on file    Attends Club or Organization Meetings: Not on file    Marital Status: Not on file   Intimate Partner Violence:     Fear of Current or Ex-Partner: Not on file    Emotionally Abused: Not on file    Physically Abused: Not on file    Sexually Abused: Not on file   Housing Stability:     Unable to Pay for Housing in the Last Year: Not on file    Number of [place] in the Last Year: Not on file    Unstable Housing in the Last Year: Not on file         Review of Systems:  Review of Systems   Constitutional: Negative. HENT: Negative. Eyes: Negative. Cardiovascular: Negative. Endocrine:        Diabetic,    Hematologic/Lymphatic: Bruises/bleeds easily. Skin: Negative. Musculoskeletal: Positive for back pain and joint pain. Gastrointestinal: Negative. Genitourinary: Negative. Neurological: Negative. Psychiatric/Behavioral: Negative. Physical Exam:  There were no vitals taken for this visit.     Physical Exam  Skin:         Neurological: Mental Status: He is alert and oriented to person, place, and time. Psychiatric:         Mood and Affect: Mood normal.         Thought Content: Thought content normal.           Assessment:  Problem List Items Addressed This Visit     Neuroforaminal stenosis of lumbar spine    Lumbar radiculopathy - Primary    DDD (degenerative disc disease), lumbar              Treatment Plan:  DISCUSSION: Treatment options discussed withpatient and all questions answered to patient's satisfaction. Possible side effects, risk of tolerance and or dependence and alternative treatments discussed    Obtaining appropriate analgesic effect of treatment   No signs of potential drug abuse or diversion identified    [x] Ill effects of being on chronic pain medications such as sleep disturbances, respiratory depression, hormonal changes, withdrawal symptoms, chronic opioid dependence and tolerance as well as risk of taking opioids with Benzodiazepines and taking opioids along with alcohol,  werediscussed with patient. I had asked the patient to minimize medication use and utilize pain medications only for uncontrolled rest pain or pain with exertional activities. I advised patient not to self-escalate painmedications without consulting with us. At each of patient's future visits we will try to taper pain medications, while adjusting the adjunct medications, and re-evaluating for Physical Therapy to improve spinal andjoint strength. We will continue to have discussions to decrease pain medications as tolerated. Counseled patient on effects their pain medication and /or their medical condition mayhave on their  ability to drive or operate machinery. Instructed not to drive or operate machinery if drowsy     I also discussed with the patient regarding the dangers of combining narcotic pain medication with tranquilizers, alcohol or illegal drugs or taking the medication any way other than prescribed.  The dangers were discussed including respiratory depression and death. Patient was told to tell  all  physicians regarding the medications he is getting from pain clinic. Patient is warned not to take any unprescribed medications over-the-countermedications that can depress breathing . Patient is advised to talk to the pharmacist or physicians if planning to take any over-the-counter medications before  takeing them. Patient is strongly advised to avoid tranquilizers or  relaxants, illegal drugs  or any medications that can depress breathing  Patient is also advised to tell us if there is any changes in their medications from other physicians.       1, call if pain returns    TREATMENT OPTIONS:       See as needed

## 2021-12-22 ENCOUNTER — OFFICE VISIT (OUTPATIENT)
Dept: ORTHOPEDIC SURGERY | Age: 46
End: 2021-12-22
Payer: COMMERCIAL

## 2021-12-22 VITALS — WEIGHT: 181 LBS | HEIGHT: 68 IN | BODY MASS INDEX: 27.43 KG/M2

## 2021-12-22 DIAGNOSIS — M51.36 LUMBAR DEGENERATIVE DISC DISEASE: Primary | ICD-10-CM

## 2021-12-22 DIAGNOSIS — M48.061 NEUROFORAMINAL STENOSIS OF LUMBAR SPINE: ICD-10-CM

## 2021-12-22 PROCEDURE — G8419 CALC BMI OUT NRM PARAM NOF/U: HCPCS | Performed by: PHYSICIAN ASSISTANT

## 2021-12-22 PROCEDURE — G8484 FLU IMMUNIZE NO ADMIN: HCPCS | Performed by: PHYSICIAN ASSISTANT

## 2021-12-22 PROCEDURE — 99213 OFFICE O/P EST LOW 20 MIN: CPT | Performed by: PHYSICIAN ASSISTANT

## 2021-12-22 PROCEDURE — 4004F PT TOBACCO SCREEN RCVD TLK: CPT | Performed by: PHYSICIAN ASSISTANT

## 2021-12-22 PROCEDURE — G8427 DOCREV CUR MEDS BY ELIG CLIN: HCPCS | Performed by: PHYSICIAN ASSISTANT

## 2021-12-22 NOTE — PROGRESS NOTES
[facility], [address] [facility], [address] [place] [address], John [place] [address].           Dept Phone: [phone]           Dept Fax:  [phone]           Wisam Ruby          Dept Phone: [phone]           Dept Fax:  [phone]      Chief Compliant:  Chief Complaint   Patient presents with    Back Pain        History of Present Illness:  Maria Luisa Brooks returns today. This is a 55 y.o. male who presents to the clinic today for follow up of chronic low back pain. Please see previous clinic notes from myself and Dr. Samuel Ruiz for more detailed history. Most recent MRI on 8/28/2021 demonstrated moderate lumbar DDD at L4-5 and L5-S1 with L4-5 disc protrusion and mild lumbar stenosis at this level. Patient saw Dr. Samuel Ruiz subsequently in September and was referred to pain management for possible lumbar epidural steroid injections. Patient reports he underwent injections on 11/4/2021 and that they actually provided significant relief of pain once they kicked in. Patient reports he had resolution of numbness and tingling and had near complete resolution of low back pain for approximately 6 weeks. Unfortunately patient reports over the last week feels like his injection has worn off he has again developed left-sided low back pain specific over the left buttock with occasional numbness and tingling down the left lower extremity. Denies any right-sided pain. Patient denies any new injury, trauma or fall. Denies any bowel or bladder dysfunction, saddle anesthesia, fever or chills. Review of Systems   Constitutional: Negative for fever, chills, sweats, recent illness, or recent injury. Neurological: Negative for headaches, numbness, or weakness. Integumentary: Negative for rash, itching, ecchymosis, abrasions, or laceration.    Musculoskeletal: Positive for Back Pain       Physical Exam:  Constitutional: Patient is oriented to person, place, and time. Patient appears well-developed and well nourished. Musculoskeletal:    LUMBAR/SACRAL EXAMINATION:  · Inspection: Local inspection shows no step-off or bruising. Lumbar alignment is normal.  Sagittal and Coronal balance is neutral.      · Palpation:   No evidence of tenderness at the midline. No tenderness bilaterally at the paraspinal or trochanters. There is no step-off or paraspinal spasm. · Range of Motion: Lumbar flexion, extension and rotation are mildly limited due to pain. · Strength:   Strength testing is 5/5 in all muscle groups tested. · Special Tests:   Straight leg raise and crossed SLR negative. Leg length and pelvis level.  0 out of 5 Ashlyn's signs. · Skin: There are no rashes, ulcerations or lesions. · Reflexes: Reflexes are symmetrically 2+ at the patellar and ankle tendons. Clonus absent bilaterally at the feet. · Gait & station: normal, patient ambulates without assistance  · Additional Examinations:   · RIGHT LOWER EXTREMITY: Inspection/examination of the right lower extremity does not show any tenderness, deformity or injury. Range of motion is unremarkable. There is no gross instability. There are no rashes, ulcerations or lesions. Strength and tone are normal.  · LEFT LOWER EXTREMITY:  Inspection/examination of the left lower extremity does not show any tenderness, deformity or injury. Range of motion is unremarkable. There is no gross instability. There are no rashes, ulcerations or lesions. Strength and tone are normal.    Neurological: Patient is alert and oriented to person, place, and time. Normal strenght. No sensory deficit. Skin: Skin is warm and dry  Psychiatric: Behavior is normal. Thought content normal.  Nursing note and vitals reviewed. Labs and Imaging:     XR taken today:  No results found.      8/28/2021 MRI lumbar spine without contrast    Impression 1. Moderate L4-5 and L5-S1 degenerative disc height loss and desiccation. 2. Broad-based posterior L4-5 disc protrusion with annular fissuring causes   mild spinal canal stenosis and mild bilateral lateral recess narrowing   contacting traversing L5 nerve roots. 3. Mild right L4 and moderate bilateral L5 neural foraminal narrowing. Assessment and Plan:  1. Lumbar degenerative disc disease    2. Neuroforaminal stenosis of lumbar spine              PLAN:  This is a 55 y.o. male who presents to the clinic today for follow up chronic low back pain. Patient MRI in August demonstrated L4-5 disc protrusion and multilevel lumbar DDD. Patient saw pain management on 11/4/2021 to undergo a lumbar epidural steroid injection which he states provided significant relief of pain unfortunate has worn off for the last week. 1.  Had lengthy discussion with patient on treatment option available to him I do believe he would benefit from a second set of injections given his great relief with the first set. 2.  Patient is in agreement with plan at this time and will contact pain management to schedule this procedure  3. Patient is educated should he fail a second set of injections would recommend follow-up with Dr. Jamel Aquino to discuss further treatment options which patient is agreeable to. 4.We will see him back in my clinic on as needed basis. Please note that this chart was generated using voice recognition Dragon dictation software. Although every effort was made to ensure the accuracy of this automated transcription, some errors in transcription may have occurred.

## 2022-01-06 ENCOUNTER — HOSPITAL ENCOUNTER (OUTPATIENT)
Dept: PAIN MANAGEMENT | Age: 47
Discharge: HOME | End: 2022-01-06
Payer: COMMERCIAL

## 2022-01-06 DIAGNOSIS — M51.36 LUMBAR DEGENERATIVE DISC DISEASE: ICD-10-CM

## 2022-01-06 DIAGNOSIS — M54.16 LUMBAR RADICULOPATHY: Primary | ICD-10-CM

## 2022-01-06 DIAGNOSIS — M48.061 NEUROFORAMINAL STENOSIS OF LUMBAR SPINE: ICD-10-CM

## 2022-01-06 DIAGNOSIS — M51.36 DDD (DEGENERATIVE DISC DISEASE), LUMBAR: ICD-10-CM

## 2022-01-06 PROCEDURE — 99213 OFFICE O/P EST LOW 20 MIN: CPT

## 2022-01-06 PROCEDURE — 99214 OFFICE O/P EST MOD 30 MIN: CPT | Performed by: PAIN MEDICINE

## 2022-01-06 ASSESSMENT — ENCOUNTER SYMPTOMS
DIARRHEA: 0
RECTAL PAIN: 0
NAUSEA: 0
COUGH: 0
ABDOMINAL PAIN: 0
BACK PAIN: 1
EYE PAIN: 0
CONSTIPATION: 0
PHOTOPHOBIA: 0
SORE THROAT: 0
SHORTNESS OF BREATH: 0

## 2022-01-06 NOTE — PROGRESS NOTES
Suellen Garber is a 55 y.o. male evaluated on 1/6/2022. Modality of virtual service provided -via  telephone   Consent:  Patient and/or health care decision maker is aware that that patient may receive a bill for this telephone service, depending on one's insurance coverage, and has provided verbal consent to proceed: Yes    Patient identification was verified at the start of the visit: Yes    Chief complaint: Suellen Garber is 55 y.o.,  male, with  with chief complaint of pain involving low back . Joana Almaraz Referring diagnosis  M54.41, G89.29 (ICD-10-CM) - Chronic right-sided low back pain with right-sided sciatica  M54.50 (ICD-10-CM) - Lumbar pain  M51.36 (ICD-10-CM) - Lumbar degenerative disc disease    Patient is complaining of pain involving the low back area. He previously had undergone lumbar epidural steroid injection from which he had good pain relief. He reports he started developing pain about last 2 weeks and is gradually getting worse and is very similar to the pain he had in the past.  He reports there is not much pain radiating to the lower extremities like before he denies any tingling or numbness in the lower extremities most of his pain is in the low back. He reports he went back to work and lifting heavy weights at work which is making the pain worse. He also reports he is having problems sleeping because of the severe back pain. Patient wants to know what his options for pain control as his pain is severe is interfering with activities of daily living    Back Pain  This is a chronic problem. The current episode started more than 1 month ago. The problem occurs intermittently (with weight lifting). The problem has been gradually worsening since onset. The pain is present in the lumbar spine. The quality of the pain is described as aching ( sharp,). Radiates to: Patient denies much radiation of the pain to the lower extremities. The pain is at a severity of 10/10.  The pain is severe. The pain is the same all the time. The symptoms are aggravated by bending, standing, sitting, twisting and lying down (walking, Lifting, ADLs). Stiffness is present in the morning. Pertinent negatives include no abdominal pain, chest pain, dysuria, fever, numbness, tingling or weakness. (Lt. leg) Risk factors include lack of exercise (smoker). Treatments tried: LESI. The treatment provided significant relief. Alleviating factors:heat and rest   Lifestyle changes experienced with pain: Keeps awake at night, Wakes from sleep, Prevents or limits ADLs  Mood changes,irritable  Patient currently employed. Physical therapy did help the pain. Are you under psychological counseling at present: No  Goals for treatment include:  Decrease in pain  Enjoy daily and recreational activities, return to previous status. Last procedure was lumbar epidural steroid injection 11/4/2021 with 90% improvement in the pain. Patient relates current medications are helping the pain. Patient reports taking pain medications as prescribed, denies obtaining medications from different sources and denies use of illegal drugs. Patient denies side effects from medications like nausea, vomiting, constipation or drowsiness. Patient reports current activities of daily living ar possible due to medications and would like to continue them.        ACTIVITY/SOCIAL/EMOTIONAL:  Sleep Pattern: 6 hours per night. nightime awakenings    Past Medical History:   Diagnosis Date    Anxiety     Asthma     CAD (coronary artery disease)     Chronic back pain     Diabetes mellitus ([facility] [address]     GERD (gastroesophageal reflux disease)      heart burn    History of MI (myocardial infarction) 5/8/2017    Hx of heart artery stent     heart stent x 1    Neuropathy        Past Surgical History:   Procedure Laterality Date    CARDIAC SURGERY      CORONARY ANGIOPLASTY WITH STENT PLACEMENT  2010    heart stent x 1    TONSILLECTOMY         Family History   Problem Relation Age of Onset    Diabetes Mother     Diabetes Father     Arrhythmia Brother        Social History     Socioeconomic History    Marital status: Single     Spouse name: None    Number of children: None    Years of education: None    Highest education level: None   Occupational History    None   Tobacco Use    Smoking status: Current Every Day Smoker     Packs/day: 0.50     Years: 21.00     Pack years: 10.50     Types: Cigarettes    Smokeless tobacco: Never Used   Vaping Use    Vaping Use: Never used   Substance and Sexual Activity    Alcohol use: Yes     Comment:  socially occ beer    Drug use: Yes     Types: Marijuana (Weed)     Comment: uses edibles last use 11-2-21    Sexual activity: None   Other Topics Concern    None   Social History Narrative    None     Social Determinants of Health     Financial Resource Strain:     Difficulty of Paying Living Expenses: Not on file   Food Insecurity:     Worried About Running Out of Food in the Last Year: Not on file    Gunnar of Food in the Last Year: Not on file   Transportation Needs:     Lack of Transportation (Medical): Not on file    Lack of Transportation (Non-Medical):  Not on file   Physical Activity:     Days of Exercise per Week: Not on file    Minutes of Exercise per Session: Not on file   Stress:     Feeling of Stress : Not on file   Social Connections:     Frequency of Communication with Friends and Family: Not on file    Frequency of Social Gatherings with Friends and Family: Not on file    Attends Mu-ism Services: Not on file    Active Member of Clubs or Organizations: Not on file    Attends Club or Organization Meetings: Not on file    Marital Status: Not on file   Intimate Partner Violence:     Fear of Current or Ex-Partner: Not on file    Emotionally Abused: Not on file    Physically Abused: Not on file    Sexually Abused: Not on file   Housing Stability:     Unable to Pay for Housing in the Last Year: Not on file    Number of Places Lived in the Last Year: Not on file    Unstable Housing in the Last Year: Not on file       Allergies   Allergen Reactions    Statins      \"don't feel right\"       Current Outpatient Medications on File Prior to Encounter   Medication Sig Dispense Refill    cyclobenzaprine (FLEXERIL) 10 MG tablet Take 1 tablet by mouth three times daily as needed for muscle spasm 60 tablet 0    gabapentin (NEURONTIN) 300 MG capsule Take 1 capsule by mouth 3 times daily for 30 days. 90 capsule 3    metoprolol tartrate (LOPRESSOR) 25 MG tablet Take 1/2 (one-half) tablet by mouth twice daily 30 tablet 11    sildenafil (VIAGRA) 100 MG tablet Take 1 tablet by mouth daily as needed for Erectile Dysfunction 10 tablet 11    metFORMIN (GLUCOPHAGE) 500 MG tablet Take 1 tablet by mouth 2 times daily (with meals) 180 tablet 3    omeprazole (PRILOSEC) 20 MG delayed release capsule Take 1 capsule by mouth 2 times daily (before meals) 180 capsule 1    albuterol sulfate HFA (PROVENTIL HFA) 108 (90 Base) MCG/ACT inhaler Inhale 1-2 puffs into the lungs every 6 hours as needed for Wheezing or Shortness of Breath (Space out to every 6 hours as symptoms improve) 3 Inhaler 3    hydrOXYzine (VISTARIL) 50 MG capsule Take 1 capsule by mouth 3 times daily as needed for Anxiety 180 capsule 3    naproxen (NAPROSYN) 500 MG tablet Take 1 tablet by mouth 2 times daily (with meals) 180 tablet 1    testosterone (ANDROGEL; TESTIM) 50 MG/5GM (1%) GEL 1% gel APPLY 1 PACKET TO SHOULDERS ONCE DAILY      vitamin D (ERGOCALCIFEROL) 1.25 MG (50267 UT) CAPS capsule TAKE 1 CAPSULE BY MOUTH EVERY OTHER WEEK      ibuprofen (ADVIL;MOTRIN) 800 MG tablet Take 1 tablet by mouth every 8 hours as needed for Pain 30 tablet 0    glipiZIDE (GLUCOTROL) 10 MG tablet TAKE 1 TABLET BY MOUTH IN THE MORNING AND 1 TABLET AT SUPPER      aspirin 81 MG tablet Take 81 mg by mouth daily.        No current facility-administered medications on file prior to encounter. Review of Systems   Constitutional: Positive for appetite change. Negative for activity change, fatigue, fever and unexpected weight change. HENT: Negative. Negative for congestion and sore throat. Eyes: Negative for photophobia and pain. Wears corrective lens   Respiratory: Negative for cough and shortness of breath. Asthma   Cardiovascular: Negative. Negative for chest pain and palpitations. History of coronary artery disease status post placement of coronary stents   Gastrointestinal: Negative for abdominal pain, constipation, diarrhea, nausea and rectal pain. Acid reflux   Endocrine: Negative for heat intolerance and polyuria. Type II diabetes   Genitourinary: Negative. Negative for dysuria and hematuria. Musculoskeletal: Positive for back pain. Negative for arthralgias. Skin: Negative. Negative for rash. Allergic/Immunologic: Positive for environmental allergies. Neurological: Negative for tingling, weakness and numbness. Hematological: Negative. Psychiatric/Behavioral: Negative. Negative for self-injury, sleep disturbance and suicidal ideas. The patient is not nervous/anxious. Physical Exam  Skin:         Neurological:      Mental Status: He is alert and oriented to person, place, and time. Psychiatric:         Mood and Affect: Mood normal.        Ortho Exam     DATA:  LAB.:   Result Note    Component Ref Range & Units 10/12/21 1458 4/17/20 9/14/17 1520 5/8/17 1550   Pain Management Drug Panel Interp, Urine  See Note [facility]       Comment: (NOTE)   ________________________________________________________________   NO DRUGS PROVIDED   ________________________________________________________________   Please call [address] [place] [address] at [phone] for   Medical Director interpretation if applicable.  Alternatively,   please consider the PAIN HYB U ([id number]) which does not require   medication information or provide compliance interpretation. ________________________________________________________________   INTERPRETIVE INFORMATION: Targeted drug profile Interp   Interpretation depends on accuracy and completeness of patient   medication information submitted by client. 6-Acetylmorphine, Ur  Not Detected [facility]       7-Aminoclonazepam, Urine  Not Detected [facility]       Alpha-OH-Alpraz, Urine  Not Detected [facility]       Alprazolam, Urine  Not Detected [facility]       Amphetamines, urine  Not Detected [facility]       Barbiturates, Ur  Not Detected [facility]       Benzoylecgonine, Ur  Not Detected [facility]       Buprenorphine Urine  Not Detected [facility]   NOT REPORTED R  NOT REPORTED R    Carisoprodol, Ur  Not Detected [facility]       Comment: (NOTE)   The carisoprodol immunoassay has cross-reactivity to carisoprodol   and meprobamate.     Clonazepam, Urine  Not Detected [facility]       Codeine, Urine  Not Detected [facility]       MDA, Ur  Not Detected [facility]       Diazepam, Urine  Not Detected [facility]       Ethyl Glucuronide Ur  Present [facility]       Fentanyl, Ur  Not Detected [facility]       Hydrocodone, Urine  Not Detected [facility]       Hydromorphone, Urine  Not Detected [facility]       Lorazepam, Urine  Not Detected [facility]       Marijuana Metab, Ur  Present [facility]       MDEA, ABIEL, Ur  Not Detected [facility]       MDMA, Urine  Not Detected [facility]   NOT REPORTED R  NOT REPORTED R    Meperidine Metab, Ur  Not Detected [facility]       Methadone, Urine  Not Detected [facility]       Methamphetamine, Urine  Not Detected [facility]   NOT REPORTED R  NOT REPORTED R    Methylphenidate  Not Detected [facility]       Midazolam, Urine  Not Detected [facility]       Morphine Urine  Not Detected [facility]       Norbuprenorphine, Urine  Not Detected [facility]       Nordiazepam, Urine  Not Detected [facility]       Norfentanyl, Urine  Not Detected [facility]       NORHYDROCODONE, URINE  Not Detected [facility]       Noroxycodone, Urine  Not Detected [facility]       NOROXYMORPHONE, URINE  Not Detected [facility]       Oxazepam, Urine  Not Detected [facility] Oxycodone Urine  Not Detected [facility]       Oxymorphone, Urine  Not Detected [facility]       PCP, Urine  Not Detected [facility]       Phentermine, Ur  Not Detected [facility]       Tapentadol-O-Sulfate, Urine  Not Detected [facility]       Tapentadol, Urine  Not Detected [facility]       Temazepam, Urine  Not Detected [facility]       Tramadol, Urine  Not Detected [facility]       Zolpidem, Urine  Not Detected [facility]       Drugs Expected, Ur  NONE LISTED [1]       Creatinine, Ur 20.0 - 400.0 mg/dL 207.8 [facility]   R      Pain Mgt Drug Panel, Hi Res, Ur  See Below [facility]              X-Ray reports:  EXAMINATION:   MRI OF THE LUMBAR SPINE WITHOUT CONTRAST, 8/28/2021 2:18 pm       TECHNIQUE:   Multiplanar multisequence MRI of the lumbar spine was performed without the   administration of intravenous contrast.       COMPARISON:   None. HISTORY:   ORDERING SYSTEM PROVIDED HISTORY: Chronic right-sided low back pain with   right-sided sciatica   TECHNOLOGIST PROVIDED HISTORY:   Reason for Exam: Chronic right-sided low back pain with right-sided sciatica;   patient states he hurt his back shoveling snow about 6 months ago; patient   c/o left leg pain and numbness into left foot       FINDINGS:   BONES/ALIGNMENT: Vertebral body heights are maintained. Alignment is normal.   Edematous degenerative endplate changes are present at L5-S1. No focal   suspect osseous lesion is evident. SPINAL CORD: The conus terminates normally at the L1 level. The visualized   spinal cord has normal signal and morphology. No evidence of mass or   abnormal fluid collection within the spinal canal.       SOFT TISSUES: Paraspinal soft tissues are unremarkable. L1-L2: Disc height and signal maintained. No neural foraminal narrowing or   spinal canal stenosis. L2-L3: Disc height and signal maintained. No neural foraminal narrowing or   spinal canal stenosis. L3-L4: Disc height and signal maintained. No neural foraminal narrowing or   spinal canal stenosis. and low back muscles Were discussed. Also aquatic (water) physical therapy and benefits were explained to patient. including \" Water supports the body and minimizes the effect of gravity, making it easier for patients to start an exercise program.\"   The following important principles were discussed with patient:  1. Limit Bed Rest--Studies show that people with short-term low-back pain who rest feel more pain and have a harder time with daily tasks than those who stay active. 2. Keep Exercising-patient is advised to stay away from strenuous activities like gardening and avoid whatever motion caused the pain in the first place.   3. Maintain Good Posture-Exercises  to maintain good posture were shown to patient. 4. To do exercises learned in PT regularly. []Information (handout) on exercise was  given to patient. The following treatment plan was developed after discussion with patient:    We discussed Lumbar Epidural steroid Injections x 1  at L4 - L5. Patient tried and failed NSAIDS,Home exercises, Physical Therapy, Chiropractic manipulations without relief. Patient exhibited signs of radiculopathy with positive straight leg raising test on left    Patient has not had prior Lumbar Surgery. We will see the patient in 2 weeks after the procedures and re-evaluate symptoms.       Responded to study on January 6 alert is being exact not only as result it is administration information about joysticks are not happy with what he has been doing this week smart vaccination  Orders Placed This Encounter   Procedures    Lumbar Epidural Steroid Injection/Caudal     Standing Status:   Future     Standing Expiration Date:   1/6/2023    FLUORO FOR SURGICAL PROCEDURES     Standing Status:   Future     Standing Expiration Date:   1/6/2023    Saline lock IV     Standing Status:   Future     Standing Expiration Date:   7/6/2023       Decision Making Process : Patient's health history and referral records thoroughly reviewed before focused physical examination and discussion with patient. Over 50% of today's visit is spent on examining the patient and counseling and coordinating the care. Level of complexity of date to be reviewed is Moderate. The chart date reviewed include the following: Imaging Reports. Summary of Care. Time spent reviewing with patient the below reports:   Medication safety, Treatment options. Level of diagnosis and management options of this case is multiple: involving the following management options: Interventions as needed, medication management as appropriate, future visits, activity modification, heat/ice as needed, Urine drug screen as required. [x]The patient's questions were answered to the best of my abilities. This note was created using voice recognition software. There may be inaccuracies of transcription  that are inadvertently overlooked prior to the signature. There is any questions about the transcription please contact me. Cornell Blas Due to the COVID-19 pandemic and the appropriate interventions by Kayleen Acosta, our non-urgent pain management patients will not be seen in the office at this time for their protection and the protection of our staff. To offer continuity of care, their prescriptions will be escribed this month after a careful chart review and review of their OARRS report  Pursuant to the emergency declaration under the [employer] and [facility], 1135 waiver authority and the [employer] and [employer] General Act, this Virtual Visit was conducted, with patient's consent, to reduce the patient's risk of exposure to COVID-19 and provide continuity of care for an established patient. Services were provided through a video synchronous discussion virtually to substitute for in-person appointment. \"  Documentation:  I communicated with the patient and/or health care decision maker about plan of care  Details of this discussion including any medical advice provided: Total Time: minutes: 21-30 minutes    I affirm this is a Patient Initiated Episode with an Established Patient who has not had a related appointment within my department in the past 7 days or scheduled within the next 24 hours.     Electronically signed by Britni Henriquez MD on 1/6/2022 at 7:45 PM

## 2022-01-24 NOTE — PRE-PROCEDURE INSTRUCTIONS
[facility]. Attempted to outreach pt. To review pre-procedure instructions. Pts wife, Boris Lea rep., answered the phone. She stated pt did not like to talk on phone. Writer reviewed pre-procedure instructions with pts wife. Pt. Wife stated pt has been off naproxen, ASA, and Ibuprofen. Pt. To wear a mask on entrance into the hospital, and discussed entrances open for patients. Pt. Does have transportation to take home, and has not had any recent vaccinations. No questions or concerns verbalized.

## 2022-01-25 ENCOUNTER — HOSPITAL ENCOUNTER (OUTPATIENT)
Dept: PAIN MANAGEMENT | Age: 47
Discharge: HOME | End: 2022-01-25
Payer: COMMERCIAL

## 2022-01-25 ENCOUNTER — HOSPITAL ENCOUNTER (OUTPATIENT)
Dept: GENERAL RADIOLOGY | Age: 47
Discharge: HOME | End: 2022-01-27
Payer: COMMERCIAL

## 2022-01-25 VITALS
TEMPERATURE: 97.7 F | SYSTOLIC BLOOD PRESSURE: 138 MMHG | DIASTOLIC BLOOD PRESSURE: 88 MMHG | OXYGEN SATURATION: 95 % | HEART RATE: 100 BPM | RESPIRATION RATE: 20 BRPM

## 2022-01-25 DIAGNOSIS — M48.061 NEUROFORAMINAL STENOSIS OF LUMBAR SPINE: ICD-10-CM

## 2022-01-25 DIAGNOSIS — M54.16 LUMBAR RADICULOPATHY: ICD-10-CM

## 2022-01-25 DIAGNOSIS — M54.16 LUMBAR RADICULOPATHY: Primary | ICD-10-CM

## 2022-01-25 DIAGNOSIS — M51.36 LUMBAR DEGENERATIVE DISC DISEASE: ICD-10-CM

## 2022-01-25 DIAGNOSIS — M51.36 DDD (DEGENERATIVE DISC DISEASE), LUMBAR: ICD-10-CM

## 2022-01-25 PROCEDURE — 6360000002 HC RX W HCPCS: Performed by: PAIN MEDICINE

## 2022-01-25 PROCEDURE — 6360000004 HC RX CONTRAST MEDICATION: Performed by: PAIN MEDICINE

## 2022-01-25 PROCEDURE — 62323 NJX INTERLAMINAR LMBR/SAC: CPT

## 2022-01-25 PROCEDURE — 62323 NJX INTERLAMINAR LMBR/SAC: CPT | Performed by: PAIN MEDICINE

## 2022-01-25 PROCEDURE — 3209999900 FLUORO FOR SURGICAL PROCEDURES

## 2022-01-25 RX ORDER — MIDAZOLAM HYDROCHLORIDE 1 MG/ML
INJECTION INTRAMUSCULAR; INTRAVENOUS
Status: COMPLETED | OUTPATIENT
Start: 2022-01-25 | End: 2022-01-25

## 2022-01-25 RX ORDER — TRIAMCINOLONE ACETONIDE 40 MG/ML
INJECTION, SUSPENSION INTRA-ARTICULAR; INTRAMUSCULAR
Status: COMPLETED | OUTPATIENT
Start: 2022-01-25 | End: 2022-01-25

## 2022-01-25 RX ADMIN — TRIAMCINOLONE ACETONIDE 80 MG: 40 INJECTION, SUSPENSION INTRA-ARTICULAR; INTRAMUSCULAR at 12:38

## 2022-01-25 RX ADMIN — MIDAZOLAM 2 MG: 1 INJECTION INTRAMUSCULAR; INTRAVENOUS at 12:31

## 2022-01-25 RX ADMIN — IOHEXOL 2 ML: 180 INJECTION INTRAVENOUS at 12:38

## 2022-01-25 ASSESSMENT — PAIN SCALES - GENERAL
PAINLEVEL_OUTOF10: 5
PAINLEVEL_OUTOF10: 4
PAINLEVEL_OUTOF10: 8

## 2022-01-25 ASSESSMENT — PAIN DESCRIPTION - PROGRESSION: CLINICAL_PROGRESSION: NOT CHANGED

## 2022-01-25 ASSESSMENT — PAIN DESCRIPTION - FREQUENCY: FREQUENCY: CONTINUOUS

## 2022-01-25 ASSESSMENT — PAIN DESCRIPTION - LOCATION: LOCATION: BACK

## 2022-01-25 ASSESSMENT — PAIN DESCRIPTION - ORIENTATION: ORIENTATION: LOWER;LEFT

## 2022-01-25 ASSESSMENT — PAIN DESCRIPTION - PAIN TYPE: TYPE: CHRONIC PAIN

## 2022-01-25 ASSESSMENT — PAIN - FUNCTIONAL ASSESSMENT: PAIN_FUNCTIONAL_ASSESSMENT: PREVENTS OR INTERFERES SOME ACTIVE ACTIVITIES AND ADLS

## 2022-01-25 ASSESSMENT — PAIN DESCRIPTION - ONSET: ONSET: ON-GOING

## 2022-01-25 ASSESSMENT — PAIN DESCRIPTION - DESCRIPTORS: DESCRIPTORS: ACHING;SHARP;JABBING

## 2022-01-25 NOTE — PROGRESS NOTES
Discharge criteria met. Post procedure dressing dry and intact. Sensory and motor function intact as per pre-procedure. Patient alert and oriented x3  Instructions and follow up reviewed with pt at patient at discharge. Discharged home transported by wheelchair, accompanied by family .   Discharged @3411

## 2022-01-25 NOTE — PROCEDURES
Pre-Procedure Note    Patient Name: Max Adler   YOB: 1975  Room/Bed: Room/bed info not found  Medical Record Number: [id number]  Date: 1/25/2022       Indication:    1. Lumbar radiculopathy    2. DDD (degenerative disc disease), lumbar    3. Neuroforaminal stenosis of lumbar spine    4. Lumbar degenerative disc disease        Consent: On file. Vital Signs:   Vitals:    01/25/22 1235   BP: (!) 141/101   Pulse: 108   Resp: 20   Temp:    SpO2: 95%       Past Medical History:   has a past medical history of Anxiety, Asthma, CAD (coronary artery disease), Chronic back pain, Diabetes mellitus ([address] GERD (gastroesophageal reflux disease), History of MI (myocardial infarction), Hx of heart artery stent, and Neuropathy. Past Surgical History:   has a past surgical history that includes Coronary angioplasty with stent (2010); Tonsillectomy; and Cardiac surgery. Pre-Sedation Documentation and Exam:   Vital signs have been reviewed (see flow sheet for vitals). Mallampati Airway Assessment:  normal    ASA Classification:  Class 2 - A normal healthy patient with mild systemic disease    Sedation/ Anesthesia Plan:   intravenous sedation   as needed. Medications Planned:   midazolam (Versed) / Fentanyl  Intravenously  as needed. Patient is an appropriate candidate for plan of sedation: yes  Patient's History and Physical examination was reviewed and there is no change. Electronically signed by Ju Monzon MD on 1/25/2022 at 12:43 PM        Preoperative Diagnosis:    1. Lumbar radiculopathy    2. DDD (degenerative disc disease), lumbar    3. Neuroforaminal stenosis of lumbar spine    4. Lumbar degenerative disc disease        Postoperative Diagnosis:    1. Lumbar radiculopathy    2. DDD (degenerative disc disease), lumbar    3. Neuroforaminal stenosis of lumbar spine    4.  Lumbar degenerative disc disease        Procedure Performed:  Lumbar epidural steroid injection under fluoroscopy guidance with IV sedation. Indication for the Procedure: The patient failed conservative management  for pain in the low back radiating to lower extremities. As the patient is not responding to conservative management and it is interfering with activities of daily living we decided to proceed with lumbar epidural steroid injection. The procedure and risks were discussed with the patient and an informed consent was obtained  Current Pain Assessment  Pain Assessment  Pain Assessment: 0-10  Pain Level: 8  Patient's Stated Pain Goal: 2 (decrease pain and increase activity)  Pain Type: Chronic pain  Pain Location: Back  Pain Orientation: Lower,Left  Pain Radiating Towards: feels like the numbnes is gone in left foot since the last LESI  Pain Descriptors: Aching,Sharp,Jabbing  Pain Frequency: Continuous  Pain Onset: On-going  Clinical Progression: Not changed  Functional Pain Assessment: Prevents or interferes some active activities and ADLs  RASS Score: Alert and calm  POSS Score (Patient Ctrl Analgesia): 1     Procedure:    After starting an IV, the patient was sedated with 2 mg of Midazolam and 0 mcg of Fentanyl Intravenously by the RN under my direct supervision. Patient's vital signs including BP, EKG and SaO2 were monitored by the RN and they remained stable during the procedure. A meaningful communication was kept up with the patient throughout  the procedure. The patient is placed in prone position. Skin over the back was prepped and draped in sterile manner. Then using fluoroscopy the L4, L5 interspace was observed and the skin and deep tissues in the left paramedian area were infiltrated with 4 ml of 1% lidocaine. The #20-gauge, 3-1/2 inch Tuohy needle was inserted through the skin wheal and the epidural space was identified using loss of resistance technique with normal saline.      This was confirmed with AP and lateral views using fluoroscopy after injecting about 2 ml of Omnipaque-180 and observing the spread of the contrast in the epidural space. Then after negative aspiration a total of 80 mg of triamcinolone with 8 ml of normal saline was injected into the epidural space. The needle is removed and a Band-Aid was placed over the needle insertion site. Patient's vital signs remained stable and the patient tolerated the procedure well. The patient was discharged home in stable condition and will be followed in the pain clinic in the next few weeks for further planning.     Electronically signed by Lexie Santamaria MD on 1/25/2022 at 12:43 PM

## 2022-01-26 ENCOUNTER — TELEPHONE (OUTPATIENT)
Dept: PAIN MANAGEMENT | Age: 47
End: 2022-01-26

## 2022-01-26 DIAGNOSIS — M51.36 DDD (DEGENERATIVE DISC DISEASE), LUMBAR: ICD-10-CM

## 2022-01-26 DIAGNOSIS — M48.061 NEUROFORAMINAL STENOSIS OF LUMBAR SPINE: ICD-10-CM

## 2022-01-26 NOTE — TELEPHONE ENCOUNTER
[facility]. Attempted to outreach for post-procedure follow up call. No answer received.

## 2022-02-14 ENCOUNTER — HOSPITAL ENCOUNTER (OUTPATIENT)
Dept: PAIN MANAGEMENT | Age: 47
Discharge: HOME | End: 2022-02-14
Payer: COMMERCIAL

## 2022-02-14 DIAGNOSIS — M48.061 NEUROFORAMINAL STENOSIS OF LUMBAR SPINE: ICD-10-CM

## 2022-02-14 DIAGNOSIS — M53.3 SACROILIAC JOINT PAIN: Primary | ICD-10-CM

## 2022-02-14 DIAGNOSIS — M51.36 DDD (DEGENERATIVE DISC DISEASE), LUMBAR: ICD-10-CM

## 2022-02-14 PROCEDURE — 99213 OFFICE O/P EST LOW 20 MIN: CPT | Performed by: NURSE PRACTITIONER

## 2022-02-14 PROCEDURE — 99213 OFFICE O/P EST LOW 20 MIN: CPT

## 2022-02-14 ASSESSMENT — ENCOUNTER SYMPTOMS: BACK PAIN: 1

## 2022-02-14 NOTE — PROGRESS NOTES
Chief Complaint: back pain    PMH Pt complains of back pain. MRI lumbar with moderate degenerative changes and disc protrusion at L4-5 causing mild canal stenosis. He did see Dr. Varun Narvaez who recommended PT and LESI. EMG completed last year shows diabetic polyneuropathy and L5 radiculopathy. Pt had LESI 11/2021 with 95% relief. Pain returned and he repeated LESI on 1/25/22. He reports 50% relief. He states he is having pain  pain seemingly lower in the back and on the left side and into the buttock. Back Pain  This is a chronic problem. The current episode started more than 1 year ago. The problem has been gradually improving since onset. The pain is present in the sacro-iliac and gluteal (left side). The quality of the pain is described as stabbing. The pain does not radiate. The pain is at a severity of 5/10. The pain is moderate. The symptoms are aggravated by position. Pertinent negatives include no numbness, paresthesias or tingling. Treatments tried: LESI. The treatment provided moderate relief. Patient denies any new neurological symptoms. No bowel or bladder incontinence, no weakness, and no falling.     Past Medical History:   Diagnosis Date    Anxiety     Asthma     CAD (coronary artery disease)     Chronic back pain     Diabetes mellitus (HCC)     GERD (gastroesophageal reflux disease)      heart burn    History of MI (myocardial infarction) 5/8/2017    Hx of heart artery stent     heart stent x 1    Neuropathy        Past Surgical History:   Procedure Laterality Date    CARDIAC SURGERY      CORONARY ANGIOPLASTY WITH STENT PLACEMENT  2010    heart stent x 1    TONSILLECTOMY         Allergies   Allergen Reactions    Statins      \"don't feel right\"         Current Outpatient Medications:     gabapentin (NEURONTIN) 300 MG capsule, TAKE 1 CAPSULE BY MOUTH THREE TIMES DAILY, Disp: 90 capsule, Rfl: 3    cyclobenzaprine (FLEXERIL) 10 MG tablet, Take 1 tablet by mouth three times daily as needed for muscle spasm, Disp: 60 tablet, Rfl: 0    omeprazole (PRILOSEC) 20 MG delayed release capsule, TAKE 1 CAPSULE BY MOUTH TWICE DAILY BEFORE MEAL(S), Disp: 180 capsule, Rfl: 0    metoprolol tartrate (LOPRESSOR) 25 MG tablet, Take 1/2 (one-half) tablet by mouth twice daily, Disp: 30 tablet, Rfl: 11    sildenafil (VIAGRA) 100 MG tablet, Take 1 tablet by mouth daily as needed for Erectile Dysfunction, Disp: 10 tablet, Rfl: 11    metFORMIN (GLUCOPHAGE) 500 MG tablet, Take 1 tablet by mouth 2 times daily (with meals), Disp: 180 tablet, Rfl: 3    albuterol sulfate HFA (PROVENTIL HFA) 108 (90 Base) MCG/ACT inhaler, Inhale 1-2 puffs into the lungs every 6 hours as needed for Wheezing or Shortness of Breath (Space out to every 6 hours as symptoms improve), Disp: 3 Inhaler, Rfl: 3    hydrOXYzine (VISTARIL) 50 MG capsule, Take 1 capsule by mouth 3 times daily as needed for Anxiety, Disp: 180 capsule, Rfl: 3    naproxen (NAPROSYN) 500 MG tablet, Take 1 tablet by mouth 2 times daily (with meals), Disp: 180 tablet, Rfl: 1    testosterone (ANDROGEL; TESTIM) 50 MG/5GM (1%) GEL 1% gel, APPLY 1 PACKET TO SHOULDERS ONCE DAILY, Disp: , Rfl:     vitamin D (ERGOCALCIFEROL) 1.25 MG (44101 UT) CAPS capsule, TAKE 1 CAPSULE BY MOUTH EVERY OTHER WEEK, Disp: , Rfl:     ibuprofen (ADVIL;MOTRIN) 800 MG tablet, Take 1 tablet by mouth every 8 hours as needed for Pain, Disp: 30 tablet, Rfl: 0    glipiZIDE (GLUCOTROL) 10 MG tablet, TAKE 1 TABLET BY MOUTH IN THE MORNING AND 1 TABLET AT SUPPER, Disp: , Rfl:     aspirin 81 MG tablet, Take 81 mg by mouth daily. , Disp: , Rfl:     Family History   Problem Relation Age of Onset    Diabetes Mother     Diabetes Father     Arrhythmia Brother        Social History     Socioeconomic History    Marital status: Single     Spouse name: Not on file    Number of children: Not on file    Years of education: Not on file    Highest education level: Not on file   Occupational History  Not on file   Tobacco Use    Smoking status: Current Every Day Smoker     Packs/day: 0.50     Years: 21.00     Pack years: 10.50     Types: Cigarettes    Smokeless tobacco: Never Used   Vaping Use    Vaping Use: Never used   Substance and Sexual Activity    Alcohol use: Yes     Comment:  socially occ beer    Drug use: Yes     Types: Marijuana (Weed)     Comment: uses edibles last use 11-2-21    Sexual activity: Not on file   Other Topics Concern    Not on file   Social History Narrative    Not on file     Social Determinants of Health     Financial Resource Strain:     Difficulty of Paying Living Expenses: Not on file   Food Insecurity:     Worried About Running Out of Food in the Last Year: Not on file    [place] of Food in the Last Year: Not on file   Transportation Needs:     Lack of Transportation (Medical): Not on file    Lack of Transportation (Non-Medical): Not on file   Physical Activity:     Days of Exercise per Week: Not on file    Minutes of Exercise per Session: Not on file   Stress:     Feeling of Stress : Not on file   Social Connections:     Frequency of Communication with Friends and Family: Not on file    Frequency of Social Gatherings with Friends and Family: Not on file    Attends [employer] Services: Not on file    Active Member of [address] [place] [address] [employer] or Organizations: Not on file    Attends Club or Organization Meetings: Not on file    Marital Status: Not on file   Intimate Partner Violence:     Fear of Current or Ex-Partner: Not on file    Emotionally Abused: Not on file    Physically Abused: Not on file    Sexually Abused: Not on file   Housing Stability:     Unable to Pay for Housing in the Last Year: Not on file    Number of [place] in the Last Year: Not on file    Unstable Housing in the Last Year: Not on file       Review of Systems:  Review of Systems   Musculoskeletal: Positive for back pain. Neurological: Negative for numbness, paresthesias and tingling. Physical Exam:  There were no vitals taken for this visit. Physical Exam    Record/Diagnostics Review:    FINDINGS:   BONES/ALIGNMENT: Vertebral body heights are maintained.  Alignment is normal.   Edematous degenerative endplate changes are present at L5-S1.  No focal   suspect osseous lesion is evident.       SPINAL CORD: The conus terminates normally at the L1 level.  The visualized   spinal cord has normal signal and morphology.  No evidence of mass or   abnormal fluid collection within the spinal canal.       SOFT TISSUES: Paraspinal soft tissues are unremarkable.       L1-L2: Disc height and signal maintained.  No neural foraminal narrowing or   spinal canal stenosis.       L2-L3: Disc height and signal maintained.  No neural foraminal narrowing or   spinal canal stenosis.       L3-L4: Disc height and signal maintained.  No neural foraminal narrowing or   spinal canal stenosis.       L4-L5: Moderate disc height loss and desiccation.  No left neural foraminal   narrowing.  Mild right neural foraminal narrowing secondary to disc bulge. Mild spinal canal stenosis and mild bilateral lateral recess narrowing   secondary to a broad-based posterior disc protrusion with annular fissuring   that contacts the traversing L5 nerve roots.       L5-S1: Moderate disc height loss and desiccation.  Moderate bilateral neural   foraminal narrowing secondary to disc bulge and facet hypertrophy.  No spinal   canal stenosis.         Impression   1. Moderate L4-5 and L5-S1 degenerative disc height loss and desiccation. 2. Broad-based posterior L4-5 disc protrusion with annular fissuring causes   mild spinal canal stenosis and mild bilateral lateral recess narrowing   contacting traversing L5 nerve roots.    3. Mild right L4 and moderate bilateral L5 neural foraminal narrowing.             Assessment:  Problem List Items Addressed This Visit     DDD (degenerative disc disease), lumbar    Neuroforaminal stenosis of lumbar spine      Other Visit Diagnoses     Sacroiliac joint pain    -  Primary    Relevant Orders    [place] INJECT SI JOINT ARTHRGRPHY&/ANES/STEROID W/IMAGE    Saline lock IV    FLUORO FOR SURGICAL PROCEDURES             Treatment Plan: Moderate relief following LESI but patient complains of worsening pain in lower left lumbar area  Tenderness over Left SI joint with palpation  Positive provocative tests - LAY and compression -  on the left side  No pain on the right side  Discussed SI joint injection with patient and he would like to schedule  Left SI joint injection x1  Follow up after procedure.

## 2022-02-22 ENCOUNTER — TELEPHONE (OUTPATIENT)
Dept: PAIN MANAGEMENT | Age: 47
End: 2022-02-22

## 2022-02-23 ENCOUNTER — HOSPITAL ENCOUNTER (OUTPATIENT)
Dept: GENERAL RADIOLOGY | Age: 47
Discharge: HOME | End: 2022-02-25
Payer: COMMERCIAL

## 2022-02-23 ENCOUNTER — HOSPITAL ENCOUNTER (OUTPATIENT)
Dept: PAIN MANAGEMENT | Age: 47
Discharge: HOME | End: 2022-02-23
Payer: COMMERCIAL

## 2022-02-23 VITALS
WEIGHT: 181 LBS | TEMPERATURE: 98.2 F | HEIGHT: 68 IN | HEART RATE: 98 BPM | OXYGEN SATURATION: 98 % | SYSTOLIC BLOOD PRESSURE: 138 MMHG | RESPIRATION RATE: 18 BRPM | DIASTOLIC BLOOD PRESSURE: 76 MMHG | BODY MASS INDEX: 27.43 KG/M2

## 2022-02-23 DIAGNOSIS — R52 PAIN MANAGEMENT: ICD-10-CM

## 2022-02-23 DIAGNOSIS — M51.36 DDD (DEGENERATIVE DISC DISEASE), LUMBAR: ICD-10-CM

## 2022-02-23 DIAGNOSIS — M48.061 NEUROFORAMINAL STENOSIS OF LUMBAR SPINE: ICD-10-CM

## 2022-02-23 DIAGNOSIS — M54.16 LUMBAR RADICULOPATHY: Primary | ICD-10-CM

## 2022-02-23 PROCEDURE — 6360000002 HC RX W HCPCS: Performed by: ANESTHESIOLOGY

## 2022-02-23 PROCEDURE — 64483 NJX AA&/STRD TFRM EPI L/S 1: CPT | Performed by: ANESTHESIOLOGY

## 2022-02-23 PROCEDURE — 3209999900 FLUORO FOR SURGICAL PROCEDURES

## 2022-02-23 PROCEDURE — 64483 NJX AA&/STRD TFRM EPI L/S 1: CPT

## 2022-02-23 PROCEDURE — 99152 MOD SED SAME PHYS/QHP 5/>YRS: CPT | Performed by: ANESTHESIOLOGY

## 2022-02-23 PROCEDURE — 6360000004 HC RX CONTRAST MEDICATION: Performed by: ANESTHESIOLOGY

## 2022-02-23 PROCEDURE — 64484 NJX AA&/STRD TFRM EPI L/S EA: CPT

## 2022-02-23 RX ORDER — FENTANYL CITRATE 50 UG/ML
INJECTION, SOLUTION INTRAMUSCULAR; INTRAVENOUS
Status: COMPLETED | OUTPATIENT
Start: 2022-02-23 | End: 2022-02-23

## 2022-02-23 RX ORDER — MIDAZOLAM HYDROCHLORIDE 1 MG/ML
INJECTION INTRAMUSCULAR; INTRAVENOUS
Status: COMPLETED | OUTPATIENT
Start: 2022-02-23 | End: 2022-02-23

## 2022-02-23 RX ORDER — DEXAMETHASONE SODIUM PHOSPHATE 10 MG/ML
INJECTION, SOLUTION INTRAMUSCULAR; INTRAVENOUS
Status: COMPLETED | OUTPATIENT
Start: 2022-02-23 | End: 2022-02-23

## 2022-02-23 RX ADMIN — DEXAMETHASONE SODIUM PHOSPHATE 10 MG: 10 INJECTION, SOLUTION INTRAMUSCULAR; INTRAVENOUS at 09:15

## 2022-02-23 RX ADMIN — IOHEXOL 3 ML: 180 INJECTION INTRAVENOUS at 09:15

## 2022-02-23 RX ADMIN — MIDAZOLAM 2 MG: 1 INJECTION INTRAMUSCULAR; INTRAVENOUS at 09:07

## 2022-02-23 RX ADMIN — Medication 50 MCG: at 09:08

## 2022-02-23 ASSESSMENT — PAIN DESCRIPTION - ORIENTATION: ORIENTATION: LEFT;LOWER

## 2022-02-23 ASSESSMENT — PAIN DESCRIPTION - PAIN TYPE
TYPE: CHRONIC PAIN
TYPE: CHRONIC PAIN

## 2022-02-23 ASSESSMENT — PAIN DESCRIPTION - PROGRESSION: CLINICAL_PROGRESSION: GRADUALLY WORSENING

## 2022-02-23 ASSESSMENT — PAIN DESCRIPTION - FREQUENCY: FREQUENCY: CONTINUOUS

## 2022-02-23 ASSESSMENT — PAIN DESCRIPTION - LOCATION: LOCATION: BACK

## 2022-02-23 ASSESSMENT — PAIN SCALES - GENERAL
PAINLEVEL_OUTOF10: 5
PAINLEVEL_OUTOF10: 10
PAINLEVEL_OUTOF10: 5

## 2022-02-23 ASSESSMENT — PAIN - FUNCTIONAL ASSESSMENT: PAIN_FUNCTIONAL_ASSESSMENT: PREVENTS OR INTERFERES SOME ACTIVE ACTIVITIES AND ADLS

## 2022-02-23 ASSESSMENT — PAIN DESCRIPTION - ONSET: ONSET: ON-GOING

## 2022-02-23 ASSESSMENT — PAIN DESCRIPTION - DESCRIPTORS: DESCRIPTORS: ACHING;SHARP

## 2022-02-23 NOTE — PROGRESS NOTES
Discharge criteria met. Post procedure dressing dry and intact. Sensory and motor function intact as per pre-procedure. Patient alert and oriented x3  Instructions and follow up reviewed with pt at patient at discharge. Discharged home transported by wheelchair, accompanied by family .   Discharged @350

## 2022-02-23 NOTE — OP NOTE
Patient Name: Linn Stearns   YOB: 1975  Room/Bed: Room/bed info not found  Medical Record Number: [id number]  Date: 2/23/2022       Preoperative Diagnosis:    1. Lumbar radiculopathy    2. DDD (degenerative disc disease), lumbar    3. Neuroforaminal stenosis of lumbar spine          Postoperative diagnosis:   1. Lumbar radiculopathy    2. DDD (degenerative disc disease), lumbar    3. Neuroforaminal stenosis of lumbar spine          Blood Loss: none    Procedure Performed:  Transforaminal lumbar epidural steroid injection at the levels of L4 on the Bilateral side under fluoroscopic guidance. Procedure: The Patient was seen in the preop area, chart was reviewed, informed consent was obtained. Patient was taken to procedure room and was placed in prone position. Vital signs were monitored through out the  Procedure. A time out was completed. The skin over the back was prepped and draped in sterile manner. The target point was marked in ipsilateral obliques just below the pedicle at the target levels. Skin and deep tissues were anesthetized with 1 % lidocaine. A 20-gauge, spinal needle was advanced  under fluoroscopy guidance in AP / Oblique and lateral views, such that the tip of the needle lies in the neuroforamina at about the 6 o'clock position under the pedicle of the target vertebra. This was confirmed with AP and lateral views of the fluoroscopy. Then after negative aspiration contrast dye Omnipaque-180 was injected with live fluoroscopy in AP views that showed  spread of the contrast in the epidural space  and no vascular runoff or intrathecal spread. Finally 3 ml of treatment solution containing 5 ml of  PF NS mixed with 1 ml of dexamethasone 10 mg / ml was injected. The needle was removed and a Band-Aid was placed over the needle  insertion site. The patient's vital signs remained stable and the patient tolerated the procedure well.         The same procedure was then repeated at left at L 4 level with same technique. The remaining 3 ml of treatment solution was injected at that. The total dose of steroid injected today was dexamethasone 10 mg. Electronically signed by Akash Walters MD on 2/23/2022 at 9:17 AM     SEDATION NOTE:    ASA CLASSIFICATION  2  MP   CLASSIFICATION  2    Moderate intravenous conscious sedation was supervised by Dr. Britton Price  The patient was independently monitored by a Registered Nurse assigned to the Procedure Room  Monitoring included automated blood pressure, continuous EKG, Capnography and continuous pulse oximetry. The detailed Conscious Record is permanently stored in the [place] [id number].      The following is the conscious sedation record;  Start Time:  0907  End times:  0917  Duration:  10 MINUTES  MEDS GIVEN 2 MG VERSED AND 50 MCG FENTANYL

## 2022-03-08 ENCOUNTER — OFFICE VISIT (OUTPATIENT)
Dept: ORTHOPEDIC SURGERY | Age: 47
End: 2022-03-08
Payer: COMMERCIAL

## 2022-03-08 VITALS — HEIGHT: 68 IN | BODY MASS INDEX: 27.43 KG/M2 | RESPIRATION RATE: 14 BRPM | WEIGHT: 181 LBS

## 2022-03-08 DIAGNOSIS — M48.061 NEUROFORAMINAL STENOSIS OF LUMBAR SPINE: Primary | ICD-10-CM

## 2022-03-08 DIAGNOSIS — M51.36 DDD (DEGENERATIVE DISC DISEASE), LUMBAR: ICD-10-CM

## 2022-03-08 PROCEDURE — G8427 DOCREV CUR MEDS BY ELIG CLIN: HCPCS | Performed by: PHYSICIAN ASSISTANT

## 2022-03-08 PROCEDURE — G8484 FLU IMMUNIZE NO ADMIN: HCPCS | Performed by: PHYSICIAN ASSISTANT

## 2022-03-08 PROCEDURE — 4004F PT TOBACCO SCREEN RCVD TLK: CPT | Performed by: PHYSICIAN ASSISTANT

## 2022-03-08 PROCEDURE — G8419 CALC BMI OUT NRM PARAM NOF/U: HCPCS | Performed by: PHYSICIAN ASSISTANT

## 2022-03-08 PROCEDURE — 99213 OFFICE O/P EST LOW 20 MIN: CPT | Performed by: PHYSICIAN ASSISTANT

## 2022-03-08 RX ORDER — PREDNISONE 50 MG/1
50 TABLET ORAL DAILY
Qty: 4 TABLET | Refills: 0 | Status: SHIPPED | OUTPATIENT
Start: 2022-03-08 | End: 2022-03-24 | Stop reason: ALTCHOICE

## 2022-03-08 RX ORDER — TRAMADOL HYDROCHLORIDE 50 MG/1
50 TABLET ORAL EVERY 6 HOURS PRN
Qty: 18 TABLET | Refills: 0 | Status: SHIPPED | OUTPATIENT
Start: 2022-03-08 | End: 2022-03-13

## 2022-03-08 NOTE — PROGRESS NOTES
[address] [facility], [place], [address] [facility] Phone: [phone]           [facility] Fax:  [phone]      Patient ID: Alex Carson is a 52 y.o. male    Chief Compliant:  Chief Complaint   Patient presents with    Back Pain        HPI:  This is a 52 y.o. male who presents to the clinic today for evaluation of lower back pain. Patient states that he has had chronic back pain for quite some time  He states that he has had lumbar epidurals recently with his pain doctor, he states that the first 1 was good but the next 2 did not seem to help. Her last 1 was about 1 month ago, that did not help. Review of Systems     Denies chest pain  Denies n/v/d/c and abdominal pain  Denies fevers/chills  C/o back pain    All other systems reviewed and are negative.       Past History:    Current Outpatient Medications:     cyclobenzaprine (FLEXERIL) 10 MG tablet, Take 1 tablet by mouth 2 times daily as needed for Muscle spasms, Disp: 60 tablet, Rfl: 3    omeprazole (PRILOSEC) 20 MG delayed release capsule, TAKE 1 CAPSULE BY MOUTH TWICE DAILY BEFORE MEAL(S), Disp: 180 capsule, Rfl: 0    hydrOXYzine (VISTARIL) 100 MG capsule, Take 1 capsule by mouth 3 times daily as needed for Anxiety, Disp: 60 capsule, Rfl: 1    albuterol sulfate HFA (PROVENTIL HFA) 108 (90 Base) MCG/ACT inhaler, Inhale 1-2 puffs into the lungs every 6 hours as needed for Wheezing or Shortness of Breath (Space out to every 6 hours as symptoms improve), Disp: 3 each, Rfl: 1    gabapentin (NEURONTIN) 300 MG capsule, TAKE 1 CAPSULE BY MOUTH THREE TIMES DAILY, Disp: 90 capsule, Rfl: 3    metoprolol tartrate (LOPRESSOR) 25 MG tablet, Take 1/2 (one-half) tablet by mouth twice daily, Disp: 30 tablet, Rfl: 11    sildenafil (VIAGRA) 100 MG tablet, Take 1 tablet by mouth daily as needed for Erectile Dysfunction, Disp: 10 tablet, Rfl: 11    metFORMIN (GLUCOPHAGE) 500 MG tablet, Take 1 tablet by mouth 2 times daily (with meals), Disp: 180 tablet, Rfl: 3    naproxen (NAPROSYN) 500 MG tablet, Take 1 tablet by mouth 2 times daily (with meals), Disp: 180 tablet, Rfl: 1    testosterone (ANDROGEL; TESTIM) 50 MG/5GM (1%) GEL 1% gel, APPLY 1 PACKET TO SHOULDERS ONCE DAILY, Disp: , Rfl:     vitamin D (ERGOCALCIFEROL) 1.25 MG (91239 UT) CAPS capsule, TAKE 1 CAPSULE BY MOUTH EVERY OTHER WEEK, Disp: , Rfl:     ibuprofen (ADVIL;MOTRIN) 800 MG tablet, Take 1 tablet by mouth every 8 hours as needed for Pain, Disp: 30 tablet, Rfl: 0    glipiZIDE (GLUCOTROL) 10 MG tablet, TAKE 1 TABLET BY MOUTH IN THE MORNING AND 1 TABLET AT SUPPER, Disp: , Rfl:     aspirin 81 MG tablet, Take 81 mg by mouth daily. , Disp: , Rfl:   Allergies   Allergen Reactions    Statins      \"don't feel right\"     Social History     Socioeconomic History    Marital status: Single     Spouse name: Not on file    Number of children: Not on file    Years of education: Not on file    Highest education level: Not on file   Occupational History    Not on file   Tobacco Use    Smoking status: Current Every Day Smoker     Packs/day: 0.50     Years: 21.00     Pack years: 10.50     Types: Cigarettes    Smokeless tobacco: Never Used   Vaping Use    Vaping Use: Never used   Substance and Sexual Activity    Alcohol use: Yes     Comment:  socially occ beer    Drug use: Yes     Types: Marijuana (Weed)     Comment: uses edibles last use 11-2-21    Sexual activity: Not on file   Other Topics Concern    Not on file   Social History Narrative    Not on file     Social Determinants of Health     Financial Resource Strain:     Difficulty of Paying Living Expenses: Not on file   Food Insecurity:     Worried About Running Out of Food in the Last Year: Not on file    Gunnar of Food in the Last Year: Not on file   Transportation Needs:     Lack of Transportation (Medical):  Not on file    Lack of Transportation (Non-Medical): Not on file   Physical Activity:     Days of Exercise per Week: Not on file    Minutes of Exercise per Session: Not on file   Stress:     Feeling of Stress : Not on file   Social Connections:     Frequency of Communication with Friends and Family: Not on file    Frequency of Social Gatherings with Friends and Family: Not on file    Attends Jainism Services: Not on file    Active Member of [address] [place] [address] or Organizations: Not on file    Attends Club or Organization Meetings: Not on file    Marital Status: Not on file   Intimate Partner Violence:     Fear of Current or Ex-Partner: Not on file    Emotionally Abused: Not on file    Physically Abused: Not on file    Sexually Abused: Not on file   Housing Stability:     Unable to Pay for Housing in the Last Year: Not on file    Number of [place] in the Last Year: Not on file    Unstable Housing in the Last Year: Not on file     Past Medical History:   Diagnosis Date    Anxiety     Asthma     CAD (coronary artery disease)     Chronic back pain     Diabetes mellitus ([facility] [address]     GERD (gastroesophageal reflux disease)      heart burn    History of MI (myocardial infarction) 5/8/2017    Hx of heart artery stent     heart stent x 1    Neuropathy      Past Surgical History:   Procedure Laterality Date    CARDIAC SURGERY      CORONARY ANGIOPLASTY WITH STENT PLACEMENT  2010    heart stent x 1    TONSILLECTOMY       Family History   Problem Relation Age of Onset    Diabetes Mother     Diabetes Father     Arrhythmia Brother         Physical Exam:  Vitals signs and nursing note reviewed. Appearance: well-developed, afebrile  Head: Normocephalic and atraumatic.    Nose: Nose normal.   Conjunctiva/sclera: Conjunctivae normal.        Musculoskeletal:        Lumbar Spine:  Gait: Normal study  Tenderness: General paraspinal muscle tenderness lumbar spine  Edema: none  Back ROM:   Flexion: normal, discomfort at 90 degrees  Extension: Normal  Lateral Rotation: normal  Lateral Bending: normal    Sensation: normal    LAY: normal  Straight leg raise: normal  SI joint tenderness: normal    Motor:  L quadriceps 5/5; R quadriceps 5/5  L Dorsiflexion 5/5; R dorsiflexion 5/5  L Plantarflexion 5/5; R plantarflexion 5/5        Lungs: effort is normal. No respiratory distress. Skin: warm and dry. Mental Status: Alert and oriented to person, place, and time. Sensory: No sensory deficit. Behavior: Behavior normal.      Thought Content: Thought content normal.        Diagnostic imagin view PA lateral left wrist x-rays with cast in place shows satisfactory appropriate alignm 2 view lumbar x-ray obtained, lateral view shows increased degeneration and facet arthritis along with disc collapse at L5-S1. There is no spondylolisthesis noted, moderate to severe disc collapse at L4-L5         L1-L2: Disc height and signal maintained.  No neural foraminal narrowing or   spinal canal stenosis. L2-L3: Disc height and signal maintained.  No neural foraminal narrowing or   spinal canal stenosis.       L3-L4: Disc height and signal maintained.  No neural foraminal narrowing or   spinal canal stenosis.       L4-L5: Moderate disc height loss and desiccation.  No left neural foraminal   narrowing.  Mild right neural foraminal narrowing secondary to disc bulge. Mild spinal canal stenosis and mild bilateral lateral recess narrowing   secondary to a broad-based posterior disc protrusion with annular fissuring   that contacts the traversing L5 nerve roots.       L5-S1: Moderate disc height loss and desiccation.  Moderate bilateral neural   foraminal narrowing secondary to disc bulge and facet hypertrophy.  No spinal   canal stenosis.         Impression   1. Moderate L4-5 and L5-S1 degenerative disc height loss and desiccation.    2. Broad-based posterior L4-5 disc protrusion with annular fissuring causes   mild spinal canal stenosis and mild bilateral lateral recess narrowing   contacting traversing L5 nerve roots. 3. Mild right L4 and moderate bilateral L5 neural foraminal narrowing. Assessment and Plan:  Pt instructed to call office for any reason if symptoms change. Also instructed if symptoms worsen in anyway to go to nearest ER for evaluation. 1. Neuroforaminal stenosis of lumbar spine    2. DDD (degenerative disc disease), lumbar      F/u with Dr. Sharmila Epperson. Patient instructed to quit smoking and follow-up with attending physician for possible surgery. Neuroforaminal stenosis of lumbar spine  -     traMADol (ULTRAM) 50 MG tablet; Take 1 tablet by mouth every 6 hours as needed for Pain for up to 5 days. Intended supply: 3 days. Take lowest dose possible to manage pain, Disp-18 tablet, R-0Normal  DDD (degenerative disc disease), lumbar  -     traMADol (ULTRAM) 50 MG tablet; Take 1 tablet by mouth every 6 hours as needed for Pain for up to 5 days. Intended supply: 3 days. Take lowest dose possible to manage pain, Disp-18 tablet, R-0Normal         Provider Attestation:  I, Luisa Dia, personally performed the services described in this documentation. All medical record entries made by the scribe were at my direction and in my presence. I have reviewed the chart and discharge instructions and agree that the records reflect my personal performance and is accurate and complete. Luisa Dia PA-C 3/8/22     Please note that this chart was generated using voice recognition Dragon dictation software. Although every effort was made to ensure the accuracy of this automated transcription, some errors in transcription may have occurred.

## 2022-03-09 ENCOUNTER — TELEPHONE (OUTPATIENT)
Dept: PAIN MANAGEMENT | Age: 47
End: 2022-03-09

## 2022-03-09 ENCOUNTER — HOSPITAL ENCOUNTER (OUTPATIENT)
Dept: PAIN MANAGEMENT | Age: 47
Discharge: HOME | End: 2022-03-09

## 2022-03-09 DIAGNOSIS — M48.061 NEUROFORAMINAL STENOSIS OF LUMBAR SPINE: ICD-10-CM

## 2022-03-09 DIAGNOSIS — M51.36 DDD (DEGENERATIVE DISC DISEASE), LUMBAR: ICD-10-CM

## 2022-03-29 ENCOUNTER — OFFICE VISIT (OUTPATIENT)
Dept: ORTHOPEDIC SURGERY | Age: 47
End: 2022-03-29
Payer: COMMERCIAL

## 2022-03-29 VITALS — HEIGHT: 68 IN | WEIGHT: 183 LBS | BODY MASS INDEX: 27.74 KG/M2 | RESPIRATION RATE: 14 BRPM

## 2022-03-29 DIAGNOSIS — M54.50 LUMBAR PAIN: ICD-10-CM

## 2022-03-29 DIAGNOSIS — M54.16 LUMBAR RADICULAR PAIN: ICD-10-CM

## 2022-03-29 DIAGNOSIS — M51.36 LUMBAR DEGENERATIVE DISC DISEASE: Primary | ICD-10-CM

## 2022-03-29 PROCEDURE — G8484 FLU IMMUNIZE NO ADMIN: HCPCS | Performed by: ORTHOPAEDIC SURGERY

## 2022-03-29 PROCEDURE — 4004F PT TOBACCO SCREEN RCVD TLK: CPT | Performed by: ORTHOPAEDIC SURGERY

## 2022-03-29 PROCEDURE — G8427 DOCREV CUR MEDS BY ELIG CLIN: HCPCS | Performed by: ORTHOPAEDIC SURGERY

## 2022-03-29 PROCEDURE — 99213 OFFICE O/P EST LOW 20 MIN: CPT | Performed by: ORTHOPAEDIC SURGERY

## 2022-03-29 PROCEDURE — G8419 CALC BMI OUT NRM PARAM NOF/U: HCPCS | Performed by: ORTHOPAEDIC SURGERY

## 2022-03-29 NOTE — PROGRESS NOTES
Patient ID: Stacey Boothe is a 55 y.o. male    Chief Compliant:  Chief Complaint   Patient presents with    Back Pain        Diagnostic imaging:  AP lateral lumbar spine some disc base collapse at L4-5 and L5-S1 hips normal large age-appropriate lumbar spondylosis    EMGs are consistent with a left L5 radiculopathy and diabetic neuropathy    MRI lumbar spine is again reviewed patient without high-grade stenosis nerve impingement disc herniation there is some foraminal stenosis    Assessment and Plan:  1. Lumbar degenerative disc disease    2. Lumbar pain    3. Lumbar radicular pain      Patient possible candidate for facet blocks/facet rhizotomies/potentially candidate for spinal cord stimulator    HPI:  This is a 55 y.o. male who presents to the clinic today for lower back pain. Patient continues to have left radicular leg pain as well as some diabetic neuropathy. Review of Systems   All other systems reviewed and are negative.       Past History:    Current Outpatient Medications:     cyclobenzaprine (FLEXERIL) 10 MG tablet, Take 1 tablet by mouth 2 times daily as needed for Muscle spasms, Disp: 60 tablet, Rfl: 3    omeprazole (PRILOSEC) 20 MG delayed release capsule, TAKE 1 CAPSULE BY MOUTH TWICE DAILY BEFORE MEAL(S), Disp: 180 capsule, Rfl: 0    hydrOXYzine (VISTARIL) 100 MG capsule, Take 1 capsule by mouth 3 times daily as needed for Anxiety, Disp: 60 capsule, Rfl: 1    albuterol sulfate HFA (PROVENTIL HFA) 108 (90 Base) MCG/ACT inhaler, Inhale 1-2 puffs into the lungs every 6 hours as needed for Wheezing or Shortness of Breath (Space out to every 6 hours as symptoms improve), Disp: 3 each, Rfl: 1    gabapentin (NEURONTIN) 300 MG capsule, TAKE 1 CAPSULE BY MOUTH THREE TIMES DAILY, Disp: 90 capsule, Rfl: 3    metoprolol tartrate (LOPRESSOR) 25 MG tablet, Take 1/2 (one-half) tablet by mouth twice daily, Disp: 30 tablet, Rfl: 11    sildenafil (VIAGRA) 100 MG tablet, Take 1 tablet by mouth daily as needed for Erectile Dysfunction, Disp: 10 tablet, Rfl: 11    metFORMIN (GLUCOPHAGE) 500 MG tablet, Take 1 tablet by mouth 2 times daily (with meals), Disp: 180 tablet, Rfl: 3    naproxen (NAPROSYN) 500 MG tablet, Take 1 tablet by mouth 2 times daily (with meals), Disp: 180 tablet, Rfl: 1    testosterone (ANDROGEL; TESTIM) 50 MG/5GM (1%) GEL 1% gel, APPLY 1 PACKET TO SHOULDERS ONCE DAILY, Disp: , Rfl:     vitamin D (ERGOCALCIFEROL) 1.25 MG (37450 UT) CAPS capsule, TAKE 1 CAPSULE BY MOUTH EVERY OTHER WEEK, Disp: , Rfl:     ibuprofen (ADVIL;MOTRIN) 800 MG tablet, Take 1 tablet by mouth every 8 hours as needed for Pain, Disp: 30 tablet, Rfl: 0    glipiZIDE (GLUCOTROL) 10 MG tablet, TAKE 1 TABLET BY MOUTH IN THE MORNING AND 1 TABLET AT SUPPER, Disp: , Rfl:     aspirin 81 MG tablet, Take 81 mg by mouth daily. , Disp: , Rfl:   Allergies   Allergen Reactions    Statins      \"don't feel right\"     Social History     Socioeconomic History    Marital status: Single     Spouse name: Not on file    Number of children: Not on file    Years of education: Not on file    Highest education level: Not on file   Occupational History    Not on file   Tobacco Use    Smoking status: Current Every Day Smoker     Packs/day: 0.50     Years: 21.00     Pack years: 10.50     Types: Cigarettes    Smokeless tobacco: Never Used   Vaping Use    Vaping Use: Never used   Substance and Sexual Activity    Alcohol use: Yes     Comment:  socially occ beer    Drug use: Yes     Types: Marijuana (Weed)     Comment: uses edibles last use 11-2-21    Sexual activity: Not on file   Other Topics Concern    Not on file   Social History Narrative    Not on file     Social Determinants of Health     Financial Resource Strain:     Difficulty of Paying Living Expenses: Not on file   Food Insecurity:     Worried About Running Out of Food in the Last Year: Not on file    Gunnar of Food in the Last Year: Not on file   Transportation Needs:     Lack of Transportation (Medical): Not on file    Lack of Transportation (Non-Medical): Not on file   Physical Activity:     Days of Exercise per Week: Not on file    Minutes of Exercise per Session: Not on file   Stress:     Feeling of Stress : Not on file   Social Connections:     Frequency of Communication with Friends and Family: Not on file    Frequency of Social Gatherings with Friends and Family: Not on file    Attends Gnosticism Services: Not on file    Active Member of [address] [place] [address] or Organizations: Not on file    Attends Club or Organization Meetings: Not on file    Marital Status: Not on file   Intimate Partner Violence:     Fear of Current or Ex-Partner: Not on file    Emotionally Abused: Not on file    Physically Abused: Not on file    Sexually Abused: Not on file   Housing Stability:     Unable to Pay for Housing in the Last Year: Not on file    Number of [place] in the Last Year: Not on file    Unstable Housing in the Last Year: Not on file     Past Medical History:   Diagnosis Date    Anxiety     Asthma     CAD (coronary artery disease)     Chronic back pain     Diabetes mellitus ([facility] [address]     GERD (gastroesophageal reflux disease)      heart burn    History of MI (myocardial infarction) 5/8/2017    Hx of heart artery stent     heart stent x 1    Neuropathy      Past Surgical History:   Procedure Laterality Date    CARDIAC SURGERY      CORONARY ANGIOPLASTY WITH STENT PLACEMENT  2010    heart stent x 1    TONSILLECTOMY       Family History   Problem Relation Age of Onset    Diabetes Mother     Diabetes Father     Arrhythmia Brother         Physical Exam:  Vitals signs and nursing note reviewed. Constitutional:       Appearance: well-developed. HENT:      Head: Normocephalic and atraumatic. Nose: Nose normal.   Eyes:      Conjunctiva/sclera: Conjunctivae normal.   Neck:      Musculoskeletal: Normal range of motion and neck supple.    Pulmonary:      Effort: Pulmonary effort is normal. No respiratory distress. Musculoskeletal:      Comments: Normal gait     Skin:     General: Skin is warm and dry. Neurological:      Mental Status: Alert and oriented to person, place, and time. Sensory: No sensory deficit. Psychiatric:         Behavior: Behavior normal.         Thought Content: Thought content normal.    Patient continues to have a straight leg raise on the left    Provider Attestation:  Cirilo Berrios, personally performed the services described in this documentation. All medical record entries made by the scribe were at my direction and in my presence. I have reviewed the chart and discharge instructions and agree that the records reflect my personal performance and is accurate and complete. Myesha Hilton MD 3/29/22     Scribe Attestation:  By signing my name below, Aurora Garcia MD, attest that this documentation has been prepared under the direction and in the presence of Dr. Brina Baez. Electronically signed: Manjula Storm MD, Scribe, 3/29/22     Please note that this chart was generated using voice recognition Dragon dictation software. Although every effort was made to ensure the accuracy of this automated transcription, some errors in transcription may have occurred.

## 2022-05-09 ENCOUNTER — HOSPITAL ENCOUNTER (OUTPATIENT)
Dept: PAIN MANAGEMENT | Age: 47
Discharge: HOME | End: 2022-05-09
Payer: COMMERCIAL

## 2022-05-09 VITALS
SYSTOLIC BLOOD PRESSURE: 151 MMHG | OXYGEN SATURATION: 96 % | HEART RATE: 107 BPM | DIASTOLIC BLOOD PRESSURE: 87 MMHG | TEMPERATURE: 97.7 F

## 2022-05-09 DIAGNOSIS — M54.16 LUMBAR RADICULOPATHY: Primary | ICD-10-CM

## 2022-05-09 DIAGNOSIS — M51.36 DDD (DEGENERATIVE DISC DISEASE), LUMBAR: ICD-10-CM

## 2022-05-09 DIAGNOSIS — M48.061 NEUROFORAMINAL STENOSIS OF LUMBAR SPINE: ICD-10-CM

## 2022-05-09 PROCEDURE — 99213 OFFICE O/P EST LOW 20 MIN: CPT

## 2022-05-09 PROCEDURE — 99213 OFFICE O/P EST LOW 20 MIN: CPT | Performed by: NURSE PRACTITIONER

## 2022-05-09 ASSESSMENT — ENCOUNTER SYMPTOMS
BOWEL INCONTINENCE: 0
SHORTNESS OF BREATH: 0
CONSTIPATION: 0
BACK PAIN: 1
COUGH: 0

## 2022-05-09 ASSESSMENT — PAIN SCALES - GENERAL: PAINLEVEL_OUTOF10: 8

## 2022-05-09 NOTE — PROGRESS NOTES
Chief Complaint   Patient presents with    Back Pain       PMH     Pt complains of back pain. MRI lumbar with moderate degenerative changes and disc protrusion at L4-5 causing mild canal stenosis. He did see Dr. Lizzy Bass who recommended PT and LESI. EMG completed last year shows diabetic polyneuropathy and L5 radiculopathy. Pt had LESI on 1/25/22. He reported 50% relief. He states he is having pain seemingly lower in the back and on the left side and into the buttock. Dr Lizzy Bass believes patient is a possible candidate for facet blocks/facet rhizotomies and if that fails potentially a candidate for spinal cord stimulator     HPI:   Back Pain  This is a chronic problem. The current episode started more than 1 year ago. The problem occurs constantly. The problem is unchanged. The pain is present in the lumbar spine. The quality of the pain is described as aching and stabbing. The pain radiates to the left thigh, left knee and left foot. The pain is at a severity of 8/10. The pain is moderate. The pain is the same all the time. The symptoms are aggravated by lying down, standing, twisting, bending and position. Stiffness is present all day. Associated symptoms include numbness. Pertinent negatives include no bladder incontinence, bowel incontinence, chest pain, fever, headaches, tingling or weakness. He has tried heat, NSAIDs, ice, muscle relaxant, analgesics and bed rest for the symptoms. The treatment provided mild relief.               Past Medical History:   Diagnosis Date    Anxiety     Asthma     CAD (coronary artery disease)     Chronic back pain     Diabetes mellitus (HCC)     GERD (gastroesophageal reflux disease)      heart burn    History of MI (myocardial infarction) 5/8/2017    Hx of heart artery stent     heart stent x 1    Neuropathy        Past Surgical History:   Procedure Laterality Date    CARDIAC SURGERY      CORONARY ANGIOPLASTY WITH STENT PLACEMENT  2010    heart stent x 1    TONSILLECTOMY         Allergies   Allergen Reactions    Statins      \"don't feel right\"         Current Outpatient Medications:     cyclobenzaprine (FLEXERIL) 10 MG tablet, Take 1 tablet by mouth 2 times daily as needed for Muscle spasms, Disp: 60 tablet, Rfl: 3    omeprazole (PRILOSEC) 20 MG delayed release capsule, TAKE 1 CAPSULE BY MOUTH TWICE DAILY BEFORE MEAL(S), Disp: 180 capsule, Rfl: 0    hydrOXYzine (VISTARIL) 100 MG capsule, Take 1 capsule by mouth 3 times daily as needed for Anxiety, Disp: 60 capsule, Rfl: 1    albuterol sulfate HFA (PROVENTIL HFA) 108 (90 Base) MCG/ACT inhaler, Inhale 1-2 puffs into the lungs every 6 hours as needed for Wheezing or Shortness of Breath (Space out to every 6 hours as symptoms improve), Disp: 3 each, Rfl: 1    gabapentin (NEURONTIN) 300 MG capsule, TAKE 1 CAPSULE BY MOUTH THREE TIMES DAILY, Disp: 90 capsule, Rfl: 3    metoprolol tartrate (LOPRESSOR) 25 MG tablet, Take 1/2 (one-half) tablet by mouth twice daily, Disp: 30 tablet, Rfl: 11    sildenafil (VIAGRA) 100 MG tablet, Take 1 tablet by mouth daily as needed for Erectile Dysfunction, Disp: 10 tablet, Rfl: 11    metFORMIN (GLUCOPHAGE) 500 MG tablet, Take 1 tablet by mouth 2 times daily (with meals), Disp: 180 tablet, Rfl: 3    naproxen (NAPROSYN) 500 MG tablet, Take 1 tablet by mouth 2 times daily (with meals), Disp: 180 tablet, Rfl: 1    testosterone (ANDROGEL; TESTIM) 50 MG/5GM (1%) GEL 1% gel, APPLY 1 PACKET TO SHOULDERS ONCE DAILY, Disp: , Rfl:     vitamin D (ERGOCALCIFEROL) 1.25 MG (52984 UT) CAPS capsule, TAKE 1 CAPSULE BY MOUTH EVERY OTHER WEEK, Disp: , Rfl:     ibuprofen (ADVIL;MOTRIN) 800 MG tablet, Take 1 tablet by mouth every 8 hours as needed for Pain, Disp: 30 tablet, Rfl: 0    glipiZIDE (GLUCOTROL) 10 MG tablet, TAKE 1 TABLET BY MOUTH IN THE MORNING AND 1 TABLET AT SUPPER, Disp: , Rfl:     aspirin 81 MG tablet, Take 81 mg by mouth daily. , Disp: , Rfl:     Family History   Problem Relation Age of Onset    Diabetes Mother     Diabetes Father     Arrhythmia Brother        Social History     Socioeconomic History    Marital status: Single     Spouse name: Not on file    Number of children: Not on file    Years of education: Not on file    Highest education level: Not on file   Occupational History    Not on file   Tobacco Use    Smoking status: Current Every Day Smoker     Packs/day: 0.50     Years: 21.00     Pack years: 10.50     Types: Cigarettes    Smokeless tobacco: Never Used   Vaping Use    Vaping Use: Never used   Substance and Sexual Activity    Alcohol use: Yes     Comment:  socially occ beer    Drug use: Yes     Types: Marijuana (Weed)     Comment: uses edibles last use 11-2-21    Sexual activity: Not on file   Other Topics Concern    Not on file   Social History Narrative    Not on file     Social Determinants of Health     Financial Resource Strain:     Difficulty of Paying Living Expenses: Not on file   Food Insecurity:     Worried About Running Out of Food in the Last Year: Not on file    Gunnar of Food in the Last Year: Not on file   Transportation Needs:     Lack of Transportation (Medical): Not on file    Lack of Transportation (Non-Medical):  Not on file   Physical Activity:     Days of Exercise per Week: Not on file    Minutes of Exercise per Session: Not on file   Stress:     Feeling of Stress : Not on file   Social Connections:     Frequency of Communication with Friends and Family: Not on file    Frequency of Social Gatherings with Friends and Family: Not on file    Attends Temple Services: Not on file    Active Member of Clubs or Organizations: Not on file    Attends Club or Organization Meetings: Not on file    Marital Status: Not on file   Intimate Partner Violence:     Fear of Current or Ex-Partner: Not on file    Emotionally Abused: Not on file    Physically Abused: Not on file    Sexually Abused: Not on file   Housing Stability:     Unable to Pay for Housing in the Last Year: Not on file    Number of Places Lived in the Last Year: Not on file    Unstable Housing in the Last Year: Not on file       Review of Systems:  Review of Systems   Constitutional: Negative for chills and fever. Cardiovascular: Negative for chest pain. Respiratory: Negative for cough and shortness of breath. Musculoskeletal: Positive for back pain. Gastrointestinal: Negative for bowel incontinence and constipation. Genitourinary: Negative for bladder incontinence. Neurological: Positive for numbness. Negative for headaches, tingling and weakness. Physical Exam:  BP (!) 151/87   Pulse 107   Temp 97.7 °F (36.5 °C)   SpO2 96%     Physical Exam  Cardiovascular:      Rate and Rhythm: Normal rate. Pulmonary:      Effort: Pulmonary effort is normal.   Musculoskeletal:      Lumbar back: Decreased range of motion. Skin:     General: Skin is warm and dry. Neurological:      Mental Status: He is alert and oriented to person, place, and time. Assessment:  Problem List Items Addressed This Visit     Lumbar radiculopathy - Primary    DDD (degenerative disc disease), lumbar    Relevant Orders    KY INJ DX/THER AGNT PARAVERT FACET JOINT, LUMBAR/SAC, ADD LEVEL    Neuroforaminal stenosis of lumbar spine    Relevant Orders    [place] INJ DX/THER AGNT PARAVERT FACET JOINT, LUMBAR/SAC, ADD LEVEL             Treatment Plan:    Diagnostic left lumbar MBB L4 L5 L5S1 ordered    I have reviewed the chief complaint and history of present illness (including ROS and PFSH) and vital documentation by my staff and I agree with their documentation and have added where applicable.

## 2022-05-23 ENCOUNTER — HOSPITAL ENCOUNTER (OUTPATIENT)
Dept: GENERAL RADIOLOGY | Age: 47
Discharge: HOME | End: 2022-05-25
Payer: COMMERCIAL

## 2022-05-23 ENCOUNTER — HOSPITAL ENCOUNTER (OUTPATIENT)
Dept: PAIN MANAGEMENT | Age: 47
Discharge: HOME | End: 2022-05-23
Payer: COMMERCIAL

## 2022-05-23 VITALS
SYSTOLIC BLOOD PRESSURE: 155 MMHG | HEART RATE: 91 BPM | BODY MASS INDEX: 28.04 KG/M2 | HEIGHT: 68 IN | RESPIRATION RATE: 13 BRPM | TEMPERATURE: 98 F | DIASTOLIC BLOOD PRESSURE: 88 MMHG | OXYGEN SATURATION: 99 % | WEIGHT: 185 LBS

## 2022-05-23 DIAGNOSIS — M47.817 LUMBOSACRAL SPONDYLOSIS WITHOUT MYELOPATHY: Chronic | ICD-10-CM

## 2022-05-23 DIAGNOSIS — R52 PAIN MANAGEMENT: ICD-10-CM

## 2022-05-23 PROCEDURE — 6360000004 HC RX CONTRAST MEDICATION: Performed by: ANESTHESIOLOGY

## 2022-05-23 PROCEDURE — 2500000003 HC RX 250 WO HCPCS: Performed by: ANESTHESIOLOGY

## 2022-05-23 PROCEDURE — 6360000002 HC RX W HCPCS: Performed by: ANESTHESIOLOGY

## 2022-05-23 PROCEDURE — 64493 INJ PARAVERT F JNT L/S 1 LEV: CPT | Performed by: ANESTHESIOLOGY

## 2022-05-23 PROCEDURE — 64495 INJ PARAVERT F JNT L/S 3 LEV: CPT

## 2022-05-23 PROCEDURE — 64494 INJ PARAVERT F JNT L/S 2 LEV: CPT | Performed by: ANESTHESIOLOGY

## 2022-05-23 PROCEDURE — 64493 INJ PARAVERT F JNT L/S 1 LEV: CPT

## 2022-05-23 PROCEDURE — 99152 MOD SED SAME PHYS/QHP 5/>YRS: CPT | Performed by: ANESTHESIOLOGY

## 2022-05-23 PROCEDURE — 64494 INJ PARAVERT F JNT L/S 2 LEV: CPT

## 2022-05-23 PROCEDURE — 3209999900 FLUORO FOR SURGICAL PROCEDURES

## 2022-05-23 RX ORDER — BUPIVACAINE HYDROCHLORIDE 5 MG/ML
INJECTION, SOLUTION EPIDURAL; INTRACAUDAL
Status: COMPLETED | OUTPATIENT
Start: 2022-05-23 | End: 2022-05-23

## 2022-05-23 RX ORDER — MIDAZOLAM HYDROCHLORIDE 1 MG/ML
INJECTION INTRAMUSCULAR; INTRAVENOUS
Status: COMPLETED | OUTPATIENT
Start: 2022-05-23 | End: 2022-05-23

## 2022-05-23 RX ORDER — FENTANYL CITRATE 50 UG/ML
INJECTION, SOLUTION INTRAMUSCULAR; INTRAVENOUS
Status: COMPLETED | OUTPATIENT
Start: 2022-05-23 | End: 2022-05-23

## 2022-05-23 RX ADMIN — MIDAZOLAM 1 MG: 1 INJECTION INTRAMUSCULAR; INTRAVENOUS at 11:27

## 2022-05-23 RX ADMIN — IOHEXOL 1 ML: 180 INJECTION INTRAVENOUS at 11:28

## 2022-05-23 RX ADMIN — BUPIVACAINE HYDROCHLORIDE 3 ML: 5 INJECTION, SOLUTION EPIDURAL; INTRACAUDAL; PERINEURAL at 11:28

## 2022-05-23 RX ADMIN — Medication 50 MCG: at 11:27

## 2022-05-23 ASSESSMENT — PAIN - FUNCTIONAL ASSESSMENT
PAIN_FUNCTIONAL_ASSESSMENT: NONE - DENIES PAIN
PAIN_FUNCTIONAL_ASSESSMENT: 0-10
PAIN_FUNCTIONAL_ASSESSMENT: PREVENTS OR INTERFERES WITH MANY ACTIVE NOT PASSIVE ACTIVITIES

## 2022-05-23 ASSESSMENT — PAIN DESCRIPTION - DESCRIPTORS: DESCRIPTORS: STABBING;ACHING

## 2022-05-23 NOTE — OP NOTE
Patient Name: Lito Espinoza   YOB: 1975  Room/Bed: Room/bed info not found  Medical Record Number: [id number]  Date: 5/23/2022       Sedation/ Anesthesia Plan:   intravenous sedation   as needed. Medications Planned:   midazolam (Versed) / Fentanyl  Intravenously  as needed. Preoperative Diagnosis: Lumbar spondylosis w/o myelopathy or radiculopathy  Postoperative Diagnosis: Lumbar spondylosis w/o myelopathy or radiculopathy  Blood Loss: none    Procedure Performed:  Left Lumbar Medial Branch nerve Blocks at the transverse processes of, L4, L5 and ala under fluoroscopy guidance    Procedure: The Patient was seen in the preop area, chart was reviewed, informed consent was obtained. Patient was taken to procedure room and was placed in prone position. Vital signs were monitored through out the  Procedure. A time out was completed. The skin over the back was prepped and draped in sterile manner. The target point was marked at the junction of Transverse process and superior articular process at the target levels. Skin and deep tissues were anesthetized with 1 % lidocaine. A 25-gauge needlele was advanced to the target spots under fluoroscopy guidance in AP / Lateral and Oblique views. Then after negative aspiration contrast dye was injected with live fluoroscopy in AP views that showed  spread of the contrast with no epidural space and no vascular runoff or intrathecal spread. Finally 0.5 ml of treatment solution 0.5 % bupivacaine  was injected at each level. The needle was removed and a Band-Aid was placed over the needle  insertion site. The patient's vital signs remained stable and the patient tolerated the procedure well.       Electronically signed by Simone Navarrete MD on 5/23/2022 at 11:34 AM    SEDATION NOTE:    ASA CLASSIFICATION  2  MP   CLASSIFICATION  2    Moderate intravenous conscious sedation was supervised by Dr. Allison Tariq  The patient was independently monitored by a Registered Nurse assigned to the Procedure Room  Monitoring included automated blood pressure, continuous EKG, Capnography and continuous pulse oximetry. The detailed Conscious Record is permanently stored in the [facility].      The following is the conscious sedation record;  Start Time:  1124  End times:  1134  Duration:  10 minutes  MEDS GIVEN 1 MG VERSED AND 50 MCG FENTANYL

## 2022-05-23 NOTE — H&P
UPDATE:  Office visit pain clinic in [facility] with all required elements of H&P dated 05/09/2022  Patient seen preop, chart reviewed,   No changes in medical history and health assessment since last evaluation. PE:  AAO x 3, in NAD, VSS,. Resp: breathing on RA, no respiratory distress  Cardiac: rate normal    Risk / Benefits explained to patient, patient agree to proceed with plan.   ASA 2  MP 2

## 2022-06-06 ENCOUNTER — HOSPITAL ENCOUNTER (EMERGENCY)
Age: 47
Discharge: HOME | End: 2022-06-06
Attending: EMERGENCY MEDICINE
Payer: COMMERCIAL

## 2022-06-06 ENCOUNTER — APPOINTMENT (OUTPATIENT)
Dept: GENERAL RADIOLOGY | Age: 47
End: 2022-06-06
Payer: COMMERCIAL

## 2022-06-06 VITALS
OXYGEN SATURATION: 97 % | HEART RATE: 99 BPM | RESPIRATION RATE: 18 BRPM | SYSTOLIC BLOOD PRESSURE: 153 MMHG | DIASTOLIC BLOOD PRESSURE: 85 MMHG | BODY MASS INDEX: 28.04 KG/M2 | TEMPERATURE: 98.5 F | HEIGHT: 68 IN | WEIGHT: 185 LBS

## 2022-06-06 DIAGNOSIS — R00.2 PALPITATIONS: Primary | ICD-10-CM

## 2022-06-06 LAB
ABSOLUTE EOS #: 0.1 K/UL (ref 0–0.4)
ABSOLUTE LYMPH #: 1.5 K/UL (ref 1–4.8)
ABSOLUTE MONO #: 0.6 K/UL (ref 0.1–1.3)
ANION GAP SERPL CALCULATED.3IONS-SCNC: 11 MMOL/L (ref 9–17)
BASOPHILS # BLD: 1 % (ref 0–2)
BASOPHILS ABSOLUTE: 0.1 K/UL (ref 0–0.2)
BUN BLDV-MCNC: 10 MG/DL (ref 6–20)
CALCIUM SERPL-MCNC: 9 MG/DL (ref 8.6–10.4)
CHLORIDE BLD-SCNC: 101 MMOL/L (ref 98–107)
CO2: 24 MMOL/L (ref 20–31)
CREAT SERPL-MCNC: 0.83 MG/DL (ref 0.7–1.2)
D-DIMER QUANTITATIVE: 0.33 MG/L FEU (ref 0–0.59)
EOSINOPHILS RELATIVE PERCENT: 1 % (ref 0–4)
GFR AFRICAN AMERICAN: >60 ML/MIN
GFR NON-AFRICAN AMERICAN: >60 ML/MIN
GFR SERPL CREATININE-BSD FRML MDRD: ABNORMAL ML/MIN/{1.73_M2}
GLUCOSE BLD-MCNC: 184 MG/DL (ref 70–99)
HCT VFR BLD CALC: 45.2 % (ref 41–53)
HEMOGLOBIN: 15.5 G/DL (ref 13.5–17.5)
LYMPHOCYTES # BLD: 19 % (ref 24–44)
MAGNESIUM: 2 MG/DL (ref 1.6–2.6)
MCH RBC QN AUTO: 31.4 PG (ref 26–34)
MCHC RBC AUTO-ENTMCNC: 34.2 G/DL (ref 31–37)
MCV RBC AUTO: 91.8 FL (ref 80–100)
MONOCYTES # BLD: 8 % (ref 1–7)
PDW BLD-RTO: 14.2 % (ref 11.5–14.9)
PLATELET # BLD: 449 K/UL (ref 150–450)
PMV BLD AUTO: 6.5 FL (ref 6–12)
POTASSIUM SERPL-SCNC: 4.3 MMOL/L (ref 3.7–5.3)
PRO-BNP: 62 PG/ML
RBC # BLD: 4.93 M/UL (ref 4.5–5.9)
SEG NEUTROPHILS: 71 % (ref 36–66)
SEGMENTED NEUTROPHILS ABSOLUTE COUNT: 5.5 K/UL (ref 1.3–9.1)
SODIUM BLD-SCNC: 136 MMOL/L (ref 135–144)
TROPONIN, HIGH SENSITIVITY: 11 NG/L (ref 0–22)
TROPONIN, HIGH SENSITIVITY: 12 NG/L (ref 0–22)
TSH SERPL DL<=0.05 MIU/L-ACNC: 0.89 UIU/ML (ref 0.3–5)
WBC # BLD: 7.8 K/UL (ref 3.5–11)

## 2022-06-06 PROCEDURE — 36415 COLL VENOUS BLD VENIPUNCTURE: CPT

## 2022-06-06 PROCEDURE — 99285 EMERGENCY DEPT VISIT HI MDM: CPT

## 2022-06-06 PROCEDURE — 83880 ASSAY OF NATRIURETIC PEPTIDE: CPT

## 2022-06-06 PROCEDURE — 71046 X-RAY EXAM CHEST 2 VIEWS: CPT

## 2022-06-06 PROCEDURE — 80048 BASIC METABOLIC PNL TOTAL CA: CPT

## 2022-06-06 PROCEDURE — 93005 ELECTROCARDIOGRAM TRACING: CPT | Performed by: EMERGENCY MEDICINE

## 2022-06-06 PROCEDURE — 2580000003 HC RX 258: Performed by: EMERGENCY MEDICINE

## 2022-06-06 PROCEDURE — 85025 COMPLETE CBC W/AUTO DIFF WBC: CPT

## 2022-06-06 PROCEDURE — 84443 ASSAY THYROID STIM HORMONE: CPT

## 2022-06-06 PROCEDURE — 85379 FIBRIN DEGRADATION QUANT: CPT

## 2022-06-06 PROCEDURE — 83735 ASSAY OF MAGNESIUM: CPT

## 2022-06-06 PROCEDURE — 96361 HYDRATE IV INFUSION ADD-ON: CPT

## 2022-06-06 PROCEDURE — 96360 HYDRATION IV INFUSION INIT: CPT

## 2022-06-06 PROCEDURE — 84484 ASSAY OF TROPONIN QUANT: CPT

## 2022-06-06 RX ORDER — 0.9 % SODIUM CHLORIDE 0.9 %
1000 INTRAVENOUS SOLUTION INTRAVENOUS ONCE
Status: COMPLETED | OUTPATIENT
Start: 2022-06-06 | End: 2022-06-06

## 2022-06-06 RX ADMIN — SODIUM CHLORIDE 1000 ML: 9 INJECTION, SOLUTION INTRAVENOUS at 10:46

## 2022-06-06 ASSESSMENT — ENCOUNTER SYMPTOMS
VOMITING: 0
COLOR CHANGE: 0
TROUBLE SWALLOWING: 0
CONSTIPATION: 0
SORE THROAT: 0
SHORTNESS OF BREATH: 0
ABDOMINAL PAIN: 0
NAUSEA: 0
BLOOD IN STOOL: 0
DIARRHEA: 0
COUGH: 0
BACK PAIN: 0

## 2022-06-06 NOTE — ED PROVIDER NOTES
[address] [facility]  EMERGENCY DEPARTMENT ENCOUNTER    Pt Name: Lelo Leong  MRN: [id number]  YOB: 1975  Date of evaluation:6/6/22  PCP: Lali Rodriguez MD    CHIEF COMPLAINT       Chief Complaint   Patient presents with    Palpitations       HISTORY OF PRESENT ILLNESS    Lelo Leong is a 52 y.o. male who presents with a chief complaint of palpitations. Patient has had palpitations and a racing heart since last night. No chest pain. Also feels anxious. No nausea, vomiting or diarrhea. He states he does have a history of an MI when he was 28 but this does not feel similar. No recent fevers, chills other illnesses. Symptoms are acute. Symptomatic. Nothing else make symptoms better or worse. Patient has no other complaints at this time. REVIEW OF SYSTEMS       Review of Systems   Constitutional: Negative for chills, fatigue and fever. HENT: Negative for congestion, ear pain, sore throat and trouble swallowing. Eyes: Negative for visual disturbance. Respiratory: Negative for cough and shortness of breath. Cardiovascular: Positive for palpitations. Negative for chest pain and leg swelling. Gastrointestinal: Negative for abdominal pain, blood in stool, constipation, diarrhea, nausea and vomiting. Genitourinary: Negative for dysuria and flank pain. Musculoskeletal: Negative for arthralgias, back pain, myalgias and neck pain. Skin: Negative for color change, rash and wound. Neurological: Negative for dizziness, weakness, light-headedness, numbness and headaches. Psychiatric/Behavioral: Negative for confusion. The patient is nervous/anxious. All other systems reviewed and are negative. Negative in 10 essential Systems except as mentioned above and in the HPI.         PAST MEDICAL HISTORY     Past Medical History:   Diagnosis Date    Anxiety     Asthma     CAD (coronary artery disease)     Chronic back pain     Diabetes mellitus (HCC)     GERD (gastroesophageal reflux disease)      heart burn    History of MI (myocardial infarction) 5/8/2017    Hx of heart artery stent     heart stent x 1    Neuropathy          SURGICAL HISTORY      has a past surgical history that includes Coronary angioplasty with stent (2010); Tonsillectomy; Cardiac surgery; and Pain management procedure. CURRENT MEDICATIONS       Current Discharge Medication List      CONTINUE these medications which have NOT CHANGED    Details   cyclobenzaprine (FLEXERIL) 10 MG tablet Take 1 tablet by mouth 2 times daily as needed for Muscle spasms  Qty: 60 tablet, Refills: 3    Associated Diagnoses: Muscle spasm      omeprazole (PRILOSEC) 20 MG delayed release capsule TAKE 1 CAPSULE BY MOUTH TWICE DAILY BEFORE MEAL(S)  Qty: 180 capsule, Refills: 0    Associated Diagnoses: Gastroesophageal reflux disease with esophagitis without hemorrhage      albuterol sulfate HFA (PROVENTIL HFA) 108 (90 Base) MCG/ACT inhaler Inhale 1-2 puffs into the lungs every 6 hours as needed for Wheezing or Shortness of Breath (Space out to every 6 hours as symptoms improve)  Qty: 3 each, Refills: 1    Comments: May sub covered product  Associated Diagnoses: Mild intermittent asthma without complication      gabapentin (NEURONTIN) 300 MG capsule TAKE 1 CAPSULE BY MOUTH THREE TIMES DAILY  Qty: 90 capsule, Refills: 3      metoprolol tartrate (LOPRESSOR) 25 MG tablet Take 1/2 (one-half) tablet by mouth twice daily  Qty: 30 tablet, Refills: 11    Associated Diagnoses: Essential hypertension;  Tachycardia      sildenafil (VIAGRA) 100 MG tablet Take 1 tablet by mouth daily as needed for Erectile Dysfunction  Qty: 10 tablet, Refills: 11    Associated Diagnoses: Erectile dysfunction due to diseases classified elsewhere      metFORMIN (GLUCOPHAGE) 500 MG tablet Take 1 tablet by mouth 2 times daily (with meals)  Qty: 180 tablet, Refills: 3    Associated Diagnoses: Type 2 diabetes mellitus without complication, without long-term current use of insulin (HCC)      naproxen (NAPROSYN) 500 MG tablet Take 1 tablet by mouth 2 times daily (with meals)  Qty: 180 tablet, Refills: 1    Associated Diagnoses: Lumbar back pain      testosterone (ANDROGEL; TESTIM) 50 MG/5GM (1%) GEL 1% gel APPLY 1 PACKET TO SHOULDERS ONCE DAILY      vitamin D (ERGOCALCIFEROL) 1.25 MG (39973 UT) CAPS capsule TAKE 1 CAPSULE BY MOUTH EVERY OTHER WEEK      ibuprofen (ADVIL;MOTRIN) 800 MG tablet Take 1 tablet by mouth every 8 hours as needed for Pain  Qty: 30 tablet, Refills: 0      glipiZIDE (GLUCOTROL) 10 MG tablet TAKE 1 TABLET BY MOUTH IN THE MORNING AND 1 TABLET AT SUPPER      aspirin 81 MG tablet Take 81 mg by mouth daily. ALLERGIES     is allergic to statins. FAMILY HISTORY     He indicated that his mother is alive. He indicated that his father is . He indicated that his sister is alive. He indicated that his brother is alive. family history includes Arrhythmia in his brother; Diabetes in his father and mother. SOCIAL HISTORY      reports that he has been smoking cigarettes. He has a 10.50 pack-year smoking history. He has never used smokeless tobacco. He reports current alcohol use. He reports current drug use. Drug: Marijuana [place] Other). PHYSICAL EXAM     INITIAL VITALS:  height is 5' 8\" (1.727 m) and weight is 185 lb (83.9 kg). His oral temperature is 98.5 °F (36.9 °C). His blood pressure is 153/85 (abnormal) and his pulse is 99. His respiration is 18 and oxygen saturation is 97%. Physical Exam  Vitals and nursing note reviewed. Constitutional:       General: He is not in acute distress. HENT:      Head: Normocephalic and atraumatic. Eyes:      Conjunctiva/sclera: Conjunctivae normal.      Pupils: Pupils are equal, round, and reactive to light. Cardiovascular:      Rate and Rhythm: Regular rhythm. Tachycardia present. Heart sounds: Normal heart sounds. No murmur heard.       Pulmonary:      Effort: Pulmonary effort is TROPONIN   TROPONIN   BRAIN NATRIURETIC PEPTIDE   D-DIMER, QUANTITATIVE   MAGNESIUM   TSH WITH REFLEX         EMERGENCY DEPARTMENT COURSE:   Vitals:    Vitals:    06/06/22 1210 06/06/22 1225 06/06/22 1240 06/06/22 1309   BP:    (!) 153/85   Pulse: (!) 102 (!) 102 (!) 103 99   Resp:    18   Temp:       TempSrc:       SpO2: 98% 96% 95% 97%   Weight:       Height:         Work-up was unremarkable here. 2 troponins are negative. D-dimer is negative. Very low suspicion for ACS or PE. He actually feels better here after IV fluids. I think he may have been a little bit dehydrated. Advised to take it easy for the next day or 2, keep himself hydrated, return if any symptoms worsen. He is agreeable to plan will be discharged home today. CRITICALCARE:      CONSULTS:  None      PROCEDURES:      FINAL IMPRESSION      1. Palpitations            DISPOSITION/PLAN   DISPOSITION Decision To Discharge 06/06/2022 01:42:31 PM          PATIENT REFERRED TO:  Philip Young, [address] [facility] [address]  [phone]    Schedule an appointment as soon as possible for a visit       [facility] ED  [place] [id number] [phone]    As needed, If symptoms worsen      DISCHARGE MEDICATIONS:  Current Discharge Medication List          The care is provided during an unprecedented national emergency due to the novel coronavirus, COVID-19.     (Please note that portions ofthis note were completed with a voice recognition program.  Efforts were made to edit the dictations but occasionally words are mis-transcribed.)    Ho-Chunk Medico, DO  Attending Emergency Physician          Ho-Chunk Medico, DO  06/06/22 [id number]

## 2022-06-06 NOTE — Clinical Note
Yu Hou was seen and treated in our emergency department on 6/6/2022. He may return to work on 06/07/2022. If you have any questions or concerns, please don't hesitate to call.       Salome Fonseca, DO

## 2022-06-06 NOTE — ED TRIAGE NOTES
Mode of arrival (squad #, walk in, police, etc) : walk in        Chief complaint(s): Palpitations        Arrival Note (brief scenario, treatment PTA, etc). : Pt states he feels like his heart is skipping beats since last night. Pt states he had an NSTEMI at 28 and is currently taking metoprolol. C= \"Have you ever felt that you should Cut down on your drinking? \"  No  A= \"Have people Annoyed you by criticizing your drinking? \"  No  G= \"Have you ever felt bad or Guilty about your drinking? \"  No  E= \"Have you ever had a drink as an Eye-opener first thing in the morning to steady your nerves or to help a hangover? \"  No      Deferred []      Reason for deferring: N/A    *If yes to two or more: probable alcohol abuse. *

## 2022-06-07 LAB
EKG ATRIAL RATE: 109 BPM
EKG P AXIS: 31 DEGREES
EKG P-R INTERVAL: 146 MS
EKG Q-T INTERVAL: 312 MS
EKG QRS DURATION: 88 MS
EKG QTC CALCULATION (BAZETT): 420 MS
EKG R AXIS: 48 DEGREES
EKG T AXIS: 15 DEGREES
EKG VENTRICULAR RATE: 109 BPM

## 2022-06-07 PROCEDURE — 93010 ELECTROCARDIOGRAM REPORT: CPT | Performed by: INTERNAL MEDICINE

## 2022-06-16 ENCOUNTER — HOSPITAL ENCOUNTER (OUTPATIENT)
Dept: PAIN MANAGEMENT | Age: 47
Discharge: HOME | End: 2022-06-16
Payer: COMMERCIAL

## 2022-06-16 VITALS
OXYGEN SATURATION: 93 % | SYSTOLIC BLOOD PRESSURE: 159 MMHG | HEART RATE: 131 BPM | DIASTOLIC BLOOD PRESSURE: 91 MMHG | BODY MASS INDEX: 28.04 KG/M2 | WEIGHT: 185 LBS | HEIGHT: 68 IN

## 2022-06-16 DIAGNOSIS — M48.061 NEUROFORAMINAL STENOSIS OF LUMBAR SPINE: ICD-10-CM

## 2022-06-16 DIAGNOSIS — M47.817 LUMBOSACRAL SPONDYLOSIS WITHOUT MYELOPATHY: Primary | Chronic | ICD-10-CM

## 2022-06-16 DIAGNOSIS — M51.36 DDD (DEGENERATIVE DISC DISEASE), LUMBAR: ICD-10-CM

## 2022-06-16 DIAGNOSIS — M54.16 LUMBAR RADICULOPATHY: ICD-10-CM

## 2022-06-16 PROCEDURE — 99213 OFFICE O/P EST LOW 20 MIN: CPT

## 2022-06-16 PROCEDURE — 99213 OFFICE O/P EST LOW 20 MIN: CPT | Performed by: NURSE PRACTITIONER

## 2022-06-16 ASSESSMENT — ENCOUNTER SYMPTOMS
BACK PAIN: 1
SHORTNESS OF BREATH: 0
BOWEL INCONTINENCE: 0
ABDOMINAL PAIN: 0
COUGH: 0
CONSTIPATION: 0

## 2022-06-16 ASSESSMENT — PAIN DESCRIPTION - FREQUENCY: FREQUENCY: CONTINUOUS

## 2022-06-16 ASSESSMENT — PAIN DESCRIPTION - LOCATION: LOCATION: BACK

## 2022-06-16 ASSESSMENT — PAIN DESCRIPTION - PAIN TYPE: TYPE: CHRONIC PAIN

## 2022-06-16 ASSESSMENT — PAIN DESCRIPTION - DIRECTION: RADIATING_TOWARDS: LEFT FOOT

## 2022-06-16 ASSESSMENT — PAIN DESCRIPTION - ORIENTATION: ORIENTATION: LOWER

## 2022-06-16 ASSESSMENT — PAIN DESCRIPTION - DESCRIPTORS: DESCRIPTORS: STABBING;SHOOTING

## 2022-06-16 ASSESSMENT — PAIN SCALES - GENERAL: PAINLEVEL_OUTOF10: 7

## 2022-06-16 ASSESSMENT — PAIN DESCRIPTION - PROGRESSION: CLINICAL_PROGRESSION: NOT CHANGED

## 2022-06-16 NOTE — PROGRESS NOTES
Chief Complaint   Patient presents with    Back Pain     FU after Left Lumbar Medial Branch nerve Blocks at the transverse processes of, L4, L5          [facility]     Pt complains of back pain. MRI lumbar with moderate degenerative changes and disc protrusion at L4-5 causing mild canal stenosis. He did see Dr. Taylor Pimentel who recommended PT and LESI. EMG completed last year shows diabetic polyneuropathy and L5 radiculopathy. Pt had LESI on 1/25/22. He reported 50% relief. He states he is having pain seemingly lower in the back and on the left side and into the buttock.     Dr Taylor Pimentel believes patient is a possible candidate for facet blocks/facet rhizotomies and if that fails potentially a candidate for spinal cord stimulator    Here today for f/u after Left Lumbar Medial Branch nerve Blocks at the transverse processes of, L4, L5 and ala  and reports 100% for 4 hours and back to baseline after 6 hours and would like to proceed with confirmatory injection    HPI:     Back Pain  This is a chronic problem. The current episode started more than 1 year ago. The problem occurs constantly. The problem is unchanged. The pain is present in the lumbar spine. The quality of the pain is described as shooting and stabbing. The pain radiates to the left foot. The pain is at a severity of 7/10. The pain is worse during the night. The symptoms are aggravated by lying down, standing, sitting, bending and twisting. Associated symptoms include headaches, leg pain, numbness and tingling. Pertinent negatives include no abdominal pain, bladder incontinence, bowel incontinence, chest pain, dysuria, fever, pelvic pain or weakness. Treatments tried: MBB. The treatment provided significant (first 6 hours) relief. Patient denies any new neurological symptoms. No bowel or bladder incontinence, no weakness, and no falling.               Past Medical History:   Diagnosis Date    Anxiety     Asthma     CAD (coronary artery disease)     Chronic back pain     Diabetes mellitus ([facility] [address]     GERD (gastroesophageal reflux disease)      heart burn    History of MI (myocardial infarction) 5/8/2017    Hx of heart artery stent     heart stent x 1    Neuropathy        Past Surgical History:   Procedure Laterality Date    CARDIAC SURGERY      CORONARY ANGIOPLASTY WITH STENT PLACEMENT  2010    heart stent x 1    PAIN MANAGEMENT PROCEDURE      TONSILLECTOMY         Allergies   Allergen Reactions    Statins      \"don't feel right\"         Current Outpatient Medications:     sildenafil (VIAGRA) 100 MG tablet, TAKE 1 TABLET BY MOUTH ONCE DAILY AS NEEDED FOR ERECTILE DYSFUNCTION, Disp: 10 tablet, Rfl: 11    cyclobenzaprine (FLEXERIL) 10 MG tablet, Take 1 tablet by mouth 2 times daily as needed for Muscle spasms, Disp: 60 tablet, Rfl: 3    omeprazole (PRILOSEC) 20 MG delayed release capsule, TAKE 1 CAPSULE BY MOUTH TWICE DAILY BEFORE MEAL(S), Disp: 180 capsule, Rfl: 0    albuterol sulfate HFA (PROVENTIL HFA) 108 (90 Base) MCG/ACT inhaler, Inhale 1-2 puffs into the lungs every 6 hours as needed for Wheezing or Shortness of Breath (Space out to every 6 hours as symptoms improve), Disp: 3 each, Rfl: 1    gabapentin (NEURONTIN) 300 MG capsule, TAKE 1 CAPSULE BY MOUTH THREE TIMES DAILY, Disp: 90 capsule, Rfl: 3    metoprolol tartrate (LOPRESSOR) 25 MG tablet, Take 1/2 (one-half) tablet by mouth twice daily, Disp: 30 tablet, Rfl: 11    metFORMIN (GLUCOPHAGE) 500 MG tablet, Take 1 tablet by mouth 2 times daily (with meals), Disp: 180 tablet, Rfl: 3    naproxen (NAPROSYN) 500 MG tablet, Take 1 tablet by mouth 2 times daily (with meals), Disp: 180 tablet, Rfl: 1    testosterone (ANDROGEL; TESTIM) 50 MG/5GM (1%) GEL 1% gel, APPLY 1 PACKET TO SHOULDERS ONCE DAILY, Disp: , Rfl:     vitamin D (ERGOCALCIFEROL) 1.25 MG (91197 UT) CAPS capsule, TAKE 1 CAPSULE BY MOUTH EVERY OTHER WEEK, Disp: , Rfl:     ibuprofen (ADVIL;MOTRIN) 800 MG tablet, Take 1 tablet by mouth every 8 hours as needed for Pain, Disp: 30 tablet, Rfl: 0    glipiZIDE (GLUCOTROL) 10 MG tablet, TAKE 1 TABLET BY MOUTH IN THE MORNING AND 1 TABLET AT SUPPER, Disp: , Rfl:     aspirin 81 MG tablet, Take 81 mg by mouth daily. , Disp: , Rfl:     Family History   Problem Relation Age of Onset    Diabetes Mother     Diabetes Father     Arrhythmia Brother        Social History     Socioeconomic History    Marital status: Single     Spouse name: Not on file    Number of children: Not on file    Years of education: Not on file    Highest education level: Not on file   Occupational History    Not on file   Tobacco Use    Smoking status: Current Every Day Smoker     Packs/day: 0.50     Years: 21.00     Pack years: 10.50     Types: Cigarettes    Smokeless tobacco: Never Used   Vaping Use    Vaping Use: Never used   Substance and Sexual Activity    Alcohol use: Yes     Comment:  socially occ beer    Drug use: Yes     Types: Marijuana (Weed)     Comment: uses edibles last use 11-2-21    Sexual activity: Not on file   Other Topics Concern    Not on file   Social History Narrative    Not on file     Social Determinants of Health     Financial Resource Strain:     Difficulty of Paying Living Expenses: Not on file   Food Insecurity:     Worried About [address] in the Last Year: Not on file    [place] of Food in the Last Year: Not on file   Transportation Needs:     Lack of Transportation (Medical): Not on file    Lack of Transportation (Non-Medical):  Not on file   Physical Activity:     Days of Exercise per Week: Not on file    Minutes of Exercise per Session: Not on file   Stress:     Feeling of Stress : Not on file   Social Connections:     Frequency of Communication with Friends and Family: Not on file    Frequency of Social Gatherings with Friends and Family: Not on file    Attends Denominational Services: Not on file    Active Member of Clubs or Organizations: Not on file    Attends Club or Organization Meetings: Not on file    Marital Status: Not on file   Intimate Partner Violence:     Fear of Current or Ex-Partner: Not on file    Emotionally Abused: Not on file    Physically Abused: Not on file    Sexually Abused: Not on file   Housing Stability:     Unable to Pay for Housing in the Last Year: Not on file    Number of [place] in the Last Year: Not on file    Unstable Housing in the Last Year: Not on file       Review of Systems:  Review of Systems   Constitutional: Negative for chills and fever. Cardiovascular: Negative for chest pain. Respiratory: Negative for cough and shortness of breath. Musculoskeletal: Positive for back pain. Gastrointestinal: Negative for abdominal pain, bowel incontinence and constipation. Genitourinary: Negative for bladder incontinence, dysuria and pelvic pain. Neurological: Positive for headaches, numbness and tingling. Negative for weakness. Physical Exam:  BP (!) 159/91   Pulse (!) 131   Ht 5' 8\" (1.727 m)   Wt 185 lb (83.9 kg)   SpO2 93%   BMI 28.13 kg/m²     Physical Exam  Cardiovascular:      Rate and Rhythm: Normal rate. Pulmonary:      Effort: Pulmonary effort is normal.   Musculoskeletal:         General: Normal range of motion. Skin:     General: Skin is warm and dry. Neurological:      Mental Status: He is alert and oriented to person, place, and time.            Assessment:  Problem List Items Addressed This Visit     Lumbosacral spondylosis without myelopathy - Primary (Chronic)    Relevant Orders    IN INJ DX/THER AGNT PARAVERT FACET JOINT, LUMBAR/SAC, ADD LEVEL    Lumbar radiculopathy    Relevant Orders    IN INJ DX/THER AGNT PARAVERT FACET JOINT, LUMBAR/SAC, ADD LEVEL    DDD (degenerative disc disease), lumbar    Relevant Orders    IN INJ DX/THER AGNT PARAVERT FACET JOINT, LUMBAR/SAC, ADD LEVEL    Neuroforaminal stenosis of lumbar spine             Treatment Plan:    Patient relates significant relief from recent intervention  Confirmatory Left Lumbar Medial Branch nerve Blocks at the transverse processes of, L4, L5 and ala ordered      I have reviewed the chief complaint and history of present illness (including ROS and PFSH) and vital documentation by my staff and I agree with their documentation and have added where applicable.

## 2022-06-26 ENCOUNTER — HOSPITAL ENCOUNTER (EMERGENCY)
Age: 47
Discharge: ANOTHER HOSPITAL | End: 2022-06-26
Attending: STUDENT IN AN ORGANIZED HEALTH CARE EDUCATION/TRAINING PROGRAM
Payer: COMMERCIAL

## 2022-06-26 ENCOUNTER — HOSPITAL ENCOUNTER (INPATIENT)
Age: 47
LOS: 3 days | Discharge: HOME | DRG: 246 | End: 2022-06-29
Attending: INTERNAL MEDICINE | Admitting: INTERNAL MEDICINE
Payer: COMMERCIAL

## 2022-06-26 ENCOUNTER — APPOINTMENT (OUTPATIENT)
Dept: GENERAL RADIOLOGY | Age: 47
End: 2022-06-26
Payer: COMMERCIAL

## 2022-06-26 VITALS
TEMPERATURE: 97.8 F | OXYGEN SATURATION: 95 % | RESPIRATION RATE: 23 BRPM | WEIGHT: 185 LBS | SYSTOLIC BLOOD PRESSURE: 140 MMHG | BODY MASS INDEX: 28.13 KG/M2 | HEART RATE: 112 BPM | DIASTOLIC BLOOD PRESSURE: 77 MMHG

## 2022-06-26 DIAGNOSIS — I21.3 ST ELEVATION MYOCARDIAL INFARCTION (STEMI), UNSPECIFIED ARTERY (HCC): Primary | ICD-10-CM

## 2022-06-26 LAB
ABSOLUTE EOS #: 0.3 K/UL (ref 0–0.4)
ABSOLUTE LYMPH #: 2.3 K/UL (ref 1–4.8)
ABSOLUTE MONO #: 0.7 K/UL (ref 0.1–1.3)
ACTIVATED CLOTTING TIME: 326 SEC (ref 79–149)
ALBUMIN SERPL-MCNC: 4 G/DL (ref 3.5–5.2)
ALP BLD-CCNC: 49 U/L (ref 40–129)
ALT SERPL-CCNC: 35 U/L (ref 5–41)
ANION GAP SERPL CALCULATED.3IONS-SCNC: 13 MMOL/L (ref 9–17)
AST SERPL-CCNC: 22 U/L
BASOPHILS # BLD: 1 % (ref 0–2)
BASOPHILS ABSOLUTE: 0.1 K/UL (ref 0–0.2)
BILIRUB SERPL-MCNC: 0.29 MG/DL (ref 0.3–1.2)
BUN BLDV-MCNC: 9 MG/DL (ref 6–20)
CALCIUM SERPL-MCNC: 8.9 MG/DL (ref 8.6–10.4)
CHLORIDE BLD-SCNC: 101 MMOL/L (ref 98–107)
CO2: 23 MMOL/L (ref 20–31)
CREAT SERPL-MCNC: 1.27 MG/DL (ref 0.7–1.2)
EOSINOPHILS RELATIVE PERCENT: 4 % (ref 0–4)
GFR AFRICAN AMERICAN: >60 ML/MIN
GFR NON-AFRICAN AMERICAN: >60 ML/MIN
GFR SERPL CREATININE-BSD FRML MDRD: ABNORMAL ML/MIN/{1.73_M2}
GLUCOSE BLD-MCNC: 142 MG/DL (ref 70–99)
HCT VFR BLD CALC: 41.2 % (ref 41–53)
HEMOGLOBIN: 14 G/DL (ref 13.5–17.5)
INFLUENZA A: NOT DETECTED
INFLUENZA B: NOT DETECTED
LYMPHOCYTES # BLD: 33 % (ref 24–44)
MCH RBC QN AUTO: 31.3 PG (ref 26–34)
MCHC RBC AUTO-ENTMCNC: 33.9 G/DL (ref 31–37)
MCV RBC AUTO: 92.1 FL (ref 80–100)
MONOCYTES # BLD: 10 % (ref 1–7)
PDW BLD-RTO: 14.4 % (ref 11.5–14.9)
PLATELET # BLD: 415 K/UL (ref 150–450)
PMV BLD AUTO: 6.4 FL (ref 6–12)
POTASSIUM SERPL-SCNC: 4 MMOL/L (ref 3.7–5.3)
RBC # BLD: 4.47 M/UL (ref 4.5–5.9)
SARS-COV-2 RNA, RT PCR: NOT DETECTED
SEG NEUTROPHILS: 52 % (ref 36–66)
SEGMENTED NEUTROPHILS ABSOLUTE COUNT: 3.5 K/UL (ref 1.3–9.1)
SODIUM BLD-SCNC: 137 MMOL/L (ref 135–144)
SOURCE: NORMAL
SPECIMEN DESCRIPTION: NORMAL
TOTAL PROTEIN: 6.5 G/DL (ref 6.4–8.3)
TROPONIN, HIGH SENSITIVITY: 29 NG/L (ref 0–22)
WBC # BLD: 6.8 K/UL (ref 3.5–11)

## 2022-06-26 PROCEDURE — C9600 PERC DRUG-EL COR STENT SING: HCPCS

## 2022-06-26 PROCEDURE — 93005 ELECTROCARDIOGRAM TRACING: CPT

## 2022-06-26 PROCEDURE — 80053 COMPREHEN METABOLIC PANEL: CPT

## 2022-06-26 PROCEDURE — 6360000002 HC RX W HCPCS: Performed by: STUDENT IN AN ORGANIZED HEALTH CARE EDUCATION/TRAINING PROGRAM

## 2022-06-26 PROCEDURE — C1725 CATH, TRANSLUMIN NON-LASER: HCPCS

## 2022-06-26 PROCEDURE — 93005 ELECTROCARDIOGRAM TRACING: CPT | Performed by: STUDENT IN AN ORGANIZED HEALTH CARE EDUCATION/TRAINING PROGRAM

## 2022-06-26 PROCEDURE — 96365 THER/PROPH/DIAG IV INF INIT: CPT

## 2022-06-26 PROCEDURE — 6370000000 HC RX 637 (ALT 250 FOR IP): Performed by: STUDENT IN AN ORGANIZED HEALTH CARE EDUCATION/TRAINING PROGRAM

## 2022-06-26 PROCEDURE — 92920 PRQ TRLUML C ANGIOP 1ART&/BR: CPT

## 2022-06-26 PROCEDURE — C1894 INTRO/SHEATH, NON-LASER: HCPCS

## 2022-06-26 PROCEDURE — C1769 GUIDE WIRE: HCPCS

## 2022-06-26 PROCEDURE — 2580000003 HC RX 258: Performed by: STUDENT IN AN ORGANIZED HEALTH CARE EDUCATION/TRAINING PROGRAM

## 2022-06-26 PROCEDURE — 6360000002 HC RX W HCPCS

## 2022-06-26 PROCEDURE — 6360000004 HC RX CONTRAST MEDICATION

## 2022-06-26 PROCEDURE — 99285 EMERGENCY DEPT VISIT HI MDM: CPT

## 2022-06-26 PROCEDURE — 87636 SARSCOV2 & INF A&B AMP PRB: CPT

## 2022-06-26 PROCEDURE — 85347 COAGULATION TIME ACTIVATED: CPT

## 2022-06-26 PROCEDURE — 2500000003 HC RX 250 WO HCPCS

## 2022-06-26 PROCEDURE — C1757 CATH, THROMBECTOMY/EMBOLECT: HCPCS

## 2022-06-26 PROCEDURE — 2000000000 HC ICU R&B

## 2022-06-26 PROCEDURE — B2151ZZ FLUOROSCOPY OF LEFT HEART USING LOW OSMOLAR CONTRAST: ICD-10-PCS | Performed by: INTERNAL MEDICINE

## 2022-06-26 PROCEDURE — C1874 STENT, COATED/COV W/DEL SYS: HCPCS

## 2022-06-26 PROCEDURE — 71045 X-RAY EXAM CHEST 1 VIEW: CPT

## 2022-06-26 PROCEDURE — 4A023N7 MEASUREMENT OF CARDIAC SAMPLING AND PRESSURE, LEFT HEART, PERCUTANEOUS APPROACH: ICD-10-PCS | Performed by: INTERNAL MEDICINE

## 2022-06-26 PROCEDURE — 02C13ZZ EXTIRPATION OF MATTER FROM CORONARY ARTERY, TWO ARTERIES, PERCUTANEOUS APPROACH: ICD-10-PCS | Performed by: INTERNAL MEDICINE

## 2022-06-26 PROCEDURE — 84484 ASSAY OF TROPONIN QUANT: CPT

## 2022-06-26 PROCEDURE — 6370000000 HC RX 637 (ALT 250 FOR IP)

## 2022-06-26 PROCEDURE — C1892 INTRO/SHEATH,FIXED,PEEL-AWAY: HCPCS

## 2022-06-26 PROCEDURE — C1887 CATHETER, GUIDING: HCPCS

## 2022-06-26 PROCEDURE — 96375 TX/PRO/DX INJ NEW DRUG ADDON: CPT

## 2022-06-26 PROCEDURE — B2111ZZ FLUOROSCOPY OF MULTIPLE CORONARY ARTERIES USING LOW OSMOLAR CONTRAST: ICD-10-PCS | Performed by: INTERNAL MEDICINE

## 2022-06-26 PROCEDURE — C1760 CLOSURE DEV, VASC: HCPCS

## 2022-06-26 PROCEDURE — 2709999900 HC NON-CHARGEABLE SUPPLY

## 2022-06-26 PROCEDURE — 85025 COMPLETE CBC W/AUTO DIFF WBC: CPT

## 2022-06-26 PROCEDURE — 93454 CORONARY ARTERY ANGIO S&I: CPT

## 2022-06-26 PROCEDURE — 36415 COLL VENOUS BLD VENIPUNCTURE: CPT

## 2022-06-26 PROCEDURE — 027137Z DILATION OF CORONARY ARTERY, TWO ARTERIES WITH FOUR OR MORE DRUG-ELUTING INTRALUMINAL DEVICES, PERCUTANEOUS APPROACH: ICD-10-PCS | Performed by: INTERNAL MEDICINE

## 2022-06-26 RX ORDER — HEPARIN SODIUM 1000 [USP'U]/ML
4000 INJECTION, SOLUTION INTRAVENOUS; SUBCUTANEOUS ONCE
Status: COMPLETED | OUTPATIENT
Start: 2022-06-26 | End: 2022-06-26

## 2022-06-26 RX ORDER — DOPAMINE HYDROCHLORIDE 160 MG/100ML
1-20 INJECTION, SOLUTION INTRAVENOUS CONTINUOUS
Status: DISCONTINUED | OUTPATIENT
Start: 2022-06-26 | End: 2022-06-26 | Stop reason: HOSPADM

## 2022-06-26 RX ORDER — ACETAMINOPHEN 325 MG/1
650 TABLET ORAL EVERY 4 HOURS PRN
Status: DISCONTINUED | OUTPATIENT
Start: 2022-06-26 | End: 2022-06-29 | Stop reason: HOSPADM

## 2022-06-26 RX ORDER — ASPIRIN 81 MG/1
81 TABLET, CHEWABLE ORAL DAILY
Status: DISCONTINUED | OUTPATIENT
Start: 2022-06-27 | End: 2022-06-29 | Stop reason: HOSPADM

## 2022-06-26 RX ORDER — FENTANYL CITRATE 50 UG/ML
50 INJECTION, SOLUTION INTRAMUSCULAR; INTRAVENOUS ONCE
Status: COMPLETED | OUTPATIENT
Start: 2022-06-26 | End: 2022-06-26

## 2022-06-26 RX ORDER — EPTIFIBATIDE 0.75 MG/ML
2 INJECTION, SOLUTION INTRAVENOUS CONTINUOUS
Status: DISPENSED | OUTPATIENT
Start: 2022-06-26 | End: 2022-06-26

## 2022-06-26 RX ORDER — 0.9 % SODIUM CHLORIDE 0.9 %
1000 INTRAVENOUS SOLUTION INTRAVENOUS ONCE
Status: DISCONTINUED | OUTPATIENT
Start: 2022-06-26 | End: 2022-06-26 | Stop reason: HOSPADM

## 2022-06-26 RX ORDER — HEPARIN SODIUM 1000 [USP'U]/ML
4000 INJECTION, SOLUTION INTRAVENOUS; SUBCUTANEOUS PRN
Status: DISCONTINUED | OUTPATIENT
Start: 2022-06-26 | End: 2022-06-26 | Stop reason: HOSPADM

## 2022-06-26 RX ORDER — ATROPINE SULFATE 0.1 MG/ML
INJECTION INTRAVENOUS
Status: DISCONTINUED
Start: 2022-06-26 | End: 2022-06-26 | Stop reason: HOSPADM

## 2022-06-26 RX ORDER — NOREPINEPHRINE BIT/0.9 % NACL 16MG/250ML
INFUSION BOTTLE (ML) INTRAVENOUS
Status: DISCONTINUED
Start: 2022-06-26 | End: 2022-06-26 | Stop reason: HOSPADM

## 2022-06-26 RX ORDER — ROSUVASTATIN CALCIUM 20 MG/1
40 TABLET, COATED ORAL NIGHTLY
Status: DISCONTINUED | OUTPATIENT
Start: 2022-06-26 | End: 2022-06-29 | Stop reason: HOSPADM

## 2022-06-26 RX ORDER — ATROPINE SULFATE 0.1 MG/ML
1 INJECTION INTRAVENOUS ONCE
Status: DISCONTINUED | OUTPATIENT
Start: 2022-06-26 | End: 2022-06-26 | Stop reason: HOSPADM

## 2022-06-26 RX ORDER — SODIUM CHLORIDE 0.9 % (FLUSH) 0.9 %
5-40 SYRINGE (ML) INJECTION PRN
Status: DISCONTINUED | OUTPATIENT
Start: 2022-06-26 | End: 2022-06-29 | Stop reason: HOSPADM

## 2022-06-26 RX ORDER — SODIUM CHLORIDE 9 MG/ML
INJECTION, SOLUTION INTRAVENOUS CONTINUOUS
Status: ACTIVE | OUTPATIENT
Start: 2022-06-26 | End: 2022-06-26

## 2022-06-26 RX ORDER — SODIUM CHLORIDE 0.9 % (FLUSH) 0.9 %
5-40 SYRINGE (ML) INJECTION EVERY 12 HOURS SCHEDULED
Status: DISCONTINUED | OUTPATIENT
Start: 2022-06-26 | End: 2022-06-29 | Stop reason: HOSPADM

## 2022-06-26 RX ORDER — LORAZEPAM 2 MG/ML
1 INJECTION INTRAMUSCULAR EVERY 6 HOURS PRN
Status: COMPLETED | OUTPATIENT
Start: 2022-06-26 | End: 2022-06-27

## 2022-06-26 RX ORDER — HEPARIN SODIUM 1000 [USP'U]/ML
2000 INJECTION, SOLUTION INTRAVENOUS; SUBCUTANEOUS PRN
Status: DISCONTINUED | OUTPATIENT
Start: 2022-06-26 | End: 2022-06-26 | Stop reason: HOSPADM

## 2022-06-26 RX ORDER — SODIUM CHLORIDE 9 MG/ML
INJECTION, SOLUTION INTRAVENOUS PRN
Status: DISCONTINUED | OUTPATIENT
Start: 2022-06-26 | End: 2022-06-29 | Stop reason: HOSPADM

## 2022-06-26 RX ORDER — CYCLOBENZAPRINE HCL 10 MG
10 TABLET ORAL 3 TIMES DAILY PRN
COMMUNITY
End: 2022-10-14

## 2022-06-26 RX ADMIN — METOPROLOL TARTRATE 25 MG: 25 TABLET ORAL at 08:39

## 2022-06-26 RX ADMIN — FENTANYL CITRATE 50 MCG: 50 INJECTION, SOLUTION INTRAMUSCULAR; INTRAVENOUS at 01:16

## 2022-06-26 RX ADMIN — EPTIFIBATIDE 2 MCG/KG/MIN: 75 INJECTION INTRAVENOUS at 08:42

## 2022-06-26 RX ADMIN — TICAGRELOR 90 MG: 90 TABLET ORAL at 08:39

## 2022-06-26 RX ADMIN — SODIUM CHLORIDE, PRESERVATIVE FREE 10 ML: 5 INJECTION INTRAVENOUS at 20:38

## 2022-06-26 RX ADMIN — HEPARIN SODIUM 12 UNITS/KG/HR: 10000 INJECTION, SOLUTION INTRAVENOUS; SUBCUTANEOUS at 01:10

## 2022-06-26 RX ADMIN — LORAZEPAM 1 MG: 2 INJECTION INTRAMUSCULAR; INTRAVENOUS at 20:32

## 2022-06-26 RX ADMIN — TICAGRELOR 90 MG: 90 TABLET ORAL at 20:32

## 2022-06-26 RX ADMIN — EPTIFIBATIDE 2 MCG/KG/MIN: 75 INJECTION INTRAVENOUS at 16:56

## 2022-06-26 RX ADMIN — HEPARIN SODIUM 4000 UNITS: 1000 INJECTION INTRAVENOUS; SUBCUTANEOUS at 01:09

## 2022-06-26 RX ADMIN — SODIUM CHLORIDE, PRESERVATIVE FREE 10 ML: 5 INJECTION INTRAVENOUS at 09:51

## 2022-06-26 RX ADMIN — METOPROLOL TARTRATE 25 MG: 25 TABLET ORAL at 20:32

## 2022-06-26 RX ADMIN — SODIUM CHLORIDE, PRESERVATIVE FREE 10 ML: 5 INJECTION INTRAVENOUS at 08:39

## 2022-06-26 RX ADMIN — LORAZEPAM 1 MG: 2 INJECTION INTRAMUSCULAR; INTRAVENOUS at 13:12

## 2022-06-26 ASSESSMENT — ENCOUNTER SYMPTOMS
FACIAL SWELLING: 0
SHORTNESS OF BREATH: 1
EYE PAIN: 0
VOMITING: 0
COUGH: 0
BACK PAIN: 0
SORE THROAT: 0
RHINORRHEA: 0
NAUSEA: 0
EYE REDNESS: 0
DIARRHEA: 0
ABDOMINAL PAIN: 0
COLOR CHANGE: 0

## 2022-06-26 ASSESSMENT — PAIN DESCRIPTION - DIRECTION: RADIATING_TOWARDS: LEGS

## 2022-06-26 ASSESSMENT — LIFESTYLE VARIABLES
HOW MANY STANDARD DRINKS CONTAINING ALCOHOL DO YOU HAVE ON A TYPICAL DAY: 1 OR 2
HOW OFTEN DO YOU HAVE A DRINK CONTAINING ALCOHOL: MONTHLY OR LESS

## 2022-06-26 ASSESSMENT — PAIN DESCRIPTION - LOCATION
LOCATION: CHEST
LOCATION: BACK

## 2022-06-26 ASSESSMENT — PAIN DESCRIPTION - FREQUENCY: FREQUENCY: CONTINUOUS

## 2022-06-26 ASSESSMENT — PAIN DESCRIPTION - DESCRIPTORS
DESCRIPTORS: ACHING
DESCRIPTORS: THROBBING

## 2022-06-26 ASSESSMENT — PAIN SCALES - GENERAL
PAINLEVEL_OUTOF10: 10
PAINLEVEL_OUTOF10: 8

## 2022-06-26 ASSESSMENT — PAIN DESCRIPTION - PAIN TYPE: TYPE: CHRONIC PAIN

## 2022-06-26 ASSESSMENT — PAIN DESCRIPTION - ORIENTATION: ORIENTATION: LOWER

## 2022-06-26 NOTE — ED NOTES
Report given to Hien Mills from [facility] crew.       [facility] [place]  06/26/22 3038

## 2022-06-26 NOTE — ED NOTES
Mode of arrival (squad #, walk in, police, etc) : EMS         Chief complaint(s): Chest pain         Arrival Note (brief scenario, treatment PTA, etc). : Pt states approx 4 hours ago while watching TV he developed chest pain describes it as sharp and heavy pressure, coming and going. SOB, and diaphoretic. Pt appears pale. Pt states he has a heart attack at 28 and has a stent now. Pt alert and oriented x4. Rating pain 7/10. EMS gave pt ASA        C= \"Have you ever felt that you should Cut down on your drinking? \"  No  A= \"Have people Annoyed you by criticizing your drinking? \"  No  G= \"Have you ever felt bad or Guilty about your drinking? \"  No  E= \"Have you ever had a drink as an Eye-opener first thing in the morning to steady your nerves or to help a hangover? \"  No      Deferred []      Reason for deferring: N/A    *If yes to two or more: probable alcohol abuse. Willis Navarro RN  06/26/22 [address], RN  06/26/22 [address], RN  06/26/22 0151

## 2022-06-26 NOTE — CARE COORDINATION
Case Management Initial Discharge Plan  Anitha Chan,             Met with:patient to discuss discharge plans. Information verified: address, contacts, phone number, , insurance Yes  Insurance Provider: Marika Sos: No    Emergency Contact/Next of Kin name & number: Spouse Brendon Wu  Who are involved in patient's support system? N/a, pt independent    PCP: Prabhu Baez MD  Date of last visit: 3-4 mo ago      Discharge Planning    Living Arrangements:  Spouse/Significant Other     Home has 2 stories   5 stairs to climb to get into front door, 16 stairs to climb to reach second floor  Location of bedroom/bathroom in home 2nd    Patient able to perform ADL's:Independent    Current Services (outpatient & in home) none  DME equipment: glucometer  DME provider:     Is patient receiving oral anticoagulation therapy? No    If indicated:   Physician managing anticoagulation treatment:   Where does patient obtain lab work for ATC treatment? Does patient have any issues/concerns obtaining medications? No  If yes, what are patient's concerns? Is there a preferred Pharmacy after hours or on weekends? Yes    If yes, which pharmacy? [facility]    Potential Assistance Needed:  N/A    Patient agreeable to home care: No  [place] of choice provided:  n/a    Prior SNF/Rehab Placement and Facility: none  Agreeable to SNF/Rehab: No  [place] of choice provided: n/a     Evaluation: no    Expected Discharge date:       Patient expects to be discharged to: If home: is the family and/or caregiver wiling & able to provide support at home? N/a pt independent, wife can help if needed  Who will be providing this support?  *    Follow Up Appointment: Best Day/ Time:     Transportation provider: wife  Transportation arrangements needed for discharge: No    Readmission Risk              Risk of Unplanned Readmission:  7             Does patient have a readmission risk score greater than 14?: No  If yes, follow-up appointment must be made within 7 days of discharge. Goals of Care: Recovering well after MI      Educated pt on transitional options, provided freedom of choice and are agreeable with plan      Discharge Plan: Home independently, wife can help if needed.           Electronically signed by Belkis Lee RN on 6/26/22 at 1:01 PM EDT

## 2022-06-26 NOTE — OP NOTE
[facility]    CARDIAC CATHETERIZATION    Date:   6/26/2022  Patient name:  Carmine Navas  Date of admission:  6/26/2022  1:55 AM  MRN:   [id number]  YOB: 1975    Operators:  Primary:   MARI Mcneill MD (Attending Physician)    Procedure performed:     [x] Left Heart Catheterization. [] Graft Angiography. [x] Left Ventriculography. [] Right Heart Catheterization. [x] Coronary Angiography. [] Aortic Valve Studies. [x] PCI:      [] Other:       Pre Procedure Conscious Sedation Data:  ASA Class:    [] I [x] II [] III [] IV    Mallampati Class:  [] I [x] II [] III [] IV      Indication:  [x] STEMI      [] + Stress test  [] ACS      [] + EKG Changes  [] Non Q MI       [] Significant Risk Factors  [] Recurrent Angina             [] Diabetes Mellitus    [] New LBBB      [] Other.  [] CHF / Low EF changes     [] Abnormal CTA / Ca Score      Procedure:  Access:  [x] Femoral  [] Radial  artery       [x] Right  [x] Left    Procedure: After informed consent was obtained with explanation of the risks and benefits, patient was brought to the cath lab. The access area was prepped and draped in sterile fashion. 1% lidocaine was used for local block. The artery was cannulated with 6  Fr sheath with brisk arterial blood return. The side port was frequently flushed and aspirated with normal saline. Estimated Blood Loss:  [x] Minimal < 25 cc [] Moderate 25-50 cc  [] >50 cc    Findings:  LAD:         Lesion on Prox LAD: Proximal subsection. 100% stenosis 30 mm length     reduced to 0%. Pre procedure TUSHAR 0 flow was noted. Post Procedure TUSHAR     III flow was present. The lesion was diagnosed as High Risk (C). The     lesion was diffuse and eccentric. The lesion showed mild angulation. Lesion     plaque is ruptured.         Comments:Post-dilation with 3.5 balloon after manual thrombectomy for     acute occlusion.     Devices used         - [place] Flex 180 cm. Number of passes: 1.       - Balloon of Resolute [place] 3.0 x 12 LAYLA. 4 inflation(s) to a max     pressure of: 15 caremn.         - Resolute [place] 3.0 x 30 LAYLA. 2 inflation(s) to a max pressure of: 20     carmen.         - ASAHI [place] Flex 180 cm. Number of passes: 1.         - Pronto Extraction Catheter. Number of passes: 1.         - NC Euphora Balloon 3.5mm x 20mm. 6 inflation(s) to a max pressure of:     20 carmen.       RCA:         Lesion on Mid RCA: 75% stenosis reduced to 0%. Pre procedure TUSHAR III     flow was noted. Post Procedure TUSHAR III flow was present. Good runoff was     present. The lesion was diagnosed as Moderate Risk (B). The lesion showed     evidence of thrombus presence. Lesion plaque is ruptured.         Comments:Manual thrombectomy. Another stent was placed at the distal edge     due to hazziness.     Devices used         - ASAHI [place] Flex 180 cm. Number of passes: 1.         - Resolute [place] 3.0 x 12 LAYLA. 2 inflation(s) to a max pressure of: 12     carmen.         - ASAHI [place] Flex 180 cm. Number of passes: 1.         - Pronto Extraction Catheter LP. Number of passes: 1.         - NC Euphora Balloon 3.0mm x 15mm. 2 inflation(s) to a max pressure of:     12 carmen.         - Resolute [place] 3.0 x 8 LAYLA. 3 inflation(s) to a max pressure of: 16     carmen.        Coronary Tree        Dominance: Right       LV Analysis   LV function assessed as:Normal.   Ejection Fraction   +----------------------------------------------------------------------+---+   ! Method                                                                !EF%! +----------------------------------------------------------------------+---+   ! LV gram                                                               !54 !    +----------------------------------------------------------------------+---+       Conclusions:   Procedure Summary        Inferior STEMI    Concern for embolism.    Possible clot in the proximal LAD instent and small distal inferior branch    of OM1 occlusing <1mm vessel.    Successful LAYLA of mid RCA for significant ulcerated plaque.    Successful PTCA/LAYLA of proximal LAD.    Distal OM1 branch too small for PCI.    At the end of case, patient complained of severe chest pain. ECG showed    inferior STEMI.    Repeat cath showed blood clot in mid RCA stent and occlusion of LAD stent.    Successful manual thrombectomy of mid RCA and LAD.    Another stent was placed at the distal edge of previous RCA stent.  LAD stent post-dilated with 3.5 NC balloon.        Recommendations        Medical therapy as needed.    Risk factor modification.    Routine Post Stent Orders.    IV integrilin for 18 hours.    30 day event monitor for evaluation of atrial fibrillation. Patient has    recent ER visit for palpitations.    Hematology consult.   Keshav Manners on Monday     Electronically signed on 6/26/2022 at 5:16 AM by:    Gio Jara MD  Fellow, [address]    I was present during entire procedure and performed all critical elements of the procedure.     Tatiana Barrios MD

## 2022-06-26 NOTE — H&P
[facility]   History & Physical               Today's Date: 6/26/2022  Patient Name: Anitha Chan  Date of admission: 6/26/2022  1:55 AM  Patient's age: 52 y.o., 1975  Admission Dx: STEMI    Reason for Admission:  STEMI    CHIEF COMPLAINT:    No chief complaint on file. History Obtained From:  patient, EMR    HISTORY OF PRESENT ILLNESS:    52year old male presents as a direct transfer from [facility] due to a STEMI presentation. Patient was in his usual state of health this evening when he developed sudden onset chest pain he describes as a pressure like sensation. EKG at [facility] showed ST elevations in inferior leads. Cath lab activated    Past Medical History:   has a past medical history of Anxiety, Asthma, CAD (coronary artery disease), Chronic back pain, Diabetes mellitus ([address] GERD (gastroesophageal reflux disease), History of MI (myocardial infarction), Hx of heart artery stent, and Neuropathy. Past Surgical History:   has a past surgical history that includes Coronary angioplasty with stent (2010); Tonsillectomy; Cardiac surgery; and Pain management procedure. Home Medications:    Prior to Admission medications    Medication Sig Start Date End Date Taking?  Authorizing Provider   sildenafil (VIAGRA) 100 MG tablet TAKE 1 TABLET BY MOUTH ONCE DAILY AS NEEDED FOR ERECTILE DYSFUNCTION 6/12/22   Prabhu Baez MD   omeprazole (PRILOSEC) 20 MG delayed release capsule TAKE 1 CAPSULE BY MOUTH TWICE DAILY BEFORE MEAL(S) 3/24/22   SEAN Falcon NP   albuterol sulfate HFA (PROVENTIL HFA) 108 (90 Base) MCG/ACT inhaler Inhale 1-2 puffs into the lungs every 6 hours as needed for Wheezing or Shortness of Breath (Space out to every 6 hours as symptoms improve) 3/8/22   SEAN Falcon NP   gabapentin (NEURONTIN) 300 MG capsule TAKE 1 CAPSULE BY MOUTH THREE TIMES DAILY 1/26/22 3/24/22  Mabel Garcia MD   metoprolol tartrate (LOPRESSOR) 25 MG tablet Take 1/2 (one-half) tablet by mouth twice daily 7/30/21   Sharon Mcdaniel MD   metFORMIN (GLUCOPHAGE) 500 MG tablet Take 1 tablet by mouth 2 times daily (with meals) 6/10/21   Sharon Mcdaniel MD   naproxen (NAPROSYN) 500 MG tablet Take 1 tablet by mouth 2 times daily (with meals) 3/16/21   Porter Salt, APRN - NP   testosterone (ANDROGEL; TESTIM) 50 MG/5GM (1%) GEL 1% gel APPLY 1 PACKET TO SHOULDERS ONCE DAILY 9/29/20   Historical Provider, MD   vitamin D (ERGOCALCIFEROL) 1.25 MG (75444 UT) CAPS capsule TAKE 1 CAPSULE BY MOUTH EVERY OTHER WEEK 11/5/20   Historical Provider, MD   ibuprofen (ADVIL;MOTRIN) 800 MG tablet Take 1 tablet by mouth every 8 hours as needed for Pain 10/18/20   Rosario Del Rio MD   glipiZIDE (GLUCOTROL) 10 MG tablet TAKE 1 TABLET BY MOUTH IN THE MORNING AND 1 TABLET AT SUPPER 4/28/20   Historical Provider, MD   aspirin 81 MG tablet Take 81 mg by mouth daily. Historical Provider, MD        No current facility-administered medications for this encounter. Allergies:  Statins    Social History:   reports that he has been smoking cigarettes. He has a 10.50 pack-year smoking history. He has never used smokeless tobacco. He reports current alcohol use. He reports current drug use. Drug: Marijuana Ester Postin). Family History: family history includes Arrhythmia in his brother; Diabetes in his father and mother. REVIEW OF SYSTEMS:      · Constitutional: there has been no unanticipated weight loss. · Eyes: No visual changes or diplopia. · ENT: No Headaches  · Cardiovascular:  Remaining as above  · Respiratory: No cough  · Gastrointestinal: No abdominal pain. No change in bowel or bladder habits. · Genitourinary: No dysuria, trouble voiding, or hematuria. · Neurological: No headache. PHYSICAL EXAM:      There were no vitals taken for this visit.    No intake or output data in the 24 hours ending 06/26/22 0205        Constitutional and General Appearance:   alert, cooperative, no distress and appears stated age  [de-identified]:  · PERRL, EOMI  Respiratory:  · Normal excursion and expansion without use of accessory muscles  · Resp Auscultation:  Good respiratory effort. No for increased work of breathing. On auscultation: clear to auscultation bilaterally  Cardiovascular:  · Regular rate and rhythm  · S1/S2  · No murmurs  · The apical impulse is not displaced  Abdomen:  · Soft  · Bowel sounds present  · Non-tender to palpation  Extremities:  · No cyanosis or clubbing  · Lower extremity edema: No  Skin:  · Warm and dry  Neurological:  · Alert and oriented. · Moves all extremities well        DATA:    Diagnostics:    EKG: inferolateral STEMI  Cardiac Angiography: Cardiac Arteries and Lesion Findings     LMCA: Mild irregularities 10-20%.     LAD: Mild irregularities 20-30%. patent mid LAD stent     LCx: Mild irregularities 10-20%.     RCA: Mild irregularities 20-30%.     Coronary Tree      Dominance: Right    Labs:     CBC:   Recent Labs     06/26/22 0052   WBC 6.8   HGB 14.0   HCT 41.2        BMP:   Recent Labs     06/26/22 0052      K 4.0   CO2 23   BUN 9   CREATININE 1.27*   LABGLOM >60   GLUCOSE 142*     Pro-BNP:  No results for input(s): PROBNP in the last 72 hours. BNP: No results for input(s): BNP in the last 72 hours. PT/INR: No results for input(s): PROTIME, INR in the last 72 hours. APTT:No results for input(s): APTT in the last 72 hours. CARDIAC ENZYMES:No results for input(s): CKTOTAL, CKMB, CKMBINDEX, TROPONINI in the last 72 hours. Invalid input(s):  TROPONINT  No results for input(s): TROPONINT in the last 72 hours. FASTING LIPID PANEL:  Lab Results   Component Value Date    HDL 33 04/17/2020    LDLCALC 144 04/17/2020    TRIG 104 04/17/2020     LIVER PROFILE:  Recent Labs     06/26/22 0052   AST 22   ALT 35   LABALBU 4.0         Patient's Active Problem List  Active Problems:    * No active hospital problems.  *  Resolved Problems:    * No resolved hospital

## 2022-06-26 NOTE — ED NOTES
Report given to Alok Beckman RN from [facility]   Report method by phone   The following was reviewed with receiving RN:   Current vital signs:  /74   Pulse (!) 108   Temp 97.8 °F (36.6 °C) (Oral)   Resp 22   Wt 185 lb (83.9 kg)   SpO2 95%   BMI 28.13 kg/m²                MEWS Score: 2     Any medication or safety alerts were reviewed. Any pending diagnostics and notifications were also reviewed, as well as any safety concerns or issues, abnormal labs, abnormal imaging, and abnormal assessment findings. Questions were answered.             SRIDEVI Shaw  06/26/22 1454

## 2022-06-26 NOTE — ED NOTES
Speaking to wife at this time in triage, wife states around 36 pm today pt complained of back pain and wasn't feeling well, she didn't think anything of it because hes been seeing Dr. Damian Morel at pain management for brachial block for chronic pain in back. So pt laid down to watch movie around 830 with wife and felt slight chest pain and panic. Pt has thought it was a panic attack causing chest discomfort so she just let him chill for a little bit. Wife states he fell asleep, watched the movie and was going to bed so she attempted to wake up around 1030 but pt continued to sleep so she let him. She attempted again later on To wake him up but when pt stood up he became light headed and sat back down and asked wife to call 911.       Lizzy Pagan RN  06/26/22 [address], RN  06/26/22 3050

## 2022-06-26 NOTE — ED NOTES
Dr. Clayton Cosme notified that patient states he is feeling worse and updated vitals, pt debbie and hypotensive.  Orders for fluids and medications obtained     Reema Nance RN  06/26/22 0925

## 2022-06-26 NOTE — ED PROVIDER NOTES
EMERGENCY DEPARTMENT ENCOUNTER    Pt Name: Rosaura Bales  MRN: [id number]  [place] 1975  Date of evaluation: 6/26/22  CHIEF COMPLAINT       Chief Complaint   Patient presents with    Chest Pain     HISTORY OF PRESENT ILLNESS   HPI  69-year-old male history of coronary artery disease with LAD stent, diabetes, tobacco smoking, hyperlipidemia, hypertension presents for evaluation of chest pain and shortness of breath. Symptoms started about 4 hours prior to arrival.  Symptoms are moderate. Symptoms are intermittent. Patient describes substernal chest heaviness. Having associated nausea with no diaphoresis. No known alleviating exacerbating factors. Patient has associated lightheadedness. No actual loss of consciousness. Got full dose aspirin with EMS on route. REVIEW OF SYSTEMS     Review of Systems   Constitutional: Negative for chills and fatigue. HENT: Negative for facial swelling, nosebleeds, rhinorrhea and sore throat. Eyes: Negative for pain and redness. Respiratory: Positive for shortness of breath. Negative for cough. Cardiovascular: Positive for chest pain. Negative for leg swelling. Gastrointestinal: Negative for abdominal pain, diarrhea, nausea and vomiting. Genitourinary: Negative for flank pain and hematuria. Musculoskeletal: Negative for arthralgias and back pain. Skin: Negative for color change and rash. Neurological: Negative for dizziness, tremors, facial asymmetry, speech difficulty, weakness and numbness.      PASTMEDICAL HISTORY     Past Medical History:   Diagnosis Date    Anxiety     Asthma     CAD (coronary artery disease)     Chronic back pain     Diabetes mellitus (HCC)     GERD (gastroesophageal reflux disease)      heart burn    History of MI (myocardial infarction) 5/8/2017    Hx of heart artery stent     heart stent x 1    Neuropathy      Past Problem List  Patient Active Problem List   Diagnosis Code    Presence of stent in LAD coronary artery Z95.5    Anxiety F41.9    Type 2 diabetes mellitus without complication, without long-term current use of insulin (HCC) E11.9    Mixed hyperlipidemia E78.2    Mild intermittent asthma without complication G48.89    Smoking F17.200    GERD (gastroesophageal reflux disease) K21.9    Lumbar radiculopathy M54.16    DDD (degenerative disc disease), lumbar M51.36    Neuroforaminal stenosis of lumbar spine M48.061    Sacroiliac joint pain M53.3    Lumbosacral spondylosis without myelopathy M47.817    STEMI (ST elevation myocardial infarction) ([facility] [address] I21.3     SURGICAL HISTORY       Past Surgical History:   Procedure Laterality Date    CARDIAC SURGERY      CORONARY ANGIOPLASTY WITH STENT PLACEMENT  2010    heart stent x 1    PAIN MANAGEMENT PROCEDURE      TONSILLECTOMY       CURRENT MEDICATIONS       Discharge Medication List as of 6/26/2022  1:45 AM      CONTINUE these medications which have NOT CHANGED    Details   sildenafil (VIAGRA) 100 MG tablet TAKE 1 TABLET BY MOUTH ONCE DAILY AS NEEDED FOR ERECTILE DYSFUNCTION, Disp-10 tablet, R-11Normal      omeprazole (PRILOSEC) 20 MG delayed release capsule TAKE 1 CAPSULE BY MOUTH TWICE DAILY BEFORE MEAL(S), Disp-180 capsule, R-0Normal      albuterol sulfate HFA (PROVENTIL HFA) 108 (90 Base) MCG/ACT inhaler Inhale 1-2 puffs into the lungs every 6 hours as needed for Wheezing or Shortness of Breath (Space out to every 6 hours as symptoms improve), Disp-3 each, R-1May sub covered productNormal      gabapentin (NEURONTIN) 300 MG capsule TAKE 1 CAPSULE BY MOUTH THREE TIMES DAILY, Disp-90 capsule, R-3Normal      metoprolol tartrate (LOPRESSOR) 25 MG tablet Take 1/2 (one-half) tablet by mouth twice daily, Disp-30 tablet, R-11Normal      metFORMIN (GLUCOPHAGE) 500 MG tablet Take 1 tablet by mouth 2 times daily (with meals), Disp-180 tablet, R-3Normal      naproxen (NAPROSYN) 500 MG tablet Take 1 tablet by mouth 2 times daily (with meals), Disp-180 tablet, R-1Normal testosterone (ANDROGEL; TESTIM) 50 MG/5GM (1%) GEL 1% gel APPLY 1 PACKET TO SHOULDERS ONCE DAILYHistorical Med      vitamin D (ERGOCALCIFEROL) 1.25 MG (02286 UT) CAPS capsule TAKE 1 CAPSULE BY MOUTH EVERY OTHER WEEKHistorical Med      ibuprofen (ADVIL;MOTRIN) 800 MG tablet Take 1 tablet by mouth every 8 hours as needed for Pain, Disp-30 tablet,R-0Print      glipiZIDE (GLUCOTROL) 10 MG tablet TAKE 1 TABLET BY MOUTH IN THE MORNING AND 1 TABLET AT SUPPERHistorical Med      aspirin 81 MG tablet Take 81 mg by mouth daily. ALLERGIES     is allergic to statins. FAMILY HISTORY     He indicated that his mother is alive. He indicated that his father is . He indicated that his sister is alive. He indicated that his brother is alive. SOCIAL HISTORY       Social History     Tobacco Use    Smoking status: Current Every Day Smoker     Packs/day: 0.50     Years: 21.00     Pack years: 10.50     Types: Cigarettes    Smokeless tobacco: Never Used   Vaping Use    Vaping Use: Never used   Substance Use Topics    Alcohol use: Yes     Comment:  socially occ beer    Drug use: Yes     Types: Marijuana (Weed)     Comment: uses edibles last use 21     PHYSICAL EXAM     INITIAL VITALS: BP (!) 140/77   Pulse (!) 112   Temp 97.8 °F (36.6 °C) (Oral)   Resp 23   Wt 185 lb (83.9 kg)   SpO2 95%   BMI 28.13 kg/m²    Physical Exam  Vitals and nursing note reviewed. Constitutional:       Appearance: Normal appearance. HENT:      Head: Normocephalic and atraumatic. Nose: Nose normal.   Eyes:      Extraocular Movements: Extraocular movements intact. Pupils: Pupils are equal, round, and reactive to light. Cardiovascular:      Rate and Rhythm: Normal rate and regular rhythm. Pulmonary:      Effort: Pulmonary effort is normal. No respiratory distress. Breath sounds: Normal breath sounds. Abdominal:      General: Abdomen is flat. There is no distension. Palpations: Abdomen is soft. There is no mass. Musculoskeletal:         General: No swelling. Normal range of motion. Cervical back: Normal range of motion. No rigidity. Skin:     General: Skin is warm and dry. Neurological:      General: No focal deficit present. Mental Status: He is alert. Mental status is at baseline. Cranial Nerves: No cranial nerve deficit. MEDICAL DECISION MAKIN-year-old male history of coronary artery disease with LAD stent presents for evaluation of acute onset chest pain for the past several hours. Patient's EKG shows ST elevations in contiguous inferior leads II, III and aVF. He also has depression in aVL. All of these changes look new from most recent EKGs. Will add a lunch to [facility] with patient's presentation and EKG changes for emergent cardiac catheterization. Discussed with cardiology Dr. Edwards who is agreeable with plan. Heparin infusion started. Patient had a brief episode of bradycardia and hypotension which responded to IV fluids. Patient transported with critical care team.         CRITICAL CARE:   30 minutes for management of acute ST elevation MI and requiring emergent cardiac catheterization, cardiology consult, transfer to higher level of care, heparin infusion    PROCEDURES:    Procedures    DIAGNOSTIC RESULTS   EKG:All EKG's are interpreted by the Emergency Department Physician who either signs or Co-signs this chart in the absence of a cardiologist.    Sinus rhythm rate of 85 normal axis normal intervals ST elevation in 2 3 aVF V5 V6, ST depression in aVL which is new from most recent EKG on 2022    RADIOLOGY:All plain film, CT, MRI, and formal ultrasound images (except ED bedside ultrasound) are read by the radiologist, see reports below, unless otherwisenoted in MDM or here. XR CHEST PORTABLE   Final Result   No acute cardiopulmonary process           LABS: All lab results were reviewed by myself, and all abnormals are listed below.   Labs Reviewed   CBC WITH AUTO DIFFERENTIAL - Abnormal; Notable for the following components:       Result Value    RBC 4.47 (*)     Monocytes 10 (*)     All other components within normal limits   COMPREHENSIVE METABOLIC PANEL W/ REFLEX TO MG FOR LOW K - Abnormal; Notable for the following components:    Glucose 142 (*)     CREATININE 1.27 (*)     Total Bilirubin 0.29 (*)     All other components within normal limits   TROPONIN - Abnormal; Notable for the following components:    Troponin, High Sensitivity 29 (*)     All other components within normal limits   COVID-19 & INFLUENZA COMBO   TROPONIN   URINALYSIS WITH MICROSCOPIC   ANTI-XA, UNFRACTIONATED HEPARIN   ANTI-XA, UNFRACTIONATED HEPARIN       EMERGENCY DEPARTMENTCOURSE:         Vitals:    Vitals:    06/26/22 0111 06/26/22 0116 06/26/22 0119 06/26/22 0129   BP: 128/72 127/75 128/74 (!) 140/77   Pulse: (!) 110 (!) 104 (!) 108 (!) 112   Resp: 24 27 22 23   Temp:       TempSrc:       SpO2: 99% 99% 95% 95%   Weight:           The patient was given the following medications while in the emergency department:  Orders Placed This Encounter   Medications    heparin (porcine) injection 4,000 Units    DISCONTD: heparin (porcine) injection 4,000 Units    DISCONTD: heparin (porcine) injection 2,000 Units    DISCONTD: heparin (porcine) 25,000 Units in dextrose 5 % 250 mL infusion    DISCONTD: atropine 1 MG/10ML injection     EZRA VOinet override    DISCONTD: norepinephrine-sodium chloride (LEVOPHED) 16-0.9 MG/250ML-% infusion     EZRA VOinet override    DISCONTD: atropine injection 1 mg    DISCONTD: DOPamine (INTROPIN) 400 mg in dextrose 5 % 250 mL infusion     Order Specific Question:   Titrate Infusion?:     Answer:   Yes     Order Specific Question:   Initial Infusion Dose:      Answer:   5 mcg/kg/min     Order Specific Question:   Goal of Therapy:     Answer:   MAP greater than 65 mmHg     Order Specific Question:   Contact Provider if: Answer:   Patient is receiving the maximum dose and is not achieving the goal of therapy    fentaNYL (SUBLIMAZE) injection 50 mcg    DISCONTD: 0.9 % sodium chloride IV bolus 1,000 mL     CONSULTS:  None    FINAL IMPRESSION      1. ST elevation myocardial infarction (STEMI), unspecified artery [facility])          DISPOSITION/PLAN   DISPOSITION Decision To Transfer 06/26/2022 01:16:12 AM      PATIENT REFERRED TO:  No follow-up provider specified. DISCHARGE MEDICATIONS:  Discharge Medication List as of 6/26/2022  1:45 AM        The care is provided during an unprecedented national emergency due to the novel coronavirus, COVID 19.   MD Jayna Lewis MD  06/26/22 6661

## 2022-06-27 ENCOUNTER — APPOINTMENT (OUTPATIENT)
Dept: CARDIAC CATH/INVASIVE PROCEDURES | Age: 47
DRG: 246 | End: 2022-06-27
Attending: INTERNAL MEDICINE
Payer: COMMERCIAL

## 2022-06-27 LAB
ANION GAP SERPL CALCULATED.3IONS-SCNC: 11 MMOL/L (ref 9–17)
BUN BLDV-MCNC: 7 MG/DL (ref 6–20)
CALCIUM SERPL-MCNC: 8.8 MG/DL (ref 8.6–10.4)
CHLORIDE BLD-SCNC: 99 MMOL/L (ref 98–107)
CO2: 24 MMOL/L (ref 20–31)
CREAT SERPL-MCNC: 0.82 MG/DL (ref 0.7–1.2)
GFR AFRICAN AMERICAN: >60 ML/MIN
GFR NON-AFRICAN AMERICAN: >60 ML/MIN
GFR SERPL CREATININE-BSD FRML MDRD: ABNORMAL ML/MIN/{1.73_M2}
GLUCOSE BLD-MCNC: 181 MG/DL (ref 70–99)
HCT VFR BLD CALC: 43.8 % (ref 40.7–50.3)
HEMOGLOBIN: 14.9 G/DL (ref 13–17)
HOMOCYSTEINE: 12.5 UMOL/L
LV EF: 55 %
LVEF MODALITY: NORMAL
MAGNESIUM: 1.9 MG/DL (ref 1.6–2.6)
MCH RBC QN AUTO: 31.4 PG (ref 25.2–33.5)
MCHC RBC AUTO-ENTMCNC: 34 G/DL (ref 28.4–34.8)
MCV RBC AUTO: 92.4 FL (ref 82.6–102.9)
NRBC AUTOMATED: 0 PER 100 WBC
PDW BLD-RTO: 13.9 % (ref 11.8–14.4)
PLATELET # BLD: 403 K/UL (ref 138–453)
PMV BLD AUTO: 8.7 FL (ref 8.1–13.5)
POTASSIUM SERPL-SCNC: 4.4 MMOL/L (ref 3.7–5.3)
RBC # BLD: 4.74 M/UL (ref 4.21–5.77)
SODIUM BLD-SCNC: 134 MMOL/L (ref 135–144)
TROPONIN, HIGH SENSITIVITY: 1298 NG/L (ref 0–22)
TROPONIN, HIGH SENSITIVITY: 1422 NG/L (ref 0–22)
TROPONIN, HIGH SENSITIVITY: 1448 NG/L (ref 0–22)
WBC # BLD: 11.3 K/UL (ref 3.5–11.3)

## 2022-06-27 PROCEDURE — 86146 BETA-2 GLYCOPROTEIN ANTIBODY: CPT

## 2022-06-27 PROCEDURE — 85613 RUSSELL VIPER VENOM DILUTED: CPT

## 2022-06-27 PROCEDURE — 2709999900 HC NON-CHARGEABLE SUPPLY

## 2022-06-27 PROCEDURE — 93308 TTE F-UP OR LMTD: CPT

## 2022-06-27 PROCEDURE — 2580000003 HC RX 258: Performed by: STUDENT IN AN ORGANIZED HEALTH CARE EDUCATION/TRAINING PROGRAM

## 2022-06-27 PROCEDURE — 6360000002 HC RX W HCPCS

## 2022-06-27 PROCEDURE — 86147 CARDIOLIPIN ANTIBODY EA IG: CPT

## 2022-06-27 PROCEDURE — 2000000000 HC ICU R&B

## 2022-06-27 PROCEDURE — 93325 DOPPLER ECHO COLOR FLOW MAPG: CPT

## 2022-06-27 PROCEDURE — 85027 COMPLETE CBC AUTOMATED: CPT

## 2022-06-27 PROCEDURE — 6370000000 HC RX 637 (ALT 250 FOR IP): Performed by: STUDENT IN AN ORGANIZED HEALTH CARE EDUCATION/TRAINING PROGRAM

## 2022-06-27 PROCEDURE — B246ZZ4 ULTRASONOGRAPHY OF RIGHT AND LEFT HEART, TRANSESOPHAGEAL: ICD-10-PCS | Performed by: INTERNAL MEDICINE

## 2022-06-27 PROCEDURE — 93312 ECHO TRANSESOPHAGEAL: CPT

## 2022-06-27 PROCEDURE — 80048 BASIC METABOLIC PNL TOTAL CA: CPT

## 2022-06-27 PROCEDURE — 99222 1ST HOSP IP/OBS MODERATE 55: CPT | Performed by: INTERNAL MEDICINE

## 2022-06-27 PROCEDURE — 84484 ASSAY OF TROPONIN QUANT: CPT

## 2022-06-27 PROCEDURE — 83735 ASSAY OF MAGNESIUM: CPT

## 2022-06-27 PROCEDURE — 6360000002 HC RX W HCPCS: Performed by: STUDENT IN AN ORGANIZED HEALTH CARE EDUCATION/TRAINING PROGRAM

## 2022-06-27 PROCEDURE — 85610 PROTHROMBIN TIME: CPT

## 2022-06-27 PROCEDURE — 36415 COLL VENOUS BLD VENIPUNCTURE: CPT

## 2022-06-27 PROCEDURE — 83090 ASSAY OF HOMOCYSTEINE: CPT

## 2022-06-27 PROCEDURE — 85730 THROMBOPLASTIN TIME PARTIAL: CPT

## 2022-06-27 RX ORDER — METOPROLOL TARTRATE 50 MG/1
50 TABLET, FILM COATED ORAL 2 TIMES DAILY
Status: DISCONTINUED | OUTPATIENT
Start: 2022-06-27 | End: 2022-06-28

## 2022-06-27 RX ORDER — ALPRAZOLAM 0.5 MG/1
0.5 TABLET ORAL ONCE
Status: COMPLETED | OUTPATIENT
Start: 2022-06-27 | End: 2022-06-27

## 2022-06-27 RX ADMIN — SODIUM CHLORIDE, PRESERVATIVE FREE 10 ML: 5 INJECTION INTRAVENOUS at 08:19

## 2022-06-27 RX ADMIN — TICAGRELOR 90 MG: 90 TABLET ORAL at 21:00

## 2022-06-27 RX ADMIN — METOPROLOL TARTRATE 25 MG: 25 TABLET, FILM COATED ORAL at 14:59

## 2022-06-27 RX ADMIN — METOPROLOL TARTRATE 25 MG: 25 TABLET ORAL at 08:19

## 2022-06-27 RX ADMIN — TICAGRELOR 90 MG: 90 TABLET ORAL at 08:19

## 2022-06-27 RX ADMIN — LORAZEPAM 1 MG: 2 INJECTION INTRAMUSCULAR; INTRAVENOUS at 08:19

## 2022-06-27 RX ADMIN — METOPROLOL TARTRATE 50 MG: 50 TABLET, FILM COATED ORAL at 21:00

## 2022-06-27 RX ADMIN — SODIUM CHLORIDE, PRESERVATIVE FREE 10 ML: 5 INJECTION INTRAVENOUS at 21:00

## 2022-06-27 RX ADMIN — ALPRAZOLAM 0.5 MG: 0.5 TABLET ORAL at 19:37

## 2022-06-27 RX ADMIN — ASPIRIN 81 MG: 81 TABLET, CHEWABLE ORAL at 08:19

## 2022-06-27 NOTE — PROGRESS NOTES
[facility]   Progress Note                    Date:   6/27/2022  Patient name:  Rohini Sanchez  Date of admission:  6/26/2022  1:55 AM  MRN:   [id number]  YOB: 1975  PCP:    Angelina Rivera MD    Reason for Admission:  STEMI (ST elevation myocardial infarction) ([facility] [address] [I21.3]    Subjective:      Clinical Changes / Abnormalities:    Patient seen and examined at bedside. No acute events overnight. No chest pain or shortness of breath. Npo after MN for JUAN JOSÉ today. Urine output in the last 24 hours:     Intake/Output Summary (Last 24 hours) at 6/27/2022 0828  Last data filed at 6/27/2022 0520  Gross per 24 hour   Intake 1630.7 ml   Output 3125 ml   Net -1494.3 ml         Medications:   Scheduled Meds:   metoprolol tartrate  50 mg Oral BID    sodium chloride flush  5-40 mL IntraVENous 2 times per day    ticagrelor  90 mg Oral BID    aspirin  81 mg Oral Daily    rosuvastatin  40 mg Oral Nightly     Continuous Infusions:   sodium chloride       CBC:   Recent Labs     06/26/22 0052 06/27/22  0454   WBC 6.8 11.3   HGB 14.0 14.9    403     BMP:    Recent Labs     06/26/22 0052 06/27/22  0454    134*   K 4.0 4.4    99   CO2 23 24   BUN 9 7   CREATININE 1.27* 0.82   GLUCOSE 142* 181*     Hepatic:   Recent Labs     06/26/22 0052   AST 22   ALT 35   BILITOT 0.29*   ALKPHOS 49         Objective:   Vitals: /74   Pulse (!) 108   Temp 97.8 °F (36.6 °C) (Oral)   Resp 20   SpO2 94%    Last Recorded Weight:  [unfilled]    Constitutional and General Appearance:    alert, cooperative, no distress and appears stated age  Respiratory:  · No for increased work of breathing. · On auscultation: diminished breath sounds bilaterally  Cardiovascular:  · The apical impulse is not displaced  · Heart tones are crisp and normal. Regular S1 and S2. No murmurs.    Abdomen:   · No masses or tenderness  · Bowel sounds present  Extremities:  ·  No Cyanosis or Clubbing  ·  Lower extremity edema: No  ·  Skin: Warm and dry  Neurological:  · Not performed    Diagnostic Studies:     EKG: Inferolateral STEMI    ECHO:   pending    Cardiac Angiography: 6/26/22  Procedure Summary      Inferior STEMI   Concern for embolism. Possible clot in the proximal LAD instent and small distal inferior branch   of OM1 occlusing <1mm vessel. Successful LAYLA of mid RCA for significant ulcerated plaque. Successful PTCA/LAYLA of proximal LAD. Distal OM1 branch too small for PCI. At the end of case, patient complained of severe chest pain. ECG showed   inferior STEMI. Repeat cath showed blood clot in mid RCA stent and occlusion of LAD stent. Successful manual thrombectomy of mid RCA and LAD. Another stent was placed at the distal edge of previous RCA stent. LAD stent post-dilated with 3.5 NC balloon. Recommendations      Medical therapy as needed. Risk factor modification. Routine Post Stent Orders. IV integrilin for 18 hours. 30 day event monitor for evaluation of atrial fibrillation. Patient has   recent ER visit for palpitations. Hematology consult. JUAN JOSÉ on Monday      Signature      ----------------------------------------------------------------   Electronically signed by Cathie Leyva(Performing Physician)   on 06/26/2022 05:15   ----------------------------------------------------------------      Angiographic Findings      Cardiac Arteries and Lesion Findings     LAD:       Lesion on Prox LAD: Proximal subsection. 100% stenosis 30 mm length    reduced to 0%. Pre procedure TUSHAR 0 flow was noted. Post Procedure TUSHAR    III flow was present. The lesion was diagnosed as High Risk (C). The    lesion was diffuse and eccentric. The lesion showed mild angulation. Lesion    plaque is ruptured. Comments:Post-dilation with 3.5 balloon after manual thrombectomy for    acute occlusion. Devices used       - Tendr Flex 180 cm.  Number of passes: 1.       - Balloon of Resolute [place] 3.0 x 12 LAYLA. 4 inflation(s) to a max    pressure of: 15 carmen. - Resolute [place] 3.0 x 30 LAYLA. 2 inflation(s) to a max pressure of: 20    carmen. - ASAHI [place] Flex 180 cm. Number of passes: 1.       - Pronto Extraction Catheter. Number of passes: 1.       - NC Euphora Balloon 3.5mm x 20mm. 6 inflation(s) to a max pressure of:    20 carmen. RCA:       Lesion on Mid RCA: 75% stenosis reduced to 0%. Pre procedure TUSHAR III    flow was noted. Post Procedure TUSHAR III flow was present. Good runoff was    present. The lesion was diagnosed as Moderate Risk (B). The lesion showed    evidence of thrombus presence. Lesion plaque is ruptured. Comments:Manual thrombectomy. Another stent was placed at the distal edge    due to hazziness. Devices used       - ASAHI [place] Flex 180 cm. Number of passes: 1.       - Resolute Reece 3.0 x 12 LAYLA. 2 inflation(s) to a max pressure of: 12    carmen. - ASAHI [place]ater Flex 180 cm. Number of passes: 1.       - Pronto Extraction Catheter LP. Number of passes: 1.       - NC Euphora Balloon 3.0mm x 15mm. 2 inflation(s) to a max pressure of:    12 carmen. - Resolute [place] 3.0 x 8 LAYLA. 3 inflation(s) to a max pressure of: 16    carmen. Coronary Tree      Dominance: Right     LV Analysis  LV function assessed as:Normal.  Ejection Fraction  +----------------------------------------------------------------------+---+  ! Method                                                                ! EF%! +----------------------------------------------------------------------+---+  ! LV gram                                                               !55 !  +----------------------------------------------------------------------+---+        Assessment / Acute Cardiac Problems:   1. Inferolateral STEMI s/p PTCA/LAYLA to RCA and LAD  2. Hx of CAD s/p PCI to LAD  3. T2DM  4. HLD  5. HTN  6. GERD  7. Smoker    Plan of Treatment:   1.  Keep NPO for JUAN JOSÉ today  2. Continue ASA and Brilinta  3. Continue crestor 40 daily  4. Increase lopressor to 50 mg BID  5. Follow up on TTE. 6. K>4, Mg>2  7. Hematology consult    Yadiel Marroquin MD  Fellow, [id number] [facility]        Attending Physician Statement  I have discussed the case of Jazmin Patel including pertinent history and exam findings with the resident. I have seen and examined the patient and the key elements of the encounter have been performed by me. I agree with the assessment, plan and orders as documented by the resident With changes made to the note.      Electronically signed by John Ramírez MD on 6/27/2022 at 3:17 PM.    [facility]      [phone]

## 2022-06-27 NOTE — CONSULTS
Today's Date: 6/27/2022  Patient Name: Al Melton  Date of admission: 6/26/2022  1:55 AM  Patient's age: 52 y.o., 1975  Admission Dx: STEMI (ST elevation myocardial infarction) ([facility] [address] [I21.3]    Reason for Consult: possible clotting disorder  Requesting Physician: Zain Dumont MD    CHIEF COMPLAINT:  No chief complaint on file. History Obtained From:  patient and chart    HISTORY OF PRESENT ILLNESS:      Al Melton is a 52 y.o. male who is admitted to the hospital on 6/26/2022  for evaluation of his cardiac disease. Patient has past medical history of CAD, diabetes, tobacco abuse, hyperlipidemia, hypertension. Patient was transferred from [facility] where he presented with STEMI. Patient underwent cardiac catheterization yesterday which showed possible clot in proximal LAD and small distal inferior branch and patient did undergo LAYLA of mid RCA and also proximal LAD. At the end of procedure patient had significant chest pain and repeat cath showed blood clot in mid RCA stent and occlusion of LAD stent, manual thrombectomy was performed. Patient currently on anticoagulation as per cardiology and planning for JUAN JOSÉ on Monday. Today patient underwent JUAN JOSÉ which showed no thrombus or valvular vegetation. Hematology consulted for evaluation of underlying thrombophilia. Past Medical History:   has a past medical history of Anxiety, Asthma, CAD (coronary artery disease), Chronic back pain, Diabetes mellitus ([facility] [address] GERD (gastroesophageal reflux disease), History of MI (myocardial infarction), Hx of heart artery stent, and Neuropathy. Past Surgical History:   has a past surgical history that includes Coronary angioplasty with stent (2010); Tonsillectomy; Cardiac surgery; and Pain management procedure. Medications:    Prior to Admission medications    Medication Sig Start Date End Date Taking?  Authorizing Provider   cyclobenzaprine (FLEXERIL) 10 MG tablet Take 10 mg by mouth 3 times daily as needed for Muscle spasms   Yes Historical Provider, MD   sildenafil (VIAGRA) 100 MG tablet TAKE 1 TABLET BY MOUTH ONCE DAILY AS NEEDED FOR ERECTILE DYSFUNCTION 6/12/22   Haider Lou MD   omeprazole (PRILOSEC) 20 MG delayed release capsule TAKE 1 CAPSULE BY MOUTH TWICE DAILY BEFORE MEAL(S) 3/24/22   SEAN Jane NP   albuterol sulfate HFA (PROVENTIL HFA) 108 (90 Base) MCG/ACT inhaler Inhale 1-2 puffs into the lungs every 6 hours as needed for Wheezing or Shortness of Breath (Space out to every 6 hours as symptoms improve) 3/8/22   SEAN Jane NP   gabapentin (NEURONTIN) 300 MG capsule TAKE 1 CAPSULE BY MOUTH THREE TIMES DAILY 1/26/22 3/24/22  Bert Martinez MD   metoprolol tartrate (LOPRESSOR) 25 MG tablet Take 1/2 (one-half) tablet by mouth twice daily 7/30/21   Haider Lou MD   metFORMIN (GLUCOPHAGE) 500 MG tablet Take 1 tablet by mouth 2 times daily (with meals) 6/10/21   Haider Lou MD   naproxen (NAPROSYN) 500 MG tablet Take 1 tablet by mouth 2 times daily (with meals) 3/16/21   SEAN Jane NP   testosterone (ANDROGEL; TESTIM) 50 MG/5GM (1%) GEL 1% gel APPLY 1 PACKET TO SHOULDERS ONCE DAILY 9/29/20   Historical Provider, MD   vitamin D (ERGOCALCIFEROL) 1.25 MG (14803 UT) CAPS capsule TAKE 1 CAPSULE BY MOUTH EVERY OTHER WEEK 11/5/20   Historical Provider, MD   ibuprofen (ADVIL;MOTRIN) 800 MG tablet Take 1 tablet by mouth every 8 hours as needed for Pain 10/18/20   Gopi Resendez MD   glipiZIDE (GLUCOTROL) 10 MG tablet TAKE 1 TABLET BY MOUTH IN THE MORNING AND 1 TABLET AT SUPPER 4/28/20   Historical Provider, MD   aspirin 81 MG tablet Take 81 mg by mouth daily.   Patient not taking: Reported on 6/26/2022    Historical Provider, MD     Current Facility-Administered Medications   Medication Dose Route Frequency Provider Last Rate Last Admin    metoprolol tartrate (LOPRESSOR) tablet 50 mg  50 mg Oral BID Amalia Young MD        sodium chloride flush 0.9 % injection 5-40 mL  5-40 mL IntraVENous 2 times per day Josie Gan MD   10 mL at 06/27/22 0819    sodium chloride flush 0.9 % injection 5-40 mL  5-40 mL IntraVENous PRN Josie Gan MD   10 mL at 06/26/22 0839    0.9 % sodium chloride infusion   IntraVENous PRN Josie Gan MD        acetaminophen (TYLENOL) tablet 650 mg  650 mg Oral Q4H PRN Josie Gan MD        ticagrelor [facility]) tablet 90 mg  90 mg Oral BID Josie Gan MD   90 mg at 06/27/22 9972    aspirin chewable tablet 81 mg  81 mg Oral Daily Josie Gan MD   81 mg at 06/27/22 2281    rosuvastatin (CRESTOR) tablet 40 mg  40 mg Oral Nightly Josie Gan MD           Allergies:  Statins    Social History:   reports that he has been smoking cigarettes. He has a 21.00 pack-year smoking history. He has never used smokeless tobacco. He reports current alcohol use. He reports current drug use. Drug: Marijuana Petey Gordillo). Family History: family history includes Arrhythmia in his brother; Diabetes in his father and mother. REVIEW OF SYSTEMS:    Constitutional: No fever or chills. No night sweats, no weight loss   Eyes: No eye discharge, double vision, or eye pain   HEENT: negative for sore mouth, sore throat, hoarseness and voice change   Respiratory: negative for cough , sputum, dyspnea, wheezing, hemoptysis, chest pain   Cardiovascular: negative for chest pain, dyspnea, palpitations, orthopnea, PND   Gastrointestinal: negative for nausea, vomiting, diarrhea, constipation, abdominal pain, Dysphagia, hematemesis and hematochezia   Genitourinary: negative for frequency, dysuria, nocturia, urinary incontinence, and hematuria   Integument: negative for rash, skin lesions, bruises.    Hematologic/Lymphatic: negative for easy bruising, bleeding, lymphadenopathy, or petechiae   Endocrine: negative for heat or cold intolerance,weight changes, change in bowel habits and hair loss   Musculoskeletal: negative for myalgias, arthralgias, pain, joint swelling,and bone pain   Neurological: negative for headaches, dizziness, seizures, weakness, numbness    PHYSICAL EXAM:      BP (!) 137/90   Pulse (!) 104   Temp 99.7 °F (37.6 °C) (Oral)   Resp 18   SpO2 95%    Temp (24hrs), Av.2 °F (37.3 °C), Min:97.8 °F (36.6 °C), Max:100 °F (37.8 °C)    General appearance - well appearing, no in pain or distress   Mental status - alert and cooperative   Eyes - pupils equal and reactive, extraocular eye movements intact   Ears - bilateral TM's and external ear canals normal   Mouth - mucous membranes moist, pharynx normal without lesions   Neck - supple, no significant adenopathy   Lymphatics - no palpable lymphadenopathy, no hepatosplenomegaly   Chest - clear to auscultation, no wheezes, rales or rhonchi, symmetric air entry   Heart - normal rate, regular rhythm, normal S1, S2, no murmurs  Abdomen - soft, nontender, nondistended, no masses or organomegaly   Neurological - alert, oriented, normal speech, no focal findings or movement disorder noted   Musculoskeletal - no joint tenderness, deformity or swelling   Extremities - peripheral pulses normal, no pedal edema, no clubbing or cyanosis   Skin - normal coloration and turgor, no rashes, no suspicious skin lesions noted ,    DATA:    Labs:   CBC:   Recent Labs     22  0052 22  0454   WBC 6.8 11.3   HGB 14.0 14.9   HCT 41.2 43.8    403     BMP:   Recent Labs     222 22  0454    134*   K 4.0 4.4   CO2 23 24   BUN 9 7   CREATININE 1.27* 0.82   LABGLOM >60 >60   GLUCOSE 142* 181*     PT/INR: No results for input(s): PROTIME, INR in the last 72 hours. IMAGING DATA:  No orders to display       Primary Problem  STEMI (ST elevation myocardial infarction) [facility])    Active Hospital Problems    Diagnosis Date Noted    STEMI (ST elevation myocardial infarction) ([facility] [address] [I21.3] 2022     Priority: High         IMPRESSION:   1. STEMI, status post cath and PCI  2.  Questionable hypercoagulable disorder  3. History of tobacco abuse  4. Hypertension  5. Hyperlipidemia    RECOMMENDATIONS:  1. I reviewed the laboratory data, imaging studies, discussed possible diagnosis and treatment recommendations  2. I will order for hypercoagulable work-up. He does not have personal or family history of DVT/PE so I will check for APS labs  3. His risk possibly higher with family history of heart disease and smoking  4. Continue other management as per cardiology  5. Patient will need follow-up with otology as outpatient to discuss the results of hypercoagulable  6. Counseled about smoking cessation  7. Discharge as per cardiology  8. Follow with hematology in 2 weeks    Discussed with ptand Nurse. Thank you for asking us to see this patient. Moise Garduno MD  Hematologist/Medical Oncologist    Cell: [phone]    This note is created with the assistance of a speech recognition program.  While intending to generate a document that actually reflects the content of the visit, the document can still have some errors including those of syntax and sound a like substitutions which may escape proof reading. It such instances, actual meaning can be extrapolated by contextual diversion.

## 2022-06-27 NOTE — OP NOTE
[facility]  Transesophageal Echocardiogram        Today's Date:  6/27/2022  Ordering Physician:    Indication:   R/o Thrombus     Operators:  Primary:   Dr. Salomon Thomas  (Attending Physician)  Assistant:   Samantha Jose MD (Cardiovascular Fellow)    Pre Procedure Conscious Sedation Data:    ASA Class:    [] I [] II [] III [] IV    Mallampati Class:  [] I [] II [] III [] IV    Patient seen and examined. History and Physical reviewed. Labs reviewed. After informed consent was obtained with explanation of the risks and benefits, the patient was brought to Cath lab. All sedation was administered by the cardiologist. The oropharynx was pre anesthetisized with cetacaine spray. JUAN JOSÉ:    Structures:    LA:  Normal  NORTH:  No thrombus  RA:  Normal  RV:   Normal  LV:  Normal  Estimated LVEF: 55%  Aorta:   Mild atheromatous disease arch  Pericardium: No pericardial effusion  Septum:  No intracardiac shunt via color Doppler. Valves:    Mitral Valve:  Structurally normal. Mild  regurgitation is identified. Aortic Valve: The aortic valve is trileaflet and opens adequately. No  regurgitiation is identified. Tricuspid valve: Structurally normal. Mild  regurgitation is identified. Pulmonary valve: Normal. No significant regurgitation    No valvular vegetations or thrombus identified. JUAN JOSÉ Summary:     1. A JUAN JOSÉ was performed without complications. 2. LVEF 55 %   3. No thrombus or valvular vegetation identified          Electronically signed by Samantha Jose MD on 6/27/2022 at 1:22 PM  Cardiovascular Diseases Fellow  [facility]      I have reviewed the case / procedure with resident / fellow  I have examined the patient personally  Patient agree with treatment plan as discussed before, final arrangement based on my evaluation and exam.    Risk and benefit of procedure planned were explained in details.     Procedure was performed by me personally, with all aspect of the procedure being done using standard protocol. Note was modified based on my own assessment and treatment.     Niyah Cook MD  [facility]

## 2022-06-28 LAB
EKG ATRIAL RATE: 85 BPM
EKG P AXIS: 66 DEGREES
EKG P-R INTERVAL: 152 MS
EKG Q-T INTERVAL: 346 MS
EKG QRS DURATION: 90 MS
EKG QTC CALCULATION (BAZETT): 411 MS
EKG R AXIS: 71 DEGREES
EKG T AXIS: 78 DEGREES
EKG VENTRICULAR RATE: 85 BPM
LV EF: 43 %
LVEF MODALITY: NORMAL
TROPONIN, HIGH SENSITIVITY: 1544 NG/L (ref 0–22)
TROPONIN, HIGH SENSITIVITY: 1558 NG/L (ref 0–22)

## 2022-06-28 PROCEDURE — 36415 COLL VENOUS BLD VENIPUNCTURE: CPT

## 2022-06-28 PROCEDURE — 84484 ASSAY OF TROPONIN QUANT: CPT

## 2022-06-28 PROCEDURE — 2580000003 HC RX 258: Performed by: STUDENT IN AN ORGANIZED HEALTH CARE EDUCATION/TRAINING PROGRAM

## 2022-06-28 PROCEDURE — 6370000000 HC RX 637 (ALT 250 FOR IP): Performed by: STUDENT IN AN ORGANIZED HEALTH CARE EDUCATION/TRAINING PROGRAM

## 2022-06-28 PROCEDURE — 2060000000 HC ICU INTERMEDIATE R&B

## 2022-06-28 PROCEDURE — 93306 TTE W/DOPPLER COMPLETE: CPT

## 2022-06-28 PROCEDURE — 6360000002 HC RX W HCPCS: Performed by: NURSE PRACTITIONER

## 2022-06-28 PROCEDURE — 6370000000 HC RX 637 (ALT 250 FOR IP): Performed by: NURSE PRACTITIONER

## 2022-06-28 RX ORDER — LORAZEPAM 2 MG/ML
1 INJECTION INTRAMUSCULAR EVERY 6 HOURS PRN
Status: DISCONTINUED | OUTPATIENT
Start: 2022-06-28 | End: 2022-06-29 | Stop reason: HOSPADM

## 2022-06-28 RX ADMIN — METOPROLOL TARTRATE 75 MG: 25 TABLET ORAL at 20:05

## 2022-06-28 RX ADMIN — METOPROLOL TARTRATE 25 MG: 25 TABLET ORAL at 16:29

## 2022-06-28 RX ADMIN — ROSUVASTATIN CALCIUM 40 MG: 20 TABLET, FILM COATED ORAL at 20:05

## 2022-06-28 RX ADMIN — METOPROLOL TARTRATE 50 MG: 50 TABLET, FILM COATED ORAL at 08:06

## 2022-06-28 RX ADMIN — TICAGRELOR 90 MG: 90 TABLET ORAL at 08:06

## 2022-06-28 RX ADMIN — ASPIRIN 81 MG: 81 TABLET, CHEWABLE ORAL at 08:06

## 2022-06-28 RX ADMIN — ACETAMINOPHEN 650 MG: 325 TABLET ORAL at 08:07

## 2022-06-28 RX ADMIN — LORAZEPAM 1 MG: 2 INJECTION INTRAMUSCULAR; INTRAVENOUS at 16:32

## 2022-06-28 RX ADMIN — SODIUM CHLORIDE, PRESERVATIVE FREE 10 ML: 5 INJECTION INTRAVENOUS at 08:07

## 2022-06-28 RX ADMIN — SODIUM CHLORIDE, PRESERVATIVE FREE 10 ML: 5 INJECTION INTRAVENOUS at 20:06

## 2022-06-28 RX ADMIN — TICAGRELOR 90 MG: 90 TABLET ORAL at 20:05

## 2022-06-28 ASSESSMENT — PAIN SCALES - GENERAL
PAINLEVEL_OUTOF10: 3
PAINLEVEL_OUTOF10: 3

## 2022-06-28 ASSESSMENT — PAIN DESCRIPTION - PAIN TYPE: TYPE: ACUTE PAIN

## 2022-06-28 ASSESSMENT — PAIN DESCRIPTION - LOCATION: LOCATION: HEAD

## 2022-06-28 NOTE — PLAN OF CARE
Problem: Discharge Planning  Goal: Discharge to home or other facility with appropriate resources  Outcome: Progressing     Problem: ABCDS Injury Assessment  Goal: Absence of physical injury  Outcome: Progressing     Problem: Pain  Goal: Verbalizes/displays adequate comfort level or baseline comfort level  Outcome: Progressing     Problem: Safety - Adult  Goal: Free from fall injury  Outcome: Progressing     Problem: Chronic Conditions and Co-morbidities  Goal: Patient's chronic conditions and co-morbidity symptoms are monitored and maintained or improved  Outcome: Progressing

## 2022-06-28 NOTE — PROGRESS NOTES
[facility]  Progress Note                   Date:   6/28/2022  Patient name: Kelsie Porras  Date of admission:  6/26/2022  1:55 AM  MRN:   [id number]  YOB: 1975  PCP: Beth Olguin MD    Reason for Admission: STEMI (ST elevation myocardial infarction) [facility]) [I21.3]    Subjective:       Clinical Changes /Abnormalities:Patient seen and examined in bed in room after discussion with RN. Denies current chest pain or SOB. Reviewed plan with Dr Jimmy De Leon  Will trend troponin and follow up on TTE. SR/ST on tele HR 90s-low 100s  Awaiting ECHO    Review of Systems    Medications:   Scheduled Meds:   metoprolol tartrate  50 mg Oral BID    sodium chloride flush  5-40 mL IntraVENous 2 times per day    ticagrelor  90 mg Oral BID    aspirin  81 mg Oral Daily    rosuvastatin  40 mg Oral Nightly     Continuous Infusions:   sodium chloride       CBC:   Recent Labs     06/26/22 0052 06/27/22  0454   WBC 6.8 11.3   HGB 14.0 14.9    403     BMP:    Recent Labs     06/26/22 0052 06/27/22  0454    134*   K 4.0 4.4    99   CO2 23 24   BUN 9 7   CREATININE 1.27* 0.82   GLUCOSE 142* 181*     Hepatic:  Recent Labs     06/26/22 0052   AST 22   ALT 35   BILITOT 0.29*   ALKPHOS 49     Troponin:   Recent Labs     06/27/22  1826 06/27/22  2151 06/28/22  0501   TROPHS 1,448* 1,422* 1,558*     BNP: No results for input(s): BNP in the last 72 hours. Lipids: No results for input(s): CHOL, HDL in the last 72 hours.     Invalid input(s): LDLCALCU  INR:   Recent Labs     06/27/22 1826   INR 1.0     DIAGNOSTIC DATA     DATA:    Diagnostics:    EKG: inferolateral STEMI    ECHO TTE ordered/pending    JUAN JOSÉ 6/27/2022     Structures:     LA:       Normal  NORTH:    No thrombus  RA:      Normal  RV:      Normal  LV:       Normal  Estimated LVEF: 55%  Aorta:               Mild atheromatous disease arch  Pericardium:    No pericardial effusion  Septum:           No intracardiac shunt via color Doppler.      Valves:     Mitral Valve:    Structurally normal. Mild  regurgitation is identified. Aortic Valve: The aortic valve is trileaflet and opens adequately. No  regurgitiation is identified. Tricuspid valve: Structurally normal. Mild  regurgitation is identified. Pulmonary valve: Normal. No significant regurgitation     No valvular vegetations or thrombus identified.        JUAN JOSÉ Summary:      1. A JUAN JOSÉ was performed without complications. 2. LVEF 55 %   3. No thrombus or valvular vegetation identified    CATH 6/26/2022    Lesion on Prox LAD: Proximal subsection. 100% stenosis 30 mm length     reduced to 0%. Pre procedure TUSHAR 0 flow was noted. Post Procedure TUSHAR     III flow was present. The lesion was diagnosed as High Risk (C). The     lesion was diffuse and eccentric. The lesion showed mild angulation. Lesion     plaque is ruptured.         Comments:Post-dilation with 3.5 balloon after manual thrombectomy for     acute occlusion.     Devices used         - ASAHI Prowater Flex 180 cm. Number of passes: 1.         - Balloon of Resolute [place] 3.0 x 12 LAYLA. 4 inflation(s) to a max     pressure of: 15 carmen.         - Resolute [place]ece 3.0 x 30 LAYLA. 2 inflation(s) to a max pressure of: 20     carmen.         - ASAHI P[place]ater Flex 180 cm. Number of passes: 1.         - Pronto Extraction Catheter. Number of passes: 1.         - NC Euphora Balloon 3.5mm x 20mm. 6 inflation(s) to a max pressure of:     20 carmen.       RCA:         Lesion on Mid RCA: 75% stenosis reduced to 0%. Pre procedure TUSHAR III     flow was noted. Post Procedure TUSHAR III flow was present. Good runoff was     present. The lesion was diagnosed as Moderate Risk (B). The lesion showed     evidence of thrombus presence. Lesion plaque is ruptured.         Comments:Manual thrombectomy. Another stent was placed at the distal edge     due to hazziness.     Devices used         - ASAHI P[place]ater Flex 180 cm.  Number of passes: 1.         - Resolute [place] 3.0 x 12 LAYLA. 2 inflation(s) to a max pressure of: 12     carmen.         - ASAHI Prowater Flex 180 cm. Number of passes: 1.         - Pronto Extraction Catheter LP. Number of passes: 1.         - NC Euphora Balloon 3.0mm x 15mm. 2 inflation(s) to a max pressure of:     12 carmen.         - Resolute Reece 3.0 x 8 LAYLA. 3 inflation(s) to a max pressure of: 16     carmen.        Coronary Tree        Dominance: Right       LV Analysis   LV function assessed as:Normal.   Ejection Fraction   +----------------------------------------------------------------------+---+   ! Method                                                                !EF%! +----------------------------------------------------------------------+---+   ! LV gram                                                               !54 ! +----------------------------------------------------------------------+---+      Conclusions      Procedure Summary      Inferior STEMI   Concern for embolism. Possible clot in the proximal LAD instent and small distal inferior branch   of OM1 occlusing <1mm vessel. Successful LAYLA of mid RCA for significant ulcerated plaque. Successful PTCA/LAYLA of proximal LAD. Distal OM1 branch too small for PCI. At the end of case, patient complained of severe chest pain. ECG showed   inferior STEMI. Repeat cath showed blood clot in mid RCA stent and occlusion of LAD stent. Successful manual thrombectomy of mid RCA and LAD. Another stent was placed at the distal edge of previous RCA stent. LAD stent post-dilated with 3.5 NC balloon. Recommendations      Medical therapy as needed. Risk factor modification. Routine Post Stent Orders. IV integrilin for 18 hours. 30 day event monitor for evaluation of atrial fibrillation. Patient has   recent ER visit for palpitations. Hematology consult. JUAN JOSÉ on Monday      Cardiac Angiography: 7/25/2017     LMCA: Mild irregularities 10-20%.     LAD: Mild irregularities 20-30%. patent mid LAD stent     LCx: Mild irregularities 10-20%.     RCA: Mild irregularities 20-30%.     Coronary Tree      Dominance: Right      Objective:   Vitals: /89   Pulse 97   Temp 97.7 °F (36.5 °C) (Oral)   Resp 22   SpO2 99%   General appearance: alert and cooperative with exam  HEENT: Head: Normocephalic, no lesions, without obvious abnormality. Neck:no JVD, trachea midline, no adenopathy  Lungs: Clear to auscultation  Heart: Regular rate and rhythm, s1/s2 auscultated, no murmurs. SR/ST on tele HR 90s-low 100s  Abdomen: soft, non-tender, bowel sounds active  Extremities: no edema  Neurologic: not done  Right Femoral Artery site:  CDI without ecchymosis or hematoma. Soft + pulse  Left Femoral Artery site:   CDI without ecchymosis or hematoma. Soft + pulse        Assessment / Acute Cardiac Problems:   1. Inferolateral STEMI s/p PTCA/LAYLA to RCA and LAD  2. Hx of CAD s/p PCI to LAD  3. T2DM  4. HLD  5. HTN  6. GERD  7. Smoker      Patient Active Problem List:     Presence of stent in LAD coronary artery     Anxiety     Type 2 diabetes mellitus without complication, without long-term current use of insulin (HCC)     Mixed hyperlipidemia     Mild intermittent asthma without complication     Smoking     GERD (gastroesophageal reflux disease)     Lumbar radiculopathy     DDD (degenerative disc disease), lumbar     Neuroforaminal stenosis of lumbar spine     Sacroiliac joint pain     Lumbosacral spondylosis without myelopathy     STEMI (ST elevation myocardial infarction) ([facility] [address]      Plan of Treatment:   1. Awaiting ECHO TTE   2. JUAN JOSÉ reviewed with no Thrombus or vegetation. 3. Reviewed plan with Dr Shahana Lilly. Plan to trend troponin and follow up on TTE. 4. Continue ASA, statin, Brilinta, and BB. Will increase dose of BB for HR control. 5. Hematology consult note reviewed. Appreciate consult  6.  Keep K > 4.0 and Mag > 2.0 K 4.4 and Mag 1.9 yesterday    Electronically signed by SEAN Anthony NP on 6/28/2022 at 2:00 [address] [facility].  [phone]

## 2022-06-29 VITALS
OXYGEN SATURATION: 95 % | TEMPERATURE: 98 F | DIASTOLIC BLOOD PRESSURE: 70 MMHG | HEART RATE: 84 BPM | RESPIRATION RATE: 19 BRPM | SYSTOLIC BLOOD PRESSURE: 111 MMHG

## 2022-06-29 LAB
EKG ATRIAL RATE: 101 BPM
EKG P AXIS: 68 DEGREES
EKG P-R INTERVAL: 146 MS
EKG Q-T INTERVAL: 326 MS
EKG QRS DURATION: 84 MS
EKG QTC CALCULATION (BAZETT): 422 MS
EKG R AXIS: 91 DEGREES
EKG T AXIS: 80 DEGREES
EKG VENTRICULAR RATE: 101 BPM
TROPONIN, HIGH SENSITIVITY: 1600 NG/L (ref 0–22)
TROPONIN, HIGH SENSITIVITY: 1742 NG/L (ref 0–22)

## 2022-06-29 PROCEDURE — 6370000000 HC RX 637 (ALT 250 FOR IP): Performed by: STUDENT IN AN ORGANIZED HEALTH CARE EDUCATION/TRAINING PROGRAM

## 2022-06-29 PROCEDURE — 6370000000 HC RX 637 (ALT 250 FOR IP): Performed by: NURSE PRACTITIONER

## 2022-06-29 PROCEDURE — 36415 COLL VENOUS BLD VENIPUNCTURE: CPT

## 2022-06-29 PROCEDURE — 6360000002 HC RX W HCPCS: Performed by: NURSE PRACTITIONER

## 2022-06-29 PROCEDURE — 2580000003 HC RX 258: Performed by: STUDENT IN AN ORGANIZED HEALTH CARE EDUCATION/TRAINING PROGRAM

## 2022-06-29 PROCEDURE — 93270 REMOTE 30 DAY ECG REV/REPORT: CPT

## 2022-06-29 PROCEDURE — 93271 ECG/MONITORING AND ANALYSIS: CPT

## 2022-06-29 PROCEDURE — 84484 ASSAY OF TROPONIN QUANT: CPT

## 2022-06-29 PROCEDURE — 93010 ELECTROCARDIOGRAM REPORT: CPT | Performed by: INTERNAL MEDICINE

## 2022-06-29 RX ORDER — LISINOPRIL 2.5 MG/1
2.5 TABLET ORAL DAILY
Qty: 30 TABLET | Refills: 3 | Status: SHIPPED | OUTPATIENT
Start: 2022-06-29

## 2022-06-29 RX ORDER — ROSUVASTATIN CALCIUM 40 MG/1
40 TABLET, COATED ORAL NIGHTLY
Qty: 30 TABLET | Refills: 3 | Status: SHIPPED | OUTPATIENT
Start: 2022-06-29

## 2022-06-29 RX ORDER — LISINOPRIL 2.5 MG/1
2.5 TABLET ORAL DAILY
Status: DISCONTINUED | OUTPATIENT
Start: 2022-06-29 | End: 2022-06-29 | Stop reason: HOSPADM

## 2022-06-29 RX ORDER — METOPROLOL TARTRATE 75 MG/1
75 TABLET, FILM COATED ORAL 2 TIMES DAILY
Qty: 60 TABLET | Refills: 3 | Status: SHIPPED | OUTPATIENT
Start: 2022-06-29

## 2022-06-29 RX ADMIN — LISINOPRIL 2.5 MG: 2.5 TABLET ORAL at 14:08

## 2022-06-29 RX ADMIN — SODIUM CHLORIDE, PRESERVATIVE FREE 10 ML: 5 INJECTION INTRAVENOUS at 10:41

## 2022-06-29 RX ADMIN — ASPIRIN 81 MG: 81 TABLET, CHEWABLE ORAL at 09:35

## 2022-06-29 RX ADMIN — METOPROLOL TARTRATE 75 MG: 25 TABLET ORAL at 09:35

## 2022-06-29 RX ADMIN — LORAZEPAM 1 MG: 2 INJECTION INTRAMUSCULAR; INTRAVENOUS at 09:54

## 2022-06-29 RX ADMIN — TICAGRELOR 90 MG: 90 TABLET ORAL at 09:35

## 2022-06-29 NOTE — PROGRESS NOTES
Order received for discharge from Cardiology, RN message Oncology and got the ok for discharge as well.

## 2022-06-29 NOTE — CARE COORDINATION
Discharge [address] [facility] Case Management Department  Written by: Jagdish Guadarrama RN    Patient Name: Becky Garza  Attending Provider: Junior Wood MD  Admit Date: 2022  1:55 AM  MRN: [id number]  Account: [de-identified]                     : 1975  Discharge Date: 22      Disposition: home    Met with patient to discuss transitional planning. Plan is home independently with spouse. Patient has ride. Patient denies need for DME or further services.  Patient agreeable to plan    Jagdish Guadarrama RN

## 2022-06-29 NOTE — PROGRESS NOTES
CLINICAL PHARMACY NOTE: MEDS TO BEDS    Total # of Prescriptions Filled: 4   The following medications were delivered to the patient:  · brilintas 90 mg tab  · Lisinopril 2.5 mg tab  · Metoprolol tart.  75 mg tab  · Rosuvastatin 40 mg tab    Additional Documentation:Medications delivered to the patient in room 2006 @ 1:36 pm .

## 2022-06-29 NOTE — PLAN OF CARE
Problem: Discharge Planning  Goal: Discharge to home or other facility with appropriate resources  6/28/2022 2111 by Salome Delacruz RN  Outcome: Progressing  Flowsheets (Taken 6/28/2022 2000)  Discharge to home or other facility with appropriate resources: Identify barriers to discharge with patient and caregiver  6/28/2022 1709 by Dian Christie RN  Outcome: Progressing     Problem: ABCDS Injury Assessment  Goal: Absence of physical injury  6/28/2022 2111 by Salome Delacruz RN  Outcome: Progressing  Flowsheets (Taken 6/28/2022 2100)  Absence of Physical Injury: Implement safety measures based on patient assessment  6/28/2022 1709 by Dian Christie RN  Outcome: Progressing     Problem: Pain  Goal: Verbalizes/displays adequate comfort level or baseline comfort level  6/28/2022 2111 by Salome Delacruz RN  Outcome: Progressing  Flowsheets (Taken 6/28/2022 1945)  Verbalizes/displays adequate comfort level or baseline comfort level: Encourage patient to monitor pain and request assistance  6/28/2022 1709 by Dian Christie RN  Outcome: Progressing     Problem: Safety - Adult  Goal: Free from fall injury  6/28/2022 2111 by Salome Delacruz RN  Outcome: Adrian Nunes (Taken 6/28/2022 2100)  Free From Fall Injury: Instruct family/caregiver on patient safety  6/28/2022 1709 by Dian Christie RN  Outcome: Progressing     Problem: Chronic Conditions and Co-morbidities  Goal: Patient's chronic conditions and co-morbidity symptoms are monitored and maintained or improved  6/28/2022 2111 by Salome Delacruz RN  Outcome: Progressing  Flowsheets (Taken 6/28/2022 2000)  Care Plan - Patient's Chronic Conditions and Co-Morbidity Symptoms are Monitored and Maintained or Improved: Monitor and assess patient's chronic conditions and comorbid symptoms for stability, deterioration, or improvement  6/28/2022 1709 by Dian Christie RN  Outcome: Progressing

## 2022-06-29 NOTE — DISCHARGE SUMMARY
[facility]  Discharge Note                 Name:  James Rivera  YOB: 1975  Social Security Number:  [id number]  Medical Record Number:  [id number]              Date of Admission:  6/26/2022  Date of Discharge:  6/29/2022    Admitting physician: Andria Olguin MD    Discharge Attending: SEAN Bhatti CNP, CNP  Primary Care Physician: Valerie Meehan MD  Consultants: Cardiology  Discharge to Home in stable condition     HOSPITAL ADMISSION PROBLEM LIST:  Patient Active Problem List   Diagnosis    Presence of stent in LAD coronary artery    Anxiety    Type 2 diabetes mellitus without complication, without long-term current use of insulin ([address]    Mixed hyperlipidemia    Mild intermittent asthma without complication    Smoking    GERD (gastroesophageal reflux disease)    Lumbar radiculopathy    DDD (degenerative disc disease), lumbar    Neuroforaminal stenosis of lumbar spine    Sacroiliac joint pain    Lumbosacral spondylosis without myelopathy    STEMI (ST elevation myocardial infarction) ([address]         Procedures: Cardiac catheterization, JUAN JOSÉ, and TTE    HOSPITAL COURSE :           The patient was admitted for: Inferior STEMI  Hospital Procedures if any: Cardiac cath, JUAN JOSÉ, TTE  Medications changes recommendation: see med list  Follow Up Plan: 2-3 weeks OP with cardiology      Discharge exam:   Vitals:    06/29/22 0654   BP: 132/85   Pulse: 98   Resp: 25   Temp: 97.7 °F (36.5 °C)   SpO2: 98%     Neuro: normal  Chest: Clear to ausculation throughout. No wheezing. Cardiac: Regular rate and rhythm. s1 and s2 auscultated. No murmur noted. HR 87. Abdomen/groin: soft, non-tender, without masses or organomegaly  Lower extremity edema: none  Right and left femoral artery sites: Clean and dry. No erythema, drainage, or signs of infections. No tenderness to palpation.  +2 femoral pulses was in his usual state of health this evening when he developed sudden onset chest pain he describes as a pressure like sensation. [facility] showed ST elevations in inferior leads. Cath lab activated    ECHO TTE ordered/pending       JUAN JOSÉ 6/27/2022     Structures:     LA:       Normal  NORTH:    No thrombus  RA:      Normal  RV:      Normal  LV:       Normal  Estimated LVEF: 55%  Aorta:               Mild atheromatous disease arch  Pericardium:    No pericardial effusion  Septum:           No intracardiac shunt via color Doppler.      Valves:     Mitral Valve:    Structurally normal. Mild  regurgitation is identified. Aortic Valve: The aortic valve is trileaflet and opens adequately. No  regurgitiation is identified. Tricuspid valve: Structurally normal. Mild  regurgitation is identified. Pulmonary valve: Normal. No significant regurgitation     No valvular vegetations or thrombus identified.      JUAN JOSÉ Summary:      1. A JUAN JOSÉ was performed without complications. 2. LVEF 55 %   3. No thrombus or valvular vegetation identified         CATH 6/26/2022 - right and left femoral arteries    Lesion on Prox LAD: Proximal subsection. 100% stenosis 30 mm length     reduced to 0%. Pre procedure TUSHAR 0 flow was noted. Post Procedure TUSHAR     III flow was present. The lesion was diagnosed as High Risk (C). The     lesion was diffuse and eccentric. The lesion showed mild angulation. Lesion     plaque is ruptured.         Comments:Post-dilation with 3.5 balloon after manual thrombectomy for     acute occlusion.     Devices used         - ASAHI Prowater Flex 180 cm. Number of passes: 1.         - Balloon of Resolute [place] 3.0 x 12 LAYLA. 4 inflation(s) to a max     pressure of: 15 carmen.         - Resolute [place] 3.0 x 30 LAYLA. 2 inflation(s) to a max pressure of: 20     carmen.         - ASAHI Prowater Flex 180 cm. Number of passes: 1.         - Pronto Extraction Catheter. Number of passes: 1.         - NC Euphora Balloon 3.5mm x 20mm.  6 inflation(s) to a max pressure of:     20 carmen.       RCA:         Lesion on Mid RCA: 75% stenosis reduced to 0%. Pre procedure TUSHAR III     flow was noted. Post Procedure TUSHAR III flow was present. Good runoff was     present. The lesion was diagnosed as Moderate Risk (B). The lesion showed     evidence of thrombus presence. Lesion plaque is ruptured.         Comments:Manual thrombectomy. Another stent was placed at the distal edge     due to hazziness.     Devices used         - Wolf Pyros Pictures Prowater Flex 180 cm. Number of passes: 1.         - Resolute [place] 3.0 x 12 LAYLA. 2 inflation(s) to a max pressure of: 12     carmen.         - ASAHI Prowater Flex 180 cm. Number of passes: 1.         - Pronto Extraction Catheter LP. Number of passes: 1.         - NC Euphora Balloon 3.0mm x 15mm. 2 inflation(s) to a max pressure of:     12 carmen.         - Resolute [place] 3.0 x 8 LAYLA. 3 inflation(s) to a max pressure of: 16     carmen.        Coronary Tree        Dominance: Right       LV Analysis   LV function assessed as:Normal.   Ejection Fraction   +----------------------------------------------------------------------+---+   ! Method                                                                !EF%! +----------------------------------------------------------------------+---+   ! LV gram                                                               !54 ! +----------------------------------------------------------------------+---+       Conclusions      Procedure Summary      Inferior STEMI   Concern for embolism.   Possible clot in the proximal LAD instent and small distal inferior branch   of OM1 occlusing <1mm vessel.   Successful LAYLA of mid RCA for significant ulcerated plaque.   Successful PTCA/LAYLA of proximal LAD.   Distal OM1 branch too small for PCI.   At the end of case, patient complained of severe chest pain.  ECG showed   inferior STEMI.   Repeat cath showed blood clot in mid RCA stent and occlusion of LAD stent.   Successful manual thrombectomy of mid RCA and LAD.   Another stent was placed at the distal edge of previous RCA stent.  LAD stent post-dilated with 3.5 NC balloon.      Recommendations      Medical therapy as needed.   Risk factor modification.   Routine Post Stent Orders.   IV integrilin for 18 hours.   30 day event monitor for evaluation of atrial fibrillation. Patient has   recent ER visit for palpitations.   Hematology consult.  Keshav Manners on Monday        Cardiac Angiography: 7/25/2017     LMCA: Mild irregularities 10-20%.     LAD: Mild irregularities 20-30%. patent mid LAD stent     LCx: Mild irregularities 10-20%.     RCA: Mild irregularities 20-30%.     Coronary Tree      Dominance: Right      Coronary Discharge Core Measure: Please indicate the medication given by X, and if not the reasons not given:    Not Given Reason  Given      Beta Blockers X      ACE-I X      Statins X      ASA X   escript sent to pharmacy; no samples available    OAP (Brilinta) X    SL Nitro   X         Discussed with patient and nursing. Medications and discharge instructions reviewed with patient and nursing. Discussed in detail with patient post cath POC including but not limited to medications, diet, exercise, right radial artery site care, and follow-up. Questions and concerns addressed. OK for discharge home today. F/U in office in 1-3 weeks.       Electronically signed by SEAN Monson CNP on 6/29/2022 at 11:08 AM  [facility]      [phone]

## 2022-06-30 LAB
ANTICARDIOLIPIN IGA ANTIBODY: 3.3 APL (ref 0–14)
ANTICARDIOLIPIN IGG ANTIBODY: 9.6 GPL (ref 0–10)
BETA 2 GLYCOPROT.1 IGA AB: 1.6 ELISA U/ML (ref 0–7)
BETA 2 GLYCOPROT.1 IGG AB: 0.6 ELISA U/ML (ref 0–7)
BETA 2 GLYCOPROT.1 IGM AB: 12 ELISA U/ML (ref 0–7)
CARDIOLIPIN AB IGM: 13 MPL (ref 0–10)
DILUTE RUSSELL VIPER VENOM TIME: ABNORMAL
INR BLD: 1
PARTIAL THROMBOPLASTIN TIME: 28 SEC (ref 20.5–30.5)
PROTHROMBIN TIME: 10.5 SEC (ref 9.1–12.3)

## 2022-07-14 ENCOUNTER — TELEPHONE (OUTPATIENT)
Dept: ONCOLOGY | Age: 47
End: 2022-07-14

## 2022-07-14 ENCOUNTER — OFFICE VISIT (OUTPATIENT)
Dept: ONCOLOGY | Age: 47
End: 2022-07-14
Payer: COMMERCIAL

## 2022-07-14 VITALS
BODY MASS INDEX: 27 KG/M2 | RESPIRATION RATE: 18 BRPM | TEMPERATURE: 98.4 F | SYSTOLIC BLOOD PRESSURE: 120 MMHG | HEART RATE: 93 BPM | WEIGHT: 177.6 LBS | DIASTOLIC BLOOD PRESSURE: 78 MMHG

## 2022-07-14 DIAGNOSIS — D68.59 HYPERCOAGULABLE STATE (HCC): Primary | ICD-10-CM

## 2022-07-14 PROCEDURE — 4004F PT TOBACCO SCREEN RCVD TLK: CPT | Performed by: INTERNAL MEDICINE

## 2022-07-14 PROCEDURE — 99214 OFFICE O/P EST MOD 30 MIN: CPT | Performed by: INTERNAL MEDICINE

## 2022-07-14 PROCEDURE — G8419 CALC BMI OUT NRM PARAM NOF/U: HCPCS | Performed by: INTERNAL MEDICINE

## 2022-07-14 PROCEDURE — 1111F DSCHRG MED/CURRENT MED MERGE: CPT | Performed by: INTERNAL MEDICINE

## 2022-07-14 PROCEDURE — G8427 DOCREV CUR MEDS BY ELIG CLIN: HCPCS | Performed by: INTERNAL MEDICINE

## 2022-07-14 NOTE — PROGRESS NOTES
Today's Date: 7/14/2022  Patient Name: Kirsten Farias  Patient's age: 52 y.o., 1975    DIAGNOSIS:   Hypercoagulable state,   Positive beta-2 glycoprotein IgM antibody and positive IgM anticardiolipin antibody     CHIEF COMPLAINT:    Chief Complaint   Patient presents with    Follow-up     review disease process   INTERVAL HISTORY :    Patient is returning for follow-up visit and to discuss lab results and further recommendations. He has appointment with cardiologist next week. He is on aspirin as well as Brilinta. He denies any bleeding symptoms. He denies any chest pain shortness of breath or leg pain. His lab work showed positive IgM antibodies for beta-2 glycoprotein as well as anticardiolipin antibody. During this visit patient's allergy, social, medical, surgical history and medications were reviewed and updated. HISTORY OF PRESENT ILLNESS:    Kirsten Farias is a 52 y.o. male who is admitted to the hospital on (Not on file)  for evaluation of his cardiac disease. Patient has past medical history of CAD, diabetes, tobacco abuse, hyperlipidemia, hypertension. Patient was transferred from [facility] where he presented with STEMI. Patient underwent cardiac catheterization yesterday which showed possible clot in proximal LAD and small distal inferior branch and patient did undergo LAYLA of mid RCA and also proximal LAD. At the end of procedure patient had significant chest pain and repeat cath showed blood clot in mid RCA stent and occlusion of LAD stent, manual thrombectomy was performed. Patient currently on anticoagulation as per cardiology and planning for JUAN JOSÉ on Monday. Today patient underwent JUAN JOSÉ which showed no thrombus or valvular vegetation. Cousin on father side clotting disorder. Hematology consulted for evaluation of underlying thrombophilia.     Past Medical History:   has a past medical history of Anxiety, Asthma, CAD (coronary artery disease), Chronic back pain, Diabetes mellitus ([facility] [address] GERD (gastroesophageal reflux disease), History of MI (myocardial infarction), Hx of heart artery stent, and Neuropathy. Past Surgical History:   has a past surgical history that includes Coronary angioplasty with stent (2010); Tonsillectomy; Cardiac surgery; and Pain management procedure.      Medications:    Current Outpatient Medications   Medication Sig Dispense Refill    rosuvastatin (CRESTOR) 40 MG tablet Take 1 tablet by mouth nightly 30 tablet 3    lisinopril (PRINIVIL;ZESTRIL) 2.5 MG tablet Take 1 tablet by mouth daily 30 tablet 3    metoprolol tartrate 75 MG TABS Take 75 mg by mouth 2 times daily 60 tablet 3    ticagrelor (BRILINTA) 90 MG TABS tablet Take 1 tablet by mouth 2 times daily 60 tablet 11    cyclobenzaprine (FLEXERIL) 10 MG tablet Take 10 mg by mouth 3 times daily as needed for Muscle spasms      sildenafil (VIAGRA) 100 MG tablet TAKE 1 TABLET BY MOUTH ONCE DAILY AS NEEDED FOR ERECTILE DYSFUNCTION 10 tablet 11    omeprazole (PRILOSEC) 20 MG delayed release capsule TAKE 1 CAPSULE BY MOUTH TWICE DAILY BEFORE MEAL(S) 180 capsule 0    albuterol sulfate HFA (PROVENTIL HFA) 108 (90 Base) MCG/ACT inhaler Inhale 1-2 puffs into the lungs every 6 hours as needed for Wheezing or Shortness of Breath (Space out to every 6 hours as symptoms improve) 3 each 1    metFORMIN (GLUCOPHAGE) 500 MG tablet Take 1 tablet by mouth 2 times daily (with meals) 180 tablet 3    naproxen (NAPROSYN) 500 MG tablet Take 1 tablet by mouth 2 times daily (with meals) 180 tablet 1    testosterone (ANDROGEL; TESTIM) 50 MG/5GM (1%) GEL 1% gel APPLY 1 PACKET TO SHOULDERS ONCE DAILY      vitamin D (ERGOCALCIFEROL) 1.25 MG (05592 UT) CAPS capsule TAKE 1 CAPSULE BY MOUTH EVERY OTHER WEEK      ibuprofen (ADVIL;MOTRIN) 800 MG tablet Take 1 tablet by mouth every 8 hours as needed for Pain 30 tablet 0    glipiZIDE (GLUCOTROL) 10 MG tablet TAKE 1 TABLET BY MOUTH IN THE MORNING AND 1 TABLET AT SUPPER aspirin 81 MG tablet Take 81 mg by mouth daily       gabapentin (NEURONTIN) 300 MG capsule TAKE 1 CAPSULE BY MOUTH THREE TIMES DAILY 90 capsule 3     No current facility-administered medications for this visit. Allergies:  Statins    Social History:   reports that he has been smoking cigarettes. He has a 21.00 pack-year smoking history. He has never used smokeless tobacco. He reports current alcohol use. He reports current drug use. Drug: Marijuana Petey Barrosison). Family History: family history includes Arrhythmia in his brother; Diabetes in his father and mother. REVIEW OF SYSTEMS:    Constitutional: No fever or chills. No night sweats, no weight loss   Eyes: No eye discharge, double vision, or eye pain   HEENT: negative for sore mouth, sore throat, hoarseness and voice change   Respiratory: negative for cough , sputum, dyspnea, wheezing, hemoptysis, chest pain   Cardiovascular: negative for chest pain, dyspnea, palpitations, orthopnea, PND   Gastrointestinal: negative for nausea, vomiting, diarrhea, constipation, abdominal pain, Dysphagia, hematemesis and hematochezia   Genitourinary: negative for frequency, dysuria, nocturia, urinary incontinence, and hematuria   Integument: negative for rash, skin lesions, bruises.    Hematologic/Lymphatic: negative for easy bruising, bleeding, lymphadenopathy, or petechiae   Endocrine: negative for heat or cold intolerance,weight changes, change in bowel habits and hair loss   Musculoskeletal: negative for myalgias, arthralgias, pain, joint swelling,and bone pain   Neurological: negative for headaches, dizziness, seizures, weakness, numbness    PHYSICAL EXAM:      /78   Pulse 93   Temp 98.4 °F (36.9 °C) (Oral)   Resp 18   Wt 177 lb 9.6 oz (80.6 kg)   BMI 27.00 kg/m²    Temp (24hrs), Av.2 °F (37.3 °C), Min:97.8 °F (36.6 °C), Max:100 °F (37.8 °C)    General appearance - well appearing, no in pain or distress   Mental status - alert and cooperative   Eyes - pupils equal and reactive, extraocular eye movements intact   Ears - bilateral TM's and external ear canals normal   Mouth - mucous membranes moist, pharynx normal without lesions   Neck - supple, no significant adenopathy   Lymphatics - no palpable lymphadenopathy, no hepatosplenomegaly   Chest - clear to auscultation, no wheezes, rales or rhonchi, symmetric air entry   Heart - normal rate, regular rhythm, normal S1, S2, no murmurs  Abdomen - soft, nontender, nondistended, no masses or organomegaly   Neurological - alert, oriented, normal speech, no focal findings or movement disorder noted   Musculoskeletal - no joint tenderness, deformity or swelling   Extremities - peripheral pulses normal, no pedal edema, no clubbing or cyanosis   Skin - normal coloration and turgor, no rashes, no suspicious skin lesions noted ,    DATA:    Labs:   Lab Results   Component Value Date    WBC 11.3 06/27/2022    HGB 14.9 06/27/2022    HCT 43.8 06/27/2022    MCV 92.4 06/27/2022     06/27/2022    LYMPHOPCT 33 06/26/2022    RBC 4.74 06/27/2022    MCH 31.4 06/27/2022    MCHC 34.0 06/27/2022    RDW 13.9 06/27/2022         Lab Results   Component Value Date     (L) 06/27/2022    K 4.4 06/27/2022    CL 99 06/27/2022    CO2 24 06/27/2022    BUN 7 06/27/2022    CREATININE 0.82 06/27/2022    GLUCOSE 181 (H) 06/27/2022    CALCIUM 8.8 06/27/2022    PROT 6.5 06/26/2022    LABALBU 4.0 06/26/2022    BILITOT 0.29 (L) 06/26/2022    ALKPHOS 49 06/26/2022    AST 22 06/26/2022    ALT 35 06/26/2022    LABGLOM >60 06/27/2022    GFRAA >60 06/27/2022     NEGATIVE for Lupus Anticoagulant  Result Notes    Component Ref Range & Units 6/27/22 1826 7/24/17 1315 7/24/17 1315   Anticardiolipin IgA 0.0 - 14.0 APL 3.3      Comment:        Reference Range:       <14.0 Negative   14.0-20.0 Equivocal       >20.0 Positive         When results are Equivocal, it is recommended to retest after 4-6 weeks.     Anticardiolipin IgG 0.0 - 10.0 GPL 9.6 Comment:        Reference Range:       <10.0 Negative   10.0-40.0 Equivocal       >40.0 Positive    Cardiolipin Ab IgM 0.0 - 10.0 MPL 13.0 High       Comment:        Reference Range:       <10.0 Negative   10.0-40.0 Equivocal       >40.0 Positive          Ref Range & Units 6/27/22 1826   Beta 2 Glycoprot. 1 IgG Ab 0.0 - 7.0 Mara U/mL 0.6    Comment:        Reference Range:       <7.0  Negative   7.0-10.0  Equivocal      >10.0  Positive          Beta 2 Glycoprot. 1 IgA Ab 0.0 - 7.0 Mara U/mL 1.6    Comment:        Reference Range:       <7.0  Negative   7.0-10.0  Equivocal      >10.0  Positive          Beta 2 Glycoprot. 1 IgM Ab 0.0 - 7.0 Mara U/mL 12.0 High     Comment:        Reference Range:       <7.0  Negative   7.0-10.0  Equivocal      >10.0  Positive              IMAGING DATA:  Reviewed     IMPRESSION:   STEMI, status post cath and PCI  Positive antiphospholipid antibodies  History of tobacco abuse  Hypertension  Hyperlipidemia    RECOMMENDATIONS:  I reviewed the laboratory data, imaging studies, discussed possible diagnosis and treatment recommendations  His lab work showed positive IgM antibodies for beta-2 glycoprotein as well as anticardiolipin antibody. Given his young age, extensive CAD and now positive IgM antibodies for beta-2 glycoprotein as well as cardiolipin, high possibility of antiphospholipid antibody syndrome and he will need long-term anticoagulation with Coumadin however we will need to confirm the diagnosis with repeat antiphospholipid antibodies in 3 months. Continue current anticoagulation for now. If his repeat lab work shows persistent antiphospholipid antibody, this will confirm the diagnosis of APS and will start romantic coagulation with Coumadin. Smoking cessation was advised and he was counseled about smoking cessation   RTC in 3 months with labs prior      Moise Cm MD  Hematologist/Medical Oncologist    On this date 7/16/22  I have spent 40 minutes reviewing previous notes, test results and face to face with the patient discussing the diagnosis and importance of compliance with the treatment plan. Greater than 50% of that time was spent face-to-face with the patient in counseling and coordinating her care. This note is created with the assistance of a speech recognition program.  While intending to generate a document that actually reflects the content of the visit, the document can still have some errors including those of syntax and sound a like substitutions which may escape proof reading. It such instances, actual meaning can be extrapolated by contextual diversion.

## 2022-07-19 ENCOUNTER — TELEPHONE (OUTPATIENT)
Dept: INFUSION THERAPY | Age: 47
End: 2022-07-19

## 2022-07-19 NOTE — TELEPHONE ENCOUNTER
Pt's wife, Flower Alcala, called stating Dr. Isac Rider needs clearance for pt to be on viagra and testosterone before he will refill these. Spoke with Dr. Cuenca Fairly; he states ok for pt to take both. Notified Minnie and clearance letter faxed to Dr. Isac Rider at [phone].

## 2022-08-02 NOTE — PROCEDURES
[address], [facility] 30                                 EVENT MONITOR    PATIENT NAME: Gretchen Riggs                 :        1975  MED REC NO:   [id number]                             ROOM:         ACCOUNT NO:   [de-identified]                           ADMIT DATE: 2022  PROVIDER:     Amos Murillo      30 DAY EVENT MONITOR:    INDICATIONS: Atrial fibrillation    ORDERING PROVIDER: Saba Kat NP    PRIMARY CARE PROVIDER: Dr. Marcheta Cushing: Dr. Yash Whitehead:  1. Predominant rhythm was Sinus rhythm / sinus tachycardia  2. MINIMUM HR: 78 BPM, AVERAGE HR: 98 BPM, MAXIMUM HR: 124 BPM  3. 4 patient events with symptoms of not specified  4. No secondary arrhythmia detected  5. No Supraventricular ectopy detected / rare Premature ventricular  contractions  6. Rate: 78 bpm, OK: 0.15, QRS: 0.09, QT: 0.39  7.  No pauses           COMFORT ROSARIO    D: 2022 14:54:47       T: 2022 14:55:55     [id number]  Job#: [id number]     Doc#: Unknown    CC:

## 2022-09-08 ENCOUNTER — APPOINTMENT (OUTPATIENT)
Dept: GENERAL RADIOLOGY | Age: 47
End: 2022-09-08
Payer: COMMERCIAL

## 2022-09-08 ENCOUNTER — APPOINTMENT (OUTPATIENT)
Dept: CT IMAGING | Age: 47
End: 2022-09-08
Payer: COMMERCIAL

## 2022-09-08 ENCOUNTER — HOSPITAL ENCOUNTER (OUTPATIENT)
Age: 47
Setting detail: OBSERVATION
Discharge: HOME | End: 2022-09-09
Attending: EMERGENCY MEDICINE | Admitting: FAMILY MEDICINE
Payer: COMMERCIAL

## 2022-09-08 DIAGNOSIS — R53.83 FATIGUE, UNSPECIFIED TYPE: Primary | ICD-10-CM

## 2022-09-08 DIAGNOSIS — R06.02 SHORTNESS OF BREATH: ICD-10-CM

## 2022-09-08 LAB
ABSOLUTE EOS #: 0.3 K/UL (ref 0–0.4)
ABSOLUTE LYMPH #: 1.7 K/UL (ref 1–4.8)
ABSOLUTE MONO #: 0.4 K/UL (ref 0.1–1.3)
ANION GAP SERPL CALCULATED.3IONS-SCNC: 8 MMOL/L (ref 9–17)
BASOPHILS # BLD: 1 % (ref 0–2)
BASOPHILS ABSOLUTE: 0 K/UL (ref 0–0.2)
BUN BLDV-MCNC: 14 MG/DL (ref 6–20)
CALCIUM SERPL-MCNC: 9.7 MG/DL (ref 8.6–10.4)
CHLORIDE BLD-SCNC: 96 MMOL/L (ref 98–107)
CO2: 27 MMOL/L (ref 20–31)
CREAT SERPL-MCNC: 0.87 MG/DL (ref 0.7–1.2)
EOSINOPHILS RELATIVE PERCENT: 5 % (ref 0–4)
GFR AFRICAN AMERICAN: >60 ML/MIN
GFR NON-AFRICAN AMERICAN: >60 ML/MIN
GFR SERPL CREATININE-BSD FRML MDRD: ABNORMAL ML/MIN/{1.73_M2}
GLUCOSE BLD-MCNC: 145 MG/DL (ref 75–110)
GLUCOSE BLD-MCNC: 325 MG/DL (ref 70–99)
HCT VFR BLD CALC: 44.6 % (ref 41–53)
HEMOGLOBIN: 15.4 G/DL (ref 13.5–17.5)
INFLUENZA A: NOT DETECTED
INFLUENZA B: NOT DETECTED
INR BLD: 0.9
LYMPHOCYTES # BLD: 29 % (ref 24–44)
MAGNESIUM: 2 MG/DL (ref 1.6–2.6)
MCH RBC QN AUTO: 31.9 PG (ref 26–34)
MCHC RBC AUTO-ENTMCNC: 34.5 G/DL (ref 31–37)
MCV RBC AUTO: 92.5 FL (ref 80–100)
MONOCYTES # BLD: 7 % (ref 1–7)
PARTIAL THROMBOPLASTIN TIME: 27.3 SEC (ref 24–36)
PDW BLD-RTO: 15 % (ref 11.5–14.9)
PHOSPHORUS: 3.6 MG/DL (ref 2.5–4.5)
PLATELET # BLD: 303 K/UL (ref 150–450)
PMV BLD AUTO: 6.9 FL (ref 6–12)
POTASSIUM SERPL-SCNC: 4.7 MMOL/L (ref 3.7–5.3)
PRO-BNP: 166 PG/ML
PROTHROMBIN TIME: 12.6 SEC (ref 11.8–14.6)
RBC # BLD: 4.83 M/UL (ref 4.5–5.9)
SARS-COV-2 RNA, RT PCR: NOT DETECTED
SEG NEUTROPHILS: 58 % (ref 36–66)
SEGMENTED NEUTROPHILS ABSOLUTE COUNT: 3.4 K/UL (ref 1.3–9.1)
SODIUM BLD-SCNC: 131 MMOL/L (ref 135–144)
SOURCE: NORMAL
SPECIMEN DESCRIPTION: NORMAL
TROPONIN, HIGH SENSITIVITY: 13 NG/L (ref 0–22)
TROPONIN, HIGH SENSITIVITY: 14 NG/L (ref 0–22)
TROPONIN, HIGH SENSITIVITY: 14 NG/L (ref 0–22)
TROPONIN, HIGH SENSITIVITY: 15 NG/L (ref 0–22)
TSH SERPL DL<=0.05 MIU/L-ACNC: 0.4 UIU/ML (ref 0.3–5)
WBC # BLD: 5.8 K/UL (ref 3.5–11)

## 2022-09-08 PROCEDURE — G0378 HOSPITAL OBSERVATION PER HR: HCPCS

## 2022-09-08 PROCEDURE — 93005 ELECTROCARDIOGRAM TRACING: CPT | Performed by: EMERGENCY MEDICINE

## 2022-09-08 PROCEDURE — 6360000002 HC RX W HCPCS: Performed by: FAMILY MEDICINE

## 2022-09-08 PROCEDURE — 82947 ASSAY GLUCOSE BLOOD QUANT: CPT

## 2022-09-08 PROCEDURE — 96372 THER/PROPH/DIAG INJ SC/IM: CPT

## 2022-09-08 PROCEDURE — 84100 ASSAY OF PHOSPHORUS: CPT

## 2022-09-08 PROCEDURE — 83880 ASSAY OF NATRIURETIC PEPTIDE: CPT

## 2022-09-08 PROCEDURE — 85730 THROMBOPLASTIN TIME PARTIAL: CPT

## 2022-09-08 PROCEDURE — 84484 ASSAY OF TROPONIN QUANT: CPT

## 2022-09-08 PROCEDURE — 85025 COMPLETE CBC W/AUTO DIFF WBC: CPT

## 2022-09-08 PROCEDURE — 83036 HEMOGLOBIN GLYCOSYLATED A1C: CPT

## 2022-09-08 PROCEDURE — 80048 BASIC METABOLIC PNL TOTAL CA: CPT

## 2022-09-08 PROCEDURE — 84443 ASSAY THYROID STIM HORMONE: CPT

## 2022-09-08 PROCEDURE — 6360000004 HC RX CONTRAST MEDICATION: Performed by: EMERGENCY MEDICINE

## 2022-09-08 PROCEDURE — 6370000000 HC RX 637 (ALT 250 FOR IP): Performed by: FAMILY MEDICINE

## 2022-09-08 PROCEDURE — 83735 ASSAY OF MAGNESIUM: CPT

## 2022-09-08 PROCEDURE — 99285 EMERGENCY DEPT VISIT HI MDM: CPT

## 2022-09-08 PROCEDURE — 36415 COLL VENOUS BLD VENIPUNCTURE: CPT

## 2022-09-08 PROCEDURE — 71260 CT THORAX DX C+: CPT | Performed by: EMERGENCY MEDICINE

## 2022-09-08 PROCEDURE — 87636 SARSCOV2 & INF A&B AMP PRB: CPT

## 2022-09-08 PROCEDURE — 71045 X-RAY EXAM CHEST 1 VIEW: CPT

## 2022-09-08 PROCEDURE — 85610 PROTHROMBIN TIME: CPT

## 2022-09-08 PROCEDURE — 2580000003 HC RX 258: Performed by: EMERGENCY MEDICINE

## 2022-09-08 RX ORDER — FENOFIBRATE 160 MG/1
160 TABLET ORAL DAILY
Status: DISCONTINUED | OUTPATIENT
Start: 2022-09-08 | End: 2022-09-09 | Stop reason: HOSPADM

## 2022-09-08 RX ORDER — ASPIRIN 81 MG/1
81 TABLET ORAL DAILY
Status: DISCONTINUED | OUTPATIENT
Start: 2022-09-08 | End: 2022-09-09 | Stop reason: HOSPADM

## 2022-09-08 RX ORDER — ROSUVASTATIN CALCIUM 40 MG/1
40 TABLET, COATED ORAL NIGHTLY
Status: DISCONTINUED | OUTPATIENT
Start: 2022-09-08 | End: 2022-09-09 | Stop reason: HOSPADM

## 2022-09-08 RX ORDER — ACETAMINOPHEN 650 MG/1
650 SUPPOSITORY RECTAL EVERY 6 HOURS PRN
Status: DISCONTINUED | OUTPATIENT
Start: 2022-09-08 | End: 2022-09-09 | Stop reason: HOSPADM

## 2022-09-08 RX ORDER — SODIUM CHLORIDE 0.9 % (FLUSH) 0.9 %
10 SYRINGE (ML) INJECTION AS NEEDED
Status: DISCONTINUED | OUTPATIENT
Start: 2022-09-08 | End: 2022-09-09 | Stop reason: HOSPADM

## 2022-09-08 RX ORDER — ASPIRIN 81 MG/1
81 TABLET, CHEWABLE ORAL DAILY
Status: DISCONTINUED | OUTPATIENT
Start: 2022-09-09 | End: 2022-09-09 | Stop reason: SDUPTHER

## 2022-09-08 RX ORDER — ONDANSETRON 4 MG/1
4 TABLET, ORALLY DISINTEGRATING ORAL EVERY 8 HOURS PRN
Status: DISCONTINUED | OUTPATIENT
Start: 2022-09-08 | End: 2022-09-09 | Stop reason: HOSPADM

## 2022-09-08 RX ORDER — INSULIN LISPRO 100 [IU]/ML
0-4 INJECTION, SOLUTION INTRAVENOUS; SUBCUTANEOUS
Status: DISCONTINUED | OUTPATIENT
Start: 2022-09-09 | End: 2022-09-09 | Stop reason: HOSPADM

## 2022-09-08 RX ORDER — CYCLOBENZAPRINE HCL 10 MG
10 TABLET ORAL 3 TIMES DAILY PRN
Status: DISCONTINUED | OUTPATIENT
Start: 2022-09-08 | End: 2022-09-09 | Stop reason: HOSPADM

## 2022-09-08 RX ORDER — LISINOPRIL 5 MG/1
2.5 TABLET ORAL DAILY
Status: DISCONTINUED | OUTPATIENT
Start: 2022-09-08 | End: 2022-09-09 | Stop reason: HOSPADM

## 2022-09-08 RX ORDER — ACETAMINOPHEN 325 MG/1
650 TABLET ORAL EVERY 6 HOURS PRN
Status: DISCONTINUED | OUTPATIENT
Start: 2022-09-08 | End: 2022-09-09 | Stop reason: HOSPADM

## 2022-09-08 RX ORDER — GLIPIZIDE 5 MG/1
10 TABLET ORAL
Status: DISCONTINUED | OUTPATIENT
Start: 2022-09-09 | End: 2022-09-09 | Stop reason: HOSPADM

## 2022-09-08 RX ORDER — SODIUM CHLORIDE 0.9 % (FLUSH) 0.9 %
5-40 SYRINGE (ML) INJECTION EVERY 12 HOURS SCHEDULED
Status: DISCONTINUED | OUTPATIENT
Start: 2022-09-08 | End: 2022-09-09 | Stop reason: HOSPADM

## 2022-09-08 RX ORDER — POLYETHYLENE GLYCOL 3350 17 G/17G
17 POWDER, FOR SOLUTION ORAL DAILY PRN
Status: DISCONTINUED | OUTPATIENT
Start: 2022-09-08 | End: 2022-09-09 | Stop reason: HOSPADM

## 2022-09-08 RX ORDER — GABAPENTIN 300 MG/1
300 CAPSULE ORAL 3 TIMES DAILY
Status: DISCONTINUED | OUTPATIENT
Start: 2022-09-08 | End: 2022-09-09 | Stop reason: HOSPADM

## 2022-09-08 RX ORDER — NITROGLYCERIN 0.4 MG/1
0.4 TABLET SUBLINGUAL EVERY 5 MIN PRN
Status: DISCONTINUED | OUTPATIENT
Start: 2022-09-08 | End: 2022-09-09 | Stop reason: HOSPADM

## 2022-09-08 RX ORDER — SODIUM CHLORIDE 0.9 % (FLUSH) 0.9 %
5-40 SYRINGE (ML) INJECTION PRN
Status: DISCONTINUED | OUTPATIENT
Start: 2022-09-08 | End: 2022-09-09 | Stop reason: HOSPADM

## 2022-09-08 RX ORDER — ALBUTEROL SULFATE 90 UG/1
2 AEROSOL, METERED RESPIRATORY (INHALATION) EVERY 6 HOURS PRN
Status: DISCONTINUED | OUTPATIENT
Start: 2022-09-08 | End: 2022-09-09 | Stop reason: HOSPADM

## 2022-09-08 RX ORDER — ONDANSETRON 2 MG/ML
4 INJECTION INTRAMUSCULAR; INTRAVENOUS EVERY 6 HOURS PRN
Status: DISCONTINUED | OUTPATIENT
Start: 2022-09-08 | End: 2022-09-09 | Stop reason: HOSPADM

## 2022-09-08 RX ORDER — SODIUM CHLORIDE 9 MG/ML
INJECTION, SOLUTION INTRAVENOUS PRN
Status: DISCONTINUED | OUTPATIENT
Start: 2022-09-08 | End: 2022-09-09 | Stop reason: HOSPADM

## 2022-09-08 RX ORDER — DEXTROSE MONOHYDRATE 100 MG/ML
INJECTION, SOLUTION INTRAVENOUS CONTINUOUS PRN
Status: DISCONTINUED | OUTPATIENT
Start: 2022-09-08 | End: 2022-09-09 | Stop reason: HOSPADM

## 2022-09-08 RX ORDER — 0.9 % SODIUM CHLORIDE 0.9 %
100 INTRAVENOUS SOLUTION INTRAVENOUS ONCE
Status: COMPLETED | OUTPATIENT
Start: 2022-09-08 | End: 2022-09-08

## 2022-09-08 RX ORDER — PANTOPRAZOLE SODIUM 40 MG/1
40 TABLET, DELAYED RELEASE ORAL
Status: DISCONTINUED | OUTPATIENT
Start: 2022-09-09 | End: 2022-09-09 | Stop reason: HOSPADM

## 2022-09-08 RX ORDER — INSULIN LISPRO 100 [IU]/ML
0-4 INJECTION, SOLUTION INTRAVENOUS; SUBCUTANEOUS NIGHTLY
Status: DISCONTINUED | OUTPATIENT
Start: 2022-09-08 | End: 2022-09-09 | Stop reason: HOSPADM

## 2022-09-08 RX ORDER — ENOXAPARIN SODIUM 100 MG/ML
40 INJECTION SUBCUTANEOUS DAILY
Status: DISCONTINUED | OUTPATIENT
Start: 2022-09-08 | End: 2022-09-09 | Stop reason: HOSPADM

## 2022-09-08 RX ADMIN — FENOFIBRATE 160 MG: 160 TABLET ORAL at 22:44

## 2022-09-08 RX ADMIN — TICAGRELOR 90 MG: 90 TABLET ORAL at 22:37

## 2022-09-08 RX ADMIN — GABAPENTIN 300 MG: 300 CAPSULE ORAL at 22:37

## 2022-09-08 RX ADMIN — SODIUM CHLORIDE, PRESERVATIVE FREE 10 ML: 5 INJECTION INTRAVENOUS at 15:37

## 2022-09-08 RX ADMIN — ENOXAPARIN SODIUM 40 MG: 100 INJECTION SUBCUTANEOUS at 22:45

## 2022-09-08 RX ADMIN — LISINOPRIL 2.5 MG: 5 TABLET ORAL at 22:44

## 2022-09-08 RX ADMIN — METOPROLOL TARTRATE 75 MG: 25 TABLET, FILM COATED ORAL at 22:37

## 2022-09-08 RX ADMIN — SODIUM CHLORIDE 100 ML: 9 INJECTION, SOLUTION INTRAVENOUS at 15:35

## 2022-09-08 RX ADMIN — IOPAMIDOL 75 ML: 755 INJECTION, SOLUTION INTRAVENOUS at 15:33

## 2022-09-08 RX ADMIN — METFORMIN HYDROCHLORIDE 500 MG: 500 TABLET ORAL at 22:44

## 2022-09-08 RX ADMIN — SODIUM CHLORIDE, PRESERVATIVE FREE 10 ML: 5 INJECTION INTRAVENOUS at 15:33

## 2022-09-08 RX ADMIN — ROSUVASTATIN 40 MG: 40 TABLET, FILM COATED ORAL at 22:44

## 2022-09-08 ASSESSMENT — ENCOUNTER SYMPTOMS
TROUBLE SWALLOWING: 0
COLOR CHANGE: 0
DIARRHEA: 0
ABDOMINAL PAIN: 0
NAUSEA: 0
VOMITING: 0
COUGH: 0
SHORTNESS OF BREATH: 1
PHOTOPHOBIA: 0

## 2022-09-08 ASSESSMENT — PAIN - FUNCTIONAL ASSESSMENT: PAIN_FUNCTIONAL_ASSESSMENT: NONE - DENIES PAIN

## 2022-09-08 NOTE — PROGRESS NOTES
Medication History completed:    New medications: none    Medications discontinued: naproxen, ibuprofen    Changes to dosing: none    Stated allergies: As listed - patient tolerates rosuvastatin    Other pertinent information: Medications confirmed with [facility] and [facility]. The patient reports adherence to gabapentin and testosterone gel, but they have not been filled since May 2022 for 30 day supplies. The patient reports that he is due for his ergocalciferol dose. The patient states he rarely uses cannabis edibles to help with back pain.      Thank you,  Alyssa Thompson, PharmD, BCPS  [phone]

## 2022-09-08 NOTE — ED PROVIDER NOTES
EMERGENCY DEPARTMENT ENCOUNTER    Pt Name: Norma Fowler  MRN: [id number]  [place] 1975  Date of evaluation: 9/8/22  CHIEF COMPLAINT       Chief Complaint   Patient presents with    Fatigue     HISTORY OF PRESENT ILLNESS   26-year-old male presents the ER complaining of fatigue and dyspnea for the last few days. Patient states he did have a heart attack roughly 3 or 4 months ago and had 2 stents placed. Patient denies any chest pain but states the shortness of breath has gotten pretty bad. Patient has been tested for a clotting disorder that makes him more prone to clots. The history is provided by the patient. Shortness of Breath  Severity:  Moderate  Onset quality:  Gradual  Duration:  4 days  Timing:  Constant  Progression:  Worsening  Chronicity:  New  Relieved by:  Rest  Worsened by:  Exertion and movement  Associated symptoms: no abdominal pain, no chest pain, no cough, no ear pain, no fever, no rash and no vomiting          REVIEW OF SYSTEMS     Review of Systems   Constitutional:  Negative for activity change, fatigue and fever. HENT:  Negative for congestion, ear pain and trouble swallowing. Eyes:  Negative for photophobia and visual disturbance. Respiratory:  Positive for shortness of breath. Negative for cough. Cardiovascular:  Negative for chest pain and palpitations. Gastrointestinal:  Negative for abdominal pain, diarrhea, nausea and vomiting. Genitourinary:  Negative for dysuria, flank pain and urgency. Musculoskeletal:  Negative for arthralgias and myalgias. Skin:  Negative for color change and rash. Neurological:  Negative for dizziness and facial asymmetry. Psychiatric/Behavioral:  Negative for agitation and behavioral problems.     PASTMEDICAL HISTORY     Past Medical History:   Diagnosis Date    Anxiety     Asthma     CAD (coronary artery disease)     Chronic back pain     Diabetes mellitus (HCC)     GERD (gastroesophageal reflux disease)      heart burn mouth daily    METFORMIN (GLUCOPHAGE) 500 MG TABLET    Take 1 tablet by mouth 2 times daily (with meals)    METOPROLOL TARTRATE 75 MG TABS    Take 75 mg by mouth 2 times daily    NAPROXEN (NAPROSYN) 500 MG TABLET    Take 1 tablet by mouth 2 times daily (with meals)    NITROGLYCERIN (NITROSTAT) 0.4 MG SL TABLET        OMEPRAZOLE (PRILOSEC) 20 MG DELAYED RELEASE CAPSULE    TAKE 1 CAPSULE BY MOUTH TWICE DAILY BEFORE MEAL(S)    ROSUVASTATIN (CRESTOR) 40 MG TABLET    Take 1 tablet by mouth nightly    SILDENAFIL (VIAGRA) 100 MG TABLET    TAKE 1 TABLET BY MOUTH ONCE DAILY AS NEEDED FOR ERECTILE DYSFUNCTION    TESTOSTERONE (ANDROGEL; TESTIM) 50 MG/5GM (1%) GEL 1% GEL    APPLY 1 PACKET TO SHOULDERS ONCE DAILY    TICAGRELOR (BRILINTA) 90 MG TABS TABLET    Take 1 tablet by mouth 2 times daily    VITAMIN D (ERGOCALCIFEROL) 1.25 MG (86758 UT) CAPS CAPSULE    TAKE 1 CAPSULE BY MOUTH EVERY OTHER WEEK     ALLERGIES     is allergic to statins. FAMILY HISTORY     He indicated that his mother is alive. He indicated that his father is . He indicated that his sister is alive. He indicated that his brother is alive. SOCIAL HISTORY       Social History     Tobacco Use    Smoking status: Every Day     Packs/day: 1.00     Years: 21.00     Pack years: 21.00     Types: Cigarettes    Smokeless tobacco: Never   Vaping Use    Vaping Use: Every day    Substances: THC   Substance Use Topics    Alcohol use: Yes     Comment:  socially occ beer    Drug use: Yes     Types: Marijuana (Weed)     Comment: Edibles     PHYSICAL EXAM     INITIAL VITALS: /80   Pulse 92   Temp 98.4 °F (36.9 °C)   Resp 18   Ht 5' 8\" (1.727 m)   Wt 175 lb (79.4 kg)   SpO2 99%   BMI 26.61 kg/m²    Physical Exam  Constitutional:       General: He is not in acute distress. Appearance: Normal appearance. HENT:      Head: Normocephalic and atraumatic.       Right Ear: External ear normal.      Left Ear: External ear normal.      Nose: Nose normal. No congestion or rhinorrhea. Eyes:      Extraocular Movements: Extraocular movements intact. Pupils: Pupils are equal, round, and reactive to light. Cardiovascular:      Rate and Rhythm: Normal rate and regular rhythm. Pulses: Normal pulses. Heart sounds: Normal heart sounds. Pulmonary:      Effort: Pulmonary effort is normal. No respiratory distress. Breath sounds: Normal breath sounds. Abdominal:      General: Bowel sounds are normal.      Palpations: Abdomen is soft. Musculoskeletal:         General: No deformity. Normal range of motion. Cervical back: Normal range of motion. No rigidity. Skin:     General: Skin is warm and dry. Neurological:      General: No focal deficit present. Mental Status: He is alert and oriented to person, place, and time. Mental status is at baseline. Psychiatric:         Mood and Affect: Mood normal.         Behavior: Behavior normal.       MEDICAL DECISION MAKIN-year-old male with an underlying history of coronary artery disease with 2 stent placements in the coronary arteries less than 3 months ago. Patient with worsening dyspnea with exertion. Cardiac work-up in the ER did not display positive signs of acute cardiac ischemia. EKG was reviewed and interpreted by myself, ED physician. Patient being tested outpatient for a possible anticoagulation disorder. CT chest pulmonary embolus in the ER did not display signs of acute abnormality. Patient admitted after speaking with the admitting team with a routine 2D echo placed upon admission. Patient understands and agrees with the plan.          CRITICAL CARE:       PROCEDURES:    Procedures    DIAGNOSTIC RESULTS   EKG:All EKG's are interpreted by the Emergency Department Physician who either signs or Co-signs this chart in the absence of a cardiologist.        RADIOLOGY:All plain film, CT, MRI, and formal ultrasound images (except ED bedside ultrasound) are read by the radiologist, see reports below, unless otherwisenoted in MDM or here. CT CHEST PULMONARY EMBOLISM W CONTRAST   Final Result   No evidence of pulmonary embolism or acute pulmonary abnormality. XR CHEST PORTABLE   Final Result   No radiographic evidence of an acute cardiopulmonary process. LABS: All lab results were reviewed by myself, and all abnormals are listed below. Labs Reviewed   BASIC METABOLIC PANEL - Abnormal; Notable for the following components:       Result Value    Glucose 325 (*)     Sodium 131 (*)     Chloride 96 (*)     Anion Gap 8 (*)     All other components within normal limits   CBC WITH AUTO DIFFERENTIAL - Abnormal; Notable for the following components:    RDW 15.0 (*)     Eosinophils % 5 (*)     All other components within normal limits   COVID-19 & INFLUENZA COMBO   TROPONIN   TROPONIN   APTT   PROTIME-INR   PHOSPHORUS   MAGNESIUM   BRAIN NATRIURETIC PEPTIDE   TSH WITH REFLEX       EMERGENCY DEPARTMENTCOURSE:         Vitals:    Vitals:    09/08/22 1341   BP: 130/80   Pulse: 92   Resp: 18   Temp: 98.4 °F (36.9 °C)   SpO2: 99%   Weight: 175 lb (79.4 kg)   Height: 5' 8\" (1.727 m)       The patient was given the following medications while in the emergency department:  Orders Placed This Encounter   Medications    0.9 % sodium chloride bolus    iopamidol (ISOVUE-370) 76 % injection 75 mL    sodium chloride flush 0.9 % injection 10 mL     CONSULTS:  IP CONSULT TO INTERNAL MEDICINE  IP CONSULT TO CARDIOLOGY    FINAL IMPRESSION      1. Fatigue, unspecified type    2. Shortness of breath          DISPOSITION/PLAN   DISPOSITION Admitted 09/08/2022 07:15:08 PM      PATIENT REFERRED TO:  No follow-up provider specified. DISCHARGE MEDICATIONS:  New Prescriptions    No medications on file     The care is provided during an unprecedented national emergency due to the novel coronavirus, COVID 19.   DO Cholo Shaw DO  09/08/22 8732

## 2022-09-08 NOTE — ED TRIAGE NOTES
Mode of arrival (squad #, walk in, police, etc) : Walk in         Chief complaint(s): Fatigue         Arrival Note (brief scenario, treatment PTA, etc). : Patient complains of increased fatigue, feeling exhausted. He says he had a heart attack around 3 months ago and required emergent cath lab treatment. He denies any chest pain today         C= \"Have you ever felt that you should Cut down on your drinking? \"  No  A= \"Have people Annoyed you by criticizing your drinking? \"  No  G= \"Have you ever felt bad or Guilty about your drinking? \"  No  E= \"Have you ever had a drink as an Eye-opener first thing in the morning to steady your nerves or to help a hangover? \"  No      Deferred []      Reason for deferring: N/A    *If yes to two or more: probable alcohol abuse. *

## 2022-09-09 ENCOUNTER — APPOINTMENT (OUTPATIENT)
Dept: NON INVASIVE DIAGNOSTICS | Age: 47
End: 2022-09-09
Payer: COMMERCIAL

## 2022-09-09 VITALS
HEART RATE: 81 BPM | TEMPERATURE: 98 F | SYSTOLIC BLOOD PRESSURE: 124 MMHG | OXYGEN SATURATION: 100 % | DIASTOLIC BLOOD PRESSURE: 84 MMHG | RESPIRATION RATE: 18 BRPM | HEIGHT: 68 IN | BODY MASS INDEX: 26.46 KG/M2 | WEIGHT: 174.6 LBS

## 2022-09-09 LAB
EKG ATRIAL RATE: 90 BPM
EKG P AXIS: 26 DEGREES
EKG P-R INTERVAL: 136 MS
EKG Q-T INTERVAL: 324 MS
EKG QRS DURATION: 86 MS
EKG QTC CALCULATION (BAZETT): 396 MS
EKG R AXIS: 40 DEGREES
EKG T AXIS: 12 DEGREES
EKG VENTRICULAR RATE: 90 BPM
ESTIMATED AVERAGE GLUCOSE: 177 MG/DL
GLUCOSE BLD-MCNC: 193 MG/DL (ref 75–110)
GLUCOSE BLD-MCNC: 218 MG/DL (ref 75–110)
HBA1C MFR BLD: 7.8 % (ref 4–6)

## 2022-09-09 PROCEDURE — G0378 HOSPITAL OBSERVATION PER HR: HCPCS

## 2022-09-09 PROCEDURE — 93005 ELECTROCARDIOGRAM TRACING: CPT | Performed by: FAMILY MEDICINE

## 2022-09-09 PROCEDURE — 2580000003 HC RX 258: Performed by: FAMILY MEDICINE

## 2022-09-09 PROCEDURE — 93308 TTE F-UP OR LMTD: CPT

## 2022-09-09 PROCEDURE — 93010 ELECTROCARDIOGRAM REPORT: CPT | Performed by: INTERNAL MEDICINE

## 2022-09-09 PROCEDURE — 6370000000 HC RX 637 (ALT 250 FOR IP): Performed by: FAMILY MEDICINE

## 2022-09-09 PROCEDURE — 82947 ASSAY GLUCOSE BLOOD QUANT: CPT

## 2022-09-09 PROCEDURE — 6370000000 HC RX 637 (ALT 250 FOR IP): Performed by: INTERNAL MEDICINE

## 2022-09-09 RX ORDER — CLOPIDOGREL BISULFATE 75 MG/1
300 TABLET ORAL ONCE
Status: COMPLETED | OUTPATIENT
Start: 2022-09-09 | End: 2022-09-09

## 2022-09-09 RX ORDER — CLOPIDOGREL BISULFATE 75 MG/1
75 TABLET ORAL DAILY
Status: DISCONTINUED | OUTPATIENT
Start: 2022-09-10 | End: 2022-09-09 | Stop reason: HOSPADM

## 2022-09-09 RX ORDER — CLOPIDOGREL BISULFATE 75 MG/1
75 TABLET ORAL DAILY
Qty: 30 TABLET | Refills: 11 | Status: SHIPPED | OUTPATIENT
Start: 2022-09-10

## 2022-09-09 RX ADMIN — GABAPENTIN 300 MG: 300 CAPSULE ORAL at 10:11

## 2022-09-09 RX ADMIN — INSULIN LISPRO 1 UNITS: 100 INJECTION, SOLUTION INTRAVENOUS; SUBCUTANEOUS at 12:02

## 2022-09-09 RX ADMIN — GABAPENTIN 300 MG: 300 CAPSULE ORAL at 15:09

## 2022-09-09 RX ADMIN — PANTOPRAZOLE SODIUM 40 MG: 40 TABLET, DELAYED RELEASE ORAL at 05:45

## 2022-09-09 RX ADMIN — TICAGRELOR 90 MG: 90 TABLET ORAL at 10:11

## 2022-09-09 RX ADMIN — LISINOPRIL 2.5 MG: 5 TABLET ORAL at 10:11

## 2022-09-09 RX ADMIN — FENOFIBRATE 160 MG: 160 TABLET ORAL at 10:11

## 2022-09-09 RX ADMIN — GLIPIZIDE 10 MG: 5 TABLET ORAL at 15:09

## 2022-09-09 RX ADMIN — CLOPIDOGREL BISULFATE 300 MG: 75 TABLET ORAL at 15:09

## 2022-09-09 RX ADMIN — METOPROLOL TARTRATE 75 MG: 25 TABLET, FILM COATED ORAL at 10:11

## 2022-09-09 RX ADMIN — ASPIRIN 81 MG: 81 TABLET, COATED ORAL at 10:11

## 2022-09-09 RX ADMIN — GLIPIZIDE 10 MG: 5 TABLET ORAL at 10:11

## 2022-09-09 RX ADMIN — METFORMIN HYDROCHLORIDE 500 MG: 500 TABLET ORAL at 10:11

## 2022-09-09 RX ADMIN — SODIUM CHLORIDE, PRESERVATIVE FREE 10 ML: 5 INJECTION INTRAVENOUS at 10:12

## 2022-09-09 NOTE — CONSENT
SEAN Valencia CNP   ticagrelor (BRILINTA) 90 MG TABS tablet Take 1 tablet by mouth 2 times daily 6/29/22   SEAN Valencia CNP   cyclobenzaprine (FLEXERIL) 10 MG tablet Take 10 mg by mouth 3 times daily as needed for Muscle spasms    Historical Provider, MD   sildenafil (VIAGRA) 100 MG tablet TAKE 1 TABLET BY MOUTH ONCE DAILY AS NEEDED FOR ERECTILE DYSFUNCTION 6/12/22   Louie Nagy MD   omeprazole (PRILOSEC) 20 MG delayed release capsule TAKE 1 CAPSULE BY MOUTH TWICE DAILY BEFORE MEAL(S) 3/24/22   SEAN Paula NP   gabapentin (NEURONTIN) 300 MG capsule TAKE 1 CAPSULE BY MOUTH THREE TIMES DAILY  Patient taking differently: Take 300 mg by mouth 3 times daily. 1/26/22 8/8/22  James Banks MD   metFORMIN (GLUCOPHAGE) 500 MG tablet Take 1 tablet by mouth 2 times daily (with meals) 6/10/21   Louie Nagy MD   testosterone (ANDROGEL; TESTIM) 50 MG/5GM (1%) GEL 1% gel Apply topically daily.  9/29/20   Historical Provider, MD   vitamin D (ERGOCALCIFEROL) 1.25 MG (23981 UT) CAPS capsule Take 50,000 Units by mouth every 14 days 11/5/20   Historical Provider, MD   glipiZIDE (GLUCOTROL) 10 MG tablet Take 10 mg by mouth 2 times daily (before meals) 4/28/20   Historical Provider, MD   aspirin 81 MG EC tablet Take 81 mg by mouth daily     Historical Provider, MD       Current Medications: Scheduled Meds:   aspirin  81 mg Oral Daily    fenofibrate  160 mg Oral Daily    gabapentin  300 mg Oral TID    glipiZIDE  10 mg Oral BID AC    lisinopril  2.5 mg Oral Daily    metFORMIN  500 mg Oral BID WC    metoprolol tartrate  75 mg Oral BID    pantoprazole  40 mg Oral QAM AC    rosuvastatin  40 mg Oral Nightly    ticagrelor  90 mg Oral BID    sodium chloride flush  5-40 mL IntraVENous 2 times per day    insulin lispro  0-4 Units SubCUTAneous TID WC    insulin lispro  0-4 Units SubCUTAneous Nightly    enoxaparin  40 mg SubCUTAneous Daily     Continuous Infusions:   dextrose      sodium chloride       PRN Meds:.perflutren lipid microspheres, sodium chloride flush, albuterol sulfate HFA, cyclobenzaprine, nitroGLYCERIN, glucose, dextrose bolus **OR** dextrose bolus, glucagon (rDNA), dextrose, sodium chloride flush, sodium chloride, ondansetron **OR** ondansetron, acetaminophen **OR** acetaminophen, polyethylene glycol     Allergies:  Statins    Social History:   reports that he has been smoking cigarettes. He has a 10.50 pack-year smoking history. He has never used smokeless tobacco. He reports current alcohol use. He reports current drug use. Drug: Marijuana Juarez Kugel). Family History: family history includes Arrhythmia in his brother; Diabetes in his father and mother. Review of Systems   CONSTITUTIONAL:  negative for fevers, chills, fatigue and malaise    EYES:  negative for discharge    HEENT:  negative for epistaxis and sore throat    RESPIRATORY:  positive for shortness of breath, no wheezing    CARDIOVASCULAR:  negative for chest pain, palpitations, syncope, edema    GASTROINTESTINAL:  negative for nausea, vomiting, diarrhea, constipation, abdominal pain    GENITOURINARY:  negative for incontinence    MUSCULOSKELETAL:  negative for neck or back pain    NEUROLOGICAL:  negative for headaches, seizures and double vision   PSYCHIATRIC:  negative               PHYSICAL EXAM:    Blood pressure 113/74, pulse 94, temperature 97.9 °F (36.6 °C), temperature source Oral, resp. rate 16, height 5' 8\" (1.727 m), weight 174 lb 9.6 oz (79.2 kg), SpO2 98 %.     CONSTITUTIONAL: AOx4, no apparent distress, appears stated age   HEAD: normocephalic, atraumatic   EYES: PERRLA, EOMI   ENT: moist mucous membranes, uvula midline   NECK:  symmetric, no midline tenderness to palpation   LUNGS: clear to auscultation bilaterally   CARDIOVASCULAR: regular rate and rhythm, no murmurs, rubs or gallops   ABDOMEN: Soft, non-tender, non-distended with normal active bowel sounds   SKIN: no rash       DATA:    ECG:NSR JUAN JOSÉ:6/7/22     Structures:     LA:       Normal  NORTH:    No thrombus  RA:      Normal  RV:      Normal  LV:       Normal  Estimated LVEF: 55%  Aorta:               Mild atheromatous disease arch  Pericardium:    No pericardial effusion  Septum:           No intracardiac shunt via color Doppler. Valves:     Mitral Valve:    Structurally normal. Mild  regurgitation is identified. Aortic Valve: The aortic valve is trileaflet and opens adequately. No  regurgitiation is identified. Tricuspid valve: Structurally normal. Mild  regurgitation is identified. Pulmonary valve: Normal. No significant regurgitation     No valvular vegetations or thrombus identified. JUAN JOSÉ Summary:      1. A JUAN JOSÉ was performed without complications. 2. LVEF 55 %   3. No thrombus or valvular vegetation identified             CATH 6/26/2022 - right and left femoral arteries    Lesion on Prox LAD: Proximal subsection. 100% stenosis 30 mm length     reduced to 0%. Pre procedure TUSHAR 0 flow was noted. Post Procedure TUSHAR     III flow was present. The lesion was diagnosed as High Risk (C). The     lesion was diffuse and eccentric. The lesion showed mild angulation. Lesion     plaque is ruptured. Comments:Post-dilation with 3.5 balloon after manual thrombectomy for     acute occlusion. Devices used         - ASAHI Prowater Flex 180 cm. Number of passes: 1.         - Balloon of Resolute Reece 3.0 x 12 LAYLA. 4 inflation(s) to a max     pressure of: 15 carmen. - Resolute Reece 3.0 x 30 LAYLA. 2 inflation(s) to a max pressure of: 20     carmen. - ASAHI Prowater Flex 180 cm. Number of passes: 1.         - Pronto Extraction Catheter. Number of passes: 1.         - NC Euphora Balloon 3.5mm x 20mm. 6 inflation(s) to a max pressure of:     20 carmen. RCA:         Lesion on Mid RCA: 75% stenosis reduced to 0%. Pre procedure TUSHAR III     flow was noted. Post Procedure TUSHAR III flow was present. Good runoff was     present.  The lesion was diagnosed as Moderate Risk (B). The lesion showed     evidence of thrombus presence. Lesion plaque is ruptured. Comments:Manual thrombectomy. Another stent was placed at the distal edge     due to hazziness. Devices used         - ASAHI Prowater Flex 180 cm. Number of passes: 1.         - Resolute Recee 3.0 x 12 LAYLA. 2 inflation(s) to a max pressure of: 12     carmen. - ASAHI Prowater Flex 180 cm. Number of passes: 1.         - Pronto Extraction Catheter LP. Number of passes: 1.         - NC Euphora Balloon 3.0mm x 15mm. 2 inflation(s) to a max pressure of:     12 carmen. - Resolute Reece 3.0 x 8 LAYLA. 3 inflation(s) to a max pressure of: 16     carmen. Coronary Tree        Dominance: Right       LV Analysis   LV function assessed as:Normal.   Ejection Fraction   +----------------------------------------------------------------------+---+   ! Method                                                                ! EF%! +----------------------------------------------------------------------+---+   ! LV gram                                                               !55 !   +----------------------------------------------------------------------+---+       Conclusions      Procedure Summary      Inferior STEMI   Concern for embolism. Possible clot in the proximal LAD instent and small distal inferior branch   of OM1 occlusing <1mm vessel. Successful LAYLA of mid RCA for significant ulcerated plaque. Successful PTCA/LAYLA of proximal LAD. Distal OM1 branch too small for PCI. At the end of case, patient complained of severe chest pain. ECG showed   inferior STEMI. Repeat cath showed blood clot in mid RCA stent and occlusion of LAD stent. Successful manual thrombectomy of mid RCA and LAD. Another stent was placed at the distal edge of previous RCA stent. LAD stent post-dilated with 3.5 NC balloon.      Labs:     CBC:   Recent Labs     09/08/22  1430   WBC 5.8   HGB 15.4   HCT 44.6        BMP:   Recent Labs     09/08/22  1430   *   K 4.7   CO2 27   BUN 14   CREATININE 0.87   LABGLOM >60   GLUCOSE 325*     BNP: No results for input(s): BNP in the last 72 hours. PT/INR:   Recent Labs     09/08/22  1430   PROTIME 12.6   INR 0.9     APTT:  Recent Labs     09/08/22  1430   APTT 27.3     CARDIAC ENZYMES:No results for input(s): CKTOTAL, CKMB, CKMBINDEX, TROPONINI in the last 72 hours. FASTING LIPID PANEL:  Lab Results   Component Value Date/Time    HDL 33 04/17/2020 12:00 AM    LDLCALC 144 04/17/2020 12:00 AM    TRIG 104 04/17/2020 12:00 AM     LIVER PROFILE:No results for input(s): AST, ALT, LABALBU in the last 72 hours. No intake or output data in the 24 hours ending 09/09/22 0951    IMPRESSION:    Patient Active Problem List   Diagnosis    Presence of stent in LAD coronary artery    Anxiety    Type 2 diabetes mellitus without complication, without long-term current use of insulin (HCC)    Mixed hyperlipidemia    Mild intermittent asthma without complication    Smoking    GERD (gastroesophageal reflux disease)    Lumbar radiculopathy    DDD (degenerative disc disease), lumbar    Neuroforaminal stenosis of lumbar spine    Sacroiliac joint pain    Lumbosacral spondylosis without myelopathy    STEMI (ST elevation myocardial infarction) (HCC)    SOB (shortness of breath)           RECOMMENDATIONS:  Shortness of breath most likely secondary to Brilinta. Patient got Simi Sides at this morning we will change to Plavix with loading dose of 300 mg after 3 PM today  Patient need to continue 75 mg of Plavix from tomorrow  STEMI history of stents as above tropes negative EKG negative no symptoms consistent with ACS  Patient will continue rest of the medication other than Brilinta  Patient can be discharged home and follow-up in 1 to 2 weeks for this      Discussed with patient and nursing.     Yevonne Olszewski, MD, MD  [facility]        [phone]

## 2022-09-09 NOTE — CARE COORDINATION
CASE MANAGEMENT NOTE:    Admission Date:  9/8/2022 Kirsten Farias is a 52 y.o.  male    Admitted for : Shortness of breath [R06.02]  SOB (shortness of breath) [R06.02]  Fatigue, unspecified type [R53.83]    Met with:  Patient    PCP:  Ana Waters                                Insurance:  [facility]      Is patient alert and oriented at time of discussion:  Yes    Current Residence/ Living Arrangements:  independently at home W/ Wife            Current Services PTA:  No    Does patient go to outpatient dialysis: No  If yes, location and chair time: NA  Who is their nephrologist? NA    Is patient agreeable to VNS: No    Freedom of choice provided:  No    List of [address] provided: No    VNS chosen:  No    DME:  none    Home Oxygen: No    Nebulizer: No    CPAP/BIPAP: No    Supplier: N/A    Potential Assistance Needed: No    SNF needed: No    Freedom of choice and list provided: NA    Pharmacy:  [facility] on [facility]       Is patient currently receiving oral anticoagulation therapy? Being tested for clotting disorder, on Brilinta    Is the Patient an [facility] with Readmission Risk Score greater than 14%? No  If yes, pt needs a follow up appointment made within 7 days. Family Members/Caregivers that pt would like involved in their care:    Yes    If yes, list name here:  WifeMinnie    Transportation Provider:  Patient             Discharge Plan:  9/9/22 [facility] Pt. Lives in 2 story home w/ Wife. No DME. MI in July, Cardio, Has testing already scheduled for Clotting Disorder, On Brilinta. CT Chest, Neg. 98% on RA.  Denies needs, will follow//KB                 Electronically signed by: Yael Browning RN on 9/9/2022 at 10:11 AM

## 2022-09-09 NOTE — H&P
Family Medicine Admit Note    PCP: Prabhu Baez MD    Date of Admission: 9/8/2022    Date of Service: Pt seen/examined on 9/9/2022 and  Placed in Observation. Chief Complaint:  SOB      History Of Present Illness: The patient is a 52 y.o. male who presents to [facility] with with increased fatigue and SOB. He has a history of MI in July; he returned to work on [employer] duty, but states that he is walking around carrying boxes, and the duties are not particularly light. He has not had any chest pain. Past Medical History:        Diagnosis Date    Anxiety     Asthma     CAD (coronary artery disease)     Chronic back pain     Diabetes mellitus ([address]     GERD (gastroesophageal reflux disease)      heart burn    History of MI (myocardial infarction) 5/8/2017    Hx of heart artery stent     heart stent x 1    Neuropathy        Past Surgical History:        Procedure Laterality Date    CARDIAC SURGERY      CORONARY ANGIOPLASTY WITH STENT PLACEMENT  2010    heart stent x 1    CORONARY ANGIOPLASTY WITH STENT PLACEMENT  06/26/2022    PAIN MANAGEMENT PROCEDURE      TONSILLECTOMY         Medications Prior to Admission:    Prior to Admission medications    Medication Sig Start Date End Date Taking?  Authorizing Provider   nitroGLYCERIN (NITROSTAT) 0.4 MG SL tablet Place 0.4 mg under the tongue every 5 minutes as needed for Chest pain 7/20/22   Historical Provider, MD   albuterol sulfate HFA (PROVENTIL HFA) 108 (90 Base) MCG/ACT inhaler Inhale 1-2 puffs into the lungs every 6 hours as needed for Wheezing or Shortness of Breath (Space out to every 6 hours as symptoms improve) 8/8/22   Prabhu Baez MD   rosuvastatin (CRESTOR) 40 MG tablet Take 1 tablet by mouth nightly 6/29/22   SEAN Cook CNP   lisinopril (PRINIVIL;ZESTRIL) 2.5 MG tablet Take 1 tablet by mouth daily 6/29/22   SEAN Cook CNP   metoprolol tartrate 75 MG TABS Take 75 mg by mouth 2 times daily 6/29/22 SEAN Morel - CNP   ticagrelor (BRILINTA) 90 MG TABS tablet Take 1 tablet by mouth 2 times daily 6/29/22   SEAN Morel CNP   cyclobenzaprine (FLEXERIL) 10 MG tablet Take 10 mg by mouth 3 times daily as needed for Muscle spasms    Historical Provider, MD   sildenafil (VIAGRA) 100 MG tablet TAKE 1 TABLET BY MOUTH ONCE DAILY AS NEEDED FOR ERECTILE DYSFUNCTION 6/12/22   Simi Aquino MD   omeprazole (PRILOSEC) 20 MG delayed release capsule TAKE 1 CAPSULE BY MOUTH TWICE DAILY BEFORE MEAL(S) 3/24/22   SEAN Lamb - NP   gabapentin (NEURONTIN) 300 MG capsule TAKE 1 CAPSULE BY MOUTH THREE TIMES DAILY  Patient taking differently: Take 300 mg by mouth 3 times daily. 1/26/22 8/8/22  Tram Garcia MD   metFORMIN (GLUCOPHAGE) 500 MG tablet Take 1 tablet by mouth 2 times daily (with meals) 6/10/21   Simi Aquino MD   testosterone (ANDROGEL; TESTIM) 50 MG/5GM (1%) GEL 1% gel Apply topically daily. 9/29/20   Historical Provider, MD   vitamin D (ERGOCALCIFEROL) 1.25 MG (70191 UT) CAPS capsule Take 50,000 Units by mouth every 14 days 11/5/20   Historical Provider, MD   glipiZIDE (GLUCOTROL) 10 MG tablet Take 10 mg by mouth 2 times daily (before meals) 4/28/20   Historical Provider, MD   aspirin 81 MG EC tablet Take 81 mg by mouth daily     Historical Provider, MD       Allergies:  Statins    Social History:  The patient currently lives at home    TOBACCO:   reports that he has been smoking cigarettes. He has a 10.50 pack-year smoking history. He has never used smokeless tobacco.  ETOH:   reports current alcohol use.       Family History:          Problem Relation Age of Onset    Diabetes Mother     Diabetes Father     Arrhythmia Brother        PHYSICAL EXAM:    /74   Pulse 94   Temp 97.9 °F (36.6 °C) (Oral)   Resp 16   Ht 5' 8\" (1.727 m)   Wt 174 lb 9.6 oz (79.2 kg)   SpO2 98%   BMI 26.55 kg/m²     General appearance: No apparent distress appears stated age and cooperative. HEENT Normal cephalic, atraumatic without obvious deformity. Neck: Supple, No jugular venous distention/bruits. Lungs: Clear to auscultation, bilaterally without rales/wheezes/rhonchi with good respiratory effort. Heart: Regular rate and rhythm with Normal S1/S2 without murmurs, rubs or gallops  Abdomen: Soft, non-tender or non-distended without rigidity or guarding and positive bowel sounds all four quadrants. Mental status: Alert, oriented, thought content appropriate. CXR:  I have reviewed the CXR with the following interpretation: No radiographic evidence of an acute cardiopulmonary process  EKG:  I have reviewed the EKG with the following interpretation: NSR    CTA chest: No evidence of pulmonary embolism or acute pulmonary abnormality. CBC   Recent Labs     09/08/22  1430   WBC 5.8   HGB 15.4   HCT 44.6         RENAL  Recent Labs     09/08/22  1430   *   K 4.7   CL 96*   CO2 27   PHOS 3.6   BUN 14   CREATININE 0.87     LFT'S  No results for input(s): AST, ALT, ALB, BILIDIR, BILITOT, ALKPHOS in the last 72 hours. COAG  Recent Labs     09/08/22  1430   INR 0.9     CARDIAC ENZYMES  No results for input(s): CKTOTAL, CKMB, CKMBINDEX, TROPONINI in the last 72 hours. ABG  No results found for: XVE5HQS, BEART, P3JEDKUI, PHART, THGBART, DYS6KTA, PO2ART, EKH3MRM        Active Hospital Problems    Diagnosis Date Noted    SOB (shortness of breath) [R06.02] 09/08/2022     Priority: Medium    Type 2 diabetes mellitus without complication, without long-term current use of insulin ([facility] [address] [E11.9] 02/05/2018    Presence of stent in LAD coronary artery [Z95.5] 05/08/2017         ASSESSMENT/PLAN:    Fatigue and SOB with a history of NSTEMI 2 months ago - consult cardiology. Echo ordered.       Diabetes - well controlled      DVT Prophylaxis: enoxaparin  Diet: Diet NPO Exceptions are: Sips of Water with Meds  Code Status: Full Code      Dispo - observation      Gideon Stokes MD, FAAFP  9/9/2022, 8:11 AM

## 2022-09-10 LAB
EKG ATRIAL RATE: 86 BPM
EKG P AXIS: 33 DEGREES
EKG P-R INTERVAL: 148 MS
EKG Q-T INTERVAL: 346 MS
EKG QRS DURATION: 86 MS
EKG QTC CALCULATION (BAZETT): 414 MS
EKG R AXIS: 69 DEGREES
EKG T AXIS: 21 DEGREES
EKG VENTRICULAR RATE: 86 BPM

## 2022-09-10 PROCEDURE — 93010 ELECTROCARDIOGRAM REPORT: CPT | Performed by: INTERNAL MEDICINE

## 2022-09-14 ENCOUNTER — HOSPITAL ENCOUNTER (OUTPATIENT)
Dept: CARDIAC REHAB | Age: 47
Setting detail: THERAPIES SERIES
Discharge: HOME | End: 2022-09-14
Payer: COMMERCIAL

## 2022-09-14 VITALS — RESPIRATION RATE: 18 BRPM | BODY MASS INDEX: 26.84 KG/M2 | OXYGEN SATURATION: 99 % | WEIGHT: 177.1 LBS | HEIGHT: 68 IN

## 2022-09-14 PROCEDURE — 93798 PHYS/QHP OP CAR RHAB W/ECG: CPT

## 2022-09-14 PROCEDURE — 93797 PHYS/QHP OP CAR RHAB WO ECG: CPT

## 2022-09-14 ASSESSMENT — EJECTION FRACTION: EF_VALUE: 55

## 2022-09-14 ASSESSMENT — LIFESTYLE VARIABLES
ALCOHOL_USE: SPECIAL
CIGARETTES_PER_DAY: 1/2 PPD

## 2022-09-14 ASSESSMENT — PATIENT HEALTH QUESTIONNAIRE - PHQ9
SUM OF ALL RESPONSES TO PHQ QUESTIONS 1-9: 1
2. FEELING DOWN, DEPRESSED OR HOPELESS: 1
SUM OF ALL RESPONSES TO PHQ9 QUESTIONS 1 & 2: 1
SUM OF ALL RESPONSES TO PHQ QUESTIONS 1-9: 1
1. LITTLE INTEREST OR PLEASURE IN DOING THINGS: 0

## 2022-09-14 ASSESSMENT — EXERCISE STRESS TEST
PEAK_HR: 96
PEAK_BP: 138/82
PEAK_METS: 2.3
PEAK_BP: 138/82
PEAK_RPE: 13

## 2022-09-14 NOTE — DISCHARGE INSTRUCTIONS
Cardiac Rehabilitation: After Your Visit  Your Care Instructions  Cardiac rehabilitation is a program for people who have had a heart attack or bypass surgery, or who have other heart problems. The program includes exercise, lifestyle changes, education, and emotional support. Cardiac rehab can help you improve the quality of your life through better overall health. It can help you lose weight and feel better about yourself. On your cardiac rehab team, you may have your doctor, a nurse specialist, a dietitian, and a physical therapist. They will design your cardiac rehab program specifically for you. You will learn how to reduce your risk for heart problems, how to manage stress, and how to eat a heart-healthy diet. By the end of the program, you will be ready to maintain a healthier lifestyle on your own. Follow-up care is a key part of your treatment and safety. Be sure to make and go to all appointments, and call your doctor if you are having problems. Its also a good idea to know your test results and keep a list of the medicines you take. How can you care for yourself at home? Take your medicines exactly as prescribed. Call your doctor if you think you are having a problem with your medicine. You will get more details on the specific medicines your doctor prescribes. Monitor your weight by weighing yourself at least once a week on the same scale with the same amount of clothing at the same time of day. Plan your meals so that you are eating heart-healthy foods. Eat a variety of foods daily. Fresh fruits and vegetables and whole-grains are good choices. Limit your fat intake, especially saturated and trans fat. Limit salt (sodium). Increase fiber in your diet. Limit alcohol. Learn how to take your pulse so that you can track your heart rate during exercise. Always check with your doctor before you begin a new exercise program.  Warm up before you exercise and cool down afterward.  This will help your heart gradually prepare for and recover from exercise and avoid pushing your heart too hard. Stop exercising if you have any unusual discomfort, such as chest pain. Do not smoke. Smoking can make heart problems worse. If you need help quitting, talk to your doctor about stop-smoking programs and medicines. These can increase your chances of quitting for good. When should you call for help? Call 911 anytime you think you may need emergency care. For example, call if:  You have severe trouble breathing. You cough up pink, foamy mucus and you have trouble breathing. You have symptoms of a heart attack. These may include:  Chest pain or pressure, or a strange feeling in the chest.  Sweating. Shortness of breath. Nausea or vomiting. Pain, pressure, or a strange feeling in the back, neck, jaw, or upper belly or in one or both shoulders or arms. Lightheadedness or sudden weakness. A fast or irregular heartbeat. After you call 911, the  may tell you to chew 1 adult-strength or 2 to 4 low-dose aspirin. Wait for an ambulance. Do not try to drive yourself. You have signs of a stroke such as:  Sudden numbness, paralysis, or weakness in your face, arm, or leg, especially on only one side of your body. New problems with walking or balance. Sudden vision changes. Drooling or slurred speech. New problems speaking or understanding simple statements, or feeling confused. A sudden, severe headache that is different from past headaches. You passed out (lost consciousness). Call your doctor now or seek immediate medical care if:  You have new or increased shortness of breath. You are dizzy or lightheaded, or you feel like you may faint. You gain 2 to 3 pounds or more over 2 days. You have increased swelling in your legs, ankles, or feet. Watch closely for changes in your health, and be sure to contact your doctor if you have any problems.     EXERCISE GUIDELINES    If you experience any unusual sensation. If you experience any of these signs you should:     A. Immediately rest. A sitting position is best for your heart. However if you are dizzy, lie down. B. Take a nitroglycerin tablet. You may take one tablet every 5 minutes for a total of three tablets. C. Relax. Concentrate on relaxing all the muscles in your body and breathing slowly and easily. D. If the pain lasts longer than 15 minutes, CALL 911. E. After the symptoms subside, rest at least 30 minutes while sitting or lying. Nitroglycerin works within 1-3 minutes and is out of your system with 30-60 minutes. You may experience a headache, dizziness, light-headedness, or a faint feeling, especially when getting up from a sitting or lying position. STORAGE OF NITROGLYCERIN:  1. Keep the tablets in their original brown bottle. (They are sensitive to heat, moisture, and light.)  2. Remove the cotton plug that comes in the bottle and do not put it back in.  3. Put the cap on the bottle quickly and tightly after each use. 4. To select a tablet for use, pour several into the bottle cap, take one, and pour the others back into the bottle. Do not hold them in the palm of your hand because they will  moisture and lose effect. 5. Do not keep other medications in the same bottle with the nitroglycerin since they will weaken the nitroglycerin effect. 6. Keep the medicine handy at all times but do not carry the bottle close to your body. Nitroglycerin may lose strength because of body warmth. Instead carry the bottle in your purse, jacket pocket, or loose fitting clothing. 7. Store additional nitroglycerin tablets in a cool, dry place. Average room temperature away form direct heat or sunlight is best. Do not sore in the bathroom medicine cabinet or refrigerator because the moisture usually present in these areas will spoil the tablets. 8. Be sure to check the expiration date on the bottle.  Nitroglycerin tablets lose their effectiveness (potency) after the expiration date. Generally, tablets are good for 6 months. Ask for new tablets at least 2 weeks before they are due to . SIDE EFFECTS:  The most common side effects of nitroglycerin are headache, dizziness, and flushing of the face and neck. These side effects are caused by increased blood flow through the relaxed blood vessels. If you have any concerns, let your health care provider know. If you develop a skin rash let your healthcare provider know. Nitroglycerin is not habit forming or addicting. Do not be afraid to use it. Record all angina attacks relieved by taking nitroglycerin and show this record to your healthcare provider at your regular check - up. If you have an increasing number of controlled attacks (symptoms go away after taking two or fewer nitroglycerin tablets and resting), notify your healthcare provider. He or she may need to adjust your daily medication or want to see you. Additionally, report any change in the type of angina to your healthcare provider within 24 hours of its occurrence. Always report angina experienced at night while sleeping winthin 24 hours of its occurrence. Where can you learn more? Go to https://[employer]. org and sign in to your [employer] account. Enter U348 in the [employer] box to learn more about Cardiac Rehabilitation: After Your Visit.     If you do not have an account, please click on the Sign Up Now link. © 9471-1805 [employer]wise, [employer]. Care instructions adapted under license by [facility]. This care instruction is for use with your licensed healthcare professional. If you have questions about a medical condition or this instruction, always ask your healthcare professional. [place] [address] any warranty or liability for your use of this information.   Content Version: 3.6.428903; Last Revised: 2013

## 2022-09-15 ENCOUNTER — TELEPHONE (OUTPATIENT)
Dept: PHARMACY | Age: 47
End: 2022-09-15

## 2022-09-15 NOTE — TELEPHONE ENCOUNTER
New Referral from Phase II Cardiac Rehab for Smoking Cessation  Called patient to set up first appt  Looks like Dr Chico Alonso is PCP  Left Vm to set up appt and verify physician

## 2022-09-26 ENCOUNTER — APPOINTMENT (OUTPATIENT)
Dept: CARDIAC REHAB | Age: 47
End: 2022-09-26
Payer: COMMERCIAL

## 2022-09-28 ENCOUNTER — APPOINTMENT (OUTPATIENT)
Dept: CARDIAC REHAB | Age: 47
End: 2022-09-28
Payer: COMMERCIAL

## 2022-09-30 ENCOUNTER — APPOINTMENT (OUTPATIENT)
Dept: CARDIAC REHAB | Age: 47
End: 2022-09-30
Payer: COMMERCIAL

## 2022-10-05 ENCOUNTER — HOSPITAL ENCOUNTER (OUTPATIENT)
Dept: CARDIAC REHAB | Age: 47
Setting detail: THERAPIES SERIES
Discharge: HOME | End: 2022-10-05

## 2022-10-06 ENCOUNTER — TELEPHONE (OUTPATIENT)
Dept: PHARMACY | Age: 47
End: 2022-10-06

## 2022-10-06 NOTE — TELEPHONE ENCOUNTER
Called the patient to schedule an initial Smoking Cessation visit. Mailbox full.    Concha Du, PharmD, BCPS  10/6/2022 1:11 PM

## 2022-10-12 ENCOUNTER — HOSPITAL ENCOUNTER (OUTPATIENT)
Age: 47
Discharge: HOME | End: 2022-10-12
Payer: COMMERCIAL

## 2022-10-12 DIAGNOSIS — D68.59 HYPERCOAGULABLE STATE (HCC): ICD-10-CM

## 2022-10-12 LAB
BETA 2 GLYCOPROT.1 IGA AB: 2 ELISA U/ML (ref 0–7)
BETA 2 GLYCOPROT.1 IGG AB: 1.7 ELISA U/ML (ref 0–7)
BETA 2 GLYCOPROT.1 IGM AB: 3.8 ELISA U/ML (ref 0–7)

## 2022-10-12 PROCEDURE — 86147 CARDIOLIPIN ANTIBODY EA IG: CPT

## 2022-10-12 PROCEDURE — 85610 PROTHROMBIN TIME: CPT

## 2022-10-12 PROCEDURE — 36415 COLL VENOUS BLD VENIPUNCTURE: CPT

## 2022-10-12 PROCEDURE — 85730 THROMBOPLASTIN TIME PARTIAL: CPT

## 2022-10-12 PROCEDURE — 86146 BETA-2 GLYCOPROTEIN ANTIBODY: CPT

## 2022-10-12 PROCEDURE — 85613 RUSSELL VIPER VENOM DILUTED: CPT

## 2022-10-18 ENCOUNTER — TELEPHONE (OUTPATIENT)
Dept: ONCOLOGY | Age: 47
End: 2022-10-18

## 2022-10-18 ENCOUNTER — OFFICE VISIT (OUTPATIENT)
Dept: ONCOLOGY | Age: 47
End: 2022-10-18
Payer: COMMERCIAL

## 2022-10-18 VITALS
BODY MASS INDEX: 27.25 KG/M2 | SYSTOLIC BLOOD PRESSURE: 147 MMHG | RESPIRATION RATE: 18 BRPM | DIASTOLIC BLOOD PRESSURE: 97 MMHG | WEIGHT: 179.2 LBS | HEART RATE: 73 BPM | TEMPERATURE: 98.1 F

## 2022-10-18 DIAGNOSIS — D68.59 PRIMARY HYPERCOAGULABLE STATE (HCC): Primary | ICD-10-CM

## 2022-10-18 DIAGNOSIS — D68.61 ANTI-PHOSPHOLIPID ANTIBODY SYNDROME (HCC): ICD-10-CM

## 2022-10-18 LAB
ANTICARDIOLIPIN IGA ANTIBODY: 2.4 APL (ref 0–14)
ANTICARDIOLIPIN IGG ANTIBODY: 1.4 GPL (ref 0–10)
CARDIOLIPIN AB IGM: 6.4 MPL (ref 0–10)
DILUTE RUSSELL VIPER VENOM TIME: NORMAL
INR BLD: 0.9
PARTIAL THROMBOPLASTIN TIME: 28.9 SEC (ref 24–36)
PROTHROMBIN TIME: 12.2 SEC (ref 11.8–14.6)

## 2022-10-18 PROCEDURE — 99214 OFFICE O/P EST MOD 30 MIN: CPT | Performed by: INTERNAL MEDICINE

## 2022-10-18 PROCEDURE — G8419 CALC BMI OUT NRM PARAM NOF/U: HCPCS | Performed by: INTERNAL MEDICINE

## 2022-10-18 PROCEDURE — G8427 DOCREV CUR MEDS BY ELIG CLIN: HCPCS | Performed by: INTERNAL MEDICINE

## 2022-10-18 PROCEDURE — G8484 FLU IMMUNIZE NO ADMIN: HCPCS | Performed by: INTERNAL MEDICINE

## 2022-10-18 PROCEDURE — 4004F PT TOBACCO SCREEN RCVD TLK: CPT | Performed by: INTERNAL MEDICINE

## 2022-10-18 PROCEDURE — 99211 OFF/OP EST MAY X REQ PHY/QHP: CPT | Performed by: INTERNAL MEDICINE

## 2022-10-18 NOTE — TELEPHONE ENCOUNTER
AVS from 10/18/22    Rtc IN 6 MONTH  REFERRAL TO COUMADIN CLINIC    Rv scheduled for 4/18/23 @ 10:30am  Coumadin clinic will call pt with an appt    PT was given AVS and appt schedule

## 2022-10-18 NOTE — PROGRESS NOTES
Today's Date: 10/18/2022  Patient Name: Herber Sierra  Patient's age: 52 y.o., 1975    DIAGNOSIS:   Hypercoagulable state,  Anti phospholipid antibody syndrome  Positive beta-2 glycoprotein IgM antibody and positive IgM anticardiolipin antibody two time 3 months apart  CAD      CHIEF COMPLAINT:    Chief Complaint   Patient presents with    Follow-up     Review of disease process   INTERVAL HISTORY :    Patient is returning for follow up visit and to discuss the lab results and further recommendations. He is still smoking and planning to quit smoking on November 1st.  He denied any chest pain and SOB. No leg swelling and leg pain. During this visit patient's allergy, social, medical, surgical history and medications were reviewed and updated. HISTORY OF PRESENT ILLNESS:    Herber Sierra is a 52 y.o. male who is admitted to the hospital on (Not on file)  for evaluation of his cardiac disease. Patient has past medical history of CAD, diabetes, tobacco abuse, hyperlipidemia, hypertension. Patient was transferred from [facility] where he presented with STEMI. Patient underwent cardiac catheterization yesterday which showed possible clot in proximal LAD and small distal inferior branch and patient did undergo LAYLA of mid RCA and also proximal LAD. At the end of procedure patient had significant chest pain and repeat cath showed blood clot in mid RCA stent and occlusion of LAD stent, manual thrombectomy was performed. Patient currently on anticoagulation as per cardiology and planning for JUAN JOSÉ on Monday. Today patient underwent JUAN JOSÉ which showed no thrombus or valvular vegetation. Cousin on father side clotting disorder. Hematology consulted for evaluation of underlying thrombophilia.     Past Medical History:   has a past medical history of Anxiety, Asthma, CAD (coronary artery disease), Chronic back pain, Diabetes mellitus ([address] GERD (gastroesophageal reflux disease), History of MI (myocardial infarction), Hx of heart artery stent, and Neuropathy. Past Surgical History:   has a past surgical history that includes Coronary angioplasty with stent (2010); Tonsillectomy; Cardiac surgery; Pain management procedure; and Coronary angioplasty with stent (06/26/2022). Medications:    Current Outpatient Medications   Medication Sig Dispense Refill    cyclobenzaprine (FLEXERIL) 10 MG tablet TAKE 1 TABLET BY MOUTH TWICE DAILY AS NEEDED FOR MUSCLE SPASM 60 tablet 0    clopidogrel (PLAVIX) 75 MG tablet Take 1 tablet by mouth daily 30 tablet 11    nitroGLYCERIN (NITROSTAT) 0.4 MG SL tablet Place 0.4 mg under the tongue every 5 minutes as needed for Chest pain      albuterol sulfate HFA (PROVENTIL HFA) 108 (90 Base) MCG/ACT inhaler Inhale 1-2 puffs into the lungs every 6 hours as needed for Wheezing or Shortness of Breath (Space out to every 6 hours as symptoms improve) 3 each 3    rosuvastatin (CRESTOR) 40 MG tablet Take 1 tablet by mouth nightly 30 tablet 3    lisinopril (PRINIVIL;ZESTRIL) 2.5 MG tablet Take 1 tablet by mouth daily 30 tablet 3    metoprolol tartrate 75 MG TABS Take 75 mg by mouth 2 times daily 60 tablet 3    sildenafil (VIAGRA) 100 MG tablet TAKE 1 TABLET BY MOUTH ONCE DAILY AS NEEDED FOR ERECTILE DYSFUNCTION 10 tablet 11    omeprazole (PRILOSEC) 20 MG delayed release capsule TAKE 1 CAPSULE BY MOUTH TWICE DAILY BEFORE MEAL(S) 180 capsule 0    metFORMIN (GLUCOPHAGE) 500 MG tablet Take 1 tablet by mouth 2 times daily (with meals) 180 tablet 3    testosterone (ANDROGEL; TESTIM) 50 MG/5GM (1%) GEL 1% gel Apply topically daily.       vitamin D (ERGOCALCIFEROL) 1.25 MG (76567 UT) CAPS capsule Take 50,000 Units by mouth every 14 days      glipiZIDE (GLUCOTROL) 10 MG tablet Take 10 mg by mouth 2 times daily (before meals)      aspirin 81 MG EC tablet Take 81 mg by mouth daily       gabapentin (NEURONTIN) 300 MG capsule TAKE 1 CAPSULE BY MOUTH THREE TIMES DAILY (Patient taking differently: Take 300 mg by mouth 3 times daily.) 90 capsule 3     No current facility-administered medications for this visit. Allergies:  Statins    Social History:   reports that he has been smoking cigarettes. He has a 9.00 pack-year smoking history. He has never used smokeless tobacco. He reports current alcohol use. He reports that he does not currently use drugs after having used the following drugs: Marijuana [place] [facility]). Family History: family history includes Arrhythmia in his brother; Diabetes in his father and mother. REVIEW OF SYSTEMS:    Constitutional: No fever or chills. No night sweats, no weight loss   Eyes: No eye discharge, double vision, or eye pain   HEENT: negative for sore mouth, sore throat, hoarseness and voice change   Respiratory: negative for cough , sputum, dyspnea, wheezing, hemoptysis, chest pain   Cardiovascular: negative for chest pain, dyspnea, palpitations, orthopnea, PND   Gastrointestinal: negative for nausea, vomiting, diarrhea, constipation, abdominal pain, Dysphagia, hematemesis and hematochezia   Genitourinary: negative for frequency, dysuria, nocturia, urinary incontinence, and hematuria   Integument: negative for rash, skin lesions, bruises.    Hematologic/Lymphatic: negative for easy bruising, bleeding, lymphadenopathy, or petechiae   Endocrine: negative for heat or cold intolerance,weight changes, change in bowel habits and hair loss   Musculoskeletal: negative for myalgias, arthralgias, pain, joint swelling,and bone pain   Neurological: negative for headaches, dizziness, seizures, weakness, numbness    PHYSICAL EXAM:      BP (!) 147/97   Pulse 73   Temp 98.1 °F (36.7 °C) (Oral)   Resp 18   Wt 179 lb 3.2 oz (81.3 kg)   BMI 27.25 kg/m²    Temp (24hrs), Av.2 °F (37.3 °C), Min:97.8 °F (36.6 °C), Max:100 °F (37.8 °C)    General appearance - well appearing, no in pain or distress   Mental status - alert and cooperative   Eyes - pupils equal and reactive, extraocular eye movements intact   Ears - bilateral TM's and external ear canals normal   Mouth - mucous membranes moist, pharynx normal without lesions   Neck - supple, no significant adenopathy   Lymphatics - no palpable lymphadenopathy, no hepatosplenomegaly   Chest - clear to auscultation, no wheezes, rales or rhonchi, symmetric air entry   Heart - normal rate, regular rhythm, normal S1, S2, no murmurs  Abdomen - soft, nontender, nondistended, no masses or organomegaly   Neurological - alert, oriented, normal speech, no focal findings or movement disorder noted   Musculoskeletal - no joint tenderness, deformity or swelling   Extremities - peripheral pulses normal, no pedal edema, no clubbing or cyanosis   Skin - normal coloration and turgor, no rashes, no suspicious skin lesions noted ,    DATA:    Labs:   Lab Results   Component Value Date    WBC 5.8 09/08/2022    HGB 15.4 09/08/2022    HCT 44.6 09/08/2022    MCV 92.5 09/08/2022     09/08/2022    LYMPHOPCT 29 09/08/2022    RBC 4.83 09/08/2022    MCH 31.9 09/08/2022    MCHC 34.5 09/08/2022    RDW 15.0 (H) 09/08/2022         Lab Results   Component Value Date     (L) 09/08/2022    K 4.7 09/08/2022    CL 96 (L) 09/08/2022    CO2 27 09/08/2022    BUN 14 09/08/2022    CREATININE 0.87 09/08/2022    GLUCOSE 325 (H) 09/08/2022    CALCIUM 9.7 09/08/2022    PROT 6.5 06/26/2022    LABALBU 4.0 06/26/2022    BILITOT 0.29 (L) 06/26/2022    ALKPHOS 49 06/26/2022    AST 22 06/26/2022    ALT 35 06/26/2022    LABGLOM >60 09/08/2022    GFRAA >60 09/08/2022     NEGATIVE for Lupus Anticoagulant  Result Notes    Component Ref Range & Units 6/27/22 1826 7/24/17 1315 7/24/17 1315   Anticardiolipin IgA 0.0 - 14.0 APL 3.3      Comment:        Reference Range:       <14.0 Negative   14.0-20.0 Equivocal       >20.0 Positive         When results are Equivocal, it is recommended to retest after 4-6 weeks.     Anticardiolipin IgG 0.0 - 10.0 GPL 9.6      Comment:        Reference Range: <10.0 Negative   10.0-40.0 Equivocal       >40.0 Positive    Cardiolipin Ab IgM 0.0 - 10.0 MPL 13.0 High       Comment:        Reference Range:       <10.0 Negative   10.0-40.0 Equivocal       >40.0 Positive          Ref Range & Units 6/27/22 1826   Beta 2 Glycoprot. 1 IgG Ab 0.0 - 7.0 Mara U/mL 0.6    Comment:        Reference Range:       <7.0  Negative   7.0-10.0  Equivocal      >10.0  Positive          Beta 2 Glycoprot. 1 IgA Ab 0.0 - 7.0 Mara U/mL 1.6    Comment:        Reference Range:       <7.0  Negative   7.0-10.0  Equivocal      >10.0  Positive          Beta 2 Glycoprot. 1 IgM Ab 0.0 - 7.0 Mara U/mL 12.0 High     Comment:        Reference Range:       <7.0  Negative   7.0-10.0  Equivocal      >10.0  Positive              IMAGING DATA:  Reviewed     IMPRESSION:   STEMI, status post cath and PCI  Positive antiphospholipid antibodies  History of tobacco abuse  Hypertension  Hyperlipidemia    RECOMMENDATIONS:  I reviewed the laboratory data, imaging studies, discussed possible diagnosis and treatment recommendations  His lab work showed positive IgM antibodies for beta-2 glycoprotein as well as anticardiolipin antibody. Given his young age, extensive CAD and now positive IgM antibodies for beta-2 glycoprotein as well as cardiolipin,  3 months apart confirms antiphospholipid antibody syndrome and he will need long-term anticoagulation with Coumadin    His repeat lab work confirms the diagnosis of APS and will start therapeutic anti coagulation with Coumadin. Smoking cessation was advised and he was counseled about smoking cessation   Referral to coumadin clinic   RTC 6 months with labs      Moise Rodas MD  Hematologist/Medical Oncologist    On this date 10/18/22  I have spent 40 minutes reviewing previous notes, test results and face to face with the patient discussing the diagnosis and importance of compliance with the treatment plan.  Greater than 50% of that time was spent face-to-face with the patient in counseling and coordinating her care. This note is created with the assistance of a speech recognition program.  While intending to generate a document that actually reflects the content of the visit, the document can still have some errors including those of syntax and sound a like substitutions which may escape proof reading. It such instances, actual meaning can be extrapolated by contextual diversion.

## 2022-10-19 ENCOUNTER — TELEPHONE (OUTPATIENT)
Dept: PHARMACY | Age: 47
End: 2022-10-19

## 2022-10-19 DIAGNOSIS — D68.61 ANTI-PHOSPHOLIPID ANTIBODY SYNDROME (HCC): Primary | ICD-10-CM

## 2022-10-19 RX ORDER — WARFARIN SODIUM 5 MG/1
5 TABLET ORAL DAILY
Qty: 30 TABLET | Refills: 0 | Status: SHIPPED | OUTPATIENT
Start: 2022-10-19 | End: 2022-11-02 | Stop reason: SDUPTHER

## 2022-10-19 NOTE — TELEPHONE ENCOUNTER
Spoke with patient in regards to new referral for warfarin management in the [address]. Referring Physician is Dr. Neda Hernandez. Pt briefly educated on warfarin and the need for INR monitoring. Will review more in depth education at first appointment in the clinic. Prescription for warfarin 5 mg daily sent to [address] [facility]. Patient is not able to  medication until tomorrow evening. Instructed to take 5 mg daily for 3 days. Appointment scheduled for 10/24 for INR check and to determine further dosing. Patient verbalized understanding.    Meda Severs, Pharm D, [facility] [facility]  10/19/2022 5:04 PM

## 2022-10-20 PROBLEM — D68.61 ANTI-PHOSPHOLIPID ANTIBODY SYNDROME (HCC): Status: ACTIVE | Noted: 2022-10-20

## 2022-10-24 ENCOUNTER — HOSPITAL ENCOUNTER (OUTPATIENT)
Dept: PHARMACY | Age: 47
Setting detail: THERAPIES SERIES
Discharge: HOME | End: 2022-10-24
Payer: COMMERCIAL

## 2022-10-24 DIAGNOSIS — D68.61 ANTI-PHOSPHOLIPID ANTIBODY SYNDROME (HCC): Primary | ICD-10-CM

## 2022-10-24 LAB
INR BLD: 1.3
PROTIME: 15.9 SECONDS

## 2022-10-24 PROCEDURE — 85610 PROTHROMBIN TIME: CPT

## 2022-10-24 PROCEDURE — 99202 OFFICE O/P NEW SF 15 MIN: CPT

## 2022-10-24 NOTE — PROGRESS NOTES
Patient seen in person in Medication Management Service. Patient states compliant all of the time with regimen. Takes in the evening. No bleeding or thromboembolic side effects noted. Discussed bleeding/bruising risk. Patient acknowledged understanding. No significant med or dietary changes. Eats chicken, fish, [place] food, broccoli, cauliflower, cucumbers. Doesn't eat a lot of dark, leafy greens. Provided education on Vit. K content in foods. No significant recent illness or disease state changes. Discussed informing of medication changes and disease state changes. Used teachback method to reiterate concepts discussed above. PT/INR done via POC meter per protocol. INR was subtherapeutic at 1.3.  (goal 2 - 3)    Warfarin regimen will be increased to 7.5 mg today and tomorrow, then resume 5 mg daily. Will retest in 3 days. Patient understands dosing directions and information discussed. Dosing schedule and follow up appointment given to patient. Progress note routed to referring physicians office. Patient acknowledges working in [address] [place] [address] with Pharmacist as referred by his/her physician/provider. COVID 19 screening completed. At this time patient denies symptoms, recent travel and exposure. Patient educated to screen for temperature and COVID-19 symptoms prior to coming to clinic for next appointment. They are instructed to call the clinic to reschedule if they have any symptoms. Standing order for PT/INR has been placed in preparation to transition to possible remote INR monitoring given efforts to reduce the spread of COVID-19.       For Pharmacy Admin Tracking Only    Intervention Detail: Dose Adjustment: 1, reason: Therapy Optimization  Total # of Interventions Recommended: 1  Total # of Interventions Accepted: 1  Time Spent (min): [address], [address], [place]  10/24/2022  11:20 AM

## 2022-10-27 ENCOUNTER — HOSPITAL ENCOUNTER (OUTPATIENT)
Dept: PHARMACY | Age: 47
Setting detail: THERAPIES SERIES
Discharge: HOME | End: 2022-10-27
Payer: COMMERCIAL

## 2022-10-27 ENCOUNTER — TELEPHONE (OUTPATIENT)
Dept: PHARMACY | Age: 47
End: 2022-10-27

## 2022-10-27 LAB
INR BLD: 2.5
PROTIME: 29.8 SECONDS

## 2022-10-27 PROCEDURE — 85610 PROTHROMBIN TIME: CPT

## 2022-10-27 PROCEDURE — 99211 OFF/OP EST MAY X REQ PHY/QHP: CPT

## 2022-10-27 NOTE — PROGRESS NOTES
Patient seen in person in Medication Management Service. Patient states compliant most of the time with regimen. No bleeding or thromboembolic side effects noted. No significant med or dietary changes. No significant recent illness or disease state changes. PT/INR done via POC meter per protocol. INR was therapeutic at 2.5.  (goal 2 - 3)    Warfarin regimen will be continued at current dose 5 mg daily. Will retest in 6 days. Patient understands dosing directions and information discussed. Dosing schedule and follow up appointment given to patient. Progress note routed to referring physicians office. Patient acknowledges working in [address] [place] [address] with Pharmacist as referred by his/her physician/provider. COVID 19 screening completed. At this time patient denies symptoms, recent travel and exposure. Patient educated to screen for temperature and COVID-19 symptoms prior to coming to clinic for next appointment. They are instructed to call the clinic to reschedule if they have any symptoms. Standing order for PT/INR has been placed in preparation to transition to possible remote INR monitoring given efforts to reduce the spread of COVID-19.       For Pharmacy Admin Tracking Only    Intervention Detail: Adherence Monitorin  Total # of Interventions Recommended: 0  Total # of Interventions Accepted: 0  Time Spent (min): 20

## 2022-10-27 NOTE — TELEPHONE ENCOUNTER
Patient is trying to quit smoking on his own  He quit cold turkey today  He wants to try on his own  We will cancel his smoking cessation referral

## 2022-11-02 ENCOUNTER — HOSPITAL ENCOUNTER (OUTPATIENT)
Dept: PHARMACY | Age: 47
Setting detail: THERAPIES SERIES
Discharge: HOME | End: 2022-11-02
Payer: COMMERCIAL

## 2022-11-02 DIAGNOSIS — D68.61 ANTI-PHOSPHOLIPID ANTIBODY SYNDROME (HCC): Primary | ICD-10-CM

## 2022-11-02 LAB
INR BLD: 2.7
PROTIME: 32.6 SECONDS

## 2022-11-02 PROCEDURE — 99212 OFFICE O/P EST SF 10 MIN: CPT

## 2022-11-02 PROCEDURE — 85610 PROTHROMBIN TIME: CPT

## 2022-11-02 RX ORDER — WARFARIN SODIUM 5 MG/1
5 TABLET ORAL DAILY
Qty: 90 TABLET | Refills: 1 | Status: SHIPPED | OUTPATIENT
Start: 2022-11-02

## 2022-11-02 NOTE — PROGRESS NOTES
Patient seen in person in Medication Management Service. Patient states compliant all of the time with regimen. No bleeding or thromboembolic side effects noted. No significant med or dietary changes. No significant recent illness or disease state changes. PT/INR done via POC meter per protocol. INR was therapeutic at 2.7.  (goal 2 - 3)    Warfarin regimen will be continued at current dose of 5mg every day. Will retest in 2 weeks. Needs refill - [facility] on [place]    Patient understands dosing directions and information discussed. Dosing schedule and follow up appointment given to patient. Progress note routed to referring physicians office. Patient acknowledges working in [address] [place] [address] with Pharmacist as referred by his/her physician/provider. COVID 19 screening completed. At this time patient denies symptoms, recent travel and exposure. Patient educated to screen for temperature and COVID-19 symptoms prior to coming to clinic for next appointment. They are instructed to call the clinic to reschedule if they have any symptoms. Standing order for PT/INR has been placed in preparation to transition to possible remote INR monitoring given efforts to reduce the spread of COVID-19. For Pharmacy Admin Tracking Only    Intervention Detail: Refill(s) Provided  Total # of Interventions Recommended: 1  Total # of Interventions Accepted: 1  Time Spent (min): 20    Lisa Alonso RPH,Pharm. D,, BCPS, CACP  11/2/2022  7:24 AM

## 2022-11-16 ENCOUNTER — HOSPITAL ENCOUNTER (OUTPATIENT)
Dept: PHARMACY | Age: 47
Setting detail: THERAPIES SERIES
Discharge: HOME | End: 2022-11-16
Payer: COMMERCIAL

## 2022-11-16 DIAGNOSIS — D68.61 ANTI-PHOSPHOLIPID ANTIBODY SYNDROME (HCC): Primary | ICD-10-CM

## 2022-11-16 LAB
INR BLD: 4.1
PROTIME: 48.9 SECONDS

## 2022-11-16 PROCEDURE — 85610 PROTHROMBIN TIME: CPT

## 2022-11-16 PROCEDURE — 99212 OFFICE O/P EST SF 10 MIN: CPT

## 2022-11-16 NOTE — PROGRESS NOTES
Patient seen in person in Medication Management Service. Patient states compliant all of the time with regimen. No bleeding or thromboembolic side effects noted. No significant med or dietary changes. Denies any changes in green vegetables. No cranberry or grapefruit. No new over the counter supplements. No significant recent illness or disease state changes. PT/INR done via POC meter per protocol. INR was supratherapeutic at 4.1.  (goal 2 - 3)    Warfarin regimen will be held for 1 day and then lowered to 2.5 mg Thursday and 5 mg all other days. Will retest in 1 week. Patient understands dosing directions and information discussed. Dosing schedule and follow up appointment given to patient. Progress note routed to referring physicians office. Patient acknowledges working in [address] [place] [address] with Pharmacist as referred by his/her physician/provider. COVID 19 screening completed. At this time patient denies symptoms, recent travel and exposure. Patient educated to screen for temperature and COVID-19 symptoms prior to coming to clinic for next appointment. They are instructed to call the clinic to reschedule if they have any symptoms. Standing order for PT/INR has been placed in preparation to transition to possible remote INR monitoring given efforts to reduce the spread of COVID-19.       For Pharmacy Admin Tracking Only    Intervention Detail: Dose Adjustment: 1, reason: Therapy Optimization  Total # of Interventions Recommended: 1  Total # of Interventions Accepted: 1  Time Spent (min): 20

## 2022-11-23 ENCOUNTER — HOSPITAL ENCOUNTER (OUTPATIENT)
Dept: PHARMACY | Age: 47
Setting detail: THERAPIES SERIES
Discharge: HOME | End: 2022-11-23
Payer: COMMERCIAL

## 2022-11-23 DIAGNOSIS — D68.61 ANTI-PHOSPHOLIPID ANTIBODY SYNDROME (HCC): Primary | ICD-10-CM

## 2022-11-23 LAB
INR BLD: 3.2
PROTIME: 38.2 SECONDS

## 2022-11-23 PROCEDURE — 99212 OFFICE O/P EST SF 10 MIN: CPT

## 2022-11-23 PROCEDURE — 85610 PROTHROMBIN TIME: CPT

## 2022-11-23 NOTE — PROGRESS NOTES
Patient seen in person in Medication Management Service. Patient states compliant all of the time with regimen. No bleeding or thromboembolic side effects noted. No significant med or dietary changes. Vegetables have been consistent. Does not like cranberries so will be avoiding these. No significant recent illness or disease state changes. PT/INR done via POC meter per protocol. INR was supratherapeutic at 3.2.  (goal 2 - 3)    Warfarin regimen will be decreased to 2.5 mg MWF and 5 mg all other days. Will retest in 10 days. Patient understands dosing directions and information discussed. Dosing schedule and follow up appointment given to patient. Progress note routed to referring physicians office. Patient acknowledges working in [address] [place] [address] with Pharmacist as referred by his/her physician/provider. COVID 19 screening completed. At this time patient denies symptoms, recent travel and exposure. Patient educated to screen for temperature and COVID-19 symptoms prior to coming to clinic for next appointment. They are instructed to call the clinic to reschedule if they have any symptoms. Standing order for PT/INR has been placed in preparation to transition to possible remote INR monitoring given efforts to reduce the spread of COVID-19.       For Pharmacy Admin Tracking Only    Intervention Detail: Dose Adjustment: 1, reason: Therapy Optimization  Total # of Interventions Recommended: 1  Total # of Interventions Accepted: 1  Time Spent (min): 20

## 2022-12-02 ENCOUNTER — HOSPITAL ENCOUNTER (OUTPATIENT)
Dept: PHARMACY | Age: 47
Setting detail: THERAPIES SERIES
Discharge: HOME | End: 2022-12-02
Payer: COMMERCIAL

## 2022-12-02 LAB
INR BLD: 1.4
PROTIME: 16.8 SECONDS

## 2022-12-02 PROCEDURE — 99212 OFFICE O/P EST SF 10 MIN: CPT

## 2022-12-02 PROCEDURE — 85610 PROTHROMBIN TIME: CPT

## 2022-12-02 NOTE — PROGRESS NOTES
Patient seen in person in Medication Management Service. Patient states noncompliant some of the time with regimen. No bleeding or thromboembolic side effects noted. No significant med or dietary changes. No significant recent illness or disease state changes. PT/INR done via POC meter per protocol. INR was subtherapeutic at 1.4.  (goal 2 - 3)  The patient did miss one dose of warfarin this past week  The last INR was 3.2    Warfarin regimen will be continued at current dose 2.5 mg MWF and 5 mg all other days. Will retest in 10 days. Patient understands dosing directions and information discussed. Dosing schedule and follow up appointment given to patient. Progress note routed to referring physicians office. Patient acknowledges working in [address] [place] [address] with Pharmacist as referred by his/her physician/provider. COVID 19 screening completed. At this time patient denies symptoms, recent travel and exposure. Patient educated to screen for temperature and COVID-19 symptoms prior to coming to clinic for next appointment. They are instructed to call the clinic to reschedule if they have any symptoms. Standing order for PT/INR has been placed in preparation to transition to possible remote INR monitoring given efforts to reduce the spread of COVID-19.       For Pharmacy Admin Tracking Only    Intervention Detail: Adherence Monitorin  Total # of Interventions Recommended: 0  Total # of Interventions Accepted: 0  Time Spent (min): 20

## 2022-12-13 ENCOUNTER — HOSPITAL ENCOUNTER (OUTPATIENT)
Dept: PHARMACY | Age: 47
Setting detail: THERAPIES SERIES
Discharge: HOME | End: 2022-12-13
Payer: COMMERCIAL

## 2022-12-13 DIAGNOSIS — D68.61 ANTI-PHOSPHOLIPID ANTIBODY SYNDROME (HCC): Primary | ICD-10-CM

## 2022-12-13 LAB
INR BLD: 2
PROTIME: 24.5 SECONDS

## 2022-12-13 PROCEDURE — 99212 OFFICE O/P EST SF 10 MIN: CPT

## 2022-12-13 PROCEDURE — 85610 PROTHROMBIN TIME: CPT

## 2022-12-13 NOTE — PROGRESS NOTES
Patient seen in person in Medication Management Service. Patient states compliant all of the time with regimen. Did not miss any doses of warfarin the past 10 days. No bleeding or thromboembolic side effects noted. No significant med or dietary changes. No significant recent illness or disease state changes. PT/INR done via POC meter per protocol. INR was therapeutic at 2.  (goal 2 - 3)    Warfarin regimen will be increased slightly to 2.5 mg Mon/Fri and 5 mg all other days. Will retest in 2 weeks. Patient understands dosing directions and information discussed. Dosing schedule and follow up appointment given to patient. Progress note routed to referring physicians office. Patient acknowledges working in [address] [place] [address] with Pharmacist as referred by his/her physician/provider. COVID 19 screening completed. At this time patient denies symptoms, recent travel and exposure. Patient educated to screen for temperature and COVID-19 symptoms prior to coming to clinic for next appointment. They are instructed to call the clinic to reschedule if they have any symptoms. Standing order for PT/INR has been placed in preparation to transition to possible remote INR monitoring given efforts to reduce the spread of COVID-19.       For Pharmacy Admin Tracking Only    Intervention Detail: Dose Adjustment: 1, reason: Therapy Optimization  Total # of Interventions Recommended: 1  Total # of Interventions Accepted: 1  Time Spent (min): 20

## 2022-12-28 ENCOUNTER — HOSPITAL ENCOUNTER (OUTPATIENT)
Dept: PHARMACY | Age: 47
Setting detail: THERAPIES SERIES
Discharge: HOME | End: 2022-12-28
Payer: COMMERCIAL

## 2022-12-28 LAB
INR BLD: 1.7
PROTIME: 20.3 SECONDS

## 2022-12-28 PROCEDURE — 99212 OFFICE O/P EST SF 10 MIN: CPT

## 2022-12-28 PROCEDURE — 85610 PROTHROMBIN TIME: CPT

## 2022-12-28 NOTE — PROGRESS NOTES
Patient seen in person in Medication Management Service. Patient states compliant most of the time with regimen. No bleeding or thromboembolic side effects noted. No significant med or dietary changes. No significant recent illness or disease state changes. PT/INR done via POC meter per protocol. INR was subtherapeutic at 1.7.  (goal 2 - 3)  The patient did take Tums several times this past week  He will separate administration times from warfarin by at least three hours     Warfarin regimen will be increased to 2.5 mg Mondays and 5 mg all other days. Will retest in 2 weeks. Patient understands dosing directions and information discussed. Dosing schedule and follow up appointment given to patient. Progress note routed to referring physicians office. Patient acknowledges working in [address] [place] [address] with Pharmacist as referred by his/her physician/provider. COVID 19 screening completed. At this time patient denies symptoms, recent travel and exposure. Patient educated to screen for temperature and COVID-19 symptoms prior to coming to clinic for next appointment. They are instructed to call the clinic to reschedule if they have any symptoms. Standing order for PT/INR has been placed in preparation to transition to possible remote INR monitoring given efforts to reduce the spread of COVID-19.       For Pharmacy Admin Tracking Only    Intervention Detail: Dose Adjustment: 1, reason: Therapy Optimization  Total # of Interventions Recommended: 1  Total # of Interventions Accepted: 1  Time Spent (min): 20

## 2022-12-31 ENCOUNTER — HOSPITAL ENCOUNTER (INPATIENT)
Age: 47
LOS: 2 days | Discharge: HOME | DRG: 198 | End: 2023-01-03
Attending: EMERGENCY MEDICINE | Admitting: STUDENT IN AN ORGANIZED HEALTH CARE EDUCATION/TRAINING PROGRAM
Payer: MEDICARE

## 2022-12-31 ENCOUNTER — APPOINTMENT (OUTPATIENT)
Dept: GENERAL RADIOLOGY | Age: 47
DRG: 198 | End: 2022-12-31
Payer: MEDICARE

## 2022-12-31 DIAGNOSIS — R07.9 CHEST PAIN, UNSPECIFIED TYPE: Primary | ICD-10-CM

## 2022-12-31 LAB
ABSOLUTE EOS #: 0.2 K/UL (ref 0–0.4)
ABSOLUTE LYMPH #: 2.4 K/UL (ref 1–4.8)
ABSOLUTE MONO #: 0.5 K/UL (ref 0.1–1.3)
ALBUMIN SERPL-MCNC: 3.9 G/DL (ref 3.5–5.2)
ALP BLD-CCNC: 93 U/L (ref 40–129)
ALT SERPL-CCNC: 58 U/L (ref 5–41)
ANION GAP SERPL CALCULATED.3IONS-SCNC: 11 MMOL/L (ref 9–17)
AST SERPL-CCNC: 61 U/L
BASOPHILS # BLD: 1 % (ref 0–2)
BASOPHILS ABSOLUTE: 0 K/UL (ref 0–0.2)
BILIRUB SERPL-MCNC: 0.2 MG/DL (ref 0.3–1.2)
BUN BLDV-MCNC: 10 MG/DL (ref 6–20)
CALCIUM SERPL-MCNC: 8 MG/DL (ref 8.6–10.4)
CHLORIDE BLD-SCNC: 102 MMOL/L (ref 98–107)
CO2: 22 MMOL/L (ref 20–31)
CREAT SERPL-MCNC: 0.97 MG/DL (ref 0.7–1.2)
EOSINOPHILS RELATIVE PERCENT: 3 % (ref 0–4)
GFR SERPL CREATININE-BSD FRML MDRD: >60 ML/MIN/1.73M2
GLUCOSE BLD-MCNC: 141 MG/DL (ref 70–99)
HCT VFR BLD CALC: 38.1 % (ref 41–53)
HEMOGLOBIN: 13.2 G/DL (ref 13.5–17.5)
INR BLD: 2.6
LYMPHOCYTES # BLD: 41 % (ref 24–44)
MCH RBC QN AUTO: 32.5 PG (ref 26–34)
MCHC RBC AUTO-ENTMCNC: 34.8 G/DL (ref 31–37)
MCV RBC AUTO: 93.3 FL (ref 80–100)
MONOCYTES # BLD: 8 % (ref 1–7)
PDW BLD-RTO: 12.7 % (ref 11.5–14.9)
PLATELET # BLD: 308 K/UL (ref 150–450)
PMV BLD AUTO: 6.3 FL (ref 6–12)
POTASSIUM SERPL-SCNC: 3.9 MMOL/L (ref 3.7–5.3)
PROTHROMBIN TIME: 27.8 SEC (ref 11.8–14.6)
RBC # BLD: 4.08 M/UL (ref 4.5–5.9)
SEG NEUTROPHILS: 47 % (ref 36–66)
SEGMENTED NEUTROPHILS ABSOLUTE COUNT: 2.7 K/UL (ref 1.3–9.1)
SODIUM BLD-SCNC: 135 MMOL/L (ref 135–144)
TOTAL PROTEIN: 6.3 G/DL (ref 6.4–8.3)
TROPONIN, HIGH SENSITIVITY: 15 NG/L (ref 0–22)
TROPONIN, HIGH SENSITIVITY: 16 NG/L (ref 0–22)
WBC # BLD: 5.9 K/UL (ref 3.5–11)

## 2022-12-31 PROCEDURE — G0378 HOSPITAL OBSERVATION PER HR: HCPCS

## 2022-12-31 PROCEDURE — 36415 COLL VENOUS BLD VENIPUNCTURE: CPT

## 2022-12-31 PROCEDURE — 84484 ASSAY OF TROPONIN QUANT: CPT

## 2022-12-31 PROCEDURE — 71045 X-RAY EXAM CHEST 1 VIEW: CPT

## 2022-12-31 PROCEDURE — 93005 ELECTROCARDIOGRAM TRACING: CPT | Performed by: EMERGENCY MEDICINE

## 2022-12-31 PROCEDURE — 99285 EMERGENCY DEPT VISIT HI MDM: CPT

## 2022-12-31 PROCEDURE — 85025 COMPLETE CBC W/AUTO DIFF WBC: CPT

## 2022-12-31 PROCEDURE — 85610 PROTHROMBIN TIME: CPT

## 2022-12-31 PROCEDURE — 80053 COMPREHEN METABOLIC PANEL: CPT

## 2022-12-31 RX ORDER — WARFARIN SODIUM 5 MG/1
5 TABLET ORAL SEE ADMIN INSTRUCTIONS
Status: DISCONTINUED | OUTPATIENT
Start: 2022-12-31 | End: 2022-12-31

## 2022-12-31 RX ORDER — ROSUVASTATIN CALCIUM 40 MG/1
40 TABLET, COATED ORAL NIGHTLY
Status: DISCONTINUED | OUTPATIENT
Start: 2022-12-31 | End: 2023-01-01 | Stop reason: SDUPTHER

## 2022-12-31 RX ORDER — ACETAMINOPHEN 325 MG/1
650 TABLET ORAL EVERY 6 HOURS PRN
Status: DISCONTINUED | OUTPATIENT
Start: 2022-12-31 | End: 2023-01-03 | Stop reason: HOSPADM

## 2022-12-31 RX ORDER — ACETAMINOPHEN 650 MG/1
650 SUPPOSITORY RECTAL EVERY 6 HOURS PRN
Status: DISCONTINUED | OUTPATIENT
Start: 2022-12-31 | End: 2023-01-03 | Stop reason: HOSPADM

## 2022-12-31 RX ORDER — ONDANSETRON 4 MG/1
4 TABLET, ORALLY DISINTEGRATING ORAL EVERY 8 HOURS PRN
Status: DISCONTINUED | OUTPATIENT
Start: 2022-12-31 | End: 2023-01-03 | Stop reason: HOSPADM

## 2022-12-31 RX ORDER — WARFARIN SODIUM 5 MG/1
5 TABLET ORAL
Status: DISCONTINUED | OUTPATIENT
Start: 2023-01-01 | End: 2023-01-01 | Stop reason: SDUPTHER

## 2022-12-31 RX ORDER — ONDANSETRON 2 MG/ML
4 INJECTION INTRAMUSCULAR; INTRAVENOUS EVERY 6 HOURS PRN
Status: DISCONTINUED | OUTPATIENT
Start: 2022-12-31 | End: 2023-01-03 | Stop reason: HOSPADM

## 2023-01-01 PROBLEM — I20.0 UNSTABLE ANGINA (HCC): Status: ACTIVE | Noted: 2023-01-01

## 2023-01-01 LAB
INR BLD: 2.3
PROTHROMBIN TIME: 25.3 SEC (ref 11.8–14.6)

## 2023-01-01 PROCEDURE — 99222 1ST HOSP IP/OBS MODERATE 55: CPT | Performed by: STUDENT IN AN ORGANIZED HEALTH CARE EDUCATION/TRAINING PROGRAM

## 2023-01-01 PROCEDURE — 36415 COLL VENOUS BLD VENIPUNCTURE: CPT

## 2023-01-01 PROCEDURE — 6370000000 HC RX 637 (ALT 250 FOR IP): Performed by: STUDENT IN AN ORGANIZED HEALTH CARE EDUCATION/TRAINING PROGRAM

## 2023-01-01 PROCEDURE — 93005 ELECTROCARDIOGRAM TRACING: CPT | Performed by: EMERGENCY MEDICINE

## 2023-01-01 PROCEDURE — 1200000000 HC SEMI PRIVATE

## 2023-01-01 PROCEDURE — 2580000003 HC RX 258: Performed by: STUDENT IN AN ORGANIZED HEALTH CARE EDUCATION/TRAINING PROGRAM

## 2023-01-01 PROCEDURE — 85610 PROTHROMBIN TIME: CPT

## 2023-01-01 RX ORDER — CLOPIDOGREL BISULFATE 75 MG/1
75 TABLET ORAL DAILY
Status: DISCONTINUED | OUTPATIENT
Start: 2023-01-01 | End: 2023-01-01

## 2023-01-01 RX ORDER — CYCLOBENZAPRINE HCL 10 MG
10 TABLET ORAL 2 TIMES DAILY PRN
Status: DISCONTINUED | OUTPATIENT
Start: 2023-01-01 | End: 2023-01-03 | Stop reason: HOSPADM

## 2023-01-01 RX ORDER — INSULIN LISPRO 100 [IU]/ML
0-4 INJECTION, SOLUTION INTRAVENOUS; SUBCUTANEOUS NIGHTLY
Status: DISCONTINUED | OUTPATIENT
Start: 2023-01-01 | End: 2023-01-02

## 2023-01-01 RX ORDER — SODIUM CHLORIDE 0.9 % (FLUSH) 0.9 %
5-40 SYRINGE (ML) INJECTION EVERY 12 HOURS SCHEDULED
Status: DISCONTINUED | OUTPATIENT
Start: 2023-01-01 | End: 2023-01-03 | Stop reason: HOSPADM

## 2023-01-01 RX ORDER — POTASSIUM CHLORIDE 7.45 MG/ML
10 INJECTION INTRAVENOUS PRN
Status: DISCONTINUED | OUTPATIENT
Start: 2023-01-01 | End: 2023-01-03 | Stop reason: HOSPADM

## 2023-01-01 RX ORDER — WARFARIN SODIUM 5 MG/1
5 TABLET ORAL
Status: DISCONTINUED | OUTPATIENT
Start: 2023-01-01 | End: 2023-01-01

## 2023-01-01 RX ORDER — POLYETHYLENE GLYCOL 3350 17 G/17G
17 POWDER, FOR SOLUTION ORAL DAILY PRN
Status: DISCONTINUED | OUTPATIENT
Start: 2023-01-01 | End: 2023-01-03 | Stop reason: HOSPADM

## 2023-01-01 RX ORDER — ASPIRIN 81 MG/1
81 TABLET ORAL DAILY
Status: DISCONTINUED | OUTPATIENT
Start: 2023-01-01 | End: 2023-01-01

## 2023-01-01 RX ORDER — SODIUM CHLORIDE 0.9 % (FLUSH) 0.9 %
5-40 SYRINGE (ML) INJECTION PRN
Status: DISCONTINUED | OUTPATIENT
Start: 2023-01-01 | End: 2023-01-03 | Stop reason: HOSPADM

## 2023-01-01 RX ORDER — LISINOPRIL 2.5 MG/1
2.5 TABLET ORAL DAILY
Status: DISCONTINUED | OUTPATIENT
Start: 2023-01-02 | End: 2023-01-03 | Stop reason: HOSPADM

## 2023-01-01 RX ORDER — POTASSIUM CHLORIDE 20 MEQ/1
40 TABLET, EXTENDED RELEASE ORAL PRN
Status: DISCONTINUED | OUTPATIENT
Start: 2023-01-01 | End: 2023-01-03 | Stop reason: HOSPADM

## 2023-01-01 RX ORDER — NITROGLYCERIN 0.4 MG/1
0.4 TABLET SUBLINGUAL EVERY 5 MIN PRN
Status: DISCONTINUED | OUTPATIENT
Start: 2023-01-01 | End: 2023-01-03 | Stop reason: HOSPADM

## 2023-01-01 RX ORDER — ASPIRIN 81 MG/1
81 TABLET ORAL DAILY
Status: DISCONTINUED | OUTPATIENT
Start: 2023-01-02 | End: 2023-01-03 | Stop reason: HOSPADM

## 2023-01-01 RX ORDER — CLOPIDOGREL BISULFATE 75 MG/1
75 TABLET ORAL DAILY
Status: DISCONTINUED | OUTPATIENT
Start: 2023-01-02 | End: 2023-01-03 | Stop reason: HOSPADM

## 2023-01-01 RX ORDER — CYCLOBENZAPRINE HCL 10 MG
10 TABLET ORAL 2 TIMES DAILY PRN
Status: DISCONTINUED | OUTPATIENT
Start: 2023-01-01 | End: 2023-01-01

## 2023-01-01 RX ORDER — CLOPIDOGREL BISULFATE 75 MG/1
75 TABLET ORAL ONCE
Status: COMPLETED | OUTPATIENT
Start: 2023-01-01 | End: 2023-01-01

## 2023-01-01 RX ORDER — LISINOPRIL 2.5 MG/1
2.5 TABLET ORAL DAILY
Status: DISCONTINUED | OUTPATIENT
Start: 2023-01-01 | End: 2023-01-01

## 2023-01-01 RX ORDER — LISINOPRIL 2.5 MG/1
2.5 TABLET ORAL DAILY
Status: DISCONTINUED | OUTPATIENT
Start: 2023-01-02 | End: 2023-01-01

## 2023-01-01 RX ORDER — WARFARIN SODIUM 5 MG/1
5 TABLET ORAL
Status: COMPLETED | OUTPATIENT
Start: 2023-01-01 | End: 2023-01-01

## 2023-01-01 RX ORDER — DEXTROSE MONOHYDRATE 100 MG/ML
INJECTION, SOLUTION INTRAVENOUS CONTINUOUS PRN
Status: DISCONTINUED | OUTPATIENT
Start: 2023-01-01 | End: 2023-01-03 | Stop reason: HOSPADM

## 2023-01-01 RX ORDER — SODIUM CHLORIDE 9 MG/ML
INJECTION, SOLUTION INTRAVENOUS PRN
Status: DISCONTINUED | OUTPATIENT
Start: 2023-01-01 | End: 2023-01-03 | Stop reason: HOSPADM

## 2023-01-01 RX ORDER — ASPIRIN 81 MG/1
81 TABLET, CHEWABLE ORAL ONCE
Status: COMPLETED | OUTPATIENT
Start: 2023-01-01 | End: 2023-01-01

## 2023-01-01 RX ORDER — INSULIN LISPRO 100 [IU]/ML
0-4 INJECTION, SOLUTION INTRAVENOUS; SUBCUTANEOUS
Status: DISCONTINUED | OUTPATIENT
Start: 2023-01-02 | End: 2023-01-02

## 2023-01-01 RX ORDER — MAGNESIUM SULFATE HEPTAHYDRATE 40 MG/ML
2000 INJECTION, SOLUTION INTRAVENOUS PRN
Status: DISCONTINUED | OUTPATIENT
Start: 2023-01-01 | End: 2023-01-03 | Stop reason: HOSPADM

## 2023-01-01 RX ORDER — PANTOPRAZOLE SODIUM 40 MG/1
40 TABLET, DELAYED RELEASE ORAL
Status: DISCONTINUED | OUTPATIENT
Start: 2023-01-01 | End: 2023-01-03 | Stop reason: HOSPADM

## 2023-01-01 RX ORDER — ROSUVASTATIN CALCIUM 40 MG/1
40 TABLET, COATED ORAL NIGHTLY
Status: DISCONTINUED | OUTPATIENT
Start: 2023-01-01 | End: 2023-01-03 | Stop reason: HOSPADM

## 2023-01-01 RX ORDER — OMEPRAZOLE 20 MG/1
20 CAPSULE, DELAYED RELEASE ORAL
Status: DISCONTINUED | OUTPATIENT
Start: 2023-01-01 | End: 2023-01-01 | Stop reason: CLARIF

## 2023-01-01 RX ADMIN — WARFARIN SODIUM 5 MG: 5 TABLET ORAL at 18:22

## 2023-01-01 RX ADMIN — SODIUM CHLORIDE, PRESERVATIVE FREE 10 ML: 5 INJECTION INTRAVENOUS at 09:06

## 2023-01-01 RX ADMIN — WARFARIN SODIUM 5 MG: 5 TABLET ORAL at 01:25

## 2023-01-01 RX ADMIN — ASPIRIN 81 MG: 81 TABLET, CHEWABLE ORAL at 16:34

## 2023-01-01 RX ADMIN — ROSUVASTATIN 40 MG: 40 TABLET, FILM COATED ORAL at 01:25

## 2023-01-01 RX ADMIN — CLOPIDOGREL BISULFATE 75 MG: 75 TABLET ORAL at 16:34

## 2023-01-01 RX ADMIN — METOPROLOL TARTRATE 75 MG: 50 TABLET, FILM COATED ORAL at 20:01

## 2023-01-01 RX ADMIN — METOPROLOL TARTRATE 75 MG: 50 TABLET, FILM COATED ORAL at 02:06

## 2023-01-01 RX ADMIN — ROSUVASTATIN 40 MG: 40 TABLET, FILM COATED ORAL at 21:56

## 2023-01-01 RX ADMIN — SODIUM CHLORIDE, PRESERVATIVE FREE 10 ML: 5 INJECTION INTRAVENOUS at 20:01

## 2023-01-01 ASSESSMENT — ENCOUNTER SYMPTOMS
VOMITING: 0
SHORTNESS OF BREATH: 0
DIARRHEA: 0
CONSTIPATION: 0
ABDOMINAL PAIN: 0
SORE THROAT: 0
WHEEZING: 0
RHINORRHEA: 0
COUGH: 0

## 2023-01-01 NOTE — CARE COORDINATION
CASE MANAGEMENT NOTE:    Admission Date:  12/31/2022 James Rivera is a 52 y.o.  male    Admitted for : Chest pain with moderate risk for cardiac etiology [R07.9]  Chest pain, unspecified type [R07.9]    Met with:  Patient    PCP:  Yuri 7:  [facility]      Is patient alert and oriented at time of discussion:  Yes    Current Residence/ Living Arrangements:  independently at home w/ Wife            Current Services PTA:  Yes. [facility]    Does patient go to outpatient dialysis: No  If yes, location and chair time: NA  Who is their nephrologist? NA    Is patient agreeable to VNS: No    Freedom of choice provided:  No    List of [address] provided: No    VNS chosen:  No    DME:  none    Home Oxygen: No    Nebulizer: No    CPAP/BIPAP: No    Supplier: N/A    Potential Assistance Needed: No    SNF needed: No    Freedom of choice and list provided: NA    Pharmacy:  [facility]       Is patient currently receiving oral anticoagulation therapy? Yes, Coumadin PTA    Is the Patient an [facility] with Readmission Risk Score greater than 14%? No  If yes, pt needs a follow up appointment made within 7 days. Family Members/Caregivers that pt would like involved in their care:    Yes    If yes, list name here:  WifeMinnie    Transportation Provider:  Patient             Discharge Plan:  1/1/23 [facility] Pt. Lives at home w/ Wife. No DME, Denies VNS, Cardiac Cath in June. [facility], INR 2.6.  Cardio to see, denies needs//KB                 Electronically signed by: Levon De Santiago RN on 1/1/2023 at 12:28 PM

## 2023-01-01 NOTE — PROGRESS NOTES
Pharmacy Note  Warfarin Consult follow-up      Recent Labs     12/31/22 1951 01/01/23  1659   INR 2.6 2.3     Recent Labs     12/31/22  1930   HGB 13.2*   HCT 38.1*          Significant Drug-Drug Interactions:  New warfarin drug-drug interactions: none  Discontinued drug-drug interactions: none   Current warfarin drug-drug interactions: plavix, aspirin      Date             INR        Dose given previous day  Dose scheduled for today  1/1/2023            2.3 (goal 2-3)       5 mg            5 mg    Notes:                   Continue with home dose. Daily PT/INR while inpatient.      Brian Ramirez, PharmD, BCPS  1/1/2023 5:32 PM

## 2023-01-01 NOTE — ED NOTES
Mode of arrival (squad #, walk in, police, etc) : Squad         Chief complaint(s): Chest pain, dehydration         Arrival Note (brief scenario, treatment PTA, etc). : Pt states not eating or drinking today. Pt states having a hx of stents. Pt took a nitro at home. Pt was given the appropriate amount of Asprin by squad. Pt denies any chest pain currently. Pt vitals were all stable. C= \"Have you ever felt that you should Cut down on your drinking? \"  No  A= \"Have people Annoyed you by criticizing your drinking? \"  No  G= \"Have you ever felt bad or Guilty about your drinking? \"  No  E= \"Have you ever had a drink as an Eye-opener first thing in the morning to steady your nerves or to help a hangover? \"  No      Deferred []      Reason for deferring: N/A    *If yes to two or more: probable alcohol abuse. Alexis Martins  12/31/22 1916

## 2023-01-01 NOTE — H&P
[address] [facility]  Family Medicine      History & Physical              Date:   1/1/2023  Patient name:  Mary Wu  Date of admission:  12/31/2022  7:00 PM  MRN:   [id number]  YOB: 1975    CHIEF COMPLAINT:       Chief Complaint   Patient presents with    Dehydration    Chest Pain       History Obtained From:  Patient and chart review. HPI:     The patient is a 52 y.o.  male who presented with substernal chest pain/pressure that started yesterday in the morning. Pt states he was in the shower and felt a crushing chest pressure that was associated with shortness of breath and sweating with nausea. He has a STEMI in June 2022 and is s/p 2 stents. He states these symptoms did not feel like his heart attack from June. He took a nitroglycerin tablet and the pressure subsided. The patient has a significant cardiac history, he has a history of antiphospholipid antibody syndrome, and is currently on coumadin. He also has a history of T2DM, and he is a smoker though he planned on quitting the first of the year. At ER troponin and EKG were negative for acute findings, however his symptoms and history are concerning for unstable angina & he there is a high probability of cardiac etiology. The patient was seen and assessed at bedside and states his symptoms have completely resolved and he feels fine. He denies any current chest pain, shortness of breath, nausea, dizziness or diaphoresis. NO acute events overnight. PAST MEDICAL HISTORY:        has a past medical history of Anxiety, Asthma, CAD (coronary artery disease), Chronic back pain, Diabetes mellitus ([address] GERD (gastroesophageal reflux disease), History of MI (myocardial infarction), Hx of heart artery stent, and Neuropathy. PAST SURGICAL HISTORY:      has a past surgical history that includes Coronary angioplasty with stent (2010);  Tonsillectomy; Cardiac surgery; Pain management procedure; and Coronary angioplasty with stent (06/26/2022). FAMILY HISTORY:     family history includes Arrhythmia in his brother; Diabetes in his father and mother. HOME MEDICATIONS:     Prior to Admission medications    Medication Sig Start Date End Date Taking?  Authorizing Provider   cyclobenzaprine (FLEXERIL) 10 MG tablet TAKE 1 TABLET BY MOUTH TWICE DAILY AS NEEDED FOR MUSCLE SPASM 12/22/22   Glory Armendariz MD   warfarin (COUMADIN) 5 MG tablet Take 1 tablet by mouth daily  Patient taking differently: Take 5 mg by mouth See Admin Instructions Dosed by [facility] Anticoagulation Service: 2.5 mg Mondays and 5 mg all other days 11/2/22   Nick Dailey MD   omeprazole (PRILOSEC) 20 MG delayed release capsule TAKE 1 CAPSULE BY MOUTH TWICE DAILY BEFORE MEAL(S) 11/1/22   Glory Armendariz MD   clopidogrel (PLAVIX) 75 MG tablet Take 1 tablet by mouth daily 9/10/22   Glory Armendariz MD   nitroGLYCERIN (NITROSTAT) 0.4 MG SL tablet Place 0.4 mg under the tongue every 5 minutes as needed for Chest pain  Patient not taking: No sig reported 7/20/22   Historical Provider, MD   albuterol sulfate HFA (PROVENTIL HFA) 108 (90 Base) MCG/ACT inhaler Inhale 1-2 puffs into the lungs every 6 hours as needed for Wheezing or Shortness of Breath (Space out to every 6 hours as symptoms improve) 8/8/22   Glory Armendariz MD   rosuvastatin (CRESTOR) 40 MG tablet Take 1 tablet by mouth nightly 6/29/22   SEAN Nolan CNP   lisinopril (PRINIVIL;ZESTRIL) 2.5 MG tablet Take 1 tablet by mouth daily 6/29/22   SEAN Nolan - J CARLOS   metoprolol tartrate 75 MG TABS Take 75 mg by mouth 2 times daily 6/29/22   SEAN Nolan - CNP   ticagrelor (BRILINTA) 90 MG TABS tablet Take 1 tablet by mouth 2 times daily 6/29/22 9/9/22  SEAN Nolan CNP   sildenafil (VIAGRA) 100 MG tablet TAKE 1 TABLET BY MOUTH ONCE DAILY AS NEEDED FOR ERECTILE DYSFUNCTION 6/12/22   Glory Armendariz MD   metFORMIN (GLUCOPHAGE) 500 MG tablet Take 1 tablet by mouth 2 times daily (with meals) 6/10/21   Meño Cole MD   testosterone (ANDROGEL; TESTIM) 50 MG/5GM (1%) GEL 1% gel Apply topically daily. 9/29/20   Historical Provider, MD   vitamin D (ERGOCALCIFEROL) 1.25 MG (82582 UT) CAPS capsule Take 50,000 Units by mouth every 14 days 11/5/20   Historical Provider, MD   glipiZIDE (GLUCOTROL) 10 MG tablet Take 10 mg by mouth 2 times daily (before meals) 4/28/20   Historical Provider, MD   aspirin 81 MG EC tablet Take 81 mg by mouth daily     Historical Provider, MD       ALLERGIES:      Statins    SOCIAL HISTORY:      reports that he has been smoking cigarettes. He has a 9.00 pack-year smoking history. He has never used smokeless tobacco. He reports current alcohol use. He reports that he does not currently use drugs after having used the following drugs: Marijuana Lucianne Bars). REVIEW OF SYSTEMS:     Review of Systems   Constitutional:  Negative for chills and fever. Diaphoresis: resolved. HENT:  Negative for congestion, rhinorrhea and sore throat. Respiratory:  Negative for cough, shortness of breath and wheezing. Cardiovascular:  Negative for palpitations and leg swelling. Chest pain: resolved. Gastrointestinal:  Negative for abdominal pain, constipation, diarrhea and vomiting. Nausea: resolved. Genitourinary:  Negative for difficulty urinating, dysuria and frequency. Neurological:  Negative for dizziness, syncope, light-headedness and headaches. PHYSICAL EXAM:     Vitals:    01/01/23 0502 01/01/23 0558 01/01/23 0702 01/01/23 0846   BP: 125/85 120/80 136/82 128/87   Pulse: 84 74 78 82   Resp: 17 15 17 18   Temp:   98 °F (36.7 °C) 97.7 °F (36.5 °C)   TempSrc:   Oral    SpO2: 95% 96% 94% 96%   Weight:       Height:           No intake or output data in the 24 hours ending 01/01/23 1221    Physical Exam  Vitals and nursing note reviewed. Constitutional:       General: He is not in acute distress.   Eyes: Extraocular Movements: Extraocular movements intact. Conjunctiva/sclera: Conjunctivae normal.   Cardiovascular:      Rate and Rhythm: Normal rate and regular rhythm. Pulses: Normal pulses. Heart sounds: Normal heart sounds. No murmur heard. No gallop. Pulmonary:      Effort: Pulmonary effort is normal. No respiratory distress. Breath sounds: Normal breath sounds. No wheezing or rales. Abdominal:      General: There is no distension. Palpations: Abdomen is soft. Tenderness: There is no abdominal tenderness. There is no guarding. Musculoskeletal:      Right lower leg: No edema. Left lower leg: No edema. Neurological:      General: No focal deficit present. Mental Status: He is alert and oriented to person, place, and time.          DIAGNOSTICS:      Laboratory Testing:    Recent Results (from the past 24 hour(s))   CBC with Auto Differential    Collection Time: 12/31/22  7:30 PM   Result Value Ref Range    WBC 5.9 3.5 - 11.0 k/uL    RBC 4.08 (L) 4.5 - 5.9 m/uL    Hemoglobin 13.2 (L) 13.5 - 17.5 g/dL    Hematocrit 38.1 (L) 41 - 53 %    MCV 93.3 80 - 100 fL    MCH 32.5 26 - 34 pg    MCHC 34.8 31 - 37 g/dL    RDW 12.7 11.5 - 14.9 %    Platelets 367 428 - 854 k/uL    MPV 6.3 6.0 - 12.0 fL    Seg Neutrophils 47 36 - 66 %    Lymphocytes 41 24 - 44 %    Monocytes 8 (H) 1 - 7 %    Eosinophils % 3 0 - 4 %    Basophils 1 0 - 2 %    Segs Absolute 2.70 1.3 - 9.1 k/uL    Absolute Lymph # 2.40 1.0 - 4.8 k/uL    Absolute Mono # 0.50 0.1 - 1.3 k/uL    Absolute Eos # 0.20 0.0 - 0.4 k/uL    Basophils Absolute 0.00 0.0 - 0.2 k/uL   Comprehensive Metabolic Panel    Collection Time: 12/31/22  7:30 PM   Result Value Ref Range    Glucose 141 (H) 70 - 99 mg/dL    BUN 10 6 - 20 mg/dL    Creatinine 0.97 0.70 - 1.20 mg/dL    Est, Glom Filt Rate >60 >60 mL/min/1.73m2    Calcium 8.0 (L) 8.6 - 10.4 mg/dL    Sodium 135 135 - 144 mmol/L    Potassium 3.9 3.7 - 5.3 mmol/L    Chloride 102 98 - 107 mmol/L    CO2 22 20 - 31 mmol/L    Anion Gap 11 9 - 17 mmol/L    Alkaline Phosphatase 93 40 - 129 U/L    ALT 58 (H) 5 - 41 U/L    AST 61 (H) <40 U/L    Total Bilirubin 0.2 (L) 0.3 - 1.2 mg/dL    Total Protein 6.3 (L) 6.4 - 8.3 g/dL    Albumin 3.9 3.5 - 5.2 g/dL   Troponin    Collection Time: 12/31/22  7:30 PM   Result Value Ref Range    Troponin, High Sensitivity 15 0 - 22 ng/L   Protime-INR    Collection Time: 12/31/22  7:51 PM   Result Value Ref Range    Protime 27.8 (H) 11.8 - 14.6 sec    INR 2.6    Troponin    Collection Time: 12/31/22  9:40 PM   Result Value Ref Range    Troponin, High Sensitivity 16 0 - 22 ng/L         Imaging/Diagonstics:  XR CHEST PORTABLE    Result Date: 12/31/2022  EXAMINATION: ONE XRAY VIEW OF THE CHEST 12/31/2022 7:54 pm COMPARISON: Chest radiograph and CT chest angiogram 09/08/2022. HISTORY: ORDERING SYSTEM PROVIDED HISTORY: cp TECHNOLOGIST PROVIDED HISTORY: cp Reason for Exam: Chest pain Additional signs and symptoms: Chest pain Relevant Medical/Surgical History: Chest pain FINDINGS: The lungs are without acute focal process. There is no effusion or pneumothorax. The cardiomediastinal silhouette is without acute process. The osseous structures are without acute process. No acute process. ASSESSMENT/PLAN       Principal Problem:    Unstable angina (HCC)  Active Problems:    Anti-phospholipid antibody syndrome (HCC)    Chest pain with high risk for cardiac etiology    Type 2 diabetes mellitus without complication, without long-term current use of insulin (HCC)    Smoking  Resolved Problems:    * No resolved hospital problems. *      1. Pt will need stress test, cardio is on board  2.telemetry  3.continue plavix, aspirin, crestor, lisinopril, metoprolol  4. Hold metformin, check glucose & use sliding scale, if no insulin is necessary will discontinue  5. Continue coumadin, pharmacy to dose.      DVT prophylaxis: Coumadin      Consultations:   Consults: IP CONSULT TO PRIMARY CARE PROVIDER  PHARMACY TO DOSE WARFARIN  IP CONSULT TO CARDIOLOGY         The severity of this patient's signs and symptoms (specify unstable angina) indicate the need for an inpatient admission.       Above plan discussed with the patient        Ashia Sebastian MD  1/1/2023 12:21 PM

## 2023-01-01 NOTE — ED PROVIDER NOTES
[address] ED  EMERGENCY DEPARTMENT ENCOUNTER      Pt Name: Samuel Cohen  MRN: [id number]  [place] 1975  Date of evaluation: 12/31/22    CHIEF COMPLAINT       Chief Complaint   Patient presents with    Dehydration    Chest Pain     HISTORY OF PRESENT ILLNESS   52 y.o. male presents with c/o cp. Symptoms started 1 hour pta. Pain \"hard to describe\" in character, reports it DID NOT feel like when he had his heart attack. No radiation. Symtpoms lasted 15 minutes. Took nitroglycerin. Called ems. They found he was orthostatic. Symptoms now resolved. Reviewing the medical record, the patient has a h/o cad. He had a prox lad occlusion in June 2022that cardiac catheterization showed  Concern for embolism. Possible clot in the proximal LAD instent and small distal inferior branch   of OM1 occlusing <1mm vessel. Successful LAYLA of mid RCA for significant ulcerated plaque. Successful PTCA/LAYLA of proximal LAD. Distal OM1 branch too small for PCI. At the end of case, patient complained of severe chest pain. ECG showed   inferior STEMI. Repeat cath showed blood clot in mid RCA stent and occlusion of LAD stent. Successful manual thrombectomy of mid RCA and LAD. Another stent was placed at the distal edge of previous RCA stent. LAD stent post-dilated with 3.5 NC balloon. Pt has been adherent to his aspirin and plavix. He is also on warfarin. REVIEW OF SYSTEMS       Review of Systems   Constitutional:  Negative for fever. HENT:  Negative for congestion. Eyes:  Negative for visual disturbance. Respiratory:  Negative for cough and shortness of breath. Cardiovascular:  Positive for chest pain. Gastrointestinal:  Negative for diarrhea. Genitourinary:  Negative for difficulty urinating. Musculoskeletal:  Negative for myalgias. Skin:  Negative for rash. Neurological:  Negative for headaches.      PAST MEDICAL HISTORY     Past Medical History:   Diagnosis Date    Anxiety Asthma     CAD (coronary artery disease)     Chronic back pain     Diabetes mellitus ([facility] [address]     GERD (gastroesophageal reflux disease)      heart burn    History of MI (myocardial infarction) 5/8/2017    Hx of heart artery stent     heart stent x 1    Neuropathy        SURGICAL HISTORY       Past Surgical History:   Procedure Laterality Date    CARDIAC SURGERY      CORONARY ANGIOPLASTY WITH STENT PLACEMENT  2010    heart stent x 1    CORONARY ANGIOPLASTY WITH STENT PLACEMENT  06/26/2022    PAIN MANAGEMENT PROCEDURE      TONSILLECTOMY         CURRENT MEDICATIONS       Previous Medications    ALBUTEROL SULFATE HFA (PROVENTIL HFA) 108 (90 BASE) MCG/ACT INHALER    Inhale 1-2 puffs into the lungs every 6 hours as needed for Wheezing or Shortness of Breath (Space out to every 6 hours as symptoms improve)    ASPIRIN 81 MG EC TABLET    Take 81 mg by mouth daily     CLOPIDOGREL (PLAVIX) 75 MG TABLET    Take 1 tablet by mouth daily    CYCLOBENZAPRINE (FLEXERIL) 10 MG TABLET    TAKE 1 TABLET BY MOUTH TWICE DAILY AS NEEDED FOR MUSCLE SPASM    GLIPIZIDE (GLUCOTROL) 10 MG TABLET    Take 10 mg by mouth 2 times daily (before meals)    LISINOPRIL (PRINIVIL;ZESTRIL) 2.5 MG TABLET    Take 1 tablet by mouth daily    METFORMIN (GLUCOPHAGE) 500 MG TABLET    Take 1 tablet by mouth 2 times daily (with meals)    METOPROLOL TARTRATE 75 MG TABS    Take 75 mg by mouth 2 times daily    NITROGLYCERIN (NITROSTAT) 0.4 MG SL TABLET    Place 0.4 mg under the tongue every 5 minutes as needed for Chest pain    OMEPRAZOLE (PRILOSEC) 20 MG DELAYED RELEASE CAPSULE    TAKE 1 CAPSULE BY MOUTH TWICE DAILY BEFORE MEAL(S)    ROSUVASTATIN (CRESTOR) 40 MG TABLET    Take 1 tablet by mouth nightly    SILDENAFIL (VIAGRA) 100 MG TABLET    TAKE 1 TABLET BY MOUTH ONCE DAILY AS NEEDED FOR ERECTILE DYSFUNCTION    TESTOSTERONE (ANDROGEL; TESTIM) 50 MG/5GM (1%) GEL 1% GEL    Apply topically daily.     VITAMIN D (ERGOCALCIFEROL) 1.25 MG (63146 UT) CAPS CAPSULE    Take 50,000 Units by mouth every 14 days    WARFARIN (COUMADIN) 5 MG TABLET    Take 1 tablet by mouth daily       ALLERGIES     is allergic to statins. FAMILY HISTORY     He indicated that his mother is alive. He indicated that his father is . He indicated that his sister is alive. He indicated that his brother is alive. SOCIAL HISTORY      reports that he has been smoking cigarettes. He has a 9.00 pack-year smoking history. He has never used smokeless tobacco. He reports current alcohol use. He reports that he does not currently use drugs after having used the following drugs: Marijuana Belkys Dickson). PHYSICAL EXAM     INITIAL VITALS: /82   Pulse (!) 102   Temp 98.1 °F (36.7 °C) (Oral)   Resp 22   Ht 5' 8\" (1.727 m)   Wt 176 lb (79.8 kg)   SpO2 96%   BMI 26.76 kg/m²   Gen: NAD  Head: Normocephalic, atraumatic, no diaphoresis  Eye: Pupils equal round reactive to light, no conjunctivitis  ENT: MMM  Neck: no JVD  Heart: Regular rate and rhythm no murmurs, no rubs  Lungs: Clear to auscultation bilaterally, no respiratory distress, no crackles, no rales  Chest wall: No crepitus, no focal tenderness palpation  Abdomen: Soft, nontender, nondistended, with no peritoneal signs  Neurologic: Patient is alert and oriented x3, motor and sensation is intact in all 4 extremities, fluent speech  Extremities: Full range of motion, no cyanosis, no edema, no signs of trauma, no tenderness to palpation, no calf ttp    MEDICAL DECISION MAKING:     MDM    52 y.o. male with extensive history of prothrombotic state and coronary artery disease presenting with chest pain. Differential diagnosis of ACS, Pna, lung mass, pneumothorax, symptomatic anemia. CBC, biochemical profile, troponin, EKG, chest x-ray ordered. Troponin returned negative. Extensive cardiac history. Placing in hospital for further cardiac evaluation. Pt in agreement with the treatment plan.      DIAGNOSTIC RESULTS     EKG: All EKG's are interpreted by the Emergency Department Physician who either signs or Co-signs this chart in the absence of a cardiologist.  EKG shows a sinus rhythm. HR is 85, , QRS 90, , no MABLE, No STD, No TWI, the axis is normal.          RADIOLOGY:All plain film, CT, MRI, and formal ultrasound images (except ED bedside ultrasound) are read by the radiologist and the images and interpretations are directly viewed by the emergency physician. XR CHEST PORTABLE   Final Result   No acute process. LABS: All lab results were reviewed by myself, and all abnormals are listed below.   Labs Reviewed   CBC WITH AUTO DIFFERENTIAL - Abnormal; Notable for the following components:       Result Value    RBC 4.08 (*)     Hemoglobin 13.2 (*)     Hematocrit 38.1 (*)     Monocytes 8 (*)     All other components within normal limits   COMPREHENSIVE METABOLIC PANEL - Abnormal; Notable for the following components:    Glucose 141 (*)     Calcium 8.0 (*)     ALT 58 (*)     AST 61 (*)     Total Bilirubin 0.2 (*)     Total Protein 6.3 (*)     All other components within normal limits   PROTIME-INR - Abnormal; Notable for the following components:    Protime 27.8 (*)     All other components within normal limits   TROPONIN   TROPONIN       EMERGENCY DEPARTMENT COURSE:   Vitals:    Vitals:    12/31/22 2259 12/31/22 2318 12/31/22 2359 01/01/23 0059   BP: 109/76 (!) 143/92 139/83 132/82   Pulse: 89 96 (!) 101 (!) 102   Resp: 20 26 22 22   Temp:       TempSrc:       SpO2: 96% 96% 97% 96%   Weight:       Height:           The patient was given the following medications while in the emergency department:  Orders Placed This Encounter   Medications    OR Linked Order Group     ondansetron (ZOFRAN-ODT) disintegrating tablet 4 mg     ondansetron (ZOFRAN) injection 4 mg    OR Linked Order Group     acetaminophen (TYLENOL) tablet 650 mg     acetaminophen (TYLENOL) suppository 650 mg    metoprolol tartrate (LOPRESSOR) tablet 75 mg    DISCONTD: rosuvastatin (CRESTOR) tablet 40 mg (Patient Supplied)    DISCONTD: warfarin (COUMADIN) tablet 5 mg     Order Specific Question:   Indication of Use     Answer: Other     Order Specific Question:   Other Warfarin Indication     Answer:   antiphospholipid antibody     Order Specific Question:   What is the patient's goal INR? Answer:   2.0 - 3.0    DISCONTD: warfarin (COUMADIN) tablet 5 mg (Patient Supplied)     Order Specific Question:   Indication of Use     Answer: Other     Order Specific Question:   Other Warfarin Indication     Answer:   antiphospholipid antibody     Order Specific Question:   What is the patient's goal INR? Answer:   2.0 - 3.0    DISCONTD: warfarin (COUMADIN) tablet 5 mg     Order Specific Question:   Indication of Use     Answer: Other     Order Specific Question:   Other Warfarin Indication     Answer:   Anti-phospholipid antibody syndrome     Order Specific Question:   What is the patient's goal INR? Answer:   2.0 - 3.0    warfarin (COUMADIN) tablet 5 mg     Order Specific Question:   Indication of Use     Answer: Other     Order Specific Question:   Other Warfarin Indication     Answer:   Anti-phospholipid antibody syndrome     Order Specific Question:   What is the patient's goal INR? Answer:   2.0 - 3.0    rosuvastatin (CRESTOR) tablet 40 mg    warfarin placeholder: dosing by pharmacy     Order Specific Question:   What is the patient's goal INR? Answer:   2.0 - 3.0     -------------------------  CRITICAL CARE:   CONSULTS: IP CONSULT TO PRIMARY CARE PROVIDER  PHARMACY TO DOSE WARFARIN  IP CONSULT TO CARDIOLOGY  PROCEDURES: Procedures     FINAL IMPRESSION      1. Chest pain, unspecified type          DISPOSITION/PLAN   DISPOSITION Admitted 12/31/2022 08:49:06 PM      PATIENT REFERRED TO:  No follow-up provider specified.     DISCHARGE MEDICATIONS:  New Prescriptions    No medications on file         Anne Marie Hudson MD  Attending Emergency Physician                      Vj Javed MD  01/01/23 8063

## 2023-01-01 NOTE — ED NOTES
TRANSFER - OUT REPORT:    Verbal report given to SRIDEVI Ley on Adriano Wall  being transferred to [facility] for routine progression of patient care       Report consisted of patient's Situation, Background, Assessment and   Recommendations(SBAR). Information from the following report(s) ED Encounter Summary, ED SBAR, [facility], Recent Results and Event Log was reviewed with the receiving nurse. [place] Assessment: No data recorded  Lines:   Peripheral IV 12/31/22 Left Antecubital (Active)        Opportunity for questions and clarification was provided.       Patient transported with:  Carlitos Garcias RN  01/01/23 5290

## 2023-01-01 NOTE — PROGRESS NOTES
Pharmacy Note  Warfarin Consult    Katharine Coates is a 52 y.o. male for whom pharmacy has been consulted to manage warfarin therapy. Consulting Physician: Sandra Stephenson  Reason for Admission: CP    Warfarin dose prior to admission: 2.5 mg Monday, 5 mg all other days  Warfarin indication: Anti-phospholipid antibody syndrome   Target INR range: 2-3     Past Medical History:   Diagnosis Date    Anxiety     Asthma     CAD (coronary artery disease)     Chronic back pain     Diabetes mellitus (HCC)     GERD (gastroesophageal reflux disease)      heart burn    History of MI (myocardial infarction) 5/8/2017    Hx of heart artery stent     heart stent x 1    Neuropathy                 Recent Labs     12/31/22 1951   INR 2.6     Recent Labs     12/31/22 1930   HGB 13.2*   HCT 38.1*          Current warfarin drug-drug interactions: ASA      Date             INR        Dose   1/1/2023          pending    Daily PT/INR while inpatient. Thank you for the consult. Will continue to follow.

## 2023-01-02 LAB
ABSOLUTE EOS #: 0.3 K/UL (ref 0–0.4)
ABSOLUTE LYMPH #: 1.6 K/UL (ref 1–4.8)
ABSOLUTE MONO #: 0.4 K/UL (ref 0.1–1.3)
ANION GAP SERPL CALCULATED.3IONS-SCNC: 8 MMOL/L (ref 9–17)
BASOPHILS # BLD: 1 % (ref 0–2)
BASOPHILS ABSOLUTE: 0 K/UL (ref 0–0.2)
BUN BLDV-MCNC: 13 MG/DL (ref 6–20)
CALCIUM SERPL-MCNC: 8.9 MG/DL (ref 8.6–10.4)
CHLORIDE BLD-SCNC: 100 MMOL/L (ref 98–107)
CO2: 27 MMOL/L (ref 20–31)
CREAT SERPL-MCNC: 0.93 MG/DL (ref 0.7–1.2)
EOSINOPHILS RELATIVE PERCENT: 5 % (ref 0–4)
GFR SERPL CREATININE-BSD FRML MDRD: >60 ML/MIN/1.73M2
GLUCOSE BLD-MCNC: 206 MG/DL (ref 70–99)
GLUCOSE BLD-MCNC: 214 MG/DL (ref 75–110)
GLUCOSE BLD-MCNC: 237 MG/DL (ref 75–110)
GLUCOSE BLD-MCNC: 249 MG/DL (ref 75–110)
GLUCOSE BLD-MCNC: 312 MG/DL (ref 75–110)
HCT VFR BLD CALC: 42.1 % (ref 41–53)
HEMOGLOBIN: 14.5 G/DL (ref 13.5–17.5)
INR BLD: 2.1
LYMPHOCYTES # BLD: 32 % (ref 24–44)
MCH RBC QN AUTO: 32.1 PG (ref 26–34)
MCHC RBC AUTO-ENTMCNC: 34.5 G/DL (ref 31–37)
MCV RBC AUTO: 93.1 FL (ref 80–100)
MONOCYTES # BLD: 8 % (ref 1–7)
PDW BLD-RTO: 12.8 % (ref 11.5–14.9)
PLATELET # BLD: 306 K/UL (ref 150–450)
PMV BLD AUTO: 6.4 FL (ref 6–12)
POTASSIUM SERPL-SCNC: 4.9 MMOL/L (ref 3.7–5.3)
PROTHROMBIN TIME: 23.9 SEC (ref 11.8–14.6)
RBC # BLD: 4.53 M/UL (ref 4.5–5.9)
SEG NEUTROPHILS: 54 % (ref 36–66)
SEGMENTED NEUTROPHILS ABSOLUTE COUNT: 2.7 K/UL (ref 1.3–9.1)
SODIUM BLD-SCNC: 135 MMOL/L (ref 135–144)
WBC # BLD: 5 K/UL (ref 3.5–11)

## 2023-01-02 PROCEDURE — 2580000003 HC RX 258: Performed by: STUDENT IN AN ORGANIZED HEALTH CARE EDUCATION/TRAINING PROGRAM

## 2023-01-02 PROCEDURE — 1200000000 HC SEMI PRIVATE

## 2023-01-02 PROCEDURE — 36415 COLL VENOUS BLD VENIPUNCTURE: CPT

## 2023-01-02 PROCEDURE — 85610 PROTHROMBIN TIME: CPT

## 2023-01-02 PROCEDURE — 85025 COMPLETE CBC W/AUTO DIFF WBC: CPT

## 2023-01-02 PROCEDURE — 82947 ASSAY GLUCOSE BLOOD QUANT: CPT

## 2023-01-02 PROCEDURE — 80048 BASIC METABOLIC PNL TOTAL CA: CPT

## 2023-01-02 PROCEDURE — 6370000000 HC RX 637 (ALT 250 FOR IP): Performed by: STUDENT IN AN ORGANIZED HEALTH CARE EDUCATION/TRAINING PROGRAM

## 2023-01-02 PROCEDURE — 99232 SBSQ HOSP IP/OBS MODERATE 35: CPT | Performed by: STUDENT IN AN ORGANIZED HEALTH CARE EDUCATION/TRAINING PROGRAM

## 2023-01-02 RX ORDER — INSULIN GLARGINE 100 [IU]/ML
10 INJECTION, SOLUTION SUBCUTANEOUS DAILY
Status: DISCONTINUED | OUTPATIENT
Start: 2023-01-02 | End: 2023-01-03 | Stop reason: HOSPADM

## 2023-01-02 RX ORDER — WARFARIN SODIUM 5 MG/1
5 TABLET ORAL
Status: COMPLETED | OUTPATIENT
Start: 2023-01-02 | End: 2023-01-02

## 2023-01-02 RX ORDER — INSULIN LISPRO 100 [IU]/ML
0-4 INJECTION, SOLUTION INTRAVENOUS; SUBCUTANEOUS NIGHTLY
Status: DISCONTINUED | OUTPATIENT
Start: 2023-01-02 | End: 2023-01-03 | Stop reason: HOSPADM

## 2023-01-02 RX ORDER — INSULIN LISPRO 100 [IU]/ML
0-8 INJECTION, SOLUTION INTRAVENOUS; SUBCUTANEOUS
Status: DISCONTINUED | OUTPATIENT
Start: 2023-01-02 | End: 2023-01-03 | Stop reason: HOSPADM

## 2023-01-02 RX ORDER — CLONAZEPAM 0.5 MG/1
0.25 TABLET ORAL EVERY 12 HOURS PRN
Status: DISCONTINUED | OUTPATIENT
Start: 2023-01-02 | End: 2023-01-03 | Stop reason: HOSPADM

## 2023-01-02 RX ADMIN — WARFARIN SODIUM 5 MG: 5 TABLET ORAL at 17:42

## 2023-01-02 RX ADMIN — METOPROLOL TARTRATE 75 MG: 50 TABLET, FILM COATED ORAL at 21:18

## 2023-01-02 RX ADMIN — PANTOPRAZOLE SODIUM 40 MG: 40 TABLET, DELAYED RELEASE ORAL at 06:01

## 2023-01-02 RX ADMIN — METOPROLOL TARTRATE 75 MG: 50 TABLET, FILM COATED ORAL at 08:07

## 2023-01-02 RX ADMIN — INSULIN LISPRO 1 UNITS: 100 INJECTION, SOLUTION INTRAVENOUS; SUBCUTANEOUS at 12:20

## 2023-01-02 RX ADMIN — ROSUVASTATIN 40 MG: 40 TABLET, FILM COATED ORAL at 21:18

## 2023-01-02 RX ADMIN — ASPIRIN 81 MG: 81 TABLET, COATED ORAL at 08:07

## 2023-01-02 RX ADMIN — INSULIN GLARGINE 10 UNITS: 100 INJECTION, SOLUTION SUBCUTANEOUS at 17:12

## 2023-01-02 RX ADMIN — INSULIN LISPRO 6 UNITS: 100 INJECTION, SOLUTION INTRAVENOUS; SUBCUTANEOUS at 17:12

## 2023-01-02 RX ADMIN — LISINOPRIL 2.5 MG: 2.5 TABLET ORAL at 09:24

## 2023-01-02 RX ADMIN — CLOPIDOGREL BISULFATE 75 MG: 75 TABLET ORAL at 08:06

## 2023-01-02 RX ADMIN — SODIUM CHLORIDE, PRESERVATIVE FREE 10 ML: 5 INJECTION INTRAVENOUS at 08:07

## 2023-01-02 RX ADMIN — SODIUM CHLORIDE, PRESERVATIVE FREE 10 ML: 5 INJECTION INTRAVENOUS at 21:20

## 2023-01-02 RX ADMIN — INSULIN LISPRO 1 UNITS: 100 INJECTION, SOLUTION INTRAVENOUS; SUBCUTANEOUS at 08:07

## 2023-01-02 ASSESSMENT — ENCOUNTER SYMPTOMS
NAUSEA: 0
RHINORRHEA: 0
VOMITING: 0
DIARRHEA: 0
COUGH: 0
SORE THROAT: 0
ABDOMINAL PAIN: 0
CONSTIPATION: 0
SHORTNESS OF BREATH: 0
WHEEZING: 0

## 2023-01-02 NOTE — CONSULTS
[facility]  Inpatient Cardiology Consult             Date:   1/1/23  Patient name: Samuel Cohen  Date of admission:  12/31/2022  7:00 PM  MRN:   [id number]  YOB: 1975      Reason for Admission:  Chest pain    CHIEF COMPLAINT:  Chest pain     History Obtained From:  Patient and medical record    HISTORY OF PRESENT ILLNESS:      The patient is a 52 y.o. gentleman with DM, HLP, antiphospholipid antibody syndrome, CAD and PCI in June 2022, who presented with substernal chest pain/pressure that started yesterday in the morning. Pt states he was in the shower and felt a crushing chest pressure that was associated with shortness of breath and sweating with nausea. He had a STEMI in June 2022 and is s/p thrombectomy and LAYLA to the proximal LAD and RCA, but has had no recent angina until this episode. He states these symptoms did not feel like his heart attack from June. He took a nitroglycerin tablet and the pressure subsided. He has been compliant with his ASA, Plavix and warfarin, however continues to smoke several cigarettes daily. EKG is WNL, and serial troponins are 15 and 16 ng/L. Past Medical History:   has a past medical history of Anxiety, Asthma, CAD (coronary artery disease), Chronic back pain, Diabetes mellitus ([address] GERD (gastroesophageal reflux disease), History of MI (myocardial infarction), Hx of heart artery stent, and Neuropathy. Past Surgical History:   has a past surgical history that includes Coronary angioplasty with stent (2010); Tonsillectomy; Cardiac surgery; Pain management procedure; and Coronary angioplasty with stent (06/26/2022). Home Medications:    Prior to Admission medications    Medication Sig Start Date End Date Taking?  Authorizing Provider   cyclobenzaprine (FLEXERIL) 10 MG tablet TAKE 1 TABLET BY MOUTH TWICE DAILY AS NEEDED FOR MUSCLE SPASM 12/22/22   Breann Mcadams MD   warfarin (COUMADIN) 5 MG tablet Take 1 tablet by mouth daily  Patient taking differently: Take 5 mg by mouth See Admin Instructions Dosed by [facility]ticoagulation Service: 2.5 mg Mondays and 5 mg all other days 11/2/22   Chico Flores MD   omeprazole (PRILOSEC) 20 MG delayed release capsule TAKE 1 CAPSULE BY MOUTH TWICE DAILY BEFORE MEAL(S) 11/1/22   oursom Kaur MD   clopidogrel (PLAVIX) 75 MG tablet Take 1 tablet by mouth daily 9/10/22   [facility]   nitroGLYCERIN (NITROSTAT) 0.4 MG SL tablet Place 0.4 mg under the tongue every 5 minutes as needed for Chest pain  Patient not taking: No sig reported 7/20/22   Historical Provider, MD   albuterol sulfate HFA (PROVENTIL HFA) 108 (90 Base) MCG/ACT inhaler Inhale 1-2 puffs into the lungs every 6 hours as needed for Wheezing or Shortness of Breath (Space out to every 6 hours as symptoms improve) 8/8/22   [place] MD Natasha   rosuvastatin (CRESTOR) 40 MG tablet Take 1 tablet by mouth nightly 6/29/22   FarzadSEAN Mullen CNP   lisinopril (PRINIVIL;ZESTRIL) 2.5 MG tablet Take 1 tablet by mouth daily 6/29/22   SEAN Brower CNP   metoprolol tartrate 75 MG TABS Take 75 mg by mouth 2 times daily 6/29/22   [facility] SEAN Ramirez CNP   ticagrelor (BRILINTA) 90 MG TABS tablet Take 1 tablet by mouth 2 times daily 6/29/22 9/9/22  SEAN Brower CNP   sildenafil (VIAGRA) 100 MG tablet TAKE 1 TABLET BY MOUTH ONCE DAILY AS NEEDED FOR ERECTILE DYSFUNCTION 6/12/22   [facility]   metFORMIN (GLUCOPHAGE) 500 MG tablet Take 1 tablet by mouth 2 times daily (with meals) 6/10/21   Twan Kaur MD   testosterone (ANDROGEL; TESTIM) 50 MG/5GM (1%) GEL 1% gel Apply topically daily.  9/29/20   Historical Provider, MD   vitamin D (ERGOCALCIFEROL) 1.25 MG (66616 UT) CAPS capsule Take 50,000 Units by mouth every 14 days 11/5/20   Historical Provider, MD   glipiZIDE (GLUCOTROL) 10 MG tablet Take 10 mg by mouth 2 times daily (before meals) 4/28/20 Historical Provider, MD   aspirin 81 MG EC tablet Take 81 mg by mouth daily     Historical Provider, MD       Allergies:  Statins    Social History:   reports that he has been smoking cigarettes. He has a 9.00 pack-year smoking history. He has never used smokeless tobacco. He reports current alcohol use. He reports that he does not currently use drugs after having used the following drugs: Marijuana [place] Yasir). Family History:   Positive for early CAD    REVIEW OF SYSTEMS:    Constitutional: there has been no unanticipated weight loss. There's been No change in energy level, No change in activity level. Eyes: No visual changes or diplopia. No scleral icterus. ENT: No Headaches, hearing loss or vertigo. No mouth sores or sore throat. Cardiovascular: No problem  Respiratory: No previous reported problems  Gastrointestinal: No abdominal pain, appetite loss, blood in stools. No change in bowel or bladder habits. Genitourinary: No dysuria, trouble voiding, or hematuria. Musculoskeletal:  No gait disturbance, No weakness or joint complaints. Integumentary: No rash or pruritis. Neurological: No headache, diplopia, change in muscle strength, numbness or tingling. No change in gait, balance, coordination, mood, affect, memory, mentation, behavior. Psychiatric: No anxiety, or depression. Endocrine: No temperature intolerance. No excessive thirst, fluid intake, or urination. No tremor. Hematologic/Lymphatic: No abnormal bruising or bleeding, blood clots or swollen lymph nodes. Allergic/Immunologic: No nasal congestion or hives. PHYSICAL EXAM:    Physical Examination:    /72   Pulse (!) 103   Temp 98.2 °F (36.8 °C)   Resp 18   Ht 5' 8\" (1.727 m)   Wt 176 lb (79.8 kg)   SpO2 94%   BMI 26.76 kg/m²    Constitutional and General Appearance: alert, cooperative, no distress and appears stated age  HEENT: PERRL, no cervical lymphadenopathy. No masses palpable.  Normal oral mucosa  Respiratory:  Normal excursion and expansion without use of accessory muscles  Resp Auscultation: Good respiratory effort. No for increased work of breathing. On auscultation: clear to auscultation bilaterally  Cardiovascular: The apical impulse is not displaced  Heart tones are crisp and normal. regular S1 and S2. Murmurs:  None  Jugular venous pulsation Normal  The carotid upstroke is normal in amplitude and contour without delay or bruit  Peripheral pulses are symmetrical and full   Abdomen:  No masses or tenderness  Bowel sounds present  Extremities:   No Cyanosis or Clubbing   Lower extremity edema: None   Skin: Warm and dry  Neurological:  Alert and oriented. Moves all extremities well  No abnormalities of mood, affect, memory, mentation, or behavior are noted    DATA:    Diagnostics:      EKG: Sinus rhythm, 88 bpm; WNL. 2D ECHO ( 9/9/22)  Summary  Limited study ordered for shortness of breath. Left ventricle is normal in size. Left ventricular systolic function is mildly reduced. Calculated EF via Leach's method is 41 %. Global hypokinesis. Visual LVEF 45-50%  Left atrium is normal in size. No obvious valvular abnormality seen. CARDIAC CATHETERIZATION ( 6/28/22)  Procedure Summary        Inferior STEMI    Concern for embolism. Possible clot in the proximal LAD instent and small distal inferior branch    of OM1 occlusing <1mm vessel. Successful LAYLA of mid RCA for significant ulcerated plaque. Successful PTCA/LAYLA of proximal LAD. Distal OM1 branch too small for PCI. At the end of case, patient complained of severe chest pain. ECG showed    inferior STEMI. Repeat cath showed blood clot in mid RCA stent and occlusion of LAD stent. Successful manual thrombectomy of mid RCA and LAD. Another stent was placed at the distal edge of previous RCA stent. LAD stent post-dilated with 3.5 NC balloon.            Labs:     CBC:   Recent Labs     12/31/22 1930   WBC 5.9   HGB 13.2*   HCT 38.1*    BMP:   Recent Labs     12/31/22 1930      K 3.9   CO2 22   BUN 10   CREATININE 0.97   LABGLOM >60   GLUCOSE 141*     BNP: No results for input(s): BNP in the last 72 hours. PT/INR:   Recent Labs     12/31/22 1951 01/01/23  1659   PROTIME 27.8* 25.3*   INR 2.6 2.3     APTT:No results for input(s): APTT in the last 72 hours. CARDIAC ENZYMES:No results for input(s): CKTOTAL, CKMB, CKMBINDEX, TROPONINI in the last 72 hours.   FASTING LIPID PANEL:  Lab Results   Component Value Date/Time    HDL 33 04/17/2020 12:00 AM    LDLCALC 144 04/17/2020 12:00 AM    TRIG 104 04/17/2020 12:00 AM     LIVER PROFILE:  Recent Labs     12/31/22 1930   AST 61*   ALT 58*   LABALBU 3.9         IMPRESSION:    Single episode of chest pressure, occurring in the shower and associated with lightheadedness and nausea and suggesting a vasovagal event  Stable CAD with normal EKG and troponins; s/p inferior STEMI 6/28/22 with manual thrombectomy and LAYLA to proximal LAD and mid-RCA  Mildly reduced LVEF, 45-50%; no CHF on exam  Antiphospholipid antibody syndrome; on chronic warfarin   Type II DM  Mixed hyperlipidemia  Persistent smoking    Patient Active Problem List   Diagnosis    Chest pain    Presence of stent in LAD coronary artery    Anxiety    Type 2 diabetes mellitus without complication, without long-term current use of insulin (HCC)    Mixed hyperlipidemia    Mild intermittent asthma without complication    Smoking    GERD (gastroesophageal reflux disease)    Lumbar radiculopathy    DDD (degenerative disc disease), lumbar    Neuroforaminal stenosis of lumbar spine    Sacroiliac joint pain    Lumbosacral spondylosis without myelopathy    STEMI (ST elevation myocardial infarction) (HCC)    SOB (shortness of breath)    Anti-phospholipid antibody syndrome (HCC)    Chest pain with high risk for cardiac etiology    Unstable angina (HCC)       RECOMMENDATIONS:  Continue ASA and Plavix  Continue warfarin  Continue metoprolol, lisinopril and Crestor  Lexiscan stress test Tues 1/3/23    Discussed with patient and nursing.    Electronically signed by Jeremie Zapata MD on 1/2/2023 at 2:08 AM.  [place] [facility]

## 2023-01-02 NOTE — PLAN OF CARE
Problem: Discharge Planning  Goal: Discharge to home or other facility with appropriate resources  1/2/2023 0542 by Jaelyn Mansfield RN  Outcome: Progressing     Problem: ABCDS Injury Assessment  Goal: Absence of physical injury  1/2/2023 0542 by Jaelyn Mansfield, RN  Outcome: Progressing

## 2023-01-02 NOTE — PLAN OF CARE
Problem: Discharge Planning  Goal: Discharge to home or other facility with appropriate resources  1/2/2023 1502 by Yonathan Fields RN  Outcome: Progressing  1/2/2023 0542 by Poonam Damian RN  Outcome: Progressing     Problem: ABCDS Injury Assessment  Goal: Absence of physical injury  1/2/2023 1502 by Yonathan Fields RN  Outcome: Progressing  1/2/2023 0542 by Poonam Damian RN  Outcome: Progressing     Problem: Chronic Conditions and Co-morbidities  Goal: Patient's chronic conditions and co-morbidity symptoms are monitored and maintained or improved  Outcome: Progressing

## 2023-01-02 NOTE — PROGRESS NOTES
Pharmacy Note  Warfarin Consult follow-up      Recent Labs     12/31/22  1951 01/01/23  1659 01/02/23  0605   INR 2.6 2.3 2.1     Recent Labs     12/31/22  1930 01/02/23  0605   HGB 13.2* 14.5   HCT 38.1* 42.1    306       Significant Drug-Drug Interactions:  New warfarin drug-drug interactions: none  Discontinued drug-drug interactions: none   Current warfarin drug-drug interactions: aspirin, plavix       Date             INR        Dose given previous day  Dose scheduled for today  1/2/2023            2.1 (goal 2-3)       5 mg            5 mg   Notes:                   INR on the lower end of normal. Will give 5 mg today instead of home dose of 2.5 mg.   Daily PT/INR while inpatient.      Trisha WalkerD, [facility]  1/2/2023 11:43 AM

## 2023-01-02 NOTE — PROGRESS NOTES
[facility]  Family Medicine        Progress Note      Date:   1/2/2023  Patient name:  Sinan Ortega  Date of admission:  12/31/2022  7:00 PM  MRN:   [id number]  YOB: 1975    SUBJECTIVE:     Patient was seen and examined at bedside. No acute events overnight. Patient denied any CP, SOB, fever, chills, nausea, vomiting or diarrhea. Patient does report a history of generalized anxiety disorder and medication in the past.  Patient states he was recently Atarax and the dose of was effective for a brief time there is no longer effective for him. Consultant notes, labs & imaging reviewed. Brief HPI    Patient with extensive cardiac history admitted for chest pain concerning for unstable angina    Review of Systems   Constitutional:  Negative for chills, diaphoresis and fever. HENT:  Negative for congestion, rhinorrhea and sore throat. Respiratory:  Negative for cough, shortness of breath and wheezing. Cardiovascular:  Negative for chest pain, palpitations and leg swelling. Gastrointestinal:  Negative for abdominal pain, constipation, diarrhea, nausea and vomiting. Genitourinary:  Negative for difficulty urinating and dysuria. Neurological:  Negative for dizziness, light-headedness and headaches. Psychiatric/Behavioral:  Negative for dysphoric mood. The patient is nervous/anxious. OBJECTIVE:     /85   Pulse 94   Temp 97.5 °F (36.4 °C)   Resp 18   Ht 5' 8\" (1.727 m)   Wt 176 lb (79.8 kg)   SpO2 97%   BMI 26.76 kg/m²      Physical Exam  Vitals and nursing note reviewed. Constitutional:       General: He is not in acute distress. Eyes:      Extraocular Movements: Extraocular movements intact. Conjunctiva/sclera: Conjunctivae normal.   Cardiovascular:      Rate and Rhythm: Normal rate and regular rhythm. Pulses: Normal pulses. Heart sounds: Normal heart sounds. No murmur heard. No gallop.    Pulmonary:      Effort: Pulmonary effort is normal.      Breath sounds: Normal breath sounds. Abdominal:      General: There is no distension. Palpations: Abdomen is soft. Tenderness: There is no abdominal tenderness. There is no guarding. Neurological:      General: No focal deficit present. Mental Status: He is alert and oriented to person, place, and time. Intake/Output:    Intake/Output Summary (Last 24 hours) at 1/2/2023 1628  Last data filed at 1/1/2023 2001  Gross per 24 hour   Intake 10 ml   Output --   Net 10 ml       Imaging:  No new imaging    Laboratory Testing:  CBC:   Recent Labs     01/02/23  0605   WBC 5.0   HGB 14.5        BMP:    Recent Labs     12/31/22  1930 01/02/23  0605    135   K 3.9 4.9    100   CO2 22 27   BUN 10 13   CREATININE 0.97 0.93   GLUCOSE 141* 206*     Magnesium:   Lab Results   Component Value Date/Time    MG 2.0 09/08/2022 02:30 PM     Phosphorus:   Lab Results   Component Value Date/Time    PHOS 3.6 09/08/2022 02:30 PM     Ionized Calcium: No results found for: CAION   PT/INR:    Lab Results   Component Value Date/Time    PROTIME 23.9 01/02/2023 06:05 AM    PROTIME 20.3 12/28/2022 07:51 AM    INR 2.1 01/02/2023 06:05 AM     PTT:    Lab Results   Component Value Date/Time    APTT 28.9 10/12/2022 06:09 AM       ASSESSMENT/PLAN     Principal Problem:    Unstable angina (HCC)  Active Problems:    Anti-phospholipid antibody syndrome (HCC)    Chest pain with high risk for cardiac etiology    Type 2 diabetes mellitus without complication, without long-term current use of insulin (HCC)    Smoking  Resolved Problems:    * No resolved hospital problems. *      1. Start Lantus 10 units daily but hold tomorrow dose and increase sliding scale to medium dose. 2.  Start clonazepam 0.25 twice daily as needed for anxiety  3.   Stress test tomorrow, n.p.o. midnight    DVT: Coumadin  Code: Full  Diet: Diabetic, n.p.o. midnight      Dorina Arechiga MD   1/2/2023 4:28 PM

## 2023-01-03 ENCOUNTER — APPOINTMENT (OUTPATIENT)
Dept: NON INVASIVE DIAGNOSTICS | Age: 48
DRG: 198 | End: 2023-01-03
Payer: MEDICARE

## 2023-01-03 ENCOUNTER — APPOINTMENT (OUTPATIENT)
Dept: NUCLEAR MEDICINE | Age: 48
DRG: 198 | End: 2023-01-03
Payer: MEDICARE

## 2023-01-03 VITALS
TEMPERATURE: 97.5 F | SYSTOLIC BLOOD PRESSURE: 123 MMHG | WEIGHT: 176 LBS | RESPIRATION RATE: 18 BRPM | DIASTOLIC BLOOD PRESSURE: 85 MMHG | OXYGEN SATURATION: 96 % | BODY MASS INDEX: 26.67 KG/M2 | HEIGHT: 68 IN | HEART RATE: 80 BPM

## 2023-01-03 LAB
ABSOLUTE EOS #: 0.3 K/UL (ref 0–0.4)
ABSOLUTE LYMPH #: 1.7 K/UL (ref 1–4.8)
ABSOLUTE MONO #: 0.5 K/UL (ref 0.1–1.3)
ANION GAP SERPL CALCULATED.3IONS-SCNC: 8 MMOL/L (ref 9–17)
BASOPHILS # BLD: 1 % (ref 0–2)
BASOPHILS ABSOLUTE: 0 K/UL (ref 0–0.2)
BUN BLDV-MCNC: 13 MG/DL (ref 6–20)
CALCIUM SERPL-MCNC: 9.1 MG/DL (ref 8.6–10.4)
CHLORIDE BLD-SCNC: 101 MMOL/L (ref 98–107)
CO2: 29 MMOL/L (ref 20–31)
CREAT SERPL-MCNC: 0.94 MG/DL (ref 0.7–1.2)
EKG ATRIAL RATE: 88 BPM
EKG P AXIS: 21 DEGREES
EKG P-R INTERVAL: 148 MS
EKG Q-T INTERVAL: 348 MS
EKG QRS DURATION: 84 MS
EKG QTC CALCULATION (BAZETT): 421 MS
EKG R AXIS: 35 DEGREES
EKG T AXIS: 30 DEGREES
EKG VENTRICULAR RATE: 88 BPM
EOSINOPHILS RELATIVE PERCENT: 6 % (ref 0–4)
GFR SERPL CREATININE-BSD FRML MDRD: >60 ML/MIN/1.73M2
GLUCOSE BLD-MCNC: 149 MG/DL (ref 70–99)
GLUCOSE BLD-MCNC: 160 MG/DL (ref 75–110)
GLUCOSE BLD-MCNC: 195 MG/DL (ref 75–110)
GLUCOSE BLD-MCNC: 228 MG/DL (ref 75–110)
HCT VFR BLD CALC: 42.7 % (ref 41–53)
HEMOGLOBIN: 14.5 G/DL (ref 13.5–17.5)
INR BLD: 1.9
LYMPHOCYTES # BLD: 32 % (ref 24–44)
MCH RBC QN AUTO: 31.4 PG (ref 26–34)
MCHC RBC AUTO-ENTMCNC: 33.9 G/DL (ref 31–37)
MCV RBC AUTO: 92.5 FL (ref 80–100)
MONOCYTES # BLD: 10 % (ref 1–7)
PDW BLD-RTO: 12.6 % (ref 11.5–14.9)
PLATELET # BLD: 321 K/UL (ref 150–450)
PMV BLD AUTO: 6.7 FL (ref 6–12)
POTASSIUM SERPL-SCNC: 4.6 MMOL/L (ref 3.7–5.3)
PROTHROMBIN TIME: 22.1 SEC (ref 11.8–14.6)
RBC # BLD: 4.62 M/UL (ref 4.5–5.9)
SEG NEUTROPHILS: 51 % (ref 36–66)
SEGMENTED NEUTROPHILS ABSOLUTE COUNT: 2.8 K/UL (ref 1.3–9.1)
SODIUM BLD-SCNC: 138 MMOL/L (ref 135–144)
WBC # BLD: 5.5 K/UL (ref 3.5–11)

## 2023-01-03 PROCEDURE — 85610 PROTHROMBIN TIME: CPT

## 2023-01-03 PROCEDURE — 78452 HT MUSCLE IMAGE SPECT MULT: CPT

## 2023-01-03 PROCEDURE — 6370000000 HC RX 637 (ALT 250 FOR IP): Performed by: STUDENT IN AN ORGANIZED HEALTH CARE EDUCATION/TRAINING PROGRAM

## 2023-01-03 PROCEDURE — 99232 SBSQ HOSP IP/OBS MODERATE 35: CPT | Performed by: FAMILY MEDICINE

## 2023-01-03 PROCEDURE — 93010 ELECTROCARDIOGRAM REPORT: CPT | Performed by: INTERNAL MEDICINE

## 2023-01-03 PROCEDURE — 85025 COMPLETE CBC W/AUTO DIFF WBC: CPT

## 2023-01-03 PROCEDURE — 2580000003 HC RX 258: Performed by: FAMILY MEDICINE

## 2023-01-03 PROCEDURE — 80048 BASIC METABOLIC PNL TOTAL CA: CPT

## 2023-01-03 PROCEDURE — 3430000000 HC RX DIAGNOSTIC RADIOPHARMACEUTICAL: Performed by: FAMILY MEDICINE

## 2023-01-03 PROCEDURE — A9500 TC99M SESTAMIBI: HCPCS | Performed by: FAMILY MEDICINE

## 2023-01-03 PROCEDURE — 6360000002 HC RX W HCPCS: Performed by: INTERNAL MEDICINE

## 2023-01-03 PROCEDURE — 2580000003 HC RX 258: Performed by: STUDENT IN AN ORGANIZED HEALTH CARE EDUCATION/TRAINING PROGRAM

## 2023-01-03 PROCEDURE — 36415 COLL VENOUS BLD VENIPUNCTURE: CPT

## 2023-01-03 PROCEDURE — A9500 TC99M SESTAMIBI: HCPCS | Performed by: INTERNAL MEDICINE

## 2023-01-03 PROCEDURE — 3430000000 HC RX DIAGNOSTIC RADIOPHARMACEUTICAL: Performed by: INTERNAL MEDICINE

## 2023-01-03 PROCEDURE — 93017 CV STRESS TEST TRACING ONLY: CPT

## 2023-01-03 PROCEDURE — 82947 ASSAY GLUCOSE BLOOD QUANT: CPT

## 2023-01-03 RX ORDER — SODIUM CHLORIDE 9 MG/ML
500 INJECTION, SOLUTION INTRAVENOUS CONTINUOUS PRN
Status: ACTIVE | OUTPATIENT
Start: 2023-01-03 | End: 2023-01-03

## 2023-01-03 RX ORDER — AMINOPHYLLINE DIHYDRATE 25 MG/ML
50 INJECTION, SOLUTION INTRAVENOUS PRN
Status: ACTIVE | OUTPATIENT
Start: 2023-01-03 | End: 2023-01-03

## 2023-01-03 RX ORDER — SODIUM CHLORIDE 0.9 % (FLUSH) 0.9 %
5-40 SYRINGE (ML) INJECTION PRN
Status: ACTIVE | OUTPATIENT
Start: 2023-01-03 | End: 2023-01-03

## 2023-01-03 RX ORDER — TECHNETIUM TC-99M SESTAMIBI 1 MG/10ML
35 INJECTION INTRAVENOUS
Status: COMPLETED | OUTPATIENT
Start: 2023-01-03 | End: 2023-01-03

## 2023-01-03 RX ORDER — METOPROLOL TARTRATE 5 MG/5ML
5 INJECTION INTRAVENOUS EVERY 5 MIN PRN
Status: ACTIVE | OUTPATIENT
Start: 2023-01-03 | End: 2023-01-03

## 2023-01-03 RX ORDER — ALBUTEROL SULFATE 90 UG/1
2 AEROSOL, METERED RESPIRATORY (INHALATION) PRN
Status: ACTIVE | OUTPATIENT
Start: 2023-01-03 | End: 2023-01-03

## 2023-01-03 RX ORDER — SODIUM CHLORIDE 0.9 % (FLUSH) 0.9 %
10 SYRINGE (ML) INJECTION PRN
Status: DISCONTINUED | OUTPATIENT
Start: 2023-01-03 | End: 2023-01-03 | Stop reason: HOSPADM

## 2023-01-03 RX ORDER — TECHNETIUM TC-99M SESTAMIBI 1 MG/10ML
16.6 INJECTION INTRAVENOUS
Status: COMPLETED | OUTPATIENT
Start: 2023-01-03 | End: 2023-01-03

## 2023-01-03 RX ORDER — WARFARIN SODIUM 5 MG/1
5 TABLET ORAL
Status: COMPLETED | OUTPATIENT
Start: 2023-01-03 | End: 2023-01-03

## 2023-01-03 RX ORDER — NITROGLYCERIN 0.4 MG/1
0.4 TABLET SUBLINGUAL EVERY 5 MIN PRN
Status: ACTIVE | OUTPATIENT
Start: 2023-01-03 | End: 2023-01-03

## 2023-01-03 RX ORDER — WARFARIN SODIUM 5 MG/1
5 TABLET ORAL SEE ADMIN INSTRUCTIONS
Qty: 1 TABLET | Refills: 0
Start: 2023-01-03

## 2023-01-03 RX ORDER — ATROPINE SULFATE 0.1 MG/ML
0.5 INJECTION INTRAVENOUS EVERY 5 MIN PRN
Status: ACTIVE | OUTPATIENT
Start: 2023-01-03 | End: 2023-01-03

## 2023-01-03 RX ADMIN — SODIUM CHLORIDE, PRESERVATIVE FREE 10 ML: 5 INJECTION INTRAVENOUS at 12:35

## 2023-01-03 RX ADMIN — SODIUM CHLORIDE, PRESERVATIVE FREE 10 ML: 5 INJECTION INTRAVENOUS at 09:00

## 2023-01-03 RX ADMIN — REGADENOSON 0.4 MG: 0.08 INJECTION, SOLUTION INTRAVENOUS at 09:27

## 2023-01-03 RX ADMIN — METOPROLOL TARTRATE 75 MG: 50 TABLET, FILM COATED ORAL at 11:22

## 2023-01-03 RX ADMIN — ASPIRIN 81 MG: 81 TABLET, COATED ORAL at 11:22

## 2023-01-03 RX ADMIN — CLOPIDOGREL BISULFATE 75 MG: 75 TABLET ORAL at 11:23

## 2023-01-03 RX ADMIN — WARFARIN SODIUM 5 MG: 5 TABLET ORAL at 17:08

## 2023-01-03 RX ADMIN — CLONAZEPAM 0.25 MG: 0.5 TABLET ORAL at 16:12

## 2023-01-03 RX ADMIN — LISINOPRIL 2.5 MG: 2.5 TABLET ORAL at 11:34

## 2023-01-03 RX ADMIN — INSULIN LISPRO 2 UNITS: 100 INJECTION, SOLUTION INTRAVENOUS; SUBCUTANEOUS at 12:06

## 2023-01-03 RX ADMIN — Medication 39.6 MILLICURIE: at 12:34

## 2023-01-03 RX ADMIN — Medication 16.6 MILLICURIE: at 09:30

## 2023-01-03 RX ADMIN — INSULIN GLARGINE 10 UNITS: 100 INJECTION, SOLUTION SUBCUTANEOUS at 11:26

## 2023-01-03 NOTE — PLAN OF CARE
Problem: Discharge Planning  Goal: Discharge to home or other facility with appropriate resources  1/3/2023 0426 by Valarie Canseco RN  Outcome: Progressing     Problem: ABCDS Injury Assessment  Goal: Absence of physical injury  1/3/2023 0426 by Valarie Canseco RN  Outcome: Progressing     Problem: Chronic Conditions and Co-morbidities  Goal: Patient's chronic conditions and co-morbidity symptoms are monitored and maintained or improved  1/3/2023 0426 by Valarie Canseco RN  Outcome: Progressing

## 2023-01-03 NOTE — PROGRESS NOTES
[facility]  Inpatient Progress             Date:   1/1/23  Patient name: David Singletary  Date of admission:  12/31/2022  7:00 PM  MRN:   [id number]  YOB: 1975      Reason for Admission:  Chest pain    CHIEF COMPLAINT:  Chest pain     History Obtained From:  Patient and medical record    HISTORY OF PRESENT ILLNESS:      Subjective:  Seen in stress lab  No cp or sob      Current Facility-Administered Medications:     regadenoson (LEXISCAN) injection 0.4 mg, 0.4 mg, IntraVENous, ONCE PRN, Jeremie Zapata MD    sodium chloride flush 0.9 % injection 5-40 mL, 5-40 mL, IntraVENous, PRN, Jeremie Zapata MD    0.9 % sodium chloride infusion, 500 mL, IntraVENous, Continuous PRN, Jeremie Zapata MD    albuterol sulfate HFA (PROVENTIL;VENTOLIN;PROAIR) 108 (90 Base) MCG/ACT inhaler 2 puff, 2 puff, Inhalation, PRN, Jeremie Zapata MD    atropine injection 0.5 mg, 0.5 mg, IntraVENous, Q5 Min PRN, Jeremie Zapata MD    nitroGLYCERIN (NITROSTAT) SL tablet 0.4 mg, 0.4 mg, SubLINGual, Q5 Min PRN, Jeremie Zapata MD    metoprolol (LOPRESSOR) injection 5 mg, 5 mg, IntraVENous, Q5 Min PRN, Jeremie Zapata MD    aminophylline injection 50 mg, 50 mg, IntraVENous, PRN, Jeremie Zapata MD    insulin glargine (LANTUS) injection vial 10 Units, 10 Units, SubCUTAneous, Daily, Fran Loza MD, 10 Units at 01/02/23 1712    insulin lispro (HUMALOG) injection vial 0-8 Units, 0-8 Units, SubCUTAneous, TID WC, Fran Loza MD, 6 Units at 01/02/23 1712    insulin lispro (HUMALOG) injection vial 0-4 Units, 0-4 Units, SubCUTAneous, Nightly, Fran Loza MD    clonazePAM (KLONOPIN) tablet 0.25 mg, 0.25 mg, Oral, Q12H PRN, Fran Loza MD    sodium chloride flush 0.9 % injection 5-40 mL, 5-40 mL, IntraVENous, 2 times per day, Fran Loza MD, 10 mL at 01/02/23 2120    sodium chloride flush 0.9 % injection 5-40 mL, 5-40 mL, IntraVENous, PRN, rFan Loza MD    0.9 % sodium chloride infusion, ,  IntraVENous, PRN, Gustavo Pina MD    polyethylene glycol (GLYCOLAX) packet 17 g, 17 g, Oral, Daily PRN, Gustavo Pina MD    potassium chloride (KLOR-CON M) extended release tablet 40 mEq, 40 mEq, Oral, PRN **OR** potassium bicarb-citric acid (EFFER-K) effervescent tablet 40 mEq, 40 mEq, Oral, PRN **OR** potassium chloride 10 mEq/100 mL IVPB (Peripheral Line), 10 mEq, IntraVENous, PRN, Gustavo Pina MD    magnesium sulfate 2000 mg in water 50 mL IVPB, 2,000 mg, IntraVENous, PRN, Gustavo Pina MD    nitroGLYCERIN (NITROSTAT) SL tablet 0.4 mg, 0.4 mg, SubLINGual, Q5 Min PRN, Gustavo Pina MD    rosuvastatin (CRESTOR) tablet 40 mg, 40 mg, Oral, Nightly, Gustavo Pina MD, 40 mg at 01/02/23 2118    warfarin placeholder: dosing by pharmacy, , Other, Kayleigh Nice MD    metoprolol tartrate (LOPRESSOR) tablet 75 mg, 75 mg, Oral, BID, Gustavo Pina MD, 75 mg at 01/02/23 2118    pantoprazole (PROTONIX) tablet 40 mg, 40 mg, Oral, QAM AC, Gustavo Pina MD, 40 mg at 01/02/23 0601    cyclobenzaprine (FLEXERIL) tablet 10 mg, 10 mg, Oral, BID PRN, Gustavo Pina MD    aspirin EC tablet 81 mg, 81 mg, Oral, Daily, Gustavo Pina MD, 81 mg at 01/02/23 1124    clopidogrel (PLAVIX) tablet 75 mg, 75 mg, Oral, Daily, Gustavo Pina MD, 75 mg at 01/02/23 0806    lisinopril (PRINIVIL;ZESTRIL) tablet 2.5 mg, 2.5 mg, Oral, Daily, Gustavo Pina MD, 2.5 mg at 01/02/23 0924    glucose chewable tablet 16 g, 4 tablet, Oral, PRN, Gustavo Pina MD    dextrose bolus 10% 125 mL, 125 mL, IntraVENous, PRN **OR** dextrose bolus 10% 250 mL, 250 mL, IntraVENous, PRN, Gustavo Pina MD    glucagon (rDNA) injection 1 mg, 1 mg, SubCUTAneous, PRN, Gustavo Pina MD    dextrose 10 % infusion, , IntraVENous, Continuous PRN, Gustavo Pina MD    ondansetron (ZOFRAN-ODT) disintegrating tablet 4 mg, 4 mg, Oral, Q8H PRN **OR** ondansetron (ZOFRAN) injection 4 mg, 4 mg, IntraVENous, Q6H PRN, Adryan Senior David Sellers MD    acetaminophen (TYLENOL) tablet 650 mg, 650 mg, Oral, Q6H PRN **OR** acetaminophen (TYLENOL) suppository 650 mg, 650 mg, Rectal, Q6H PRN, Brittany العراقي MD    Allergies   Allergen Reactions    Statins      \"don't feel right\"       PHYSICAL EXAM:    Physical Examination:    /85   Pulse 80   Temp 97.5 °F (36.4 °C)   Resp 18   Ht 5' 8\" (1.727 m)   Wt 176 lb (79.8 kg)   SpO2 96%   BMI 26.76 kg/m²    Constitutional and General Appearance: alert, cooperative, no distress and appears stated age  HEENT: PERRL, no cervical lymphadenopathy. No masses palpable. Normal oral mucosa  Respiratory:  Normal excursion and expansion without use of accessory muscles  Resp Auscultation: Good respiratory effort. No for increased work of breathing. On auscultation: clear to auscultation bilaterally  Cardiovascular: The apical impulse is not displaced  Heart tones are crisp and normal. regular S1 and S2. Murmurs:  None  Jugular venous pulsation Normal  The carotid upstroke is normal in amplitude and contour without delay or bruit  Peripheral pulses are symmetrical and full   Abdomen:  No masses or tenderness  Bowel sounds present  Extremities:   No Cyanosis or Clubbing   Lower extremity edema: None   Skin: Warm and dry  Neurological:  Alert and oriented. Moves all extremities well  No abnormalities of mood, affect, memory, mentation, or behavior are noted    DATA:    Diagnostics:      EKG: Sinus rhythm, 88 bpm; WNL. 2D ECHO ( 9/9/22)  Summary  Limited study ordered for shortness of breath. Left ventricle is normal in size. Left ventricular systolic function is mildly reduced. Calculated EF via Leach's method is 41 %. Global hypokinesis. Visual LVEF 45-50%  Left atrium is normal in size. No obvious valvular abnormality seen. CARDIAC CATHETERIZATION ( 6/28/22)  Procedure Summary        Inferior STEMI    Concern for embolism.     Possible clot in the proximal LAD instent and small distal inferior branch    of OM1 occlusing <1mm vessel. Successful LAYLA of mid RCA for significant ulcerated plaque. Successful PTCA/LAYLA of proximal LAD. Distal OM1 branch too small for PCI. At the end of case, patient complained of severe chest pain. ECG showed    inferior STEMI. Repeat cath showed blood clot in mid RCA stent and occlusion of LAD stent. Successful manual thrombectomy of mid RCA and LAD. Another stent was placed at the distal edge of previous RCA stent. LAD stent post-dilated with 3.5 NC balloon. Labs:     CBC:   Recent Labs     01/02/23  0605 01/03/23  0541   WBC 5.0 5.5   HGB 14.5 14.5   HCT 42.1 42.7    321       BMP:   Recent Labs     01/02/23  0605 01/03/23  0541    138   K 4.9 4.6   CO2 27 29   BUN 13 13   CREATININE 0.93 0.94   LABGLOM >60 >60   GLUCOSE 206* 149*       BNP: No results for input(s): BNP in the last 72 hours. PT/INR:   Recent Labs     01/02/23 0605 01/03/23  0541   PROTIME 23.9* 22.1*   INR 2.1 1.9       APTT:No results for input(s): APTT in the last 72 hours. CARDIAC ENZYMES:No results for input(s): CKTOTAL, CKMB, CKMBINDEX, TROPONINI in the last 72 hours.   FASTING LIPID PANEL:  Lab Results   Component Value Date/Time    HDL 33 04/17/2020 12:00 AM    LDLCALC 144 04/17/2020 12:00 AM    TRIG 104 04/17/2020 12:00 AM     LIVER PROFILE:  Recent Labs     12/31/22  1930   AST 61*   ALT 58*   LABALBU 3.9           IMPRESSION:    Single episode of chest pressure, occurring in the shower and associated with lightheadedness and nausea and suggesting a vasovagal event  Stable CAD with normal EKG and troponins; s/p inferior STEMI 6/28/22 with manual thrombectomy and LAYLA to proximal LAD and mid-RCA  Mildly reduced LVEF, 45-50%; no CHF on exam  Antiphospholipid antibody syndrome; on chronic warfarin   Type II DM  Mixed hyperlipidemia  Persistent smoking      RECOMMENDATIONS:  Continue ASA and Plavix  Continue warfarin  Continue metoprolol, lisinopril and Crestor  Lexiscan stress test today    If stress test is negative or low risk plan for d/c home today from cardiac standpoint and follow up with cardiology in 2 weeks. Discussed with patient and nursing.     Electronically signed by Jennifer Mancilla DO on 1/3/2023 at 9:17 AM.  [place] [facility]

## 2023-01-03 NOTE — CARE COORDINATION
ONGOING DISCHARGE PLAN:    Patient is alert and oriented x4. Spoke with patient regarding discharge plan and patient confirms that plan is still to go home with no needs. Pt has stress test today and is awaiting results. Pt is hoping for discharge today. Will continue to follow for additional discharge needs.     Electronically signed by Lia Franklin RN on 1/3/2023 at 2:21 PM

## 2023-01-03 NOTE — PROGRESS NOTES
Progress Note  Date:1/3/2023       [facility] [id number]  Patient Name:David Singletary     YOB: 1975     Age:47 y.o. Subjective    Subjective:  Symptoms:  Resolved. Diet:  NPO. Activity level: Normal.    Pain:  He reports no pain. Review of Systems  Objective         Vitals Last 24 Hours:  TEMPERATURE:  Temp  Av °F (36.7 °C)  Min: 97.5 °F (36.4 °C)  Max: 98.4 °F (36.9 °C)  RESPIRATIONS RANGE: Resp  Av  Min: 16  Max: 18  PULSE OXIMETRY RANGE: SpO2  Av %  Min: 96 %  Max: 98 %  PULSE RANGE: Pulse  Av  Min: 80  Max: 92  BLOOD PRESSURE RANGE: Systolic (79YTQ), RXZ:191 , Min:123 , RMF:750   ; Diastolic (75DDH), AVS:00, Min:85, Max:99    I/O (24Hr): Intake/Output Summary (Last 24 hours) at 1/3/2023 0744  Last data filed at 2023 2120  Gross per 24 hour   Intake 10 ml   Output --   Net 10 ml     Objective:  General Appearance:  Comfortable. Vital signs: (most recent): Blood pressure 123/85, pulse 80, temperature 97.5 °F (36.4 °C), resp. rate 18, height 5' 8\" (1.727 m), weight 176 lb (79.8 kg), SpO2 96 %. Vital signs are normal.    Lungs:  Normal effort and normal respiratory rate. Breath sounds clear to auscultation. Heart: Normal rate. S1 normal and S2 normal.    Labs/Imaging/Diagnostics    Labs:  CBC:  Recent Labs     22  0605 23  0541   WBC 5.9 5.0 5.5   RBC 4.08* 4.53 4.62   HGB 13.2* 14.5 14.5   HCT 38.1* 42.1 42.7   MCV 93.3 93.1 92.5   RDW 12.7 12.8 12.6    306 321     CHEMISTRIES:  Recent Labs     22  0605 23  0541    135 138   K 3.9 4.9 4.6    100 101   CO2 22 27 29   BUN 10 13 13   CREATININE 0.97 0.93 0.94   GLUCOSE 141* 206* 149*     PT/INR:  Recent Labs     23  1659 23  0605 23  0541   PROTIME 25.3* 23.9* 22.1*   INR 2.3 2.1 1.9     APTT:No results for input(s): APTT in the last 72 hours.   LIVER PROFILE:  Recent Labs     22  1930   AST 61*   ALT 58* BILITOT 0.2*   ALKPHOS 93       Imaging Last 24 Hours:  No results found. Assessment//Plan           Hospital Problems             Last Modified POA    * (Principal) Unstable angina ([facility] [address] 1/1/2023 Yes    Anti-phospholipid antibody syndrome ([facility] [address] 1/1/2023 Yes    Chest pain with high risk for cardiac etiology 1/1/2023 Yes    Type 2 diabetes mellitus without complication, without long-term current use of insulin ([facility] [address] 1/1/2023 Yes    Smoking 1/1/2023 Yes     Assessment & Plan    Stress scheduled today    KEE d/t prerenal azotemia d/t dehydration - resolved.  Resume home meds when taking PO    Diabetes - resume home meds and d/c insulin when taking PO      Electronically signed by Anisha Roland MD on 1/3/23 at 7:44 AM EST

## 2023-01-03 NOTE — PLAN OF CARE
Problem: Discharge Planning  Goal: Discharge to home or other facility with appropriate resources  1/3/2023 1657 by Leeanne Bateman RN  Outcome: Completed  1/3/2023 0426 by Daniel Gonzalez RN  Outcome: Progressing     Problem: ABCDS Injury Assessment  Goal: Absence of physical injury  1/3/2023 1657 by Leeanne Bateman RN  Outcome: Completed  1/3/2023 0426 by Daniel Gonzalez RN  Outcome: Progressing     Problem: Chronic Conditions and Co-morbidities  Goal: Patient's chronic conditions and co-morbidity symptoms are monitored and maintained or improved  1/3/2023 1657 by Leeanne Bateman RN  Outcome: Completed  1/3/2023 0426 by Daniel Gonzalez RN  Outcome: Progressing

## 2023-01-03 NOTE — DISCHARGE INSTRUCTIONS
Your information:  Name: Hannah Finn  : 1975        What to do after you leave the hospital:    Recommended diet: regular diet    Recommended activity: activity as tolerated        The following personal items were collected during your admission and were returned to you:    Belongings  Dental Appliances: None  Vision - Corrective Lenses: Contact Lenses  Hearing Aid: None  Clothing: Socks, Shirt, Pants, Jacket/Coat, Footwear, Undergarments  Jewelry: Ring, Watch  Electronic Devices: Cell Phone,   Weapons (Notify Protective Services/Security): None  Home Medications: None  Valuables Given To: Patient  Provide Name(s) of Who Valuable(s) Were Given To: mannie    Information obtained by:  By signing below, I understand that if any problems occur once I leave the hospital I am to contact my PCP. I understand and acknowledge receipt of the instructions indicated above.

## 2023-01-03 NOTE — PROGRESS NOTES
Pt currently off the floor; nursing reports he is doing well with no further chest pain. Lexiscan stress test scheduled for AM 1/3/22. · Acute pain in left knee reported, patient is unable to bear weight  · Will order state left knee x-ray  · Lidocaine cream to be applied to left knee tid  · Continue scheduled tylenol  · Continue NPI as tolerated

## 2023-01-03 NOTE — PROGRESS NOTES
Skip Juarez. Stress Tech performs patient preparation of physical comfort, review test procedures, pre-stress EKG. Lung Sounds clear in all fields. Consent verified. Educated patient on test procedure and possible side effects of Lexiscan. Cardiologist reviewed pre-test EKG and is present for test. Patient tolerated test well with minor SOB and CP which resolved to baseline after test with caffeine. EKG portion of test complete, Nuc Med portion pending.   Pretest VS: /78 HR 76  Post test VS: /84 HR 94

## 2023-01-03 NOTE — PROGRESS NOTES
Pharmacy Note  Warfarin Consult follow-up      Recent Labs     01/01/23  1659 01/02/23  0605 01/03/23  0541   INR 2.3 2.1 1.9     Recent Labs     12/31/22  1930 01/02/23  0605 01/03/23  0541   HGB 13.2* 14.5 14.5   HCT 38.1* 42.1 42.7    306 321       Significant Drug-Drug Interactions:  New warfarin drug-drug interactions: none  Discontinued drug-drug interactions: none  Current warfarin drug-drug interactions: aspirin, plavix, crestor      Date             INR        Dose given previous day  Dose scheduled for today  1/3/2023            1.9       5 mg           5 mg        Notes:                     Daily PT/INR while inpatient.      Merlene Velazquez RPH,PharmD,  1/3/2023, 12:52 PM

## 2023-01-04 LAB
EKG ATRIAL RATE: 85 BPM
EKG P AXIS: 43 DEGREES
EKG P-R INTERVAL: 158 MS
EKG Q-T INTERVAL: 378 MS
EKG QRS DURATION: 90 MS
EKG QTC CALCULATION (BAZETT): 449 MS
EKG R AXIS: 18 DEGREES
EKG T AXIS: 30 DEGREES
EKG VENTRICULAR RATE: 85 BPM

## 2023-01-04 PROCEDURE — 93010 ELECTROCARDIOGRAM REPORT: CPT | Performed by: INTERNAL MEDICINE

## 2023-01-04 NOTE — PROGRESS NOTES
Physician Progress Note      Alfred Lora  [facility] #:                  [id number]  :                       1975  ADMIT DATE:       2022 7:00 PM  [address] [place] DATE:        1/3/2023 5:25 PM  RESPONDING  PROVIDER #:        Inge Tony MD          QUERY TEXT:    Patient admitted with unstable angina. Noted documentation of Acute Kidney   Injury in1/3 Medicine PN. In order to support the diagnosis of KEE, please   include additional clinical indicators in your documentation. ? Or please   document if the diagnosis of KEE has been ruled out after further study. The medical record reflects the following:  Risk Factors: PMH of DM, HLD, and antiphospholipid syndrome. Clinical Indicators: Medicine PN 1/3: KEE d/t prerenal azotemia d/t   dehydration - resolved. BUN/Cr/GFR: 10/0.97/>60 (), 13/0.93/>60 (),   13/0.94/>60 (1/3). Treatment: No IVFs    Defined by Kidney Disease Improving Global Outcomes (KDIGO) clinical practice   guideline for acute kidney injury:  -Increase in SCr by greater than or equal to 0.3 mg/dl within 48 hours; or  -Increase or decrease in SCr to greater than or equal to 1.5 times baseline,   which is known or presumed to have occurred within the prior 7 days; or  -Urine volume < 0.5ml/kg/h for 6 hours. Options provided:  -- Currently resolved acute kidney injury was evidenced by, Please document   evidence as well as a numerical baseline creatinine, if known. -- Acute kidney injury ruled out after study  -- Other - I will add my own diagnosis  -- Disagree - Not applicable / Not valid  -- Disagree - Clinically unable to determine / Unknown  -- Refer to Clinical Documentation Reviewer    PROVIDER RESPONSE TEXT:    Acute kidney injury was ruled out after study. Query created by: Briana Chadwick on 1/3/2023 11:23 AM      Electronically signed by:   Inge Tony MD 2023 1:35 PM

## 2023-01-06 ENCOUNTER — TELEPHONE (OUTPATIENT)
Dept: PHARMACY | Age: 48
End: 2023-01-06

## 2023-01-06 NOTE — PROCEDURES
[address] [facility]                    [facility]. [address]                              CARDIAC STRESS TEST    PATIENT NAME: Daniella Plata                 :        1975  MED REC NO:   [id number]                              ROOM:       2078  ACCOUNT NO:   [de-identified]                           ADMIT DATE: 2022  PROVIDER:     Melly Farnsworth DO    DATE OF STUDY:  2023    TEST TYPE: LEXISCAN CARDIOLYTE STRESS TEST  INDICATION: CHEST PAIN  REFERRING PHYSICIAN: SONAL MCWILLIAMS    RESTING HEART RATE: 76 BPM  RESTING BLOOD PRESSURE: 128/78    MEDICATION(S) GIVEN: 0.4MG IV LEXISCAN  REASON FOR TERMINATION: MEDICATION INFUSION COMPLETE    RESTING EKG: ABNORMAL. NORMAL SINUS RHYTHM, LOW VOLTAGE, NONSPECIFIC T  CHANGE  STRESS HEART RESPONSE: NORMAL RESPONSE  BLOOD PRESSURE RESPONSE: APPROPRIATE  CHEST DISCOMFORT: NO PAIN DURING STRESS      EKG IMPRESSION: ELECTROCARDIOGRAPHICALLY NEGATIVE LEXISCAN STRESS TEST. RADIOISOTOPE RESULTS TO FOLLOW FROM THE DEPARTMENT OF NUCLEAR MEDICINE.             [address], DO    D: 2023 11:50:15       T: 2023 11:53:50     /SIERRA  Job#: [id number]     Doc#: Unknown    CC:    (Retain this field even if not dictated or not decipherable)

## 2023-01-11 ENCOUNTER — HOSPITAL ENCOUNTER (OUTPATIENT)
Dept: PHARMACY | Age: 48
Setting detail: THERAPIES SERIES
Discharge: HOME | End: 2023-01-11
Payer: MEDICARE

## 2023-01-11 DIAGNOSIS — D68.61 ANTI-PHOSPHOLIPID ANTIBODY SYNDROME (HCC): Primary | ICD-10-CM

## 2023-01-11 LAB
INR BLD: 2
PROTIME: 24.2 SECONDS

## 2023-01-11 PROCEDURE — 85610 PROTHROMBIN TIME: CPT

## 2023-01-11 PROCEDURE — 99212 OFFICE O/P EST SF 10 MIN: CPT

## 2023-01-11 NOTE — PROGRESS NOTES
Patient seen in person in Medication Management Service. Patient states compliant all of the time with regimen. No bleeding or thromboembolic side effects noted. No significant dietary changes. Patient reports taking omeprazole once a day most days and only twice a day if needed. Has been doing this for a year or more. Explained affect on INR and to inform us if he takes more or less than usual.    No significant recent illness or disease state changes. PT/INR done via POC meter per protocol. INR was therapeutic at 2.  (goal 2 - 3) but on the low end of range and has been subtherapeutic the previous two visits. Warfarin regimen will be increased to 5mg every day to help maintain patient in therapeutic range. Will retest in 4 weeks. Patient understands dosing directions and information discussed. Dosing schedule and follow up appointment given to patient. Progress note routed to referring physicians office. Patient acknowledges working in [address] [place] [address] with Pharmacist as referred by his/her physician/provider. COVID 19 screening completed. At this time patient denies symptoms, recent travel and exposure. Patient educated to screen for temperature and COVID-19 symptoms prior to coming to clinic for next appointment. They are instructed to call the clinic to reschedule if they have any symptoms. Standing order for PT/INR has been placed in preparation to transition to possible remote INR monitoring given efforts to reduce the spread of COVID-19. For Pharmacy Admin Tracking Only    Intervention Detail: Dose Adjustment: 1, reason: Therapy Optimization  Total # of Interventions Recommended: 1  Total # of Interventions Accepted: 1  Time Spent (min): 20    Lisa Alonso RPH,Pharm. D,, BCPS, CACP  1/11/2023  6:56 AM

## 2023-02-08 ENCOUNTER — TELEPHONE (OUTPATIENT)
Dept: PHARMACY | Age: 48
End: 2023-02-08

## 2023-02-08 NOTE — TELEPHONE ENCOUNTER
Patient was a no show for his appt with medication management. Called number on record and patient's wife answered indicating patient must have forgotten. Appt rescheduled for tomorrow 2/9/23. Lisa Alonso RPH,Pharm. D,, BCPS, CACP  2/8/2023  9:28 AM

## 2023-02-09 ENCOUNTER — HOSPITAL ENCOUNTER (OUTPATIENT)
Dept: PHARMACY | Age: 48
Setting detail: THERAPIES SERIES
Discharge: HOME | End: 2023-02-09
Payer: COMMERCIAL

## 2023-02-09 DIAGNOSIS — D68.61 ANTI-PHOSPHOLIPID ANTIBODY SYNDROME (HCC): Primary | ICD-10-CM

## 2023-02-09 LAB
INR BLD: 1.6
PROTIME: 18.6 SECONDS

## 2023-02-09 PROCEDURE — 85610 PROTHROMBIN TIME: CPT

## 2023-02-09 PROCEDURE — 99212 OFFICE O/P EST SF 10 MIN: CPT

## 2023-02-09 NOTE — PROGRESS NOTES
Patient seen in person in Medication Management Service. Patient states compliant most of the time with regimen. No bleeding or thromboembolic side effects noted. No significant med or dietary changes. No significant recent illness or disease state changes. PT/INR done via POC meter per protocol. INR was subtherapeutic at 1.6.  (goal 2 - 3)  The patient is starting to do some light workouts    Warfarin regimen will be increased to 7.5 mg Thursdays and 5 mg all other days. Will retest in 2 weeks. Patient understands dosing directions and information discussed. Dosing schedule and follow up appointment given to patient. Progress note routed to referring physicians office. Patient acknowledges working in [address] [place] [address] with Pharmacist as referred by his/her physician/provider. COVID 19 screening completed. At this time patient denies symptoms, recent travel and exposure. Patient educated to screen for temperature and COVID-19 symptoms prior to coming to clinic for next appointment. They are instructed to call the clinic to reschedule if they have any symptoms. Standing order for PT/INR has been placed in preparation to transition to possible remote INR monitoring given efforts to reduce the spread of COVID-19.       For Pharmacy Admin Tracking Only    Intervention Detail: Dose Adjustment: 1, reason: Therapy Optimization  Total # of Interventions Recommended: 1  Total # of Interventions Accepted: 1  Time Spent (min): 20

## 2023-02-24 ENCOUNTER — TELEPHONE (OUTPATIENT)
Dept: PHARMACY | Age: 48
End: 2023-02-24

## 2023-02-24 ENCOUNTER — HOSPITAL ENCOUNTER (OUTPATIENT)
Dept: PHARMACY | Age: 48
Setting detail: THERAPIES SERIES
Discharge: HOME | End: 2023-02-24
Payer: MEDICAID

## 2023-02-24 DIAGNOSIS — D68.61 ANTI-PHOSPHOLIPID ANTIBODY SYNDROME (HCC): Primary | ICD-10-CM

## 2023-02-24 LAB
INR BLD: 1.5
PROTIME: 18.5 SECONDS

## 2023-02-24 PROCEDURE — 85610 PROTHROMBIN TIME: CPT

## 2023-02-24 PROCEDURE — 99213 OFFICE O/P EST LOW 20 MIN: CPT

## 2023-02-24 RX ORDER — WARFARIN SODIUM 5 MG/1
TABLET ORAL
Qty: 180 TABLET | Refills: 1 | Status: SHIPPED | OUTPATIENT
Start: 2023-02-24

## 2023-02-24 NOTE — PROGRESS NOTES
Patient seen in person in Medication Management Service. Patient states compliant most of the time with regimen. No bleeding or thromboembolic side effects noted. No significant med or dietary changes. No significant recent illness or disease state changes. PT/INR done via POC meter per protocol. INR was subtherapeutic at 1.5.  (goal 2 - 3)    Warfarin regimen will be increased to 7.5 mg MWF and 5 mg all other days. Will retest in 10 days. New Prescription for warfarin 5 mg tablets to [address] on [place]    Patient understands dosing directions and information discussed. Dosing schedule and follow up appointment given to patient. Progress note routed to referring physicians office. Patient acknowledges working in [address] [place] [address] with Pharmacist as referred by his/her physician/provider. COVID 19 screening completed. At this time patient denies symptoms, recent travel and exposure. Patient educated to screen for temperature and COVID-19 symptoms prior to coming to clinic for next appointment. They are instructed to call the clinic to reschedule if they have any symptoms. Standing order for PT/INR has been placed in preparation to transition to possible remote INR monitoring given efforts to reduce the spread of COVID-19.       For Pharmacy Admin Tracking Only    Intervention Detail: Dose Adjustment: 1, reason: Therapy Optimization  Total # of Interventions Recommended: 1  Total # of Interventions Accepted: 1  Time Spent (min): 20

## 2023-03-07 ENCOUNTER — HOSPITAL ENCOUNTER (OUTPATIENT)
Dept: PHARMACY | Age: 48
Setting detail: THERAPIES SERIES
Discharge: HOME | End: 2023-03-07
Payer: MEDICAID

## 2023-03-07 DIAGNOSIS — D68.61 ANTI-PHOSPHOLIPID ANTIBODY SYNDROME (HCC): Primary | ICD-10-CM

## 2023-03-07 LAB
INR BLD: 2
PROTIME: 24 SECONDS

## 2023-03-07 PROCEDURE — 85610 PROTHROMBIN TIME: CPT

## 2023-03-07 PROCEDURE — 99212 OFFICE O/P EST SF 10 MIN: CPT

## 2023-03-07 NOTE — PROGRESS NOTES
Patient seen in person in Medication Management Service. Patient states compliant most of the time with regimen. No bleeding or thromboembolic side effects noted. No significant med or dietary changes. No significant recent illness or disease state changes. PT/INR done via POC meter per protocol. INR was therapeutic at 2.  (goal 2 - 3)  INR of 2 at lower end of therapeutic range    Warfarin regimen will be increased to 5 mg Tues-Thurs-Sat and 7.5 mg all other days. Will retest in 2 weeks. The patient has begun taking Vitamin D3  5000 IU daily    Patient understands dosing directions and information discussed. Dosing schedule and follow up appointment given to patient. Progress note routed to referring physicians office. Patient acknowledges working in [address] [place] [address] with Pharmacist as referred by his/her physician/provider. COVID 19 screening completed. At this time patient denies symptoms, recent travel and exposure. Patient educated to screen for temperature and COVID-19 symptoms prior to coming to clinic for next appointment. They are instructed to call the clinic to reschedule if they have any symptoms. Standing order for PT/INR has been placed in preparation to transition to possible remote INR monitoring given efforts to reduce the spread of COVID-19.       For Pharmacy Admin Tracking Only    Intervention Detail: Dose Adjustment: 1, reason: Therapy Optimization  Total # of Interventions Recommended: 1  Total # of Interventions Accepted: 1  Time Spent (min): 20

## 2023-03-21 ENCOUNTER — APPOINTMENT (OUTPATIENT)
Dept: PHARMACY | Age: 48
End: 2023-03-21
Payer: MEDICAID

## 2023-03-22 ENCOUNTER — HOSPITAL ENCOUNTER (OUTPATIENT)
Dept: PHARMACY | Age: 48
Discharge: HOME | End: 2023-03-22

## 2023-03-22 DIAGNOSIS — D68.61 ANTI-PHOSPHOLIPID ANTIBODY SYNDROME (HCC): Primary | ICD-10-CM

## 2023-03-22 LAB
INR BLD: 2.3
PROTIME: 28.1 SECONDS

## 2023-03-22 PROCEDURE — 99211 OFF/OP EST MAY X REQ PHY/QHP: CPT

## 2023-03-22 PROCEDURE — 85610 PROTHROMBIN TIME: CPT

## 2023-03-22 NOTE — PROGRESS NOTES
Patient seen in person in Medication Management Service. Patient states compliant all of the time with regimen. No bleeding or thromboembolic side effects noted. No significant med or dietary changes. No significant recent illness or disease state changes. PT/INR done via POC meter per protocol. INR was therapeutic at 2.3.  (goal 2 - 3)    Warfarin regimen will be continued at current dose of 5mg on Tues/Thur and Sat and 7.5mg all other days  Will retest in 4 weeks. Patient understands dosing directions and information discussed. Dosing schedule and follow up appointment given to patient. Progress note routed to referring physicians office. Patient acknowledges working in [address] [place] [address] with Pharmacist as referred by his/her physician/provider. COVID 19 screening completed. At this time patient denies symptoms, recent travel and exposure. Patient educated to screen for temperature and COVID-19 symptoms prior to coming to clinic for next appointment. They are instructed to call the clinic to reschedule if they have any symptoms. Standing order for PT/INR has been placed in preparation to transition to possible remote INR monitoring given efforts to reduce the spread of COVID-19. For Pharmacy Admin Tracking Only    Total # of Interventions Recommended: 0  Total # of Interventions Accepted: 0  Time Spent (min): 20  Lisa Alonso RPH,Pharm. D,, BCPS, CACP  3/22/2023  6:58 AM

## 2023-04-18 PROBLEM — E29.1 HYPOGONADISM IN MALE: Status: ACTIVE | Noted: 2023-02-23

## 2023-04-19 ENCOUNTER — HOSPITAL ENCOUNTER (OUTPATIENT)
Dept: PHARMACY | Age: 48
Setting detail: THERAPIES SERIES
Discharge: HOME | End: 2023-04-19
Payer: MEDICAID

## 2023-04-19 DIAGNOSIS — D68.61 ANTI-PHOSPHOLIPID ANTIBODY SYNDROME (HCC): Primary | ICD-10-CM

## 2023-04-19 LAB
INR BLD: 1.8
PROTIME: 21 SECONDS

## 2023-04-19 PROCEDURE — 99211 OFF/OP EST MAY X REQ PHY/QHP: CPT

## 2023-04-19 PROCEDURE — 85610 PROTHROMBIN TIME: CPT

## 2023-04-19 RX ORDER — WARFARIN SODIUM 5 MG/1
5 TABLET ORAL SEE ADMIN INSTRUCTIONS
Qty: 90 TABLET | Refills: 2 | Status: SHIPPED | OUTPATIENT
Start: 2023-04-19

## 2023-04-19 NOTE — PROGRESS NOTES
Patient seen in person in Medication Management Service. Patient states compliant all of the time with regimen. Pt questions if he took it this past Monday (2 days ago). Rene To asleep early and does not think he took it. No bleeding or thromboembolic side effects noted. No significant med or dietary changes. No significant recent illness or disease state changes. PT/INR done via POC meter per protocol. INR was subtherapeutic at 1.8.  (goal 2 - 3)    With recent missed dose and the past several within range,   Warfarin regimen will be continued at current dose 5 mg Tues/Th/Sat and 7.5 mg all other days. Will retest in 2 weeks. Patient understands dosing directions and information discussed. Dosing schedule and follow up appointment given to patient. Progress note routed to referring physicians office. Patient acknowledges working in [address] [place] [address] with Pharmacist as referred by his/her physician/provider. COVID 19 screening completed. At this time patient denies symptoms, recent travel and exposure. Patient educated to screen for temperature and COVID-19 symptoms prior to coming to clinic for next appointment. They are instructed to call the clinic to reschedule if they have any symptoms. Standing order for PT/INR has been placed in preparation to transition to possible remote INR monitoring given efforts to reduce the spread of COVID-19.       For Pharmacy Admin Tracking Only    Intervention Detail: Adherence Monitorin  Total # of Interventions Recommended: 1  Total # of Interventions Accepted: 1  Time Spent (min): 20    Amee Menjivar, Pharm D, [facility]  2023 7:00 AM

## 2023-05-03 ENCOUNTER — HOSPITAL ENCOUNTER (OUTPATIENT)
Dept: PHARMACY | Age: 48
Setting detail: THERAPIES SERIES
Discharge: HOME | End: 2023-05-03
Payer: MEDICAID

## 2023-05-03 DIAGNOSIS — D68.61 ANTI-PHOSPHOLIPID ANTIBODY SYNDROME (HCC): Primary | ICD-10-CM

## 2023-05-03 LAB
INR BLD: 2.5
PROTIME: 30 SECONDS

## 2023-05-03 PROCEDURE — 99211 OFF/OP EST MAY X REQ PHY/QHP: CPT

## 2023-05-03 PROCEDURE — 85610 PROTHROMBIN TIME: CPT

## 2023-05-03 NOTE — PROGRESS NOTES
Patient seen in person in Medication Management Service. Patient states compliant all of the time with regimen. No bleeding or thromboembolic side effects noted. No significant med or dietary changes. No significant recent illness or disease state changes. PT/INR done via POC meter per protocol. INR was therapeutic at 2.5.  (goal 2 - 3)    Warfarin regimen will be continued at current dose of 7.5mg on Sun, MWF and 5mg on Tues/Thur and Sat. Will retest in 4 weeks. Patient understands dosing directions and information discussed. Dosing schedule and follow up appointment given to patient. Progress note routed to referring physicians office. Patient acknowledges working in [address] [place] [address] with Pharmacist as referred by his/her physician/provider. COVID 19 screening completed. At this time patient denies symptoms, recent travel and exposure. Patient educated to screen for temperature and COVID-19 symptoms prior to coming to clinic for next appointment. They are instructed to call the clinic to reschedule if they have any symptoms. Standing order for PT/INR has been placed in preparation to transition to possible remote INR monitoring given efforts to reduce the spread of COVID-19. For Pharmacy Admin Tracking Only  Total # of Interventions Recommended: 0  Total # of Interventions Accepted: 0  Time Spent (min): 20  Lisa Alonso RPH,Pharm. D,, BCPS, CACP  5/3/2023  7:05 AM

## 2023-06-07 ENCOUNTER — HOSPITAL ENCOUNTER (OUTPATIENT)
Dept: PHARMACY | Age: 48
Setting detail: THERAPIES SERIES
Discharge: HOME | End: 2023-06-07
Payer: MEDICAID

## 2023-06-07 DIAGNOSIS — D68.61 ANTI-PHOSPHOLIPID ANTIBODY SYNDROME (HCC): Primary | ICD-10-CM

## 2023-06-07 LAB
INR BLD: 3.1
PROTIME: 37.4 SECONDS

## 2023-06-07 PROCEDURE — 99211 OFF/OP EST MAY X REQ PHY/QHP: CPT

## 2023-06-07 PROCEDURE — 85610 PROTHROMBIN TIME: CPT

## 2023-06-07 NOTE — PROGRESS NOTES
Patient seen in person in Medication Management Service. Patient states compliant most of the time with regimen. No bleeding or thromboembolic side effects noted. No significant med or dietary changes. No significant recent illness or disease state changes. PT/INR done via POC meter per protocol. INR was supratherapeutic at 3.1.  (goal 2 - 3)  The patient did consume a glass of wine yesterday which he usually does not do. Warfarin regimen will be continued at current dose 5 mg Tues-Thurs-Sat and 7.5 mg all other days. Will retest in 3 weeks. Patient understands dosing directions and information discussed. Dosing schedule and follow up appointment given to patient. Progress note routed to referring physicians office. Patient acknowledges working in [address] [place] [address] with Pharmacist as referred by his/her physician/provider. COVID 19 screening completed. At this time patient denies symptoms, recent travel and exposure. Patient educated to screen for temperature and COVID-19 symptoms prior to coming to clinic for next appointment. They are instructed to call the clinic to reschedule if they have any symptoms. Standing order for PT/INR has been placed in preparation to transition to possible remote INR monitoring given efforts to reduce the spread of COVID-19.       For Pharmacy Admin Tracking Only    Intervention Detail: Adherence Monitorin  Total # of Interventions Recommended: 0  Total # of Interventions Accepted: 0  Time Spent (min): 20

## 2023-06-30 ENCOUNTER — HOSPITAL ENCOUNTER (OUTPATIENT)
Dept: PHARMACY | Age: 48
Setting detail: THERAPIES SERIES
Discharge: HOME | End: 2023-06-30
Payer: MEDICAID

## 2023-06-30 DIAGNOSIS — D68.61 ANTI-PHOSPHOLIPID ANTIBODY SYNDROME (HCC): Primary | ICD-10-CM

## 2023-06-30 LAB
INR BLD: 2
PROTIME: 23.8 SECONDS

## 2023-06-30 PROCEDURE — 85610 PROTHROMBIN TIME: CPT

## 2023-06-30 PROCEDURE — 99211 OFF/OP EST MAY X REQ PHY/QHP: CPT

## 2023-07-28 ENCOUNTER — HOSPITAL ENCOUNTER (OUTPATIENT)
Dept: PHARMACY | Age: 48
Setting detail: THERAPIES SERIES
Discharge: HOME | End: 2023-07-28
Payer: MEDICAID

## 2023-07-28 DIAGNOSIS — D68.61 ANTI-PHOSPHOLIPID ANTIBODY SYNDROME (HCC): Primary | ICD-10-CM

## 2023-07-28 LAB
INR BLD: 3.4
PROTIME: 40.9 SECONDS

## 2023-07-28 PROCEDURE — 85610 PROTHROMBIN TIME: CPT

## 2023-07-28 PROCEDURE — 99212 OFFICE O/P EST SF 10 MIN: CPT

## 2023-07-28 NOTE — PROGRESS NOTES
Patient seen in person in Medication Management Service. Patient states compliant all of the time with regimen. No bleeding or thromboembolic side effects noted. No significant med or dietary changes. No significant recent illness or disease state changes. Recently quit smoking since the past month. Using nicotine lozenges alone and says that has been helpful. PT/INR done via POC meter per protocol. INR was supratherapeutic at 3.4.  (goal 2 - 3)     Warfarin regimen will be held for today, then resume at reduced dose 7.5 mg Mon/Wed/Fri and 5 mg all other days. Will retest in 5 days due to patient's limited time availability. Patient understands dosing directions and information discussed. Dosing schedule and follow up appointment given to patient. Progress note routed to referring physicians office. Patient acknowledges working in [address] [place] [address] with Pharmacist as referred by his/her physician/provider. COVID 19 screening completed. At this time patient denies symptoms, recent travel and exposure. Patient educated to screen for temperature and COVID-19 symptoms prior to coming to clinic for next appointment. They are instructed to call the clinic to reschedule if they have any symptoms. Standing order for PT/INR has been placed in preparation to transition to possible remote INR monitoring given efforts to reduce the spread of COVID-19.       For Pharmacy Admin Tracking Only    Intervention Detail: Dose Adjustment: 1, reason: Therapy Optimization  Total # of Interventions Recommended: 1  Total # of Interventions Accepted: 1  Time Spent (min): 9808 Leena Donohue RPH,PharmD, BCACP  7/28/2023  7:11 AM

## 2023-08-02 ENCOUNTER — HOSPITAL ENCOUNTER (OUTPATIENT)
Dept: PHARMACY | Age: 48
Setting detail: THERAPIES SERIES
Discharge: HOME | End: 2023-08-02
Payer: MEDICAID

## 2023-08-02 DIAGNOSIS — D68.61 ANTI-PHOSPHOLIPID ANTIBODY SYNDROME (HCC): Primary | ICD-10-CM

## 2023-08-02 LAB
INR BLD: 2.8
PROTIME: 33.8 SECONDS

## 2023-08-02 PROCEDURE — 99211 OFF/OP EST MAY X REQ PHY/QHP: CPT

## 2023-08-02 PROCEDURE — 85610 PROTHROMBIN TIME: CPT

## 2023-08-02 NOTE — PROGRESS NOTES
Patient seen in person in Medication Management Service. Patient states compliant most of the time with regimen. No bleeding or thromboembolic side effects noted. No significant med or dietary changes. No significant recent illness or disease state changes. PT/INR done via POC meter per protocol. INR was therapeutic at 2.8.  (goal 2 - 3)    Warfarin regimen will be continued at current dose 7.5 mg MWF and 5 mg all other days. Will retest in 3 weeks. Patient understands dosing directions and information discussed. Dosing schedule and follow up appointment given to patient. Progress note routed to referring physicians office. Patient acknowledges working in [address] [place] [address] with Pharmacist as referred by his/her physician/provider. COVID 19 screening completed. At this time patient denies symptoms, recent travel and exposure. Patient educated to screen for temperature and COVID-19 symptoms prior to coming to clinic for next appointment. They are instructed to call the clinic to reschedule if they have any symptoms. Standing order for PT/INR has been placed in preparation to transition to possible remote INR monitoring given efforts to reduce the spread of COVID-19.       For Pharmacy Admin Tracking Only    Intervention Detail: Adherence Monitorin  Total # of Interventions Recommended: 0  Total # of Interventions Accepted: 0  Time Spent (min): 20

## 2023-08-08 ENCOUNTER — TELEPHONE (OUTPATIENT)
Dept: ONCOLOGY | Age: 48
End: 2023-08-08

## 2023-08-08 ENCOUNTER — OFFICE VISIT (OUTPATIENT)
Dept: ONCOLOGY | Age: 48
End: 2023-08-08
Payer: COMMERCIAL

## 2023-08-08 VITALS
TEMPERATURE: 97.8 F | WEIGHT: 181.5 LBS | BODY MASS INDEX: 27.6 KG/M2 | DIASTOLIC BLOOD PRESSURE: 88 MMHG | HEART RATE: 102 BPM | SYSTOLIC BLOOD PRESSURE: 128 MMHG

## 2023-08-08 DIAGNOSIS — D68.61 ANTI-PHOSPHOLIPID ANTIBODY SYNDROME (HCC): ICD-10-CM

## 2023-08-08 DIAGNOSIS — D68.59 PRIMARY HYPERCOAGULABLE STATE (HCC): Primary | ICD-10-CM

## 2023-08-08 PROCEDURE — 99214 OFFICE O/P EST MOD 30 MIN: CPT | Performed by: INTERNAL MEDICINE

## 2023-08-08 PROCEDURE — 99211 OFF/OP EST MAY X REQ PHY/QHP: CPT | Performed by: INTERNAL MEDICINE

## 2023-08-08 NOTE — PROGRESS NOTES
care.      This note is created with the assistance of a speech recognition program.  While intending to generate a document that actually reflects the content of the visit, the document can still have some errors including those of syntax and sound a like substitutions which may escape proof reading. It such instances, actual meaning can be extrapolated by contextual diversion.

## 2023-08-08 NOTE — TELEPHONE ENCOUNTER
AVS for 8/8        RTC in ome year with [facility]        RV scheduled for 8/8/24 @ 11    Labs to be drawn at RV      Pt elected to access avs & schedule on [employer] signed by Christofer Machuca on 8/8/2023 at 3:36 PM

## 2023-08-21 ENCOUNTER — APPOINTMENT (OUTPATIENT)
Dept: PHARMACY | Age: 48
End: 2023-08-21
Payer: COMMERCIAL

## 2023-08-21 DIAGNOSIS — D68.61 ANTI-PHOSPHOLIPID ANTIBODY SYNDROME (HCC): Primary | ICD-10-CM

## 2023-08-21 LAB
INR BLD: 2
PROTIME: 23.9 SECONDS

## 2023-08-21 PROCEDURE — 85610 PROTHROMBIN TIME: CPT

## 2023-08-21 PROCEDURE — 99211 OFF/OP EST MAY X REQ PHY/QHP: CPT

## 2023-08-21 NOTE — PROGRESS NOTES
Patient seen in person in Medication Management Service. Patient states compliant all of the time with regimen. No bleeding or thromboembolic side effects noted. No significant med or dietary changes. No significant recent illness or disease state changes. PT/INR done via POC meter per protocol. INR was therapeutic at 2.  (goal 2 - 3)    Warfarin regimen will be continued at current dose 7.5 mg Mon, Wed, Fri and 5 mg all other days. Will retest in 4 weeks. Patient understands dosing directions and information discussed. Dosing schedule and follow up appointment given to patient. Progress note routed to referring physicians office. Patient acknowledges working in [address] [place] [address] with Pharmacist as referred by his/her physician/provider. COVID 19 screening completed. At this time patient denies symptoms, recent travel and exposure. Patient educated to screen for temperature and COVID-19 symptoms prior to coming to clinic for next appointment. They are instructed to call the clinic to reschedule if they have any symptoms. Standing order for PT/INR has been placed in preparation to transition to possible remote INR monitoring given efforts to reduce the spread of COVID-19.       For Pharmacy Admin Tracking Only    Total # of Interventions Recommended: 0  Total # of Interventions Accepted: 0  Time Spent (min): 1000 [facility] ZEUS Mejia,PharmD, BCACP  8/21/2023  6:56 AM

## 2023-09-18 ENCOUNTER — HOSPITAL ENCOUNTER (OUTPATIENT)
Dept: PHARMACY | Age: 48
Setting detail: THERAPIES SERIES
Discharge: HOME | End: 2023-09-18
Payer: COMMERCIAL

## 2023-09-18 DIAGNOSIS — D68.61 ANTI-PHOSPHOLIPID ANTIBODY SYNDROME (HCC): Primary | ICD-10-CM

## 2023-09-18 LAB
INR BLD: 1.7
PROTIME: 20 SECONDS

## 2023-09-18 PROCEDURE — 85610 PROTHROMBIN TIME: CPT

## 2023-09-18 PROCEDURE — 99212 OFFICE O/P EST SF 10 MIN: CPT

## 2023-09-18 NOTE — PROGRESS NOTES
Patient seen in person in Medication Management Service. Patient states compliant all of the time with regimen. No bleeding or thromboembolic side effects noted. No significant med or dietary changes. No significant recent illness or disease state changes. PT/INR done via POC meter per protocol. INR was subtherapeutic at 1.7.  (goal 2 - 3)    Warfarin regimen will be increased to 7.5 mg Mon, Tues, Wed, Fri and 5 mg all other days. Will retest in 2 weeks. Patient understands dosing directions and information discussed. Dosing schedule and follow up appointment given to patient. Progress note routed to referring physicians office. Patient acknowledges working in [address] [place] [address] with Pharmacist as referred by his/her physician/provider. COVID 19 screening completed. At this time patient denies symptoms, recent travel and exposure. Patient educated to screen for temperature and COVID-19 symptoms prior to coming to clinic for next appointment. They are instructed to call the clinic to reschedule if they have any symptoms. Standing order for PT/INR has been placed in preparation to transition to possible remote INR monitoring given efforts to reduce the spread of COVID-19.       For Pharmacy Admin Tracking Only    Intervention Detail: Dose Adjustment: 1, reason: Therapy Optimization  Total # of Interventions Recommended: 1  Total # of Interventions Accepted: 1  Time Spent (min): 9808 Leena Donohue RPH,PharmD, BCACP  9/18/2023  6:57 AM

## 2023-10-04 ENCOUNTER — HOSPITAL ENCOUNTER (OUTPATIENT)
Dept: PHARMACY | Age: 48
Setting detail: THERAPIES SERIES
Discharge: HOME | End: 2023-10-04
Payer: COMMERCIAL

## 2023-10-04 DIAGNOSIS — D68.61 ANTI-PHOSPHOLIPID ANTIBODY SYNDROME (HCC): Primary | ICD-10-CM

## 2023-10-04 LAB
INR BLD: 1.3
PROTIME: 15.5 SECONDS

## 2023-10-04 PROCEDURE — 99212 OFFICE O/P EST SF 10 MIN: CPT

## 2023-10-04 PROCEDURE — 85610 PROTHROMBIN TIME: CPT

## 2023-10-04 NOTE — PROGRESS NOTES
Patient seen in person in Medication Management Service. Patient states compliant all of the time with regimen. Pt reports using a pill box and an Reba alarm to make sure he has not missed any doses. No bleeding or thromboembolic side effects noted. No significant med or dietary changes. Denies any changes in green vegetables. Has been drinking a crystal tea, but does not think it has green tea in it. Will double check at home. Has had protein shakes in the past, but the brand he buys does not have vitamin k per patient. None recently. No significant recent illness or disease state changes. No nausea, vomiting, or diarrhea. PT/INR done via POC meter per protocol. INR was subtherapeutic at 1.3.  (goal 2 - 3)    Unable to identify cause for subtherapeutic INR. Warfarin regimen will be increased to 5 mg Sun and Thurs and 7.5 mg all other days. Will retest in 1 week. Patient understands dosing directions and information discussed. Dosing schedule and follow up appointment given to patient. Progress note routed to referring physicians office. Patient acknowledges working in [address] [place] [address] with Pharmacist as referred by his/her physician/provider. COVID 19 screening completed. At this time patient denies symptoms, recent travel and exposure. Patient educated to screen for temperature and COVID-19 symptoms prior to coming to clinic for next appointment. They are instructed to call the clinic to reschedule if they have any symptoms. Standing order for PT/INR has been placed in preparation to transition to possible remote INR monitoring given efforts to reduce the spread of COVID-19.       For Pharmacy Admin Tracking Only    Intervention Detail: Dose Adjustment: 1, reason: Therapy Optimization  Total # of Interventions Recommended: 1  Total # of Interventions Accepted: 1  Time Spent (min): 20    Christie Almaraz, Pharm D, [address] [facility]

## 2023-10-11 ENCOUNTER — HOSPITAL ENCOUNTER (OUTPATIENT)
Dept: PHARMACY | Age: 48
Setting detail: THERAPIES SERIES
Discharge: HOME | End: 2023-10-11
Payer: COMMERCIAL

## 2023-10-11 DIAGNOSIS — D68.61 ANTI-PHOSPHOLIPID ANTIBODY SYNDROME (HCC): Primary | ICD-10-CM

## 2023-10-11 LAB
INR BLD: 1.8
PROTIME: 22 SECONDS

## 2023-10-11 PROCEDURE — 85610 PROTHROMBIN TIME: CPT | Performed by: PHARMACIST

## 2023-10-11 PROCEDURE — 99211 OFF/OP EST MAY X REQ PHY/QHP: CPT | Performed by: PHARMACIST

## 2023-10-11 NOTE — PROGRESS NOTES
Patient seen in person in Medication Management Service. Patient states compliant all of the time with regimen. No bleeding or thromboembolic side effects noted. No significant med changes. Patient did check tea at home and it was green tea. Will avoid. He was drinking nightly. Has completely stopped. No significant recent illness or disease state changes. PT/INR done via POC meter per protocol. INR was subtherapeutic at 1.8.   (goal 2 - 3)    Warfarin regimen will be continued at current dose 5 mg Sun/Thurs and 7.5 mg all other days. .  Will retest in 2 weeks. Patient understands dosing directions and information discussed. Dosing schedule and follow up appointment given to patient. Progress note routed to referring physicians office. Patient acknowledges working in [address] [place] [address] with Pharmacist as referred by his/her physician/provider. COVID 19 screening completed. At this time patient denies symptoms, recent travel and exposure. Patient educated to screen for temperature and COVID-19 symptoms prior to coming to clinic for next appointment. They are instructed to call the clinic to reschedule if they have any symptoms. Standing order for PT/INR has been placed in preparation to transition to possible remote INR monitoring given efforts to reduce the spread of COVID-19.       For Pharmacy Admin Tracking Only    Intervention Detail: Adherence Monitorin  Total # of Interventions Recommended: 1  Total # of Interventions Accepted: 1  Time Spent (min): 20    Shaka Merrill, Pharm D, [address] [facility]  10/11/2023 7:17 AM

## 2023-10-19 RX ORDER — WARFARIN SODIUM 5 MG/1
5 TABLET ORAL SEE ADMIN INSTRUCTIONS
Qty: 90 TABLET | Refills: 2 | Status: SHIPPED | OUTPATIENT
Start: 2023-10-19

## 2023-10-25 ENCOUNTER — HOSPITAL ENCOUNTER (OUTPATIENT)
Dept: PHARMACY | Age: 48
Setting detail: THERAPIES SERIES
Discharge: HOME | End: 2023-10-25
Payer: COMMERCIAL

## 2023-10-25 DIAGNOSIS — D68.61 ANTI-PHOSPHOLIPID ANTIBODY SYNDROME (HCC): Primary | ICD-10-CM

## 2023-10-25 LAB
INR BLD: 1.8
PROTIME: 21.6 SECONDS

## 2023-10-25 PROCEDURE — 99212 OFFICE O/P EST SF 10 MIN: CPT | Performed by: PHARMACY

## 2023-10-25 PROCEDURE — 85610 PROTHROMBIN TIME: CPT | Performed by: PHARMACY

## 2023-10-25 NOTE — PROGRESS NOTES
Patient seen in person in Medication Management Service. Patient states compliant all of the time with regimen. No bleeding or thromboembolic side effects noted. No significant med or dietary changes. No significant recent illness or disease state changes. PT/INR done via POC meter per protocol. INR was subtherapeutic at 1.8.  (goal 2 - 3) 5 mg sun and 7.5 aod    Warfarin regimen will be increased to 5 mg Sun and 7.5 mg all other days. Will retest in 2 weeks. Patient understands dosing directions and information discussed. Dosing schedule and follow up appointment given to patient. Progress note routed to referring physicians office. Patient acknowledges working in [address] [place] [address] with Pharmacist as referred by his/her physician/provider. COVID 19 screening completed. At this time patient denies symptoms, recent travel and exposure. Patient educated to screen for temperature and COVID-19 symptoms prior to coming to clinic for next appointment. They are instructed to call the clinic to reschedule if they have any symptoms. Standing order for PT/INR has been placed in preparation to transition to possible remote INR monitoring given efforts to reduce the spread of COVID-19.       For Pharmacy Admin Tracking Only    Intervention Detail: Dose Adjustment: 1, reason: Therapy Optimization  Total # of Interventions Recommended: 1  Total # of Interventions Accepted: 1  Time Spent (min): 9808 Leena Donohue [facility], PharmD, [address] [place] [address]  10/25/2023  6:58 AM

## 2023-11-07 ENCOUNTER — HOSPITAL ENCOUNTER (OUTPATIENT)
Age: 48
Setting detail: SPECIMEN
Discharge: HOME | End: 2023-11-07

## 2023-11-08 ENCOUNTER — HOSPITAL ENCOUNTER (OUTPATIENT)
Dept: PHARMACY | Age: 48
Setting detail: THERAPIES SERIES
Discharge: HOME | End: 2023-11-08
Payer: COMMERCIAL

## 2023-11-08 DIAGNOSIS — D68.61 ANTI-PHOSPHOLIPID ANTIBODY SYNDROME (HCC): Primary | ICD-10-CM

## 2023-11-08 LAB
INR BLD: 3.9
PROTIME: 47.3 SECONDS

## 2023-11-08 PROCEDURE — 85610 PROTHROMBIN TIME: CPT

## 2023-11-08 PROCEDURE — 99212 OFFICE O/P EST SF 10 MIN: CPT

## 2023-11-08 NOTE — PROGRESS NOTES
Patient seen in person in Medication Management Service. Patient states compliant all of the time with regimen. No bleeding or thromboembolic side effects noted. No significant med changes. Patient had been drinking green tea prior to last visit with subtherapeutic INR but stopped after last visit. Is now drinking black tea. No significant recent illness or disease state changes. PT/INR done via POC meter per protocol. INR was supratherapeutic at 3.9.  (goal 2 - 3)    Warfarin regimen will be held for today  then reduced to 7.5mg on MWFSAt and 5mg all other days. Will retest in 1 week. Will need refill in next few weeks once new dose determined. Patient understands dosing directions and information discussed. Dosing schedule and follow up appointment given to patient. Progress note routed to referring physicians office. Patient acknowledges working in [address] [place] [address] with Pharmacist as referred by his/her physician/provider. COVID 19 screening completed. At this time patient denies symptoms, recent travel and exposure. Patient educated to screen for temperature and COVID-19 symptoms prior to coming to clinic for next appointment. They are instructed to call the clinic to reschedule if they have any symptoms. Standing order for PT/INR has been placed in preparation to transition to possible remote INR monitoring given efforts to reduce the spread of COVID-19. For Pharmacy Admin Tracking Only    Intervention Detail: Adherence Monitorin and Dose Adjustment: 1, reason: Therapy Optimization  Total # of Interventions Recommended: 3  Total # of Interventions Accepted: 3  Time Spent (min): 20    Lisa Alonso RPH,Pharm. D,, BCPS, PASTORP  2023  7:32 AM

## 2023-11-15 ENCOUNTER — HOSPITAL ENCOUNTER (OUTPATIENT)
Dept: PHARMACY | Age: 48
Setting detail: THERAPIES SERIES
Discharge: HOME | End: 2023-11-15
Payer: COMMERCIAL

## 2023-11-15 ENCOUNTER — TELEPHONE (OUTPATIENT)
Dept: INFUSION THERAPY | Age: 48
End: 2023-11-15

## 2023-11-15 DIAGNOSIS — D68.61 ANTI-PHOSPHOLIPID ANTIBODY SYNDROME (HCC): Primary | ICD-10-CM

## 2023-11-15 LAB
INR BLD: 2.5
PROTIME: 29.5 SECONDS

## 2023-11-15 PROCEDURE — 85610 PROTHROMBIN TIME: CPT

## 2023-11-15 PROCEDURE — 99211 OFF/OP EST MAY X REQ PHY/QHP: CPT

## 2023-11-15 NOTE — PROGRESS NOTES
Patient seen in person in Medication Management Service. Patient states compliant all of the time with regimen. No bleeding or thromboembolic side effects noted. No significant med or dietary changes. No significant recent illness or disease state changes. PT/INR done via POC meter per protocol. INR was therapeutic at 2.5.  (goal 2 - 3)    Warfarin regimen will be continued at current dose of 7.5mg on MWFSat and 5mg all other days. Will retest in 2 weeks. Patient indicates he had enough warfarin at home and does not need a refill. Patient understands dosing directions and information discussed. Dosing schedule and follow up appointment given to patient. Progress note routed to referring physicians office. Patient acknowledges working in [address] [place] [address] with Pharmacist as referred by his/her physician/provider. COVID 19 screening completed. At this time patient denies symptoms, recent travel and exposure. Patient educated to screen for temperature and COVID-19 symptoms prior to coming to clinic for next appointment. They are instructed to call the clinic to reschedule if they have any symptoms. Standing order for PT/INR has been placed in preparation to transition to possible remote INR monitoring given efforts to reduce the spread of COVID-19. For Pharmacy Admin Tracking Only    Total # of Interventions Recommended: 0  Total # of Interventions Accepted: 0  Time Spent (min): 20  Lisa Alonso RPH,Pharm. D,, BCPS, CACP  11/15/2023  6:59 AM

## 2023-11-30 ENCOUNTER — HOSPITAL ENCOUNTER (OUTPATIENT)
Dept: PHARMACY | Age: 48
Setting detail: THERAPIES SERIES
Discharge: HOME | End: 2023-11-30
Payer: COMMERCIAL

## 2023-11-30 DIAGNOSIS — D68.61 ANTI-PHOSPHOLIPID ANTIBODY SYNDROME (HCC): Primary | ICD-10-CM

## 2023-11-30 LAB
INR BLD: 2.5
PROTIME: 29.6 SECONDS

## 2023-11-30 PROCEDURE — 99211 OFF/OP EST MAY X REQ PHY/QHP: CPT

## 2023-11-30 PROCEDURE — 85610 PROTHROMBIN TIME: CPT

## 2023-11-30 NOTE — PROGRESS NOTES
Patient seen in person in Medication Management Service. Patient states compliant most of the time with regimen. No bleeding or thromboembolic side effects noted. No significant med or dietary changes. No significant recent illness or disease state changes. PT/INR done via POC meter per protocol. INR was therapeutic at 2.5.  (goal 2 - 3)    Warfarin regimen will be continued at current dose 5 mg Sun-Tues-Thurs and 7.5 mg all other days. Will retest in 4 weeks. Patient understands dosing directions and information discussed. Dosing schedule and follow up appointment given to patient. Progress note routed to referring physicians office. Patient acknowledges working in [address] [place] [address] with Pharmacist as referred by his/her physician/provider. COVID 19 screening completed. At this time patient denies symptoms, recent travel and exposure. Patient educated to screen for temperature and COVID-19 symptoms prior to coming to clinic for next appointment. They are instructed to call the clinic to reschedule if they have any symptoms. Standing order for PT/INR has been placed in preparation to transition to possible remote INR monitoring given efforts to reduce the spread of COVID-19.       For Pharmacy Admin Tracking Only    Intervention Detail: Adherence Monitorin  Total # of Interventions Recommended: 0  Total # of Interventions Accepted: 0  Time Spent (min): 20

## 2023-12-28 ENCOUNTER — APPOINTMENT (OUTPATIENT)
Dept: PHARMACY | Age: 48
End: 2023-12-28
Payer: COMMERCIAL

## 2023-12-29 ENCOUNTER — HOSPITAL ENCOUNTER (OUTPATIENT)
Dept: PHARMACY | Age: 48
Setting detail: THERAPIES SERIES
Discharge: HOME | End: 2023-12-29
Payer: COMMERCIAL

## 2023-12-29 DIAGNOSIS — D68.61 ANTI-PHOSPHOLIPID ANTIBODY SYNDROME (HCC): Primary | ICD-10-CM

## 2023-12-29 LAB
INR BLD: 2.7
PROTIME: 33 SECONDS

## 2023-12-29 PROCEDURE — 99211 OFF/OP EST MAY X REQ PHY/QHP: CPT | Performed by: PHARMACIST

## 2023-12-29 PROCEDURE — 85610 PROTHROMBIN TIME: CPT | Performed by: PHARMACIST

## 2023-12-29 NOTE — PROGRESS NOTES
Patient seen in person in Medication Management Service. Patient states compliant all of the time with regimen. No bleeding or thromboembolic side effects noted. No significant med or dietary changes. Pt does ask if he can eat garlic. Ate 2 jars of garlic from an [employer] the past week. He only does this occasionally when visiting [facility], [facility].. Discussed that it does have some antiplatelet properties and need to use caution when eating large amounts. Pt does not plan to continue to eat the amount he has the past week. No significant recent illness or disease state changes. PT/INR done via POC meter per protocol. INR was therapeutic at 2.7.  (goal 2 - 3)    Warfarin regimen will be continued at current dose 5 mg Sun/Tues/Thurs and 7.5 mg all other days. Will retest in 4 weeks. Patient understands dosing directions and information discussed. Dosing schedule and follow up appointment given to patient. Progress note routed to referring physicians office. Patient acknowledges working in [address] [place] [address] with Pharmacist as referred by his/her physician/provider. COVID 19 screening completed. At this time patient denies symptoms, recent travel and exposure. Patient educated to screen for temperature and COVID-19 symptoms prior to coming to clinic for next appointment. They are instructed to call the clinic to reschedule if they have any symptoms. Standing order for PT/INR has been placed in preparation to transition to possible remote INR monitoring given efforts to reduce the spread of COVID-19.       For Pharmacy Admin Tracking Only    Intervention Detail:   Total # of Interventions Recommended: 0  Total # of Interventions Accepted: 0  Time Spent (min): 20    Elida Perez, Pharm D, [address] [facility]  12/29/2023 4:03 PM

## 2024-01-25 ENCOUNTER — HOSPITAL ENCOUNTER (OUTPATIENT)
Dept: PHARMACY | Age: 49
Setting detail: THERAPIES SERIES
Discharge: HOME | End: 2024-01-25
Payer: COMMERCIAL

## 2024-01-25 DIAGNOSIS — D68.61 ANTI-PHOSPHOLIPID ANTIBODY SYNDROME (HCC): Primary | ICD-10-CM

## 2024-01-25 LAB
INR BLD: 1.9
PROTIME: 22.5 SECONDS

## 2024-01-25 PROCEDURE — 85610 PROTHROMBIN TIME: CPT

## 2024-01-25 PROCEDURE — 99212 OFFICE O/P EST SF 10 MIN: CPT

## 2024-01-25 NOTE — PROGRESS NOTES
Patient seen in person in Medication Management Service.    Patient states compliant most of the time with regimen.    No bleeding or thromboembolic side effects noted.    No significant med or dietary changes.    No significant recent illness or disease state changes.      PT/INR done via POC meter per protocol.  INR was subtherapeutic at 1.9(goal 2 - 3)  The patient is exercising more    Warfarin regimen will be increased to 5 mg Tues/Sat and 7.5 mg all other days.  Will retest in 2 weeks.    Patient understands dosing directions and information discussed.  Dosing schedule and follow up appointment given to patient. Progress note routed to referring physicians office.  Patient acknowledges working in Consult Agreement with Pharmacist as referred by his/her physician/provider.      COVID 19 screening completed.  At this time patient denies symptoms, recent travel and exposure.  Patient educated to screen for temperature and COVID-19 symptoms prior to coming to clinic for next appointment.  They are instructed to call the clinic to reschedule if they have any symptoms.      Standing order for PT/INR has been placed in preparation to transition to possible remote INR monitoring given efforts to reduce the spread of COVID-19.      For Pharmacy Admin Tracking Only    Intervention Detail: Dose Adjustment: 1, reason: Therapy Optimization  Total # of Interventions Recommended: 1  Total # of Interventions Accepted: 1  Time Spent (min): 20

## 2024-02-09 ENCOUNTER — HOSPITAL ENCOUNTER (OUTPATIENT)
Dept: PHARMACY | Age: 49
Setting detail: THERAPIES SERIES
Discharge: HOME | End: 2024-02-09
Payer: COMMERCIAL

## 2024-02-09 DIAGNOSIS — D68.61 ANTI-PHOSPHOLIPID ANTIBODY SYNDROME (HCC): Primary | ICD-10-CM

## 2024-02-09 LAB
INR BLD: 2.1
PROTIME: 24.9 SECONDS

## 2024-02-09 PROCEDURE — 99212 OFFICE O/P EST SF 10 MIN: CPT

## 2024-02-09 PROCEDURE — 85610 PROTHROMBIN TIME: CPT

## 2024-02-09 NOTE — PROGRESS NOTES
Patient seen in person in Medication Management Service.    Patient states compliant most of the time with regimen.    No bleeding or thromboembolic side effects noted.    No significant med or dietary changes.    No significant recent illness or disease state changes.      PT/INR done via POC meter per protocol.  INR was therapeutic at 2.1.  (goal 2 - 3)  INR of 2.1 at lower end of therapeutic range    Warfarin regimen will be increased to 5 mg Tues and 7.5 mg all other days.  Will retest in 4 weeks.    Patient understands dosing directions and information discussed.  Dosing schedule and follow up appointment given to patient. Progress note routed to referring physicians office.  Patient acknowledges working in Consult Agreement with Pharmacist as referred by his/her physician/provider.      COVID 19 screening completed.  At this time patient denies symptoms, recent travel and exposure.  Patient educated to screen for temperature and COVID-19 symptoms prior to coming to clinic for next appointment.  They are instructed to call the clinic to reschedule if they have any symptoms.      Standing order for PT/INR has been placed in preparation to transition to possible remote INR monitoring given efforts to reduce the spread of COVID-19.      For Pharmacy Admin Tracking Only    Intervention Detail: Dose Adjustment: 1, reason: Therapy Optimization  Total # of Interventions Recommended: 1  Total # of Interventions Accepted: 1  Time Spent (min): 20

## 2024-03-06 ENCOUNTER — TELEPHONE (OUTPATIENT)
Dept: PHARMACY | Age: 49
End: 2024-03-06

## 2024-03-06 RX ORDER — WARFARIN SODIUM 5 MG/1
5 TABLET ORAL SEE ADMIN INSTRUCTIONS
Qty: 126 TABLET | Refills: 1 | Status: SHIPPED | OUTPATIENT
Start: 2024-03-06 | End: 2024-06-04

## 2024-03-06 NOTE — TELEPHONE ENCOUNTER
Patient's wife requested warfarin to be changed to 90 day supply.   Sent Rx to [facility] pharmacy  Warfarin 5 mg #126 tabs R1  Take 5 mg on Tuesday and 7.5 mg all other days.     Maia RicoD, BCPS 3/6/2024 3:13 PM

## 2024-03-11 ENCOUNTER — HOSPITAL ENCOUNTER (OUTPATIENT)
Dept: PHARMACY | Age: 49
Setting detail: THERAPIES SERIES
Discharge: HOME | End: 2024-03-11
Payer: COMMERCIAL

## 2024-03-11 DIAGNOSIS — D68.61 ANTI-PHOSPHOLIPID ANTIBODY SYNDROME (HCC): Primary | ICD-10-CM

## 2024-03-11 LAB
INR BLD: 3.4
PROTIME: 40.6 SECONDS

## 2024-03-11 PROCEDURE — 85610 PROTHROMBIN TIME: CPT | Performed by: PHARMACIST

## 2024-03-11 PROCEDURE — 99212 OFFICE O/P EST SF 10 MIN: CPT | Performed by: PHARMACIST

## 2024-03-11 NOTE — PROGRESS NOTES
Patient seen in person in Medication Management Service.    Patient states compliant all of the time with regimen.    No bleeding or thromboembolic side effects noted.    No significant med or dietary changes.    No significant disease state changes.      Patient reports he did have a respiratory illness a couple weeks ago. Took some Theraflu nighttime for a couple of days. No nausea, vomiting, or diarrhea.   Started to feel better last week.     Has also been more active. Walking a lot more.     Patient acknowledges they are still an active patient of referring provider which is Dr. Grey Yes     PT/INR done via POC meter per protocol.  INR was supratherapeutic at 3.4.  (goal 2 - 3)    Last INR 2.1.  Warfarin regimen will be held for 1 day and then resume 5 mg Tues and 7.5 mg all other days.  Will retest in 1 week.    Patient understands dosing directions and information discussed.  Dosing schedule and follow up appointment given to patient. Progress note routed to referring physicians office.  Patient acknowledges working in Consult Agreement with Pharmacist as referred by his/her physician/provider.      For Pharmacy Admin Tracking Only    Intervention Detail: Dose Adjustment: 1, reason: Therapy Optimization  Total # of Interventions Recommended: 1  Total # of Interventions Accepted: 1  Time Spent (min): 20    Casie Patel, Pharm D, BCPS  [facility]  3/11/2024 7:00 AM

## 2024-03-18 ENCOUNTER — TELEPHONE (OUTPATIENT)
Dept: PHARMACY | Age: 49
End: 2024-03-18

## 2024-03-18 ENCOUNTER — HOSPITAL ENCOUNTER (OUTPATIENT)
Dept: PHARMACY | Age: 49
Setting detail: THERAPIES SERIES
Discharge: HOME | End: 2024-03-18
Payer: COMMERCIAL

## 2024-03-18 DIAGNOSIS — D68.61 ANTI-PHOSPHOLIPID ANTIBODY SYNDROME (HCC): Primary | ICD-10-CM

## 2024-03-18 LAB
INR BLD: 3
PROTIME: 35.5 SECONDS

## 2024-03-18 PROCEDURE — 85610 PROTHROMBIN TIME: CPT

## 2024-03-18 PROCEDURE — 99213 OFFICE O/P EST LOW 20 MIN: CPT

## 2024-03-18 RX ORDER — WARFARIN SODIUM 7.5 MG/1
TABLET ORAL
Qty: 100 TABLET | Refills: 1 | Status: SHIPPED | OUTPATIENT
Start: 2024-03-18

## 2024-03-18 NOTE — PROGRESS NOTES
Patient seen in person in Medication Management Service.    Patient states compliant most of the time with regimen.    No bleeding or thromboembolic side effects noted.    No significant med or dietary changes.    No significant recent illness or disease state changes.        PT/INR done via POC meter per protocol.  INR was therapeutic at 3.  (goal 2 - 3)    Warfarin regimen will be decreased to 3.75 mg Tues and 7.5 mg all other days.  Will retest in 4 weeks.  New Prescription for warfarin 7.5 mg tablets to [facility] on [address]  Patient understands dosing directions and information discussed.  Dosing schedule and follow up appointment given to patient. Progress note routed to referring physicians office.  Patient acknowledges working in Consult Agreement with Pharmacist as referred by his/her physician/provider.      For Pharmacy Admin Tracking Only    Intervention Detail: Dose Adjustment: 1, reason: Therapy De-escalation  Total # of Interventions Recommended: 1  Total # of Interventions Accepted: 1  Time Spent (min): 20

## 2024-03-25 PROBLEM — I10 ESSENTIAL HYPERTENSION: Status: ACTIVE | Noted: 2024-03-25

## 2024-04-15 ENCOUNTER — APPOINTMENT (OUTPATIENT)
Dept: PHARMACY | Age: 49
End: 2024-04-15
Payer: COMMERCIAL

## 2024-04-15 ENCOUNTER — TELEPHONE (OUTPATIENT)
Dept: PHARMACY | Age: 49
End: 2024-04-15

## 2024-04-15 NOTE — TELEPHONE ENCOUNTER
Called patient due to them not showing up for their appt today in the [facility].  Left message for them to call back to reschedule their appt. Indicated importance of keeping up on INR monitoring.    Maia Blanco PharmD, BCPS 4/15/2024 7:15 AM

## 2024-04-17 ENCOUNTER — HOSPITAL ENCOUNTER (OUTPATIENT)
Dept: PHARMACY | Age: 49
Setting detail: THERAPIES SERIES
Discharge: HOME | End: 2024-04-17
Payer: COMMERCIAL

## 2024-04-17 DIAGNOSIS — D68.61 ANTI-PHOSPHOLIPID ANTIBODY SYNDROME (HCC): Primary | ICD-10-CM

## 2024-04-17 LAB
INR BLD: 3.6
PROTIME: 43.6 SECONDS

## 2024-04-17 PROCEDURE — 99212 OFFICE O/P EST SF 10 MIN: CPT

## 2024-04-17 PROCEDURE — 85610 PROTHROMBIN TIME: CPT

## 2024-04-17 NOTE — PROGRESS NOTES
Patient seen in person in Medication Management Service.    Patient states compliant all of the time with regimen.  However, patient was using 5 mg on Tue (instead of 3.75 mg ) and 7.5 mg all other days as he picked up prescription for 5 mg tabs.   No bleeding or thromboembolic side effects noted.    No significant med or dietary changes.    No significant recent illness or disease state changes.     Patient acknowledges they are still an active patient of referring provider which is Shelke Yes     PT/INR done via POC meter per protocol.  INR was supratherapeutic at 3.6.  (goal 2 - 3)    Warfarin regimen will be take 2.5 mg today, 5 mg tomorrow and 7.5 mg all other days until appt on 2024..  Will retest in 1 week.    Called [facility] pharmacy, spoke with Krist[facility]  - de-activated prescription for Warfarin 5 mg to avoid future confusion in dosing, the only prescription remains active is warfarin 7.5 mg strength.  Patient understands dosing directions and information discussed.  Dosing schedule and follow up appointment given to patient. Progress note routed to referring physicians office.  Patient acknowledges working in Consult Agreement with Pharmacist as referred by his/her physician/provider.      For Pharmacy Admin Tracking Only    Intervention Detail: Adherence Monitorin, Dose Adjustment: 1, reason: Therapy Optimization, and Refill(s) Provided  Total # of Interventions Recommended: 3  Total # of Interventions Accepted: 3  Time Spent (min): 20    Maia Blanco PharmD, [facility] 20[facility] 12:44 PM

## 2024-04-23 ENCOUNTER — HOSPITAL ENCOUNTER (OUTPATIENT)
Dept: PHARMACY | Age: 49
Setting detail: THERAPIES SERIES
Discharge: HOME | End: 2024-04-23
Payer: COMMERCIAL

## 2024-04-23 DIAGNOSIS — D68.61 ANTI-PHOSPHOLIPID ANTIBODY SYNDROME (HCC): Primary | ICD-10-CM

## 2024-04-23 LAB
INR BLD: 2.5
PROTIME: 29.8 SECONDS

## 2024-04-23 PROCEDURE — 85610 PROTHROMBIN TIME: CPT | Performed by: PHARMACIST

## 2024-04-23 PROCEDURE — 99211 OFF/OP EST MAY X REQ PHY/QHP: CPT | Performed by: PHARMACIST

## 2024-04-23 NOTE — PROGRESS NOTES
Patient seen in person in Medication Management Service.    Patient states compliant all of the time with regimen.    Although, patient states he still only has the 5 mg tablets at home. His last prescription filled on 3/30/24 was for the 5 mg tablets. He needs to use these up before pharmacy will fill 7.5 mg tablets which is this week. Pt plans to  the 7.5 mg tablets today.     No bleeding or thromboembolic side effects noted.    No significant med or dietary changes.    No significant recent illness or disease state changes.      Patient acknowledges they are still an active patient of referring provider which is Matilda Yes     PT/INR done via POC meter per protocol.  INR was therapeutic at 2.5.  (goal 2 - 3)    Warfarin regimen will be 3.75 mg Mon/Thurs and 7.5 mg all other days.  Will retest in 2 weeks.    Patient understands dosing directions and information discussed.  Dosing schedule and follow up appointment given to patient. Progress note routed to referring physicians office.  Patient acknowledges working in Consult Agreement with Pharmacist as referred by his/her physician/provider.      For Pharmacy Admin Tracking Only    Intervention Detail: Adherence Monitorin and Dose Adjustment: 1, reason: Therapy Optimization  Total # of Interventions Recommended: 2  Total # of Interventions Accepted: 2  Time Spent (min): 20     Casie Patel, Pharm D, BCPS, CACP  [facility]  2024 7:08 AM

## 2024-04-25 ENCOUNTER — TELEPHONE (OUTPATIENT)
Dept: PHARMACY | Age: 49
End: 2024-04-25

## 2024-04-25 RX ORDER — WARFARIN SODIUM 7.5 MG/1
TABLET ORAL
Qty: 100 TABLET | Refills: 1 | Status: SHIPPED | OUTPATIENT
Start: 2024-04-25

## 2024-05-07 ENCOUNTER — HOSPITAL ENCOUNTER (OUTPATIENT)
Dept: PHARMACY | Age: 49
Setting detail: THERAPIES SERIES
Discharge: HOME | End: 2024-05-07
Payer: COMMERCIAL

## 2024-05-07 DIAGNOSIS — D68.61 ANTI-PHOSPHOLIPID ANTIBODY SYNDROME (HCC): Primary | ICD-10-CM

## 2024-05-07 LAB
INR BLD: 2.1
PROTIME: 25.2 SECONDS

## 2024-05-07 PROCEDURE — 99211 OFF/OP EST MAY X REQ PHY/QHP: CPT

## 2024-05-07 PROCEDURE — 85610 PROTHROMBIN TIME: CPT

## 2024-05-07 NOTE — PROGRESS NOTES
Patient seen in person in Medication Management Service.    Patient states compliant most of the time with regimen.    No bleeding or thromboembolic side effects noted.    No significant med or dietary changes.    No significant recent illness or disease state changes.      Patient acknowledges they are still an active patient of referring provider which is Moise Grey Yes     PT/INR done via POC meter per protocol.  INR was therapeutic at 2.1.  (goal 2 - 3)    Warfarin regimen will be continued at current dose 3.75 mg Mon/Thurs and 7.5 mg all other days.  Will retest in 4 weeks.    Patient understands dosing directions and information discussed.  Dosing schedule and follow up appointment given to patient. Progress note routed to referring physicians office.  Patient acknowledges working in Consult Agreement with Pharmacist as referred by his/her physician/provider.      For Pharmacy Admin Tracking Only    Intervention Detail: Adherence Monitorin  Total # of Interventions Recommended: 0  Total # of Interventions Accepted: 0  Time Spent (min): 20

## 2024-06-04 ENCOUNTER — HOSPITAL ENCOUNTER (OUTPATIENT)
Dept: PHARMACY | Age: 49
Setting detail: THERAPIES SERIES
Discharge: HOME | End: 2024-06-04
Payer: COMMERCIAL

## 2024-06-04 DIAGNOSIS — D68.61 ANTI-PHOSPHOLIPID ANTIBODY SYNDROME (HCC): Primary | ICD-10-CM

## 2024-06-04 LAB
INR BLD: 2.7
PROTIME: 31.8 SECONDS

## 2024-06-04 PROCEDURE — 85610 PROTHROMBIN TIME: CPT | Performed by: PHARMACIST

## 2024-06-04 PROCEDURE — 99211 OFF/OP EST MAY X REQ PHY/QHP: CPT | Performed by: PHARMACIST

## 2024-06-04 NOTE — PROGRESS NOTES
Patient seen in person in Medication Management Service.    Patient states compliant all of the time with regimen.    No bleeding or thromboembolic side effects noted.    No significant med or dietary changes.    No significant recent illness or disease state changes.      Patient acknowledges they are still an active patient of referring provider which is Dr. Grey Yes       PT/INR done via POC meter per protocol.  INR was therapeutic at 2.7.  (goal 2 - 3)    Warfarin regimen will be continued at current dose 3.75 mg Mon/Thurs and 7.5 mg all other days.  Will retest in 4 weeks.    Patient understands dosing directions and information discussed.  Dosing schedule and follow up appointment given to patient. Progress note routed to referring physicians office.  Patient acknowledges working in Consult Agreement with Pharmacist as referred by his/her physician/provider.      For Pharmacy Admin Tracking Only    Intervention Detail:   Total # of Interventions Recommended: 0  Total # of Interventions Accepted: 0  Time Spent (min): 20     Casie Patel, Pharm D, BCPS, CACP  [facility]  6/4/2024 6:51 AM

## 2024-06-24 DIAGNOSIS — D68.61 ANTI-PHOSPHOLIPID ANTIBODY SYNDROME (HCC): ICD-10-CM

## 2024-06-24 DIAGNOSIS — D68.59 PRIMARY HYPERCOAGULABLE STATE (HCC): Primary | ICD-10-CM

## 2024-06-25 ENCOUNTER — HOSPITAL ENCOUNTER (OUTPATIENT)
Age: 49
Discharge: HOME | End: 2024-06-25
Payer: COMMERCIAL

## 2024-06-25 ENCOUNTER — OFFICE VISIT (OUTPATIENT)
Dept: ONCOLOGY | Age: 49
End: 2024-06-25
Payer: COMMERCIAL

## 2024-06-25 VITALS
BODY MASS INDEX: 25.8 KG/M2 | HEART RATE: 87 BPM | DIASTOLIC BLOOD PRESSURE: 87 MMHG | TEMPERATURE: 97.7 F | OXYGEN SATURATION: 98 % | SYSTOLIC BLOOD PRESSURE: 130 MMHG | WEIGHT: 169.7 LBS | RESPIRATION RATE: 16 BRPM

## 2024-06-25 DIAGNOSIS — D68.61 ANTI-PHOSPHOLIPID ANTIBODY SYNDROME (HCC): ICD-10-CM

## 2024-06-25 DIAGNOSIS — D68.59 PRIMARY HYPERCOAGULABLE STATE (HCC): Primary | ICD-10-CM

## 2024-06-25 DIAGNOSIS — D68.59 PRIMARY HYPERCOAGULABLE STATE (HCC): ICD-10-CM

## 2024-06-25 LAB
BASOPHILS # BLD: 0.1 K/UL (ref 0–0.2)
BASOPHILS NFR BLD: 1 % (ref 0–2)
EOSINOPHIL # BLD: 0.3 K/UL (ref 0–0.4)
EOSINOPHILS RELATIVE PERCENT: 4 % (ref 1–4)
ERYTHROCYTE [DISTWIDTH] IN BLOOD BY AUTOMATED COUNT: 14 % (ref 12.5–15.4)
HCT VFR BLD AUTO: 40.2 % (ref 41–53)
HGB BLD-MCNC: 13.5 G/DL (ref 13.5–17.5)
LYMPHOCYTES NFR BLD: 2.1 K/UL (ref 1–4.8)
LYMPHOCYTES RELATIVE PERCENT: 27 % (ref 24–44)
MCH RBC QN AUTO: 29.8 PG (ref 26–34)
MCHC RBC AUTO-ENTMCNC: 33.5 G/DL (ref 31–37)
MCV RBC AUTO: 89 FL (ref 80–100)
MONOCYTES NFR BLD: 0.6 K/UL (ref 0.1–1.2)
MONOCYTES NFR BLD: 9 % (ref 2–11)
NEUTROPHILS NFR BLD: 59 % (ref 36–66)
NEUTS SEG NFR BLD: 4.5 K/UL (ref 1.8–7.7)
PLATELET # BLD AUTO: 327 K/UL (ref 140–450)
PMV BLD AUTO: 6.8 FL (ref 6–12)
RBC # BLD AUTO: 4.52 M/UL (ref 4.5–5.9)
WBC OTHER # BLD: 7.6 K/UL (ref 3.5–11)

## 2024-06-25 PROCEDURE — G8419 CALC BMI OUT NRM PARAM NOF/U: HCPCS | Performed by: INTERNAL MEDICINE

## 2024-06-25 PROCEDURE — 85025 COMPLETE CBC W/AUTO DIFF WBC: CPT

## 2024-06-25 PROCEDURE — 36415 COLL VENOUS BLD VENIPUNCTURE: CPT

## 2024-06-25 PROCEDURE — G8427 DOCREV CUR MEDS BY ELIG CLIN: HCPCS | Performed by: INTERNAL MEDICINE

## 2024-06-25 PROCEDURE — 3079F DIAST BP 80-89 MM HG: CPT | Performed by: INTERNAL MEDICINE

## 2024-06-25 PROCEDURE — 4004F PT TOBACCO SCREEN RCVD TLK: CPT | Performed by: INTERNAL MEDICINE

## 2024-06-25 PROCEDURE — 99214 OFFICE O/P EST MOD 30 MIN: CPT | Performed by: INTERNAL MEDICINE

## 2024-06-25 PROCEDURE — 3075F SYST BP GE 130 - 139MM HG: CPT | Performed by: INTERNAL MEDICINE

## 2024-06-25 NOTE — PROGRESS NOTES
Today's Date: 6/25/2024  Patient Name: David Singletary  Patient's age: 49 y.o., 1975    DIAGNOSIS:   Hypercoagulable state,  Anti phospholipid antibody syndrome  Positive beta-2 glycoprotein IgM antibody and positive IgM anticardiolipin antibody two time 3 months apart  CAD      CHIEF COMPLAINT:    Chief Complaint   Patient presents with    Other     To review disease and further recommendations      INTERVAL HISTORY :    Patient is return for follow visit and to discuss lab results and further recommendations.  He denies any chest pain or shortness of breath.  Denies any leg pain or swelling.  He is on Coumadin and tolerating well.  He has quit smoking for more than a year ago.    Denies any bleeding symptoms.        During this visit patient's allergy, social, medical, surgical history and medications were reviewed and updated.     HISTORY OF PRESENT ILLNESS:    David Singletary is a 49 y.o. male who is admitted to the hospital on (Not on file)  for evaluation of his cardiac disease.  Patient has past medical history of CAD, diabetes, tobacco abuse, hyperlipidemia, hypertension.  Patient was transferred from [facility] where he presented with STEMI.  Patient underwent cardiac catheterization yesterday which showed possible clot in proximal LAD and small distal inferior branch and patient did undergo LAYLA of mid RCA and also proximal LAD.  At the end of procedure patient had significant chest pain and repeat cath showed blood clot in mid RCA stent and occlusion of LAD stent, manual thrombectomy was performed.  Patient currently on anticoagulation as per cardiology and planning for JUAN JOSÉ on Monday.  Today patient underwent JUAN JOSÉ which showed no thrombus or valvular vegetation.  Cousin on father side clotting disorder.    Hematology consulted for evaluation of underlying thrombophilia.    Past Medical History:   has a past medical history of Anxiety, Asthma, CAD (coronary artery disease), Chronic

## 2024-07-08 ENCOUNTER — HOSPITAL ENCOUNTER (OUTPATIENT)
Dept: PHARMACY | Age: 49
Setting detail: THERAPIES SERIES
Discharge: HOME | End: 2024-07-08
Payer: COMMERCIAL

## 2024-07-08 DIAGNOSIS — D68.61 ANTI-PHOSPHOLIPID ANTIBODY SYNDROME (HCC): Primary | ICD-10-CM

## 2024-07-08 LAB
INR BLD: 3.2
PROTIME: 38.9 SECONDS

## 2024-07-08 PROCEDURE — 99211 OFF/OP EST MAY X REQ PHY/QHP: CPT | Performed by: PHARMACIST

## 2024-07-08 PROCEDURE — 85610 PROTHROMBIN TIME: CPT | Performed by: PHARMACIST

## 2024-07-08 RX ORDER — PRASTERONE (DHEA) 50 MG
50 TABLET ORAL DAILY
COMMUNITY

## 2024-07-08 NOTE — PROGRESS NOTES
Patient seen in person in Medication Management Service.    Patient states compliant all of the time with regimen.    No bleeding or thromboembolic side effects noted.    No significant dietary changes.  Eating more tomatoes this past week. Has had some tomato juice also, but not the V8 as he was watching vitamin k. No cranberry, grapefruit, black licorice, or alcohol.   Patient started dhea 50 mg daily almost a week ago. Has noticed it is helping with moods and energy and would like to continue.   No significant recent illness or disease state changes.      Patient acknowledges they are still an active patient of referring provider which is Dr. Grey Yes     PT/INR done via POC meter per protocol.  INR was supratherapeutic at 3.2.  (goal 2 - 3)    Warfarin regimen will be decreased to 3.75 mg MWF and 7.5 mg all other days.  Will retest in 2 week.    Patient understands dosing directions and information discussed.  Dosing schedule and follow up appointment given to patient. Progress note routed to referring physicians office.  Patient acknowledges working in Consult Agreement with Pharmacist as referred by his/her physician/provider.      For Pharmacy Admin Tracking Only    Intervention Detail: Dose Adjustment: 1, reason: Therapy Optimization  Total # of Interventions Recommended: 1  Total # of Interventions Accepted: 1  Time Spent (min): 20     Casie Patel, Pharm D, BCPS, CACP  [facility]  7/8/2024 7:04 AM

## 2024-07-22 ENCOUNTER — HOSPITAL ENCOUNTER (OUTPATIENT)
Dept: PHARMACY | Age: 49
Setting detail: THERAPIES SERIES
Discharge: HOME | End: 2024-07-22
Payer: COMMERCIAL

## 2024-07-22 DIAGNOSIS — D68.61 ANTI-PHOSPHOLIPID ANTIBODY SYNDROME (HCC): Primary | ICD-10-CM

## 2024-07-22 LAB
INR BLD: 1.9
PROTIME: 22.3 SECONDS

## 2024-07-22 PROCEDURE — 99211 OFF/OP EST MAY X REQ PHY/QHP: CPT | Performed by: PHARMACIST

## 2024-07-22 PROCEDURE — 85610 PROTHROMBIN TIME: CPT | Performed by: PHARMACIST

## 2024-07-22 NOTE — PROGRESS NOTES
Patient seen in person in Medication Management Service.    Patient states compliant all of the time with regimen.    No bleeding or thromboembolic side effects noted.    No significant dietary changes.  Patient did run out of aspirin and has been out for almost a week. Plans to resume today when he gets more.   No significant recent illness or disease state changes.      Patient acknowledges they are still an active patient of referring provider which is Dr. Grey Yes       PT/INR done via POC meter per protocol.  INR was subtherapeutic at 1.9.  (goal 2 - 3)    Warfarin regimen will be increased to 3.75 mg Mon/ Fri and 7.5 mg all other days.  Will retest in 2 weeks.    Patient understands dosing directions and information discussed.  Dosing schedule and follow up appointment given to patient. Progress note routed to referring physicians office.  Patient acknowledges working in Consult Agreement with Pharmacist as referred by his/her physician/provider.      For Pharmacy Admin Tracking Only    Intervention Detail: Dose Adjustment: 1, reason: Therapy Optimization  Total # of Interventions Recommended: 1  Total # of Interventions Accepted: 1  Time Spent (min): 20     Casie Patel, Pharm D, BCPS, CACP  [facility]  7/22/2024 7:01 AM

## 2024-07-24 ENCOUNTER — TELEPHONE (OUTPATIENT)
Dept: INFUSION THERAPY | Age: 49
End: 2024-07-24

## 2024-07-24 NOTE — TELEPHONE ENCOUNTER
FMLA completed; spoke to pt's wife and she will . Copy scanned into chart and original left at check-in.

## 2024-08-07 ENCOUNTER — ANTI-COAG VISIT (OUTPATIENT)
Age: 49
End: 2024-08-07
Payer: COMMERCIAL

## 2024-08-07 DIAGNOSIS — D68.61 ANTI-PHOSPHOLIPID ANTIBODY SYNDROME (HCC): Primary | ICD-10-CM

## 2024-08-07 PROCEDURE — 85610 PROTHROMBIN TIME: CPT

## 2024-08-07 PROCEDURE — 99212 OFFICE O/P EST SF 10 MIN: CPT

## 2024-08-07 NOTE — PROGRESS NOTES
Patient seen in person in Medication Management Service.    Patient states compliant all of the time with regimen.    No bleeding or thromboembolic side effects noted.    Started clonazepam for anxiety nightly.  Has been using creatine supplement and also tomato juice daily 11 oz.    No significant recent illness or disease state changes.      Patient acknowledges they are still an active patient of referring provider which is Dr. Grey Yes       PT/INR done via POC meter per protocol.  INR was subtherapeutic at 1.3.  (goal 2 - 3)    Warfarin regimen will be boosted to 11.75 (1.5 tab of 7.5 mg) today and increased to 3.75 mg Mon and 7.5 mg all other days. Patient understood the instructions.   Will retest in 6 days.    Patient understands dosing directions and information discussed.  Dosing schedule and follow up appointment given to patient. Progress note routed to referring physicians office.  Patient acknowledges working in Consult Agreement with Pharmacist as referred by his/her physician/provider.      For Pharmacy Admin Tracking Only    Intervention Detail: Adherence Monitorin and Dose Adjustment: 1, reason: Therapy Optimization  Total # of Interventions Recommended: 2  Total # of Interventions Accepted: 2  Time Spent (min): 20     Maia Blanco PharmD, BCPS 2024 8:34 AM

## 2024-08-13 ENCOUNTER — ANTI-COAG VISIT (OUTPATIENT)
Age: 49
End: 2024-08-13
Payer: COMMERCIAL

## 2024-08-13 DIAGNOSIS — D68.61 ANTI-PHOSPHOLIPID ANTIBODY SYNDROME (HCC): Primary | ICD-10-CM

## 2024-08-13 LAB
INR BLD: 2.4
PROTIME: 28.7 SECONDS

## 2024-08-13 PROCEDURE — 99211 OFF/OP EST MAY X REQ PHY/QHP: CPT | Performed by: PHARMACIST

## 2024-08-13 PROCEDURE — 85610 PROTHROMBIN TIME: CPT | Performed by: PHARMACIST

## 2024-08-13 NOTE — PROGRESS NOTES
Patient seen in person in Medication Management Service.    Patient states compliant all of the time with regimen.    Patient states his tablets are a different shape and color this time. Reports they were yellow and round. These are more oblong and pale yellow. We did confirm with tablet ID that these are warfarin 7.5 mg tablets.     No bleeding or thromboembolic side effects noted.    No significant med or dietary changes.  Continues to drink using creatine supplement and also plain tomato juice daily 11 oz. (Not V8 or other vegetable juice)   No significant recent illness or disease state changes.      Patient acknowledges they are still an active patient of referring provider which is Dr. Grey Yes     PT/INR done via POC meter per protocol.  INR was therapeutic at 2.4.  (goal 2 - 3)    Warfarin regimen will be continued at current dose 3.75 mg Mon and 7.5 mg all other days.  Will retest in 2 weeks.    Patient understands dosing directions and information discussed.  Dosing schedule and follow up appointment given to patient. Progress note routed to referring physicians office.  Patient acknowledges working in Consult Agreement with Pharmacist as referred by his/her physician/provider.      For Pharmacy Admin Tracking Only    Intervention Detail:   Total # of Interventions Recommended: 0  Total # of Interventions Accepted: 0  Time Spent (min): 20   Casie Patel, Pharm D, BCPS, CACP  [facility]  8/13/2024 7:07 AM

## 2024-08-27 ENCOUNTER — ANTI-COAG VISIT (OUTPATIENT)
Age: 49
End: 2024-08-27
Payer: COMMERCIAL

## 2024-08-27 DIAGNOSIS — D68.61 ANTI-PHOSPHOLIPID ANTIBODY SYNDROME (HCC): Primary | ICD-10-CM

## 2024-08-27 LAB
INTERNATIONAL NORMALIZATION RATIO, POC: 1.8
PROTHROMBIN TIME, POC: 21.3

## 2024-08-27 PROCEDURE — 85610 PROTHROMBIN TIME: CPT | Performed by: PHARMACIST

## 2024-08-27 PROCEDURE — 99212 OFFICE O/P EST SF 10 MIN: CPT | Performed by: PHARMACIST

## 2024-09-10 ENCOUNTER — ANTI-COAG VISIT (OUTPATIENT)
Age: 49
End: 2024-09-10
Payer: COMMERCIAL

## 2024-09-10 DIAGNOSIS — D68.61 ANTI-PHOSPHOLIPID ANTIBODY SYNDROME (HCC): Primary | ICD-10-CM

## 2024-09-10 LAB
INTERNATIONAL NORMALIZATION RATIO, POC: 2.5 (ref 2–3)
PROTHROMBIN TIME, POC: 29.5

## 2024-09-10 PROCEDURE — 99211 OFF/OP EST MAY X REQ PHY/QHP: CPT

## 2024-09-10 PROCEDURE — 85610 PROTHROMBIN TIME: CPT

## 2024-10-08 ENCOUNTER — ANTI-COAG VISIT (OUTPATIENT)
Age: 49
End: 2024-10-08
Payer: COMMERCIAL

## 2024-10-08 DIAGNOSIS — D68.61 ANTI-PHOSPHOLIPID ANTIBODY SYNDROME (HCC): Primary | ICD-10-CM

## 2024-10-08 LAB
INTERNATIONAL NORMALIZATION RATIO, POC: 1.8
PROTHROMBIN TIME, POC: 22.1

## 2024-10-08 PROCEDURE — 85610 PROTHROMBIN TIME: CPT

## 2024-10-08 PROCEDURE — 99212 OFFICE O/P EST SF 10 MIN: CPT

## 2024-10-08 NOTE — PROGRESS NOTES
Patient seen in person in Medication Management Service.    Patient states compliant all of the time with regimen.    No bleeding or thromboembolic side effects noted.    No significant med or dietary changes.  Has been drinking Muscle milk  lately (minor vitamin K content) and  has been started on Trazodone. Pt stopped trazodone 4 days ago due to concerns about drug interactions with coumadin. Asked pt to take medications as prescribed and clinic will follow INR and adjust doses as needed. Also asked to be consistent with Muscle milk supplement. Pt expressed understanding.Will checkin INR in two weeks for dose adjustments in needed when he restarts trazodone.      No significant recent illness or disease state changes.      Patient acknowledges they are still an active patient of referring provider which is Dr. Grey  Yes     PT/INR done via POC meter per protocol.  INR was subtherapeutic at 1.8.  (goal 2 - 3)    Warfarin regimen will be boost with 11.25 mg today and then continue current regimen of 7.5 mg daily.  Will retest in 2 weeks.    Patient understands dosing directions and information discussed.  Dosing schedule and follow up appointment given to patient. Progress note routed to referring physicians office.  Patient acknowledges working in Consult Agreement with Pharmacist as referred by his/her physician/provider.      For Pharmacy Admin Tracking Only    Intervention Detail: Adherence Monitorin and Dose Adjustment: 1, reason: Therapy Optimization  Total # of Interventions Recommended: 2  Total # of Interventions Accepted: 2  Time Spent (min): 20    Maia Blanco PharmD, [facility] 10/8/2024 7:37 AM'

## 2024-10-22 ENCOUNTER — ANTI-COAG VISIT (OUTPATIENT)
Age: 49
End: 2024-10-22
Payer: COMMERCIAL

## 2024-10-22 DIAGNOSIS — D68.61 ANTI-PHOSPHOLIPID ANTIBODY SYNDROME (HCC): Primary | ICD-10-CM

## 2024-10-22 LAB
INTERNATIONAL NORMALIZATION RATIO, POC: 2.9 (ref 2–3)
PROTHROMBIN TIME, POC: 34.7

## 2024-10-22 PROCEDURE — 99211 OFF/OP EST MAY X REQ PHY/QHP: CPT

## 2024-10-22 PROCEDURE — 85610 PROTHROMBIN TIME: CPT

## 2024-10-22 NOTE — PROGRESS NOTES
Patient seen in person in Medication Management Service.    Patient states compliant most of the time with regimen.    No bleeding or thromboembolic side effects noted.    No significant med or dietary changes.    No significant recent illness or disease state changes.      Patient acknowledges they are still an active patient of referring provider which is Moise Grey Yes     PT/INR done via POC meter per protocol.  INR was therapeutic at 2.9.  (goal 2 - 3)    Warfarin regimen will be continued at current dose 7.5 mg daily.  Will retest in 4 weeks.    Patient understands dosing directions and information discussed.  Dosing schedule and follow up appointment given to patient. Progress note routed to referring physicians office.  Patient acknowledges working in Consult Agreement with Pharmacist as referred by his/her physician/provider.      For Pharmacy Admin Tracking Only    Intervention Detail: Adherence Monitorin  Total # of Interventions Recommended: 0  Total # of Interventions Accepted: 0  Time Spent (min): 20

## 2024-11-05 ENCOUNTER — HOSPITAL ENCOUNTER (OUTPATIENT)
Age: 49
Setting detail: SPECIMEN
Discharge: HOME | End: 2024-11-05

## 2024-11-05 DIAGNOSIS — E11.9 TYPE 2 DIABETES MELLITUS WITHOUT COMPLICATION, WITHOUT LONG-TERM CURRENT USE OF INSULIN (HCC): ICD-10-CM

## 2024-11-05 LAB
ALBUMIN SERPL-MCNC: 4.2 G/DL (ref 3.5–5.2)
ALBUMIN/GLOB SERPL: 1.9 {RATIO} (ref 1–2.5)
ALP SERPL-CCNC: 58 U/L (ref 40–129)
ALT SERPL-CCNC: 50 U/L (ref 10–50)
ANION GAP SERPL CALCULATED.3IONS-SCNC: 9 MMOL/L (ref 9–16)
AST SERPL-CCNC: 34 U/L (ref 10–50)
BASOPHILS # BLD: 0.04 K/UL (ref 0–0.2)
BASOPHILS NFR BLD: 1 % (ref 0–2)
BILIRUB SERPL-MCNC: 0.2 MG/DL (ref 0–1.2)
BUN SERPL-MCNC: 15 MG/DL (ref 6–20)
CALCIUM SERPL-MCNC: 8.8 MG/DL (ref 8.6–10.4)
CHLORIDE SERPL-SCNC: 102 MMOL/L (ref 98–107)
CHOLEST SERPL-MCNC: 95 MG/DL (ref 0–199)
CHOLESTEROL/HDL RATIO: 3.5
CO2 SERPL-SCNC: 25 MMOL/L (ref 20–31)
CREAT SERPL-MCNC: 0.9 MG/DL (ref 0.7–1.2)
CREAT UR-MCNC: 23.4 MG/DL (ref 39–259)
EOSINOPHIL # BLD: 0.35 K/UL (ref 0–0.44)
EOSINOPHILS RELATIVE PERCENT: 5 % (ref 1–4)
ERYTHROCYTE [DISTWIDTH] IN BLOOD BY AUTOMATED COUNT: 13.2 % (ref 11.8–14.4)
GFR, ESTIMATED: >90 ML/MIN/1.73M2
GLUCOSE SERPL-MCNC: 231 MG/DL (ref 74–99)
HCT VFR BLD AUTO: 41.1 % (ref 40.7–50.3)
HDLC SERPL-MCNC: 27 MG/DL
HGB BLD-MCNC: 12.9 G/DL (ref 13–17)
IMM GRANULOCYTES # BLD AUTO: <0.03 K/UL (ref 0–0.3)
IMM GRANULOCYTES NFR BLD: 0 %
LDLC SERPL CALC-MCNC: 40 MG/DL (ref 0–100)
LYMPHOCYTES NFR BLD: 1.57 K/UL (ref 1.1–3.7)
LYMPHOCYTES RELATIVE PERCENT: 23 % (ref 24–43)
MCH RBC QN AUTO: 29.7 PG (ref 25.2–33.5)
MCHC RBC AUTO-ENTMCNC: 31.4 G/DL (ref 28.4–34.8)
MCV RBC AUTO: 94.5 FL (ref 82.6–102.9)
MICROALBUMIN UR-MCNC: <12 MG/L (ref 0–20)
MICROALBUMIN/CREAT UR-RTO: ABNORMAL MCG/MG CREAT (ref 0–17)
MONOCYTES NFR BLD: 0.6 K/UL (ref 0.1–1.2)
MONOCYTES NFR BLD: 9 % (ref 3–12)
NEUTROPHILS NFR BLD: 62 % (ref 36–65)
NEUTS SEG NFR BLD: 4.21 K/UL (ref 1.5–8.1)
NRBC BLD-RTO: 0 PER 100 WBC
PLATELET # BLD AUTO: 331 K/UL (ref 138–453)
PMV BLD AUTO: 9.2 FL (ref 8.1–13.5)
POTASSIUM SERPL-SCNC: 4.5 MMOL/L (ref 3.7–5.3)
PROT SERPL-MCNC: 6.4 G/DL (ref 6.6–8.7)
RBC # BLD AUTO: 4.35 M/UL (ref 4.21–5.77)
SODIUM SERPL-SCNC: 136 MMOL/L (ref 136–145)
TRIGL SERPL-MCNC: 142 MG/DL
VLDLC SERPL CALC-MCNC: 28 MG/DL (ref 1–30)
WBC OTHER # BLD: 6.8 K/UL (ref 3.5–11.3)

## 2024-11-06 NOTE — RESULT ENCOUNTER NOTE
Timo - your labs are good except for your sugar being high that day. Continue your current medications.

## 2024-11-19 ENCOUNTER — ANTI-COAG VISIT (OUTPATIENT)
Age: 49
End: 2024-11-19
Payer: COMMERCIAL

## 2024-11-19 DIAGNOSIS — D68.61 ANTI-PHOSPHOLIPID ANTIBODY SYNDROME (HCC): Primary | ICD-10-CM

## 2024-11-19 LAB
INTERNATIONAL NORMALIZATION RATIO, POC: 3.4
PROTHROMBIN TIME, POC: 40.6

## 2024-11-19 PROCEDURE — 99212 OFFICE O/P EST SF 10 MIN: CPT

## 2024-11-19 PROCEDURE — 85610 PROTHROMBIN TIME: CPT

## 2024-11-19 NOTE — PROGRESS NOTES
Patient seen in person in Medication Management Service.    Patient states compliant all of the time with regimen.    No bleeding or thromboembolic side effects noted.    No significant med or dietary changes.    No significant recent illness or disease state changes.      Patient acknowledges they are still an active patient of referring provider which is Matilda  Yes     PT/INR done via POC meter per protocol.  INR was supratherapeutic at 3.4.  (goal 2 - 3)    Warfarin regimen will be decreased to 3.75 mg every Tue; 7.5 mg all other days .  Will retest in 2 weeks.    Patient understands dosing directions and information discussed.  Dosing schedule and follow up appointment given to patient. Progress note routed to referring physicians office.  Patient acknowledges working in Consult Agreement with Pharmacist as referred by his/her physician/provider.      For Pharmacy Admin Tracking Only    Intervention Detail: Adherence Monitorin and Dose Adjustment: 1, reason: Therapy Optimization  Total # of Interventions Recommended: 2  Total # of Interventions Accepted: 2  Time Spent (min): 20    Maia Blanco PharmD, BCPS 2024 7:34 AM

## 2024-12-06 ENCOUNTER — ANTI-COAG VISIT (OUTPATIENT)
Age: 49
End: 2024-12-06
Payer: COMMERCIAL

## 2024-12-06 DIAGNOSIS — D68.61 ANTI-PHOSPHOLIPID ANTIBODY SYNDROME (HCC): Primary | ICD-10-CM

## 2024-12-06 LAB
INTERNATIONAL NORMALIZATION RATIO, POC: 2.5
PROTHROMBIN TIME, POC: 29.7

## 2024-12-06 PROCEDURE — 85610 PROTHROMBIN TIME: CPT

## 2024-12-06 PROCEDURE — 99211 OFF/OP EST MAY X REQ PHY/QHP: CPT

## 2024-12-06 NOTE — PROGRESS NOTES
Patient seen in person in Medication Management Service.    Patient states compliant most of the time with regimen.  Pt has been taking 7.5 mg daily (he thinks), instead of decreased weekly dose of 3.75 mg on tue and 7.5 mg allother days.   No bleeding or thromboembolic side effects noted.    No significant med or dietary changes.    No significant recent illness or disease state changes.      Patient acknowledges they are still an active patient of referring provider which is Dr. Grey Yes     PT/INR done via POC meter per protocol.  INR was therapeutic at 2.5.  (goal 2 - 3)    Warfarin regimen will be continued at current dose 7.5 mg daily. Pt understands that if at then next appt INR supratherapeutic dose will be decreased to use 1/2 tab of 7.5 mg once a week and 7.5 mg all other days.  Will retest in 2 weeks.    Patient understands dosing directions and information discussed.  Dosing schedule and follow up appointment given to patient. Progress note routed to referring physicians office.  Patient acknowledges working in Consult Agreement with Pharmacist as referred by his/her physician/provider.      For Pharmacy Admin Tracking Only    Intervention Detail: Adherence Monitorin and Dose Adjustment: 1, reason: Therapy Optimization  Total # of Interventions Recommended: 2  Total # of Interventions Accepted: 2  Time Spent (min): 20    Maia Blanco PharmD, BCPS 2024 7:12 AM

## 2024-12-18 ENCOUNTER — ANTI-COAG VISIT (OUTPATIENT)
Age: 49
End: 2024-12-18
Payer: COMMERCIAL

## 2024-12-18 DIAGNOSIS — D68.61 ANTI-PHOSPHOLIPID ANTIBODY SYNDROME (HCC): Primary | ICD-10-CM

## 2024-12-18 LAB
INTERNATIONAL NORMALIZATION RATIO, POC: 2.1
PROTHROMBIN TIME, POC: 24.7

## 2024-12-18 PROCEDURE — 85610 PROTHROMBIN TIME: CPT | Performed by: PHARMACY

## 2024-12-18 PROCEDURE — 99211 OFF/OP EST MAY X REQ PHY/QHP: CPT | Performed by: PHARMACY

## 2024-12-18 RX ORDER — WARFARIN SODIUM 7.5 MG/1
TABLET ORAL
Qty: 30 TABLET | Refills: 3 | Status: SHIPPED | OUTPATIENT
Start: 2024-12-18

## 2024-12-18 NOTE — PROGRESS NOTES
Patient seen in person in Medication Management Service.    Patient states compliant all of the time with regimen.    No bleeding or thromboembolic side effects noted.    No significant med or dietary changes. Patient was prescribed Cymbalta but is not taking.   No significant recent illness or disease state changes.      Patient acknowledges they are still an active patient of referring provider which is Matilda Yes     PT/INR done via POC meter per protocol.  INR was therapeutic at 2.1.  (goal 2 - 3)    Warfarin regimen will be continued at current dose 7.5 mg daily. Refill sent to [facility].  Will retest in 4 weeks.    Patient understands dosing directions and information discussed.  Dosing schedule and follow up appointment given to patient. Progress note routed to referring physicians office.  Patient acknowledges working in Consult Agreement with Pharmacist as referred by his/her physician/provider.      For Pharmacy Admin Tracking Only    Intervention Detail: Refill(s) Provided  Total # of Interventions Recommended: 1  Total # of Interventions Accepted: 1  Time Spent (min): 20      Fely Byrne RPH, PharmD, BCACP  12/18/2024  7:00 AM

## 2024-12-26 NOTE — PROGRESS NOTES
Patient seen in person in Medication Management Service.    Patient states compliant all of the time with regimen.    No bleeding or thromboembolic side effects noted.    No significant dietary changes.  Continues to drink plain tomato juice (not V8).  Pt reports he is doing intermittent fasting. Wants to help Diabetes and read this might help. Recommended smaller more frequent meals to help with diabetes. Discussed that he should not take glipizide if he is not eating due to risks of hypoglycemia.   Pt has been off of aspirin for about a month. Stopped after seeing Cardiologist on 7/30/24.  No significant recent illness or disease state changes.      Patient acknowledges they are still an active patient of referring provider which is Dr. Grey Yes     PT/INR done via POC meter per protocol.  INR was subtherapeutic at 1.8.  (goal 2 - 3)    Warfarin regimen will be increased to 11.25 mg today and then 7.5 mg daily.  Will retest in 2 weeks.    Patient understands dosing directions and information discussed.  Dosing schedule and follow up appointment given to patient. Progress note routed to referring physicians office.  Patient acknowledges working in Consult Agreement with Pharmacist as referred by his/her physician/provider.      For Pharmacy Admin Tracking Only    Intervention Detail: Dose Adjustment: 1, reason: Therapy Optimization  Total # of Interventions Recommended: 1  Total # of Interventions Accepted: 1  Time Spent (min): 20   Casie Patel, Pharm D, BCPS, CACP  [facility]  8/27/2024 7:01 AM         RW PRN/needs device

## 2025-01-13 ENCOUNTER — ANTI-COAG VISIT (OUTPATIENT)
Age: 50
End: 2025-01-13
Payer: COMMERCIAL

## 2025-01-13 DIAGNOSIS — D68.61 ANTI-PHOSPHOLIPID ANTIBODY SYNDROME (HCC): Primary | ICD-10-CM

## 2025-01-13 LAB
INTERNATIONAL NORMALIZATION RATIO, POC: 4.9
PROTHROMBIN TIME, POC: 58.5

## 2025-01-13 PROCEDURE — 85610 PROTHROMBIN TIME: CPT

## 2025-01-13 PROCEDURE — 99212 OFFICE O/P EST SF 10 MIN: CPT

## 2025-01-13 NOTE — PROGRESS NOTES
Patient seen in person in Medication Management Service.    Patient states compliant most of the time with regimen.  Pt thinks may have taken a double dose one day last week.   No bleeding or thromboembolic side effects noted.    No significant med or dietary changes.    No significant recent illness or disease state changes.      Patient acknowledges they are still an active patient of referring provider which is Matilda Yes     PT/INR done via POC meter per protocol.  INR was supratherapeutic at 4.9.  (goal 2 - 3)    Warfarin regimen will be held for 1 day and then decreased to 3.75 mg on Tue and 7.5 mg all other days.  Will retest in 10 days.    Patient understands dosing directions and information discussed.  Dosing schedule and follow up appointment given to patient. Progress note routed to referring physicians office.  Patient acknowledges working in Consult Agreement with Pharmacist as referred by his/her physician/provider.      For Pharmacy Admin Tracking Only    Intervention Detail: Adherence Monitorin and Dose Adjustment: 1, reason: Therapy Optimization  Total # of Interventions Recommended: 2  Total # of Interventions Accepted: 2  Time Spent (min): 20    Maia Blanco PharmD, BCPS 2025 6:54 AM

## 2025-01-22 ENCOUNTER — ANTI-COAG VISIT (OUTPATIENT)
Age: 50
End: 2025-01-22
Payer: COMMERCIAL

## 2025-01-22 DIAGNOSIS — D68.61 ANTI-PHOSPHOLIPID ANTIBODY SYNDROME (HCC): Primary | ICD-10-CM

## 2025-01-22 LAB
INTERNATIONAL NORMALIZATION RATIO, POC: 2.5
PROTHROMBIN TIME, POC: 30.2

## 2025-01-22 PROCEDURE — 99211 OFF/OP EST MAY X REQ PHY/QHP: CPT

## 2025-01-22 PROCEDURE — 85610 PROTHROMBIN TIME: CPT

## 2025-01-22 NOTE — PROGRESS NOTES
Patient seen in person in Medication Management Service.    Patient states compliant all of the time with regimen.    No bleeding or thromboembolic side effects noted.    No significant med or dietary changes.    No significant recent illness or disease state changes.      Patient acknowledges they are still an active patient of referring provider which is Dr. Grey Yes     PT/INR done via POC meter per protocol.  INR was therapeutic at 2.5.  (goal 2 - 3)    Warfarin regimen will be continued at current dose of 3.75mg on Tuesdays and 7.5mg all other days.  Will retest in 3 weeks.    Patient understands dosing directions and information discussed.  Dosing schedule and follow up appointment given to patient. Progress note routed to referring physicians office.  Patient acknowledges working in Consult Agreement with Pharmacist as referred by his/her physician/provider.      For Pharmacy Admin Tracking Only    Total # of Interventions Recommended: 0  Total # of Interventions Accepted: 0  Time Spent (min): 20    Lisa Alonso RPH,Pharm.D,, BCPS, CACP  1/22/2025  7:02 AM

## 2025-02-12 ENCOUNTER — ANTI-COAG VISIT (OUTPATIENT)
Age: 50
End: 2025-02-12
Payer: COMMERCIAL

## 2025-02-12 DIAGNOSIS — D68.61 ANTI-PHOSPHOLIPID ANTIBODY SYNDROME (HCC): Primary | ICD-10-CM

## 2025-02-12 LAB
INTERNATIONAL NORMALIZATION RATIO, POC: 3
PROTHROMBIN TIME, POC: 35.4

## 2025-02-12 PROCEDURE — 99211 OFF/OP EST MAY X REQ PHY/QHP: CPT

## 2025-02-12 PROCEDURE — 85610 PROTHROMBIN TIME: CPT

## 2025-02-12 NOTE — PROGRESS NOTES
Patient seen in person in Medication Management Service.    Patient states compliant all of the time with regimen.    No bleeding or thromboembolic side effects noted.    No significant med or dietary changes.  Has been taking Motrin occasionally for back pain, advised to use Acetaminophen    No significant recent illness or disease state changes.  Has severe back pain.     Patient acknowledges they are still an active patient of referring provider which is Dr. Grey Yes     PT/INR done via POC meter per protocol.  INR was therapeutic at 3.  (goal 2 - 3)    Warfarin regimen will be continued at current dose 3.75 mg on Tue and 7.5 mg all other days.  Will retest in 4 weeks.    Patient understands dosing directions and information discussed.  Dosing schedule and follow up appointment given to patient. Progress note routed to referring physicians office.  Patient acknowledges working in Consult Agreement with Pharmacist as referred by his/her physician/provider.      For Pharmacy Admin Tracking Only    Intervention Detail: Adherence Monitorin  Total # of Interventions Recommended: 1  Total # of Interventions Accepted: 1  Time Spent (min): 20      aMia RicoD, BCPS 2025 7:00 AM

## 2025-03-14 ENCOUNTER — ANTI-COAG VISIT (OUTPATIENT)
Age: 50
End: 2025-03-14
Payer: COMMERCIAL

## 2025-03-14 DIAGNOSIS — D68.61 ANTI-PHOSPHOLIPID ANTIBODY SYNDROME: Primary | ICD-10-CM

## 2025-03-14 LAB
INTERNATIONAL NORMALIZATION RATIO, POC: 2
PROTHROMBIN TIME, POC: 24.2

## 2025-03-14 PROCEDURE — 85610 PROTHROMBIN TIME: CPT | Performed by: PHARMACIST

## 2025-03-14 PROCEDURE — 99211 OFF/OP EST MAY X REQ PHY/QHP: CPT | Performed by: PHARMACIST

## 2025-03-14 NOTE — PROGRESS NOTES
Patient seen in person in Medication Management Service.    Patient states compliant all of the time with regimen.    No bleeding or thromboembolic side effects noted.    No significant med changes.  Pt did have 3 avocados a day last week. Does not normally eat avocados and does not plan to continue   No significant recent illness or disease state changes.      Patient acknowledges they are still an active patient of referring provider which is Dr. Grey Yes     PT/INR done via POC meter per protocol.  INR was therapeutic at 2.  (goal 2 - 3)    Warfarin regimen will be continued at current dose 3.75 mg Tues and 7.5 mg all other days.  Will retest in 4 weeks.    Patient understands dosing directions and information discussed.  Dosing schedule and follow up appointment given to patient. Progress note routed to referring physicians office.  Patient acknowledges working in Consult Agreement with Pharmacist as referred by his/her physician/provider.      For Pharmacy Admin Tracking Only    Intervention Detail:   Total # of Interventions Recommended: 0  Total # of Interventions Accepted: 0  Time Spent (min): 20       Casie Patel, Pharm D, BCPS, CACP  [facility]  3/14/2025 6:55 AM

## 2025-04-11 ENCOUNTER — ANTI-COAG VISIT (OUTPATIENT)
Age: 50
End: 2025-04-11
Payer: COMMERCIAL

## 2025-04-11 DIAGNOSIS — D68.61 ANTI-PHOSPHOLIPID ANTIBODY SYNDROME: Primary | ICD-10-CM

## 2025-04-11 LAB
INTERNATIONAL NORMALIZATION RATIO, POC: 3 (ref 2–3)
PROTHROMBIN TIME, POC: 35.9

## 2025-04-11 PROCEDURE — 85610 PROTHROMBIN TIME: CPT

## 2025-04-11 PROCEDURE — 99212 OFFICE O/P EST SF 10 MIN: CPT

## 2025-04-11 NOTE — PROGRESS NOTES
Patient seen in person in Medication Management Service.    Patient states compliant most of the time with regimen.    No bleeding or thromboembolic side effects noted.    No significant med or dietary changes.    No significant recent illness or disease state changes.      Patient acknowledges they are still an active patient of referring provider which is Dr Grey Yes     PT/INR done via POC meter per protocol.  INR was therapeutic at 3.  (goal 2 - 3)    Warfarin regimen will be continued at current dose 3.75 mg Tues and 7.5 mg all other days.  Will retest in 4 weeks.    Patient understands dosing directions and information discussed.  Dosing schedule and follow up appointment given to patient. Progress note routed to referring physicians office.  Patient acknowledges working in Consult Agreement with Pharmacist as referred by his/her physician/provider.      For Pharmacy Admin Tracking Only    Intervention Detail: Adherence Monitorin  Total # of Interventions Recommended: 0  Total # of Interventions Accepted: 0  Time Spent (min): 20

## 2025-04-24 RX ORDER — WARFARIN SODIUM 7.5 MG/1
TABLET ORAL
Qty: 30 TABLET | Refills: 3 | Status: SHIPPED | OUTPATIENT
Start: 2025-04-24

## 2025-04-24 NOTE — TELEPHONE ENCOUNTER
New Rx to [facility] warfarin 7.5 mg#30R3.     Maia Blanco PharmD, BCPS 4/24/2025 7:37 AM

## 2025-05-09 ENCOUNTER — APPOINTMENT (OUTPATIENT)
Dept: CT IMAGING | Age: 50
End: 2025-05-09
Payer: COMMERCIAL

## 2025-05-09 ENCOUNTER — ANTI-COAG VISIT (OUTPATIENT)
Age: 50
End: 2025-05-09
Payer: COMMERCIAL

## 2025-05-09 ENCOUNTER — HOSPITAL ENCOUNTER (EMERGENCY)
Age: 50
Discharge: HOME | End: 2025-05-09
Attending: EMERGENCY MEDICINE
Payer: COMMERCIAL

## 2025-05-09 VITALS
WEIGHT: 170 LBS | RESPIRATION RATE: 18 BRPM | OXYGEN SATURATION: 99 % | DIASTOLIC BLOOD PRESSURE: 71 MMHG | BODY MASS INDEX: 25.76 KG/M2 | TEMPERATURE: 97.8 F | SYSTOLIC BLOOD PRESSURE: 137 MMHG | HEART RATE: 89 BPM | HEIGHT: 68 IN

## 2025-05-09 DIAGNOSIS — K59.00 CONSTIPATION, UNSPECIFIED CONSTIPATION TYPE: Primary | ICD-10-CM

## 2025-05-09 DIAGNOSIS — K40.90 UNILATERAL INGUINAL HERNIA WITHOUT OBSTRUCTION OR GANGRENE, RECURRENCE NOT SPECIFIED: ICD-10-CM

## 2025-05-09 DIAGNOSIS — D68.61 ANTI-PHOSPHOLIPID ANTIBODY SYNDROME: Primary | ICD-10-CM

## 2025-05-09 LAB
ALBUMIN SERPL-MCNC: 4 G/DL (ref 3.5–5.2)
ALP SERPL-CCNC: 62 U/L (ref 40–129)
ALT SERPL-CCNC: 69 U/L (ref 10–50)
ANION GAP SERPL CALCULATED.3IONS-SCNC: 8 MMOL/L (ref 9–16)
AST SERPL-CCNC: 36 U/L (ref 10–50)
BASOPHILS # BLD: 0.07 K/UL (ref 0–0.2)
BASOPHILS NFR BLD: 1 % (ref 0–2)
BILIRUB SERPL-MCNC: <0.2 MG/DL (ref 0–1.2)
BUN SERPL-MCNC: 16 MG/DL (ref 6–20)
CALCIUM SERPL-MCNC: 8.7 MG/DL (ref 8.6–10.4)
CHLORIDE SERPL-SCNC: 105 MMOL/L (ref 98–107)
CO2 SERPL-SCNC: 23 MMOL/L (ref 20–31)
CREAT SERPL-MCNC: 1.1 MG/DL (ref 0.7–1.2)
EOSINOPHIL # BLD: 0.15 K/UL (ref 0–0.44)
EOSINOPHILS RELATIVE PERCENT: 2 % (ref 0–4)
ERYTHROCYTE [DISTWIDTH] IN BLOOD BY AUTOMATED COUNT: 13.3 % (ref 11.5–14.9)
GFR, ESTIMATED: 82 ML/MIN/1.73M2
GLUCOSE SERPL-MCNC: 193 MG/DL (ref 74–99)
HCT VFR BLD AUTO: 39.2 % (ref 41–53)
HGB BLD-MCNC: 13 G/DL (ref 13.5–17.5)
IMM GRANULOCYTES # BLD AUTO: 0 K/UL (ref 0–0.3)
IMM GRANULOCYTES NFR BLD: 0 %
INTERNATIONAL NORMALIZATION RATIO, POC: 2 (ref 2–3)
LACTATE BLDV-SCNC: 1.6 MMOL/L (ref 0.5–2.2)
LYMPHOCYTES NFR BLD: 1.68 K/UL (ref 1.1–3.7)
LYMPHOCYTES RELATIVE PERCENT: 23 % (ref 24–44)
MCH RBC QN AUTO: 30.2 PG (ref 26–34)
MCHC RBC AUTO-ENTMCNC: 33.2 G/DL (ref 31–37)
MCV RBC AUTO: 91 FL (ref 80–100)
MONOCYTES NFR BLD: 0.44 K/UL (ref 0.1–1.2)
MONOCYTES NFR BLD: 6 % (ref 3–12)
MORPHOLOGY: NORMAL
NEUTROPHILS NFR BLD: 68 % (ref 36–66)
NEUTS SEG NFR BLD: 4.96 K/UL (ref 1.5–8.1)
NRBC BLD-RTO: 0 PER 100 WBC
PLATELET # BLD AUTO: 318 K/UL (ref 150–450)
PMV BLD AUTO: 9.1 FL (ref 8–13.5)
POTASSIUM SERPL-SCNC: 4.8 MMOL/L (ref 3.7–5.3)
PROT SERPL-MCNC: 6.1 G/DL (ref 6.6–8.7)
PROTHROMBIN TIME, POC: 24.4
RBC # BLD AUTO: 4.31 M/UL (ref 4.21–5.77)
SODIUM SERPL-SCNC: 136 MMOL/L (ref 136–145)
WBC OTHER # BLD: 7.3 K/UL (ref 3.5–11)

## 2025-05-09 PROCEDURE — 74177 CT ABD & PELVIS W/CONTRAST: CPT

## 2025-05-09 PROCEDURE — 6360000002 HC RX W HCPCS: Performed by: EMERGENCY MEDICINE

## 2025-05-09 PROCEDURE — 36415 COLL VENOUS BLD VENIPUNCTURE: CPT

## 2025-05-09 PROCEDURE — 83605 ASSAY OF LACTIC ACID: CPT

## 2025-05-09 PROCEDURE — 85610 PROTHROMBIN TIME: CPT

## 2025-05-09 PROCEDURE — 2580000003 HC RX 258: Performed by: EMERGENCY MEDICINE

## 2025-05-09 PROCEDURE — 6360000004 HC RX CONTRAST MEDICATION: Performed by: EMERGENCY MEDICINE

## 2025-05-09 PROCEDURE — 2500000003 HC RX 250 WO HCPCS: Performed by: EMERGENCY MEDICINE

## 2025-05-09 PROCEDURE — 96374 THER/PROPH/DIAG INJ IV PUSH: CPT

## 2025-05-09 PROCEDURE — 85025 COMPLETE CBC W/AUTO DIFF WBC: CPT

## 2025-05-09 PROCEDURE — 99285 EMERGENCY DEPT VISIT HI MDM: CPT

## 2025-05-09 PROCEDURE — 80053 COMPREHEN METABOLIC PANEL: CPT

## 2025-05-09 PROCEDURE — 99211 OFF/OP EST MAY X REQ PHY/QHP: CPT

## 2025-05-09 RX ORDER — IOPAMIDOL 755 MG/ML
75 INJECTION, SOLUTION INTRAVASCULAR
Status: COMPLETED | OUTPATIENT
Start: 2025-05-09 | End: 2025-05-09

## 2025-05-09 RX ORDER — 0.9 % SODIUM CHLORIDE 0.9 %
100 INTRAVENOUS SOLUTION INTRAVENOUS ONCE
Status: COMPLETED | OUTPATIENT
Start: 2025-05-09 | End: 2025-05-09

## 2025-05-09 RX ORDER — KETOROLAC TROMETHAMINE 30 MG/ML
15 INJECTION, SOLUTION INTRAMUSCULAR; INTRAVENOUS ONCE
Status: COMPLETED | OUTPATIENT
Start: 2025-05-09 | End: 2025-05-09

## 2025-05-09 RX ORDER — SODIUM CHLORIDE 0.9 % (FLUSH) 0.9 %
10 SYRINGE (ML) INJECTION PRN
Status: DISCONTINUED | OUTPATIENT
Start: 2025-05-09 | End: 2025-05-09 | Stop reason: HOSPADM

## 2025-05-09 RX ADMIN — KETOROLAC TROMETHAMINE 15 MG: 30 INJECTION, SOLUTION INTRAMUSCULAR at 08:59

## 2025-05-09 RX ADMIN — IOPAMIDOL 75 ML: 755 INJECTION, SOLUTION INTRAVENOUS at 09:55

## 2025-05-09 RX ADMIN — SODIUM CHLORIDE, PRESERVATIVE FREE 10 ML: 5 INJECTION INTRAVENOUS at 09:55

## 2025-05-09 RX ADMIN — SODIUM CHLORIDE 100 ML: 9 INJECTION, SOLUTION INTRAVENOUS at 09:56

## 2025-05-09 ASSESSMENT — PAIN DESCRIPTION - ORIENTATION
ORIENTATION: RIGHT
ORIENTATION: RIGHT

## 2025-05-09 ASSESSMENT — PAIN DESCRIPTION - LOCATION: LOCATION: ABDOMEN

## 2025-05-09 ASSESSMENT — ENCOUNTER SYMPTOMS
ABDOMINAL PAIN: 1
SHORTNESS OF BREATH: 0
CONSTIPATION: 1
VOMITING: 0
NAUSEA: 0

## 2025-05-09 ASSESSMENT — PAIN SCALES - GENERAL
PAINLEVEL_OUTOF10: 8
PAINLEVEL_OUTOF10: 8

## 2025-05-09 ASSESSMENT — LIFESTYLE VARIABLES
HOW MANY STANDARD DRINKS CONTAINING ALCOHOL DO YOU HAVE ON A TYPICAL DAY: PATIENT DOES NOT DRINK
HOW OFTEN DO YOU HAVE A DRINK CONTAINING ALCOHOL: NEVER

## 2025-05-09 ASSESSMENT — PAIN - FUNCTIONAL ASSESSMENT: PAIN_FUNCTIONAL_ASSESSMENT: 0-10

## 2025-05-09 NOTE — PROGRESS NOTES
Patient seen in person in Medication Management Service.    Patient states compliant most of the time with regimen.    No bleeding or thromboembolic side effects noted.    No significant med or dietary changes.    No significant recent illness or disease state changes.      Patient acknowledges they are still an active patient of referring provider which is GLENN Grey Yes     PT/INR done via POC meter per protocol.  INR was therapeutic at 2.  (goal 2 - 3)  INR of 2 at lower end of therapeutic range  The last INR was 3 with no change made in the warfarin dose  The patient thinks he may have missed his warfarin dose 2 days ago    Warfarin regimen will be continued at current dose 3.75 mg Tues and 7.5 mg all other days.  Will retest in 4 weeks.  The patient may need hernia surgery  He will let us know if surgery is scheduled and clearance received from Dr Grey  He is aware that our clinic can set up Lovenox Bridging if this is required    Patient understands dosing directions and information discussed.  Dosing schedule and follow up appointment given to patient. Progress note routed to referring physicians office.  Patient acknowledges working in Consult Agreement with Pharmacist as referred by his/her physician/provider.      For Pharmacy Admin Tracking Only    Intervention Detail: Adherence Monitorin  Total # of Interventions Recommended: 0  Total # of Interventions Accepted: 0  Time Spent (min): 20

## 2025-05-09 NOTE — ED PROVIDER NOTES
EMERGENCY DEPARTMENT ENCOUNTER    Pt Name: David Singletary  MRN: [id number]  Birthdate 1975  Date of evaluation: 5/9/25  CHIEF COMPLAINT       Chief Complaint   Patient presents with    Abdominal Pain     Patient reports lump in groin on right side. Patient reports being diagnosed with a hernia and is seeing the surgeon soon. Patient reports he hasn't had a BM in one month     HISTORY OF PRESENT ILLNESS   Presenting for chief complaint of constipation.  Patient is concerned that he has not had more than 1 bowel movement in the last month.  He said that he has tried Dulcolax yesterday without relief.  He also has a hernia for which she has a scheduled appointment with the surgeon Monday of next week.  He says that it can become uncomfortable at times.  It hurts to the touch.    The history is provided by the patient.           REVIEW OF SYSTEMS     Review of Systems   Constitutional:  Negative for chills and fever.   Respiratory:  Negative for shortness of breath.    Cardiovascular:  Negative for chest pain.   Gastrointestinal:  Positive for abdominal pain and constipation. Negative for nausea and vomiting.   Psychiatric/Behavioral:  The patient is nervous/anxious.      PASTMEDICAL HISTORY     Past Medical History:   Diagnosis Date    Anticoagulant long-term use     Anxiety     Asthma     CAD (coronary artery disease)     Chronic back pain     Diabetes mellitus (HCC)     GERD (gastroesophageal reflux disease)      heart burn    History of MI (myocardial infarction) 05/08/2017    Hx of heart artery stent     heart stent x 1    Neuropathy      Past Problem List  Patient Active Problem List   Diagnosis Code    Chest pain R07.9    Presence of stent in LAD coronary artery Z95.5    Anxiety F41.9    Type 2 diabetes mellitus without complication, without long-term current use of insulin (HCC) E11.9    Mixed hyperlipidemia E78.2    Mild intermittent asthma without complication J45.20    Smoking [id number]    GERD

## 2025-05-12 ENCOUNTER — TELEPHONE (OUTPATIENT)
Age: 50
End: 2025-05-12

## 2025-05-12 ENCOUNTER — OFFICE VISIT (OUTPATIENT)
Age: 50
End: 2025-05-12
Payer: COMMERCIAL

## 2025-05-12 VITALS
HEIGHT: 68 IN | HEART RATE: 87 BPM | WEIGHT: 166.1 LBS | SYSTOLIC BLOOD PRESSURE: 123 MMHG | OXYGEN SATURATION: 98 % | BODY MASS INDEX: 25.17 KG/M2 | DIASTOLIC BLOOD PRESSURE: 84 MMHG | TEMPERATURE: 98.2 F

## 2025-05-12 DIAGNOSIS — K59.09 CHRONIC CONSTIPATION: ICD-10-CM

## 2025-05-12 DIAGNOSIS — K40.90 RIGHT INGUINAL HERNIA: Primary | ICD-10-CM

## 2025-05-12 DIAGNOSIS — Z79.01 CHRONIC ANTICOAGULATION: ICD-10-CM

## 2025-05-12 DIAGNOSIS — D68.61 ANTI-PHOSPHOLIPID ANTIBODY SYNDROME: ICD-10-CM

## 2025-05-12 PROCEDURE — 3074F SYST BP LT 130 MM HG: CPT | Performed by: NURSE PRACTITIONER

## 2025-05-12 PROCEDURE — 99203 OFFICE O/P NEW LOW 30 MIN: CPT | Performed by: NURSE PRACTITIONER

## 2025-05-12 PROCEDURE — 3079F DIAST BP 80-89 MM HG: CPT | Performed by: NURSE PRACTITIONER

## 2025-05-12 ASSESSMENT — ENCOUNTER SYMPTOMS
COUGH: 0
SORE THROAT: 0
RHINORRHEA: 0
SHORTNESS OF BREATH: 0

## 2025-05-12 NOTE — PROGRESS NOTES
[facility] General Surgery   Christophe Harper MD, FACS  Tami MRosey Peacockry, APRN-CNP  [address], [address]  [place] [address]  P: [phone], F: [phone]    General and Robotic Surgery  Consult Note               PATIENT NAME: David Singletary   :  1975   MRN: [id number]   PCP:  Danita Ward MD     TODAY'S DATE: 2025    Chief Complaint   Patient presents with    New Patient     Pt here today as a new patient to discuss concerns of right inguinal hernia. Pt states he has had am abdomen and pelvic CT on 25 at [facility].        HISTORY OF PRESENT ILLNESS: 50 y.o. male presents to discuss possible right inguinal hernia.  Presented to ED 25 with constipation, concern for hernia.    Bulge: Pain and bulge to right groin 2-3 months  Location of bulge: Right groin  Bulge growing in size: Somewhat  Bulge tenderness: Yes  Skin color changes to bulge: None  Reducibility: None  Constipation/diarrhea: Constipation- states prior to ED visit no BM in a month- has since had a BM  Urination problems: None  Nausea/vomiting: Denies  Fever/chills: Denies  Prior hernia repairs: None  Prior imaging done: CT ABD/pelvis 25 with fat containing right inguinal hernia  Most recent colonoscopy: None/no screening- has Cologuard at home but has not yet completed    Major medical hx: Asthma, CAD, DM, hx of MI, s/p cardiac stent, Anti-phospholipid antibody syndrome   Prior ABD/pelvic surgeries: None  Blood thinning medications: Warfarin, Plavix    CT Result (most recent):  CT ABDOMEN PELVIS W IV CONTRAST 2025    Narrative  EXAMINATION:  CT OF THE ABDOMEN AND PELVIS WITH CONTRAST 2025 9:48 am    TECHNIQUE:  CT of the abdomen and pelvis was performed with the administration of  intravenous contrast. Multiplanar reformatted images are provided for review.  Automated exposure control, iterative reconstruction, and/or weight based  adjustment of the mA/kV was utilized to reduce

## 2025-05-12 NOTE — TELEPHONE ENCOUNTER
Patient presented to ED following our appt on 5/9/25 for INR/warfarin monitoring complaining of constipation.  Patient discharged from ED with no change in medication.  Patient to see surgeon today to evaluate if he needs hernia repair. Patient to let us know if surgery scheduled to manage warfarin around surgery.  Currently has follow up INR scheduled for 6/6/25.  Lisa Alonso RPH,Pharm.D,, BCPS, CACP  5/12/2025  11:47 AM

## 2025-05-13 ENCOUNTER — TELEPHONE (OUTPATIENT)
Age: 50
End: 2025-05-13

## 2025-05-17 PROBLEM — K59.09 CHRONIC CONSTIPATION: Status: ACTIVE | Noted: 2025-05-17

## 2025-05-17 PROBLEM — Z79.01 CHRONIC ANTICOAGULATION: Status: ACTIVE | Noted: 2025-05-17

## 2025-05-17 PROBLEM — K40.90 RIGHT INGUINAL HERNIA: Status: ACTIVE | Noted: 2025-05-17

## 2025-05-23 ENCOUNTER — PREP FOR PROCEDURE (OUTPATIENT)
Age: 50
End: 2025-05-23

## 2025-05-23 ENCOUNTER — TELEPHONE (OUTPATIENT)
Age: 50
End: 2025-05-23

## 2025-05-23 RX ORDER — ONDANSETRON 4 MG/1
4 TABLET, FILM COATED ORAL EVERY 6 HOURS PRN
Qty: 10 TABLET | Refills: 0 | Status: SHIPPED | OUTPATIENT
Start: 2025-05-23

## 2025-05-23 NOTE — TELEPHONE ENCOUNTER
Spoke with patient's wife Karen to schedule procedures  Bowel Prep discussed and Coupon for Sutab will be mailed to patient  Cardiac Clearance faxed to Dr Garcia  Hematology Clearance faxed to Dr Jhaveri    .........................................................................................................    #1 DIAGNOSTIC COLONOSCOPY      SUTAB BOWEL PREP INSTRUCTIONS    STOP Aspirin 7 Days prior to Procedure  STOP Plavix 5 Days prior to Procedure  STOP Warfarin/Coumadin 5 Days prior to Procedure  STOP all other Blood Thinners 5 Days prior to Procedure        **DO NOT EAT ANY SOLID FOOD THE DAY BEFORE YOUR PROCEDURE**  **YOU MUST BE ON A CLEAR LIQUID DIET ONLY**    Approved Clear Liquids:  Any flavor of water or soda except Red or Purple  Fruit Juice without pulp  Coffee or tea without dairy products  Jell-O without fruit or toppings, No Red or Purple  Pop-dyllan or Italian Ice, No Red or Purple  Chicken or Beef broth and bouillon      DRINK PLENTY OF WATER THROUGHOUT THE DAY    SUTAB  People will take 24 tablets in total, with 12 tablets equaling one dose. The instructions for taking SUTAB include the following:    The evening before at 5:00pm  Open one bottle containing 12 tablets. Empty tablets from container.  Fill the container provided with 16 ounces (oz) water.  Take each tablet with a sip of water, and consume the rest of the water over 15-20 minutes.  One hour after taking the last tablet, drink another 16 oz of water over 30 minutes.  Approximately 30 minutes after finishing the previous 16 oz of water, drink another 16 oz of water over 30 minutes.    At 10:00pm  Open the other bottle containing 12 tablets. Empty tablets from container.  Fill the provided container with 16 ounces (oz) water.  Take each tablet with a sip of water, and consume the rest of the water over 15-20 minutes.  One hour after taking the last tablet, drink another 16 oz of water over 30 minutes.  5. Approximately 30 minutes

## 2025-05-23 NOTE — TELEPHONE ENCOUNTER
PAT per anesthesia protocol.  Prep sent to patient's pharmacy.  Pre-op Ancef ordered.  Signing encounter.

## 2025-05-27 ENCOUNTER — TELEPHONE (OUTPATIENT)
Dept: INFUSION THERAPY | Age: 50
End: 2025-05-27

## 2025-05-27 ENCOUNTER — TELEPHONE (OUTPATIENT)
Age: 50
End: 2025-05-27

## 2025-05-27 NOTE — TELEPHONE ENCOUNTER
Received clearance from [facility] for [facility] General Surgery - colonoscopy 6/19/25 and hernial repair with possible open 6/20/25.     Instructions include Hold Lovenox 4 days preop with Lovenox bridging. Patient is also on Plavix and will need to hold this for 5 days prior to surgery - all information available from phone note 5/23/25.     Pt will need updated Scr at upcoming appointment.      Bridging calendar started and located in N drive.         Minnie, patients wife, would like a handout/instructions on Lovenox at next appointment.     Tessie Hyatt, PharmD  PGY1 Pharmacy Resident    5/27/2025  5:27 PM

## 2025-06-06 ENCOUNTER — HOSPITAL ENCOUNTER (OUTPATIENT)
Dept: PREADMISSION TESTING | Age: 50
Discharge: HOME | End: 2025-06-10
Attending: SURGERY | Admitting: SURGERY
Payer: COMMERCIAL

## 2025-06-06 ENCOUNTER — ANTI-COAG VISIT (OUTPATIENT)
Age: 50
End: 2025-06-06
Payer: COMMERCIAL

## 2025-06-06 ENCOUNTER — TELEPHONE (OUTPATIENT)
Age: 50
End: 2025-06-06

## 2025-06-06 VITALS
WEIGHT: 163 LBS | HEART RATE: 81 BPM | TEMPERATURE: 97.7 F | BODY MASS INDEX: 24.71 KG/M2 | OXYGEN SATURATION: 99 % | SYSTOLIC BLOOD PRESSURE: 109 MMHG | DIASTOLIC BLOOD PRESSURE: 73 MMHG | HEIGHT: 68 IN | RESPIRATION RATE: 16 BRPM

## 2025-06-06 VITALS — BODY MASS INDEX: 24.78 KG/M2 | WEIGHT: 163 LBS

## 2025-06-06 DIAGNOSIS — D68.61 ANTI-PHOSPHOLIPID ANTIBODY SYNDROME: Primary | ICD-10-CM

## 2025-06-06 LAB
ANION GAP SERPL CALCULATED.3IONS-SCNC: 9 MMOL/L (ref 9–16)
BASOPHILS # BLD: 0.04 K/UL (ref 0–0.2)
BASOPHILS NFR BLD: 1 % (ref 0–2)
BUN SERPL-MCNC: 13 MG/DL (ref 6–20)
CALCIUM SERPL-MCNC: 8.8 MG/DL (ref 8.6–10.4)
CHLORIDE SERPL-SCNC: 106 MMOL/L (ref 98–107)
CO2 SERPL-SCNC: 26 MMOL/L (ref 20–31)
CREAT SERPL-MCNC: 1 MG/DL (ref 0.7–1.2)
EOSINOPHIL # BLD: 0.35 K/UL (ref 0–0.44)
EOSINOPHILS RELATIVE PERCENT: 6 % (ref 0–4)
ERYTHROCYTE [DISTWIDTH] IN BLOOD BY AUTOMATED COUNT: 13.4 % (ref 11.5–14.9)
GFR, ESTIMATED: >90 ML/MIN/1.73M2
GLUCOSE SERPL-MCNC: 219 MG/DL (ref 74–99)
HCT VFR BLD AUTO: 36.7 % (ref 41–53)
HGB BLD-MCNC: 12.4 G/DL (ref 13.5–17.5)
IMM GRANULOCYTES # BLD AUTO: <0.03 K/UL (ref 0–0.3)
IMM GRANULOCYTES NFR BLD: 0 %
INTERNATIONAL NORMALIZATION RATIO, POC: 3.4
LYMPHOCYTES NFR BLD: 1.67 K/UL (ref 1.1–3.7)
LYMPHOCYTES RELATIVE PERCENT: 30 % (ref 24–44)
MCH RBC QN AUTO: 31.1 PG (ref 26–34)
MCHC RBC AUTO-ENTMCNC: 33.8 G/DL (ref 31–37)
MCV RBC AUTO: 92 FL (ref 80–100)
MONOCYTES NFR BLD: 0.42 K/UL (ref 0.1–1.2)
MONOCYTES NFR BLD: 8 % (ref 3–12)
NEUTROPHILS NFR BLD: 55 % (ref 36–66)
NEUTS SEG NFR BLD: 3.11 K/UL (ref 1.5–8.1)
NRBC BLD-RTO: 0 PER 100 WBC
PLATELET # BLD AUTO: 310 K/UL (ref 150–450)
PMV BLD AUTO: 8.9 FL (ref 8–13.5)
POTASSIUM SERPL-SCNC: 4.6 MMOL/L (ref 3.7–5.3)
PROTHROMBIN TIME, POC: 40.8
RBC # BLD AUTO: 3.99 M/UL (ref 4.21–5.77)
SODIUM SERPL-SCNC: 141 MMOL/L (ref 136–145)
WBC OTHER # BLD: 5.6 K/UL (ref 3.5–11)

## 2025-06-06 PROCEDURE — 80048 BASIC METABOLIC PNL TOTAL CA: CPT

## 2025-06-06 PROCEDURE — 85025 COMPLETE CBC W/AUTO DIFF WBC: CPT

## 2025-06-06 PROCEDURE — 99213 OFFICE O/P EST LOW 20 MIN: CPT | Performed by: PHARMACIST

## 2025-06-06 PROCEDURE — 85610 PROTHROMBIN TIME: CPT | Performed by: PHARMACIST

## 2025-06-06 PROCEDURE — 36415 COLL VENOUS BLD VENIPUNCTURE: CPT

## 2025-06-06 RX ORDER — DIPHENHYDRAMINE HCL 25 MG
25 CAPSULE ORAL NIGHTLY
COMMUNITY

## 2025-06-06 RX ORDER — ENOXAPARIN SODIUM 100 MG/ML
70 INJECTION SUBCUTANEOUS 2 TIMES DAILY
Qty: 16 ML | Refills: 1 | Status: SHIPPED | OUTPATIENT
Start: 2025-06-06

## 2025-06-06 NOTE — DISCHARGE INSTRUCTIONS
Pre-op Instructions For Out-Patient Endoscopy Surgery   6/19/25    Medication Instructions:  Please stop herbs and any supplements now (includes vitamins and minerals).    For these medications:  Dulaglutide (Trulicity), Exenatide (Byetta and Bydureon, Liraglutide (Victoza), Lixisenatide (Adlyxin), Semaglutide (Ozempic and Rybelsus), Tirzepatide (Mounjaro, Zepbound,Wegovy)- Stop 1 week prior if taking weekly or 1 day prior if taking every 12 hours or daily.     Please contact your surgeon and prescribing physician for pre-op instructions for any blood thinners. Stop Plavix & Warfarin as directed-bridge to Lovenox    If you have inhalers/aerosol treatments at home, please use them the morning of your surgery and bring the inhalers with you to the hospital.    Please take the following medications the morning of your surgery with a sip of water:    Metoprolol, Inhaler    Surgery Instructions:  After midnight before surgery:  Do not eat or drink anything, including water, mints, gum, and hard candy.  You may brush your teeth without swallowing.  No smoking, chewing tobacco, or street drugs.       ** Please Follow Bowel Prep instructions if given by surgeon's office**    Please shower or bathe before surgery.       Please do not wear any cologne, lotion, powder, jewelry, piercings, perfume, makeup, nail polish, hair accessories, or hair spray on the day of surgery.  Wear loose comfortable clothing.    Leave your valuables at home but bring a payment source for any after-surgery prescriptions you plan to fill at [facility].  Bring a storage case for any glasses/contacts.    An adult who is responsible for you MUST remain in the hospital and drive you home and should be with you for the first 24 hours after surgery.     The Day of Surgery:  Arrive at [facility] Surgery Entrance at the time directed by your surgeon and check in at the desk.     If you have a living will or healthcare power of

## 2025-06-06 NOTE — TELEPHONE ENCOUNTER
[facility] Medication Management Clinic Note   Prescription for Lovenox 80 mg syringe- take 70 mg twice daily #20 syringes with 1 refill sent to [facility] for upcoming procedure. Receipt confirmed by pharmacy  Casie Patel, Pharm D, BCPS, CACP  [facility] Medication Management Clinic  6/6/2025 7:50 AM

## 2025-06-06 NOTE — PROGRESS NOTES
Patient seen in person in Medication Management Service.    Patient states compliant most of the time with regimen.  One day patient is not sure if he missed a dose of warfarin (maybe 1 week ago). Pt did mistakenly take 7.5 mg instead of 3.75 mg on Tuesday.  No bleeding or thromboembolic side effects noted.    No significant med or dietary changes.    No significant recent illness or disease state changes.      Received clearance from [facility] for [facility] General Surgery - colonoscopy 6/19/25 and hernial repair with possible open 6/20/25.   Instructions include Hold Lovenox 4 days preop with Lovenox bridging. Patient is also on Plavix and will need to hold this for 5 days prior to surgery - all information available from phone note 5/23/25.   Pt also received clearance from Dr Grey to hold warfarin for procedure also. See note 5/27/25.  Creatinine 1.1; Weight 73.9kg  Prescription for Lovenox 80 mg Syringe- take 70 mg twice daily #20 syringes sent to [facility]    Bridging calendar is ready for patient and will be reviewed at next visit.       Patient acknowledges they are still an active patient of referring provider which is Dr. Grey Yes     PT/INR done via POC meter per protocol.  INR was supratherapeutic at 3.4.  (goal 2 - 3)    Warfarin regimen will be decreased to 3.75 mg today and then resume 3.75 mg Tues and 7.5 mg all other days.  Will retest in 1 week.    Patient understands dosing directions and information discussed.  Dosing schedule and follow up appointment given to patient. Progress note routed to referring physicians office.  Patient acknowledges working in Consult Agreement with Pharmacist as referred by his/her physician/provider.      For Pharmacy Admin Tracking Only    Intervention Detail: Dose Adjustment: 1, reason: Therapy De-escalation  Total # of Interventions Recommended: 1  Total # of Interventions Accepted: 1  Time Spent (min): 20

## 2025-06-13 ENCOUNTER — ANTI-COAG VISIT (OUTPATIENT)
Age: 50
End: 2025-06-13
Payer: COMMERCIAL

## 2025-06-13 DIAGNOSIS — D68.61 ANTI-PHOSPHOLIPID ANTIBODY SYNDROME: Primary | ICD-10-CM

## 2025-06-13 LAB
INTERNATIONAL NORMALIZATION RATIO, POC: 3.8
PROTHROMBIN TIME, POC: 45.9

## 2025-06-13 PROCEDURE — 85610 PROTHROMBIN TIME: CPT

## 2025-06-13 PROCEDURE — 99212 OFFICE O/P EST SF 10 MIN: CPT

## 2025-06-13 NOTE — PROGRESS NOTES
Patient seen in person in Medication Management Service.    Patient states compliant all of the time with regimen.    No bleeding or thromboembolic side effects noted.    No significant med or dietary changes.    No significant recent illness or disease state changes. Stressed out prior to procedure.  Pt will have procedures on  and .     Patient acknowledges they are still an active patient of referring provider which is Moise Grey MD  Yes     PT/INR done via POC meter per protocol.  INR was supratherapeutic at 3.8.  (goal 2 - 3)    Warfarin regimen will be held x 1 day and then use 3.75 mg on Saturday. Then follow Lovenox calendar until next appt.  Will retest in 2 weeks.      Patient understands dosing directions and information discussed.  Dosing schedule and follow up appointment given to patient. Progress note routed to referring physicians office.  Patient acknowledges working in Consult Agreement with Pharmacist as referred by his/her physician/provider.      For Pharmacy Admin Tracking Only    Intervention Detail: Adherence Monitorin and Dose Adjustment: 1, reason: Therapy Optimization  Total # of Interventions Recommended: 2  Total # of Interventions Accepted: 2  Time Spent (min): 20    Maia Blanco PharmD, BCPS 2025 7:36 AM

## 2025-06-17 NOTE — PRE-PROCEDURE INSTRUCTIONS
No answer, left message ?                             Unable to leave message ?    When were you told to arrive at hospital ?  0630    Do you have a  ? yes    Are you on any blood thinners ? Yes, coumadin, plavix, lovenox   If yes when did you stop taking ? Stopped 6/14 bridge with lovenox    Do you have your prep Rx filled and instruction ?  yes    Nothing to eat the day before , only clear liquids.     Are you experiencing any covid symptoms ? no    Do you have any infections or rash we should be aware of ? no      Do you have the Hibiclens soap to use the night before and the morning of surgery ?    Nothing to eat or drink after midnight, only a sip of water to take any medication instructed to take the night before.  Wear comfortable clothing, leave any valuables at home, remove any jewelry and body piercing .      Spoke to his wife

## 2025-06-18 ENCOUNTER — ANESTHESIA EVENT (OUTPATIENT)
Dept: ENDOSCOPY | Age: 50
End: 2025-06-18
Payer: COMMERCIAL

## 2025-06-19 ENCOUNTER — ANESTHESIA (OUTPATIENT)
Dept: ENDOSCOPY | Age: 50
End: 2025-06-19
Payer: COMMERCIAL

## 2025-06-19 ENCOUNTER — ANESTHESIA EVENT (OUTPATIENT)
Dept: OPERATING ROOM | Age: 50
End: 2025-06-19
Payer: COMMERCIAL

## 2025-06-19 ENCOUNTER — TELEPHONE (OUTPATIENT)
Age: 50
End: 2025-06-19

## 2025-06-19 ENCOUNTER — HOSPITAL ENCOUNTER (OUTPATIENT)
Age: 50
Setting detail: OUTPATIENT SURGERY
Discharge: HOME | End: 2025-06-19
Attending: SURGERY | Admitting: SURGERY
Payer: COMMERCIAL

## 2025-06-19 VITALS
RESPIRATION RATE: 16 BRPM | OXYGEN SATURATION: 100 % | DIASTOLIC BLOOD PRESSURE: 73 MMHG | HEART RATE: 77 BPM | HEIGHT: 68 IN | BODY MASS INDEX: 24.71 KG/M2 | WEIGHT: 163 LBS | SYSTOLIC BLOOD PRESSURE: 109 MMHG | TEMPERATURE: 97.8 F

## 2025-06-19 LAB — GLUCOSE BLD-MCNC: 154 MG/DL (ref 75–110)

## 2025-06-19 PROCEDURE — 7100000001 HC PACU RECOVERY - ADDTL 15 MIN: Performed by: SURGERY

## 2025-06-19 PROCEDURE — 2709999900 HC NON-CHARGEABLE SUPPLY: Performed by: SURGERY

## 2025-06-19 PROCEDURE — 7100000000 HC PACU RECOVERY - FIRST 15 MIN: Performed by: SURGERY

## 2025-06-19 PROCEDURE — 45378 DIAGNOSTIC COLONOSCOPY: CPT | Performed by: SURGERY

## 2025-06-19 PROCEDURE — 3700000000 HC ANESTHESIA ATTENDED CARE: Performed by: SURGERY

## 2025-06-19 PROCEDURE — 7100000010 HC PHASE II RECOVERY - FIRST 15 MIN: Performed by: SURGERY

## 2025-06-19 PROCEDURE — 7100000031 HC ASPR PHASE II RECOVERY - ADDTL 15 MIN: Performed by: SURGERY

## 2025-06-19 PROCEDURE — 82947 ASSAY GLUCOSE BLOOD QUANT: CPT

## 2025-06-19 PROCEDURE — 7100000030 HC ASPR PHASE II RECOVERY - FIRST 15 MIN: Performed by: SURGERY

## 2025-06-19 PROCEDURE — 3700000001 HC ADD 15 MINUTES (ANESTHESIA): Performed by: SURGERY

## 2025-06-19 PROCEDURE — 6360000002 HC RX W HCPCS: Performed by: ANESTHESIOLOGY

## 2025-06-19 PROCEDURE — 6360000002 HC RX W HCPCS: Performed by: NURSE ANESTHETIST, CERTIFIED REGISTERED

## 2025-06-19 PROCEDURE — 3609027000 HC COLONOSCOPY: Performed by: SURGERY

## 2025-06-19 PROCEDURE — 2580000003 HC RX 258: Performed by: ANESTHESIOLOGY

## 2025-06-19 PROCEDURE — 7100000011 HC PHASE II RECOVERY - ADDTL 15 MIN: Performed by: SURGERY

## 2025-06-19 RX ORDER — DEXAMETHASONE SODIUM PHOSPHATE 4 MG/ML
INJECTION, SOLUTION INTRA-ARTICULAR; INTRALESIONAL; INTRAMUSCULAR; INTRAVENOUS; SOFT TISSUE
Status: DISCONTINUED | OUTPATIENT
Start: 2025-06-19 | End: 2025-06-19 | Stop reason: SDUPTHER

## 2025-06-19 RX ORDER — SODIUM CHLORIDE 0.9 % (FLUSH) 0.9 %
5-40 SYRINGE (ML) INJECTION PRN
Status: DISCONTINUED | OUTPATIENT
Start: 2025-06-19 | End: 2025-06-19 | Stop reason: HOSPADM

## 2025-06-19 RX ORDER — ONDANSETRON 2 MG/ML
4 INJECTION INTRAMUSCULAR; INTRAVENOUS
Status: DISCONTINUED | OUTPATIENT
Start: 2025-06-19 | End: 2025-06-19 | Stop reason: HOSPADM

## 2025-06-19 RX ORDER — SODIUM CHLORIDE 0.9 % (FLUSH) 0.9 %
5-40 SYRINGE (ML) INJECTION EVERY 12 HOURS SCHEDULED
Status: DISCONTINUED | OUTPATIENT
Start: 2025-06-19 | End: 2025-06-19 | Stop reason: HOSPADM

## 2025-06-19 RX ORDER — FENTANYL CITRATE 0.05 MG/ML
25 INJECTION, SOLUTION INTRAMUSCULAR; INTRAVENOUS EVERY 5 MIN PRN
Status: DISCONTINUED | OUTPATIENT
Start: 2025-06-19 | End: 2025-06-19 | Stop reason: HOSPADM

## 2025-06-19 RX ORDER — DIPHENHYDRAMINE HYDROCHLORIDE 50 MG/ML
12.5 INJECTION, SOLUTION INTRAMUSCULAR; INTRAVENOUS
Status: DISCONTINUED | OUTPATIENT
Start: 2025-06-19 | End: 2025-06-19 | Stop reason: HOSPADM

## 2025-06-19 RX ORDER — SODIUM CHLORIDE 9 MG/ML
INJECTION, SOLUTION INTRAVENOUS CONTINUOUS
Status: DISCONTINUED | OUTPATIENT
Start: 2025-06-19 | End: 2025-06-19 | Stop reason: HOSPADM

## 2025-06-19 RX ORDER — PROPOFOL 10 MG/ML
INJECTION, EMULSION INTRAVENOUS
Status: DISCONTINUED | OUTPATIENT
Start: 2025-06-19 | End: 2025-06-19 | Stop reason: SDUPTHER

## 2025-06-19 RX ORDER — SODIUM CHLORIDE 9 MG/ML
INJECTION, SOLUTION INTRAVENOUS PRN
Status: DISCONTINUED | OUTPATIENT
Start: 2025-06-19 | End: 2025-06-19 | Stop reason: HOSPADM

## 2025-06-19 RX ORDER — ONDANSETRON 2 MG/ML
INJECTION INTRAMUSCULAR; INTRAVENOUS
Status: DISCONTINUED | OUTPATIENT
Start: 2025-06-19 | End: 2025-06-19 | Stop reason: SDUPTHER

## 2025-06-19 RX ORDER — LIDOCAINE HYDROCHLORIDE 10 MG/ML
1 INJECTION, SOLUTION EPIDURAL; INFILTRATION; INTRACAUDAL; PERINEURAL
Status: COMPLETED | OUTPATIENT
Start: 2025-06-19 | End: 2025-06-19

## 2025-06-19 RX ORDER — LIDOCAINE HYDROCHLORIDE 20 MG/ML
INJECTION, SOLUTION EPIDURAL; INFILTRATION; INTRACAUDAL; PERINEURAL
Status: DISCONTINUED | OUTPATIENT
Start: 2025-06-19 | End: 2025-06-19 | Stop reason: SDUPTHER

## 2025-06-19 RX ORDER — NALOXONE HYDROCHLORIDE 0.4 MG/ML
INJECTION, SOLUTION INTRAMUSCULAR; INTRAVENOUS; SUBCUTANEOUS PRN
Status: DISCONTINUED | OUTPATIENT
Start: 2025-06-19 | End: 2025-06-19 | Stop reason: HOSPADM

## 2025-06-19 RX ORDER — METOCLOPRAMIDE HYDROCHLORIDE 5 MG/ML
10 INJECTION INTRAMUSCULAR; INTRAVENOUS
Status: DISCONTINUED | OUTPATIENT
Start: 2025-06-19 | End: 2025-06-19 | Stop reason: HOSPADM

## 2025-06-19 RX ADMIN — ONDANSETRON 4 MG: 2 INJECTION INTRAMUSCULAR; INTRAVENOUS at 09:22

## 2025-06-19 RX ADMIN — LIDOCAINE HYDROCHLORIDE 1 ML: 10 INJECTION, SOLUTION EPIDURAL; INFILTRATION; INTRACAUDAL; PERINEURAL at 07:33

## 2025-06-19 RX ADMIN — SODIUM CHLORIDE: 900 INJECTION, SOLUTION INTRAVENOUS at 07:34

## 2025-06-19 RX ADMIN — DEXAMETHASONE SODIUM PHOSPHATE 4 MG: 4 INJECTION INTRA-ARTICULAR; INTRALESIONAL; INTRAMUSCULAR; INTRAVENOUS; SOFT TISSUE at 09:22

## 2025-06-19 RX ADMIN — PROPOFOL 300 MG: 10 INJECTION, EMULSION INTRAVENOUS at 09:17

## 2025-06-19 RX ADMIN — LIDOCAINE HYDROCHLORIDE 80 MG: 20 INJECTION, SOLUTION EPIDURAL; INFILTRATION; INTRACAUDAL; PERINEURAL at 09:17

## 2025-06-19 RX ADMIN — SODIUM CHLORIDE: 900 INJECTION, SOLUTION INTRAVENOUS at 09:44

## 2025-06-19 ASSESSMENT — PAIN - FUNCTIONAL ASSESSMENT
PAIN_FUNCTIONAL_ASSESSMENT: ADULT NONVERBAL PAIN SCALE (NPVS)
PAIN_FUNCTIONAL_ASSESSMENT: 0-10

## 2025-06-19 ASSESSMENT — ENCOUNTER SYMPTOMS
SHORTNESS OF BREATH: 0
COUGH: 0
SORE THROAT: 0

## 2025-06-19 NOTE — H&P
HISTORY and PHYSICAL  [facility]       NAME:  David Singletary  MRN: [id number]   YOB: 1975   Date: 6/19/2025   Age: 50 y.o.  Gender: male       COMPLAINT AND PRESENT HISTORY:       David Singletary is 50 y.o.  male, here for     Procedure(s):  DIAGNOSTIC COLONOSCOPY    Pre-Op Diagnosis Codes:      * Chronic constipation [K59.09]    Denies abdominal pain.  Denies dysphagia, heartburn.  Denies nausea, vomiting.  Denies diarrhea.  Reports constipation has resolved.   Denies blood in stool, dark tarry stools.  Denies changes in appetite and unintended weight loss.  Denies family history of colon cancer.    Completed and followed prescribed prep. NPO p MN. Stopped plavix five days ago. Stopped warfarin five days ago. Bridging with lovenox with last dose yesterday am. Denies taking any other blood thinning medications. Denies recent or current chest pain/pressure, palpitations, SOB, recent URI, fever or chills.       RECENT LABS, IMAGING AND TESTING     Lab Results   Component Value Date    WBC 5.6 06/06/2025    RBC 3.99 (L) 06/06/2025    HGB 12.4 (L) 06/06/2025    HCT 36.7 (L) 06/06/2025    MCV 92.0 06/06/2025    MCH 31.1 06/06/2025    MCHC 33.8 06/06/2025    RDW 13.4 06/06/2025     06/06/2025    MPV 8.9 06/06/2025        Lab Results   Component Value Date     06/06/2025    K 4.6 06/06/2025     06/06/2025    CO2 26 06/06/2025    BUN 13 06/06/2025    CREATININE 1.0 06/06/2025    GLUCOSE 219 (H) 06/06/2025    CALCIUM 8.8 06/06/2025    BILITOT <0.2 05/09/2025    ALKPHOS 62 05/09/2025    AST 36 05/09/2025    ALT 69 (H) 05/09/2025     CT ABDOMEN PELVIS W IV CONTRAST Additional Contrast? None  Order: [id number]   Status: Final result       Next appt: 06/25/2025 at 08:10 AM in Pharmacy ([facility])    Test Result Released: Yes (seen)    0 Result Notes  Details    Reading Physician Reading Date Result Priority   Josiah Meza MD  [phone] 5/9/2025

## 2025-06-19 NOTE — ANESTHESIA PRE PROCEDURE
Department of Anesthesiology  Preprocedure Note       Name:  David Singletary   Age:  50 y.o.  :  1975                                          MRN:  [id number]         Date:  2025      Surgeon: Surgeon(s):  Christophe Harper MD    Procedure: Procedure(s):  DIAGNOSTIC COLONOSCOPY    Medications prior to admission:   Prior to Admission medications    Medication Sig Start Date End Date Taking? Authorizing Provider   enoxaparin (LOVENOX) 80 MG/0.8ML Inject 0.7 mLs into the skin 2 times daily 25  Yes Moise Grey MD   ondansetron (ZOFRAN) 4 MG tablet Take 1 tablet by mouth every 6 hours as needed for Nausea or Vomiting 25  Yes Tami Sinha APRN - CNP   albuterol sulfate HFA (PROVENTIL;VENTOLIN;PROAIR) 108 (90 Base) MCG/ACT inhaler Inhale 2 puffs into the lungs every 4 hours as needed for Wheezing or Shortness of Breath 25  Yes Danita Ward MD   metFORMIN (GLUCOPHAGE-XR) 500 MG extended release tablet Take 2 tablets by mouth 2 times daily 10/17/24  Yes Danita Ward MD   clonazePAM (KLONOPIN) 0.5 MG tablet Take 1 tablet by mouth nightly. Taking as needed 24  Yes Linn Whalen MD   rosuvastatin (CRESTOR) 40 MG tablet Take 1 tablet by mouth every evening 24  Yes Rosalina Gee APRN - CNP   lisinopril (PRINIVIL;ZESTRIL) 2.5 MG tablet Take 1 tablet by mouth daily 24  Yes Rosalina Gee APRN - CNP   Metoprolol Tartrate 75 MG TABS Take 1 tablet by mouth 2 times daily 24  Yes Rosalina Gee APRN - CNP   omeprazole (PRILOSEC) 20 MG delayed release capsule TAKE ONE CAPSULE BY MOUTH TWICE A DAY BEFORE MEAL(S) 7/3/24  Yes Danita Ward MD   L-ARGININE PO Take 2 capsules by mouth daily   Yes Linn Whalen MD   nitroGLYCERIN (NITROSTAT) 0.4 MG SL tablet Place 1 tablet under the tongue every 5 minutes as needed for Chest pain 25   Maribeth Garcia MD   cyclobenzaprine (FLEXERIL) 10 MG tablet Take 1 tablet by mouth three

## 2025-06-19 NOTE — TELEPHONE ENCOUNTER
Patient had colonoscopy on 6/19/25 and will have right inguinal repair on 6/20/25; off warfarin for procedures and bridging with Lovenox. Follow up scheduled for 6/25/25    Trisha MayerD  6/19/2025, 2:48 PM

## 2025-06-19 NOTE — ANESTHESIA POSTPROCEDURE EVALUATION
Department of Anesthesiology  Postprocedure Note    Patient: David Singletary  MRN: [id number]  YOB: 1975  Date of evaluation: 6/19/2025    Procedure Summary       Date: 06/19/25 Room / Location: [place] [address] / [facility]    Anesthesia Start: 0913 Anesthesia Stop: 1001    Procedure: COLONOSCOPY DIAGNOSTIC (Rectum) Diagnosis:       Chronic constipation      (Chronic constipation [K59.09])    Surgeons: Christophe Harper MD Responsible Provider: Joselito Madden MD    Anesthesia Type: general ASA Status: 3            Anesthesia Type: No value filed.    Julissa Phase I: Julissa Score: 10    Julissa Phase II: Julissa Score: 10    Anesthesia Post Evaluation    Patient location during evaluation: PACU  Patient participation: complete - patient participated  Level of consciousness: awake and alert  Airway patency: patent  Nausea & Vomiting: no vomiting  Cardiovascular status: hemodynamically stable  Respiratory status: acceptable  Hydration status: euvolemic  Comments: POST- ANESTHESIA EVALUATION       Pt Name: David Singletary  MRN: [id number]  YOB: 1975  Date of evaluation: 6/19/2025  Time:  1:50 PM      /73   Pulse 77   Temp 97.8 °F (36.6 °C) (Temporal)   Resp 16   Ht 1.727 m (5' 8\")   Wt 73.9 kg (163 lb)   SpO2 100%   BMI 24.78 kg/m²      Consciousness Level  Awake  Cardiopulmonary Status  Stable  Pain Adequately Treated YES  Nausea / Vomiting  NO  Adequate Hydration  YES  Anesthesia Related Complications NONE      Electronically signed by Joselito Madden MD on 6/19/2025 at 1:50 PM         Pain management: satisfactory to patient    No notable events documented.

## 2025-06-19 NOTE — OP NOTE
[facility] Surgery   Christophe Harper MD, FACS  Tami Sinha, APRN-CNP  [address] [place], [address]  [place] [address]  P: [phone], F: [phone]    PROCEDURE NOTE    DATE OF PROCEDURE: 6/19/2025    SURGEON: Christophe Harper MD    ASSISTANT: None    PREOPERATIVE DIAGNOSIS: Constipation.  Change in bowel habits.  Multiple comorbidities.  Chronic anticoagulation.    POSTOPERATIVE DIAGNOSIS: Poor prep with limited visualization.  No obstruction.  No obvious gross lesions.    OPERATION: Total colonoscopy to cecum with intubation of terminal ileum    ANESTHESIA: General    ESTIMATED BLOOD LOSS: None    COMPLICATIONS: None     SPECIMENS:  Was Not Obtained    HISTORY: The patient is a 50 y.o. year old male with history of above preop diagnosis.  I recommended colonoscopy with possible biopsy or polypectomy and I explained the risk, benefits, expected outcome, and alternatives to the procedure.  Risks included but are not limited to bleeding, infection, respiratory distress, hypotension, and perforation of the colon and possibility of missing a lesion.  The patient understands and is in agreement.      PROCEDURE: The patient was given IV conscious sedation.  The patient's SPO2 remained above 90% throughout the procedure. Digital rectal exam was normal.  The colonoscope was inserted through the anus into the rectum and advanced under direct vision to the cecum without difficulty.  Terminal ileum was examined for approximately 2 inches.  The prep was poor.      Findings:  Terminal ileum: normal    Cecum/Ascending colon: Poor prep with limited visualization.  No gross lesions    Transverse colon: Poor prep with limited visualization.  No gross lesions    Descending/Sigmoid colon: Poor prep with limited visualization.  No gross lesions    Rectum/Anus: examined in normal and retroflexed positions and was poor prep with limited visualization no gross lesions    Withdrawal Time was (minutes):

## 2025-06-19 NOTE — PRE-PROCEDURE INSTRUCTIONS
No answer, left message ?  Yes, voicemail                           Unable to leave message ?    When were you told to arrive at hospital ?  0945    Do you have a  ?    Are you on any blood thinners ?  yes                   If yes when did you stop taking ?    Do you have your prep Rx filled and instruction ?      Nothing to eat the day before , only clear liquids.    Are you experiencing any covid symptoms ?     Do you have any infections or rash we should be aware of ?      Do you have the Hibiclens soap to use the night before and the morning of surgery ?    Nothing to eat or drink after midnight, only a sip of water to take any medication instructed to take the night before.  Wear comfortable clothing, leave any valuables at home, remove any jewelry and body piercing .

## 2025-06-20 ENCOUNTER — ANESTHESIA (OUTPATIENT)
Dept: OPERATING ROOM | Age: 50
End: 2025-06-20
Payer: COMMERCIAL

## 2025-06-20 ENCOUNTER — HOSPITAL ENCOUNTER (OUTPATIENT)
Age: 50
Setting detail: OUTPATIENT SURGERY
Discharge: HOME | End: 2025-06-20
Attending: SURGERY | Admitting: SURGERY
Payer: COMMERCIAL

## 2025-06-20 VITALS
TEMPERATURE: 98 F | BODY MASS INDEX: 24.71 KG/M2 | DIASTOLIC BLOOD PRESSURE: 74 MMHG | WEIGHT: 163 LBS | HEART RATE: 83 BPM | OXYGEN SATURATION: 97 % | RESPIRATION RATE: 16 BRPM | SYSTOLIC BLOOD PRESSURE: 130 MMHG | HEIGHT: 68 IN

## 2025-06-20 DIAGNOSIS — K40.90 RIGHT INGUINAL HERNIA: Primary | ICD-10-CM

## 2025-06-20 LAB
GLUCOSE BLD-MCNC: 142 MG/DL (ref 75–110)
GLUCOSE BLD-MCNC: 185 MG/DL (ref 75–110)

## 2025-06-20 PROCEDURE — 82947 ASSAY GLUCOSE BLOOD QUANT: CPT

## 2025-06-20 PROCEDURE — 7100000000 HC PACU RECOVERY - FIRST 15 MIN: Performed by: SURGERY

## 2025-06-20 PROCEDURE — 6370000000 HC RX 637 (ALT 250 FOR IP): Performed by: ANESTHESIOLOGY

## 2025-06-20 PROCEDURE — 7100000001 HC PACU RECOVERY - ADDTL 15 MIN: Performed by: SURGERY

## 2025-06-20 PROCEDURE — 7100000031 HC ASPR PHASE II RECOVERY - ADDTL 15 MIN: Performed by: SURGERY

## 2025-06-20 PROCEDURE — 6360000002 HC RX W HCPCS: Performed by: ANESTHESIOLOGY

## 2025-06-20 PROCEDURE — 49505 PRP I/HERN INIT REDUC >5 YR: CPT | Performed by: NURSE PRACTITIONER

## 2025-06-20 PROCEDURE — 7100000011 HC PHASE II RECOVERY - ADDTL 15 MIN: Performed by: SURGERY

## 2025-06-20 PROCEDURE — 6360000002 HC RX W HCPCS: Performed by: NURSE PRACTITIONER

## 2025-06-20 PROCEDURE — 6360000002 HC RX W HCPCS: Performed by: SURGERY

## 2025-06-20 PROCEDURE — 2709999900 HC NON-CHARGEABLE SUPPLY: Performed by: SURGERY

## 2025-06-20 PROCEDURE — 3700000000 HC ANESTHESIA ATTENDED CARE: Performed by: SURGERY

## 2025-06-20 PROCEDURE — 7100000010 HC PHASE II RECOVERY - FIRST 15 MIN: Performed by: SURGERY

## 2025-06-20 PROCEDURE — C1781 MESH (IMPLANTABLE): HCPCS | Performed by: SURGERY

## 2025-06-20 PROCEDURE — 2500000003 HC RX 250 WO HCPCS: Performed by: NURSE ANESTHETIST, CERTIFIED REGISTERED

## 2025-06-20 PROCEDURE — 3600000002 HC SURGERY LEVEL 2 BASE: Performed by: SURGERY

## 2025-06-20 PROCEDURE — 2580000003 HC RX 258: Performed by: ANESTHESIOLOGY

## 2025-06-20 PROCEDURE — 6360000002 HC RX W HCPCS: Performed by: NURSE ANESTHETIST, CERTIFIED REGISTERED

## 2025-06-20 PROCEDURE — 3600000012 HC SURGERY LEVEL 2 ADDTL 15MIN: Performed by: SURGERY

## 2025-06-20 PROCEDURE — 49505 PRP I/HERN INIT REDUC >5 YR: CPT | Performed by: SURGERY

## 2025-06-20 PROCEDURE — 7100000030 HC ASPR PHASE II RECOVERY - FIRST 15 MIN: Performed by: SURGERY

## 2025-06-20 PROCEDURE — 2720000010 HC SURG SUPPLY STERILE: Performed by: SURGERY

## 2025-06-20 PROCEDURE — 3700000001 HC ADD 15 MINUTES (ANESTHESIA): Performed by: SURGERY

## 2025-06-20 DEVICE — PERFIX PLUG, 1.5" X 2.0" (3.8 CM X 5.0 CM), EXTRA LARGE (CONTENTS: 6)
Type: IMPLANTABLE DEVICE | Site: INGUINAL | Status: FUNCTIONAL
Brand: PERFIX

## 2025-06-20 RX ORDER — ACETAMINOPHEN 500 MG
1000 TABLET ORAL ONCE
Status: COMPLETED | OUTPATIENT
Start: 2025-06-20 | End: 2025-06-20

## 2025-06-20 RX ORDER — SODIUM CHLORIDE 9 MG/ML
INJECTION, SOLUTION INTRAVENOUS CONTINUOUS
Status: DISCONTINUED | OUTPATIENT
Start: 2025-06-20 | End: 2025-06-20 | Stop reason: HOSPADM

## 2025-06-20 RX ORDER — FENTANYL CITRATE 0.05 MG/ML
25 INJECTION, SOLUTION INTRAMUSCULAR; INTRAVENOUS EVERY 5 MIN PRN
Status: DISCONTINUED | OUTPATIENT
Start: 2025-06-20 | End: 2025-06-20 | Stop reason: HOSPADM

## 2025-06-20 RX ORDER — PROPOFOL 10 MG/ML
INJECTION, EMULSION INTRAVENOUS
Status: DISCONTINUED | OUTPATIENT
Start: 2025-06-20 | End: 2025-06-20 | Stop reason: SDUPTHER

## 2025-06-20 RX ORDER — LIDOCAINE HYDROCHLORIDE 10 MG/ML
1 INJECTION, SOLUTION EPIDURAL; INFILTRATION; INTRACAUDAL; PERINEURAL
Status: COMPLETED | OUTPATIENT
Start: 2025-06-20 | End: 2025-06-20

## 2025-06-20 RX ORDER — MIDAZOLAM HYDROCHLORIDE 2 MG/2ML
INJECTION, SOLUTION INTRAMUSCULAR; INTRAVENOUS
Status: DISCONTINUED | OUTPATIENT
Start: 2025-06-20 | End: 2025-06-20 | Stop reason: SDUPTHER

## 2025-06-20 RX ORDER — ONDANSETRON 4 MG/1
TABLET, FILM COATED ORAL
Qty: 20 TABLET | Refills: 0 | Status: SHIPPED | OUTPATIENT
Start: 2025-06-20

## 2025-06-20 RX ORDER — OXYCODONE AND ACETAMINOPHEN 5; 325 MG/1; MG/1
1 TABLET ORAL EVERY 6 HOURS PRN
Qty: 28 TABLET | Refills: 0 | Status: SHIPPED | OUTPATIENT
Start: 2025-06-20 | End: 2025-06-27

## 2025-06-20 RX ORDER — OXYCODONE AND ACETAMINOPHEN 5; 325 MG/1; MG/1
1 TABLET ORAL ONCE
Status: DISCONTINUED | OUTPATIENT
Start: 2025-06-20 | End: 2025-06-20 | Stop reason: HOSPADM

## 2025-06-20 RX ORDER — ONDANSETRON 2 MG/ML
INJECTION INTRAMUSCULAR; INTRAVENOUS
Status: DISCONTINUED | OUTPATIENT
Start: 2025-06-20 | End: 2025-06-20 | Stop reason: SDUPTHER

## 2025-06-20 RX ORDER — LABETALOL HYDROCHLORIDE 5 MG/ML
10 INJECTION, SOLUTION INTRAVENOUS
Status: DISCONTINUED | OUTPATIENT
Start: 2025-06-20 | End: 2025-06-20 | Stop reason: HOSPADM

## 2025-06-20 RX ORDER — LIDOCAINE HYDROCHLORIDE 10 MG/ML
INJECTION, SOLUTION EPIDURAL; INFILTRATION; INTRACAUDAL; PERINEURAL
Status: DISCONTINUED | OUTPATIENT
Start: 2025-06-20 | End: 2025-06-20 | Stop reason: SDUPTHER

## 2025-06-20 RX ORDER — ACETAMINOPHEN 325 MG/1
650 TABLET ORAL
Status: DISCONTINUED | OUTPATIENT
Start: 2025-06-20 | End: 2025-06-20 | Stop reason: HOSPADM

## 2025-06-20 RX ORDER — DIPHENHYDRAMINE HYDROCHLORIDE 50 MG/ML
12.5 INJECTION, SOLUTION INTRAMUSCULAR; INTRAVENOUS
Status: DISCONTINUED | OUTPATIENT
Start: 2025-06-20 | End: 2025-06-20 | Stop reason: HOSPADM

## 2025-06-20 RX ORDER — GABAPENTIN 300 MG/1
300 CAPSULE ORAL ONCE
Status: COMPLETED | OUTPATIENT
Start: 2025-06-20 | End: 2025-06-20

## 2025-06-20 RX ORDER — SODIUM CHLORIDE 0.9 % (FLUSH) 0.9 %
5-40 SYRINGE (ML) INJECTION PRN
Status: DISCONTINUED | OUTPATIENT
Start: 2025-06-20 | End: 2025-06-20 | Stop reason: HOSPADM

## 2025-06-20 RX ORDER — SODIUM CHLORIDE 9 MG/ML
INJECTION, SOLUTION INTRAVENOUS PRN
Status: DISCONTINUED | OUTPATIENT
Start: 2025-06-20 | End: 2025-06-20 | Stop reason: HOSPADM

## 2025-06-20 RX ORDER — METOCLOPRAMIDE HYDROCHLORIDE 5 MG/ML
10 INJECTION INTRAMUSCULAR; INTRAVENOUS
Status: DISCONTINUED | OUTPATIENT
Start: 2025-06-20 | End: 2025-06-20 | Stop reason: HOSPADM

## 2025-06-20 RX ORDER — ROCURONIUM BROMIDE 10 MG/ML
INJECTION, SOLUTION INTRAVENOUS
Status: DISCONTINUED | OUTPATIENT
Start: 2025-06-20 | End: 2025-06-20 | Stop reason: SDUPTHER

## 2025-06-20 RX ORDER — SODIUM CHLORIDE 0.9 % (FLUSH) 0.9 %
5-40 SYRINGE (ML) INJECTION EVERY 12 HOURS SCHEDULED
Status: DISCONTINUED | OUTPATIENT
Start: 2025-06-20 | End: 2025-06-20 | Stop reason: HOSPADM

## 2025-06-20 RX ORDER — HYDRALAZINE HYDROCHLORIDE 20 MG/ML
10 INJECTION INTRAMUSCULAR; INTRAVENOUS
Status: DISCONTINUED | OUTPATIENT
Start: 2025-06-20 | End: 2025-06-20 | Stop reason: HOSPADM

## 2025-06-20 RX ORDER — BUPIVACAINE HYDROCHLORIDE 5 MG/ML
INJECTION, SOLUTION EPIDURAL; INTRACAUDAL; PERINEURAL PRN
Status: DISCONTINUED | OUTPATIENT
Start: 2025-06-20 | End: 2025-06-20 | Stop reason: ALTCHOICE

## 2025-06-20 RX ORDER — KETOROLAC TROMETHAMINE 30 MG/ML
INJECTION, SOLUTION INTRAMUSCULAR; INTRAVENOUS
Status: DISCONTINUED | OUTPATIENT
Start: 2025-06-20 | End: 2025-06-20 | Stop reason: SDUPTHER

## 2025-06-20 RX ORDER — CEPHALEXIN 500 MG/1
CAPSULE ORAL
Qty: 21 CAPSULE | Refills: 0 | Status: SHIPPED | OUTPATIENT
Start: 2025-06-20

## 2025-06-20 RX ORDER — MEPERIDINE HYDROCHLORIDE 25 MG/ML
12.5 INJECTION INTRAMUSCULAR; INTRAVENOUS; SUBCUTANEOUS EVERY 5 MIN PRN
Status: DISCONTINUED | OUTPATIENT
Start: 2025-06-20 | End: 2025-06-20 | Stop reason: HOSPADM

## 2025-06-20 RX ORDER — FENTANYL CITRATE 50 UG/ML
INJECTION, SOLUTION INTRAMUSCULAR; INTRAVENOUS
Status: DISCONTINUED | OUTPATIENT
Start: 2025-06-20 | End: 2025-06-20 | Stop reason: SDUPTHER

## 2025-06-20 RX ORDER — NALOXONE HYDROCHLORIDE 0.4 MG/ML
INJECTION, SOLUTION INTRAMUSCULAR; INTRAVENOUS; SUBCUTANEOUS PRN
Status: DISCONTINUED | OUTPATIENT
Start: 2025-06-20 | End: 2025-06-20 | Stop reason: HOSPADM

## 2025-06-20 RX ORDER — DEXAMETHASONE SODIUM PHOSPHATE 4 MG/ML
INJECTION, SOLUTION INTRA-ARTICULAR; INTRALESIONAL; INTRAMUSCULAR; INTRAVENOUS; SOFT TISSUE
Status: DISCONTINUED | OUTPATIENT
Start: 2025-06-20 | End: 2025-06-20 | Stop reason: SDUPTHER

## 2025-06-20 RX ORDER — ONDANSETRON 2 MG/ML
4 INJECTION INTRAMUSCULAR; INTRAVENOUS
Status: DISCONTINUED | OUTPATIENT
Start: 2025-06-20 | End: 2025-06-20 | Stop reason: HOSPADM

## 2025-06-20 RX ADMIN — SUGAMMADEX 200 MG: 100 INJECTION, SOLUTION INTRAVENOUS at 13:54

## 2025-06-20 RX ADMIN — LIDOCAINE HYDROCHLORIDE 50 MG: 10 INJECTION, SOLUTION EPIDURAL; INFILTRATION; INTRACAUDAL; PERINEURAL at 12:39

## 2025-06-20 RX ADMIN — FENTANYL CITRATE 25 MCG: 50 INJECTION INTRAMUSCULAR; INTRAVENOUS at 12:39

## 2025-06-20 RX ADMIN — LIDOCAINE HYDROCHLORIDE 1 ML: 10 INJECTION, SOLUTION EPIDURAL; INFILTRATION; INTRACAUDAL; PERINEURAL at 10:25

## 2025-06-20 RX ADMIN — FENTANYL CITRATE 25 MCG: 50 INJECTION INTRAMUSCULAR; INTRAVENOUS at 13:02

## 2025-06-20 RX ADMIN — ACETAMINOPHEN 1000 MG: 500 TABLET ORAL at 10:30

## 2025-06-20 RX ADMIN — Medication 2000 MG: at 12:45

## 2025-06-20 RX ADMIN — DEXAMETHASONE SODIUM PHOSPHATE 8 MG: 4 INJECTION INTRA-ARTICULAR; INTRALESIONAL; INTRAMUSCULAR; INTRAVENOUS; SOFT TISSUE at 12:50

## 2025-06-20 RX ADMIN — GABAPENTIN 300 MG: 300 CAPSULE ORAL at 10:30

## 2025-06-20 RX ADMIN — ONDANSETRON 4 MG: 2 INJECTION, SOLUTION INTRAMUSCULAR; INTRAVENOUS at 13:50

## 2025-06-20 RX ADMIN — HYDROMORPHONE HYDROCHLORIDE 0.5 MG: 1 INJECTION, SOLUTION INTRAMUSCULAR; INTRAVENOUS; SUBCUTANEOUS at 14:53

## 2025-06-20 RX ADMIN — SODIUM CHLORIDE: 900 INJECTION, SOLUTION INTRAVENOUS at 10:26

## 2025-06-20 RX ADMIN — KETOROLAC TROMETHAMINE 15 MG: 30 INJECTION, SOLUTION INTRAMUSCULAR; INTRAVENOUS at 13:50

## 2025-06-20 RX ADMIN — ROCURONIUM BROMIDE 70 MG: 50 INJECTION INTRAVENOUS at 12:40

## 2025-06-20 RX ADMIN — FENTANYL CITRATE 50 MCG: 50 INJECTION INTRAMUSCULAR; INTRAVENOUS at 14:01

## 2025-06-20 RX ADMIN — MIDAZOLAM HYDROCHLORIDE 2 MG: 1 INJECTION, SOLUTION INTRAMUSCULAR; INTRAVENOUS at 12:33

## 2025-06-20 RX ADMIN — PROPOFOL 200 MG: 10 INJECTION, EMULSION INTRAVENOUS at 12:39

## 2025-06-20 RX ADMIN — SODIUM CHLORIDE: 900 INJECTION, SOLUTION INTRAVENOUS at 13:13

## 2025-06-20 RX ADMIN — ROCURONIUM BROMIDE 15 MG: 50 INJECTION INTRAVENOUS at 13:25

## 2025-06-20 ASSESSMENT — PAIN DESCRIPTION - LOCATION
LOCATION: ABDOMEN

## 2025-06-20 ASSESSMENT — PAIN DESCRIPTION - DESCRIPTORS
DESCRIPTORS: ACHING;BURNING
DESCRIPTORS: BURNING;SHARP

## 2025-06-20 ASSESSMENT — PAIN DESCRIPTION - ORIENTATION
ORIENTATION: RIGHT;LOWER

## 2025-06-20 ASSESSMENT — ENCOUNTER SYMPTOMS
SORE THROAT: 0
COUGH: 0
SHORTNESS OF BREATH: 1
SHORTNESS OF BREATH: 0

## 2025-06-20 ASSESSMENT — PAIN DESCRIPTION - PAIN TYPE: TYPE: SURGICAL PAIN

## 2025-06-20 ASSESSMENT — PAIN SCALES - GENERAL
PAINLEVEL_OUTOF10: 10
PAINLEVEL_OUTOF10: 10

## 2025-06-20 ASSESSMENT — PAIN - FUNCTIONAL ASSESSMENT
PAIN_FUNCTIONAL_ASSESSMENT: 0-10

## 2025-06-20 NOTE — PROGRESS NOTES
CLINICAL PHARMACY NOTE: MEDS TO BEDS    Total # of Prescriptions Filled: 3   The following medications were delivered to the patient:  Cephalexin 500mg  Ondansetron 4mg  Oxycodone/APAP 5/325mg     Additional Documentation: 6/20/25 2:07pm family Karen p/delisa at [facility]

## 2025-06-20 NOTE — BRIEF OP NOTE
Brief Postoperative Note      Patient: David Singletary  YOB: 1975  MRN: [id number]    Date of Procedure: 6/20/2025    Pre-Op Diagnosis Codes:      * Right inguinal hernia [K40.90]    Post-Op Diagnosis: Same       Procedure(s):  XI ROBOTIC LAPAROSCOPIC RIGHT INGUINAL REPAIR WITH MESH POSSIBLE OPEN    Surgeon(s):  Christophe Harper MD    Assistant:  * No surgical staff found *    Anesthesia: General    Estimated Blood Loss (mL): less than 50     Complications: None    Specimens:   * No specimens in log *    Implants:  * No implants in log *      Drains: * No LDAs found *    Findings:  Infection Present At Time Of Surgery (PATOS) (choose all levels that have infection present):  No infection present  Other Findings: dictated    Electronically signed by Christophe Harper MD on 6/20/2025 at 12:47 PM

## 2025-06-20 NOTE — PROGRESS NOTES
[facility] Surgery   Christophe Harper MD, FACS  ALEXANDER Mak  [address], [address]  [place] [address]  P: [phone], F: [phone]    First Assist Operative Note      Patient: David Singletary  YOB: 1975  MRN: [id number]    Date of Procedure: 6/20/2025    Pre-Op Diagnosis Codes:      * Right inguinal hernia [K40.90]    Post-Op Diagnosis: Right direct inguinal hernia       Procedure(s): Open right direct inguinal hernia repair with extra-large mesh plug and mesh     Surgeon(s):  Christophe Harper MD    Assistant: SEAN Burleson CNP was needed throughout the entirety of case for help with positioning the patient, instrumentation and exposure, assistance with securing the mesh and wound closure.     Anesthesia: General    Detailed Description of Procedure: See Dr. Harper's operative note from 6/20/2025 for further detail.    Electronically signed by SEAN Burleson CNP on 6/20/2025 at 6:41 PM

## 2025-06-20 NOTE — OP NOTE
[facility] General Surgery   Christophe Harper MD, FACS  Tami Sinha, APRN-CNP  [address], [address]  [place] [address]  P: [phone], F: [phone]      Preoperative diagnosis: Right inguinal hernia    Postoperative diagnosis: Right direct inguinal hernia    Procedure: Open right direct inguinal hernia repair with extra-large mesh plug and mesh    Surgeon: Dr. Harper    First Assist: Tami Sinha, SEAN-CNP    Anesthesia: General    Preparation: Hibiclens    EBL: Less than 25 mL    Specimen: None    Procedure: Informed consent was obtained.  Site was marked and confirmed.  Preoperative antibiotic was given.  He was taken to the operating room.  General anesthesia was given.  Operative site was prepped and draped usual sterile fashion.  Timeout was done.  Right inguinal incision was made.  Lida's was incised.  External oblique aponeurosis was incised.  External ring was divided.  Cord structures were isolated and skeletonized.  Patient was found to have indirect defect.  Sac was dissected of the cord structures and completely reduced.  Direct defect was repaired using an extra-large mesh plug.  Floor of the inguinal canal initially was repaired by approximating the conjoined tendon with the inguinal ligament using running 2-0 Prolene suture.  Floor of the inguinal canal was further reinforced using a Prolene mesh which was secured in several different locations using interrupted Ethibond sutures.  Satisfactory tension-free repair was accomplished.  Hemostasis was confirmed.  Sponge needle instrument counts found to be correct.  Wound was approximated in several layers using absorbable sutures.  Local anesthetic was infiltrated.  Dermabond was applied.  Steri-Strips applied.  Sterile dressing was applied.  Overall patient tolerated procedure well and was transferred to the recovery room in a stable condition.    First assistant helped with positioning the patient, instrumentation  and exposure, assistance with securing the mesh and wound closure.    Recommendations: Operative findings discussed with the family.  Postoperative course recovery restrictions follow-up were all discussed.  Patient may resume his Plavix on Sunday.  He may take his Coumadin tomorrow evening Saturday.  Monitor wound closely for any bleeding or hematomas.  Discussed with patient's wife.  Prescriptions called in.  Discharge instructions in the chart.

## 2025-06-20 NOTE — ANESTHESIA PRE PROCEDURE
Department of Anesthesiology  Preprocedure Note       Name:  David Singletary   Age:  50 y.o.  :  1975                                          MRN:  [id number]         Date:  2025      Surgeon: Surgeon(s):  Christophe Harper MD    Procedure: Procedure(s):  XI ROBOTIC LAPAROSCOPIC RIGHT INGUINAL REPAIR WITH MESH POSSIBLE OPEN    Medications prior to admission:   Prior to Admission medications    Medication Sig Start Date End Date Taking? Authorizing Provider   cyclobenzaprine (FLEXERIL) 10 MG tablet Take 1 tablet by mouth three times daily as needed for muscle spasm 25  Yes Danita Ward MD   diphenhydrAMINE (BENADRYL) 25 MG capsule Take 1 capsule by mouth at bedtime   Yes ProviderLinn MD   ondansetron (ZOFRAN) 4 MG tablet Take 1 tablet by mouth every 6 hours as needed for Nausea or Vomiting 25  Yes Tami Sinha APRN - CNP   glipiZIDE (GLUCOTROL) 10 MG tablet Take 0.5 tablets by mouth 2 times daily (before meals) 25  Yes Danita Ward MD   albuterol sulfate HFA (PROVENTIL;VENTOLIN;PROAIR) 108 (90 Base) MCG/ACT inhaler Inhale 2 puffs into the lungs every 4 hours as needed for Wheezing or Shortness of Breath 25  Yes Danita Ward MD   traZODone (DESYREL) 50 MG tablet Take 1 tablet by mouth nightly 10/16/24  Yes Linn Whalen MD   metFORMIN (GLUCOPHAGE-XR) 500 MG extended release tablet Take 2 tablets by mouth 2 times daily 10/17/24  Yes Danita Ward MD   clonazePAM (KLONOPIN) 0.5 MG tablet Take 1 tablet by mouth nightly. Taking as needed 24  Yes Linn Whalen MD   rosuvastatin (CRESTOR) 40 MG tablet Take 1 tablet by mouth every evening 24  Yes Rosalina Gee APRN - CNP   lisinopril (PRINIVIL;ZESTRIL) 2.5 MG tablet Take 1 tablet by mouth daily 24  Yes Rosalina Gee APRN - CNP   Metoprolol Tartrate 75 MG TABS Take 1 tablet by mouth 2 times daily 24  Yes Rosalina Gee, APRN - CNP   omeprazole

## 2025-06-20 NOTE — H&P
HISTORY and PHYSICAL  [facility]       NAME:  David Singletary  MRN: [id number]   YOB: 1975   Date: 6/20/2025   Age: 50 y.o.  Gender: male       COMPLAINT AND PRESENT HISTORY:     David Singletary is 50 y.o., male, here today for    Procedure(s):  XI ROBOTIC LAPAROSCOPIC RIGHT INGUINAL REPAIR WITH MESH POSSIBLE OPEN    Pre-Op Diagnosis Codes:      * Right inguinal hernia [K40.90]    Below italics is a portion of office visit by Tami Sinha CNP 05/12/25: Reviewed   HISTORY OF PRESENT ILLNESS: 50 y.o. male presents to discuss possible right inguinal hernia.  Presented to ED 5-9-25 with constipation, concern for hernia.     Bulge: Pain and bulge to right groin 2-3 months  Location of bulge: Right groin  Bulge growing in size: Somewhat  Bulge tenderness: Yes  Skin color changes to bulge: None  Reducibility: None  Constipation/diarrhea: Constipation- states prior to ED visit no BM in a month- has since had a BM  Urination problems: None  Nausea/vomiting: Denies  Fever/chills: Denies  Prior hernia repairs: None  Prior imaging done: CT ABD/pelvis 5-9-25 with fat containing right inguinal hernia  Most recent colonoscopy: None/no screening- has Cologuard at home but has not yet completed     Major medical hx: Asthma, CAD, DM, hx of MI, s/p cardiac stent, Anti-phospholipid antibody syndrome   Prior ABD/pelvic surgeries: None  Blood thinning medications: Warfarin, Plavix       UPDATE  Pt s/p colonoscopy 06/19/25 with the below findings per note by Dr. Harper.    Findings:  Terminal ileum: normal     Cecum/Ascending colon: Poor prep with limited visualization.  No gross lesions     Transverse colon: Poor prep with limited visualization.  No gross lesions     Descending/Sigmoid colon: Poor prep with limited visualization.  No gross lesions     Rectum/Anus: examined in normal and retroflexed positions and was poor prep with limited visualization no gross lesions       NPO p MN. Stopped plavix

## 2025-06-20 NOTE — ANESTHESIA POSTPROCEDURE EVALUATION
Department of Anesthesiology  Postprocedure Note    Patient: David Singletary  MRN: [id number]  YOB: 1975  Date of evaluation: 6/20/2025    Procedure Summary       Date: 06/20/25 Room / Location: [address]    Anesthesia Start: 1233 Anesthesia Stop: 1409    Procedure: ROBOTIC LAPAROSCOPIC XI RIGHT INGUINAL REPAIR WITH MESH (Right: Groin) Diagnosis:       Right inguinal hernia      (Right inguinal hernia [K40.90])    Surgeons: Christophe Harper MD Responsible Provider: Tylor Hsu MD    Anesthesia Type: General ASA Status: 3            Anesthesia Type: General    Julissa Phase I: Julissa Score: 10    Julissa Phase II: Julissa Score: 10    Anesthesia Post Evaluation    Comments: POST- ANESTHESIA EVALUATION       Pt Name: David Singletary  MRN: [id number]  YOB: 1975  Date of evaluation: 6/20/2025  Time:  5:01 PM      /74   Pulse 83   Temp 98 °F (36.7 °C) (Infrared)   Resp 16   Ht 1.727 m (5' 8\")   Wt 73.9 kg (163 lb)   SpO2 97%   BMI 24.78 kg/m²      Consciousness Level  Awake  Cardiopulmonary Status  Stable  Pain Adequately Treated YES  Nausea / Vomiting  NO  Adequate Hydration  YES  Anesthesia Related Complications NONE      Electronically signed by Tylor Hsu MD on 6/20/2025 at 5:01 PM           No notable events documented.

## 2025-06-20 NOTE — DISCHARGE INSTRUCTIONS
Dr. Harper Post-Op Orders for Outpatient    1.) Diet:    [x]  As tolerated  []  Special     2.) Activity:    [x]  No lifting more than five to ten pounds 4-6 weeks  [x]  May Shower tomorrow  [x]  No driving until off pain medications     3.) Dressing:    [x]  Remove top dressing in 48 hours   [x]  Leave steri strips on     [x]  Remove and change dressing sooner if soaked    4.) Medication:    [x]  Take medication as prescribed   []  Resume blood thinners in  days    [x]  Resume home medications per AVS Summary    5.) Office visit: Follow up with Dr. Harper. Call to schedule an appointment if one has not been made.     6.) Call [phone] if you have any questions or concerns.    Electronically signed by Christophe Harper MD on 6/20/2025 at 12:48 PM        DISCHARGE INSTRUCTIONS FOR OUTPATIENT SURGERY    In order to continue your care at home, please follow the instructions below.    For General Anesthesia  Do not drink any alcoholic beverages or make any legal or important decisions for 24 hours.      Diet    Drink plenty of fluids after surgery, unless you are on a fluid restriction.  After general anesthesia, start out eating lightly (broth, soup, crackers, toast, etc.) advancing as tolerated to your usual diet.  Try to avoid spicy or greasy/fatty foods for 24 hours.  Avoid milk/milk product for several hours.    Medications  Take medications as instructed by your surgeon.   Please do not take prescribed pain medication with alcoholic beverages.  When cleared to drive by your surgeon, please do not drive or operate machinery while taking any prescribed pain medication.    Activities  As instructed by your surgeon.  Limit your activities for 24 hours.  Avoid heavy work or sports until surgeon approves.   No lifting, pushing, pulling, straining until approved by your surgeon and not more than 10 pounds.  No driving or operating machinery until cleared by surgeon.  May shower tomorrow if incision was closed with

## 2025-06-25 ENCOUNTER — ANTI-COAG VISIT (OUTPATIENT)
Age: 50
End: 2025-06-25
Payer: COMMERCIAL

## 2025-06-25 DIAGNOSIS — D68.61 ANTI-PHOSPHOLIPID ANTIBODY SYNDROME: Primary | ICD-10-CM

## 2025-06-25 LAB
INTERNATIONAL NORMALIZATION RATIO, POC: 1.6
PROTHROMBIN TIME, POC: 18.8

## 2025-06-25 PROCEDURE — 99213 OFFICE O/P EST LOW 20 MIN: CPT

## 2025-06-25 PROCEDURE — 85610 PROTHROMBIN TIME: CPT

## 2025-06-25 RX ORDER — ENOXAPARIN SODIUM 100 MG/ML
70 INJECTION SUBCUTANEOUS 2 TIMES DAILY
Qty: 6 EACH | Refills: 0 | Status: SHIPPED | OUTPATIENT
Start: 2025-06-25

## 2025-06-25 NOTE — PROGRESS NOTES
Patient seen in person in Medication Management Service.    Patient states compliant most of the time with regimen but patient was told to not resume his warfarin until the day after. Resumed warfarin on 6/20.  Patient had colonoscopy on 6/19 and then hernia surgery on 6/20. Hernia was open instead of laparoscopic.  Taking oxycodone / APAP for pain control.    No thromboembolic side effects noted.  Does note that last two enoxaparin injections have bleed a little.  Bruising only as expected from surgery.      Last enoxaparin injection was this am.  Patient has two syringes left.  No significant dietary changes other than day of prep for colonoscopy.    Patient also taking cephalexin 500mg three times a day x 7 days.  Patient started 6/20.  Also taken ondansetron for nausea.  No other significant recent illness or disease state changes.  No further upcoming procedures.     Patient acknowledges they are still an active patient of referring provider which is Moise Grey MD Yes     PT/INR done via POC meter per protocol.  INR was subtherapeutic at 1.6.  (goal 2 - 3)    Warfarin regimen will be continued at 7.5mg today and tomorrow (6/25 and 6/26) along with continued enoxaparin injections.  Will continue enoxaparin until INR 2 or greater  (see calendar below).  Will retest in 2 days.        Rx for enoxaparin 80mg syringes #6 with no refills sent to [facility].  Receipt confirmed by pharmacy.    Patient understands dosing directions and information discussed.  Dosing schedule and follow up appointment given to patient. Progress note routed to referring physicians office.  Patient acknowledges working in Consult Agreement with Pharmacist as referred by his/her physician/provider.      For Pharmacy Admin Tracking Only    Intervention Detail: Dose Adjustment: 1, reason: Therapy Optimization and Refill(s) Provided  Total # of Interventions Recommended: 2  Total # of Interventions Accepted: 2  Time Spent (min):

## 2025-06-27 ENCOUNTER — ANTI-COAG VISIT (OUTPATIENT)
Age: 50
End: 2025-06-27
Payer: COMMERCIAL

## 2025-06-27 DIAGNOSIS — D68.61 ANTI-PHOSPHOLIPID ANTIBODY SYNDROME: Primary | ICD-10-CM

## 2025-06-27 LAB
INTERNATIONAL NORMALIZATION RATIO, POC: 3.1
PROTHROMBIN TIME, POC: 37.6

## 2025-06-27 PROCEDURE — 85610 PROTHROMBIN TIME: CPT

## 2025-06-27 PROCEDURE — 99211 OFF/OP EST MAY X REQ PHY/QHP: CPT

## 2025-06-27 NOTE — PROGRESS NOTES
Patient seen in person in Medication Management Service.    Patient states compliant all of the time with regimen.    No bleeding or thromboembolic side effects noted.    No significant med or dietary changes.    No significant recent illness or disease state changes.      Patient acknowledges they are still an active patient of referring provider which is Moise Grey MD  Yes     PT/INR done via POC meter per protocol.  INR was supratherapeutic at 3.1.  (goal 2 - 3)    Warfarin regimen will be 3.75 mg on Tue and 7.5 mg all other days. Pt to take 3.75 mg dose today to slow down INR rise and stop Lovenox.  Will retest in 1 week.    Patient understands dosing directions and information discussed.  Dosing schedule and follow up appointment given to patient. Progress note routed to referring physicians office.  Patient acknowledges working in Consult Agreement with Pharmacist as referred by his/her physician/provider.      For Pharmacy Admin Tracking Only    Intervention Detail: Adherence Monitorin  Total # of Interventions Recommended: 1  Total # of Interventions Accepted: 1  Time Spent (min): 20    Maia RicoD, BCPS 2025 7:25 AM

## 2025-06-30 ENCOUNTER — TELEPHONE (OUTPATIENT)
Age: 50
End: 2025-06-30

## 2025-06-30 NOTE — TELEPHONE ENCOUNTER
Patient wife called Karen and patient.  Patient gave permission verbally to speak to his wife,  Patient would like to know if he can go swimming this weekend coming up?  Patient had an open right inguinal hernia repair on 6/20/2025.  Incision looks good per patient no redness, no drainage, Still has steri strips on it.   Normal bowel movents and normal urination. Denies any fever.  Please advise

## 2025-06-30 NOTE — TELEPHONE ENCOUNTER
Not until after his follow-up appointment with me.  I will assess his incision then and let them know.

## 2025-07-02 NOTE — TELEPHONE ENCOUNTER
Minnie spouse and patient returned call and that she does not see anything else for FMLA paperwork for the patient and that she will have to get the correct paperwork to bring it to our office.

## 2025-07-02 NOTE — TELEPHONE ENCOUNTER
Karen spouse , called and informed writer patient still has not has a bowel movement.   Patient has take max dose laxatives.  Drink water and Gatorade, eating well.  Denies fever.  Urinating normal  Please advise

## 2025-07-02 NOTE — TELEPHONE ENCOUNTER
Please see when patient's last bowel movement was.  Any nausea, any vomiting?  Does he feel bloated?

## 2025-07-02 NOTE — TELEPHONE ENCOUNTER
If he had a bowel movement yesterday, I am not too concerned.  Have him drink some prune juice, increase water intake and increase activity.  If he does not have a bowel movement by tomorrow, contact the office again.  For any significant abdominal bloating, nausea, vomiting, fever, chills he should go to the emergency department.

## 2025-07-07 ENCOUNTER — OFFICE VISIT (OUTPATIENT)
Age: 50
End: 2025-07-07

## 2025-07-07 ENCOUNTER — ANTI-COAG VISIT (OUTPATIENT)
Age: 50
End: 2025-07-07
Payer: COMMERCIAL

## 2025-07-07 VITALS
BODY MASS INDEX: 25.22 KG/M2 | DIASTOLIC BLOOD PRESSURE: 90 MMHG | HEIGHT: 68 IN | HEART RATE: 89 BPM | TEMPERATURE: 98.8 F | SYSTOLIC BLOOD PRESSURE: 124 MMHG | OXYGEN SATURATION: 96 % | WEIGHT: 166.4 LBS

## 2025-07-07 DIAGNOSIS — Z87.19 STATUS POST RIGHT INGUINAL HERNIA REPAIR: ICD-10-CM

## 2025-07-07 DIAGNOSIS — Z98.890 STATUS POST RIGHT INGUINAL HERNIA REPAIR: ICD-10-CM

## 2025-07-07 DIAGNOSIS — Z79.01 CHRONIC ANTICOAGULATION: ICD-10-CM

## 2025-07-07 DIAGNOSIS — D68.61 ANTI-PHOSPHOLIPID ANTIBODY SYNDROME: Primary | ICD-10-CM

## 2025-07-07 DIAGNOSIS — K40.90 RIGHT INGUINAL HERNIA: Primary | ICD-10-CM

## 2025-07-07 DIAGNOSIS — D68.61 ANTI-PHOSPHOLIPID ANTIBODY SYNDROME: ICD-10-CM

## 2025-07-07 DIAGNOSIS — K59.09 CHRONIC CONSTIPATION: ICD-10-CM

## 2025-07-07 LAB
INTERNATIONAL NORMALIZATION RATIO, POC: 3.1
PROTHROMBIN TIME, POC: 36.7

## 2025-07-07 PROCEDURE — 99212 OFFICE O/P EST SF 10 MIN: CPT | Performed by: PHARMACIST

## 2025-07-07 PROCEDURE — 85610 PROTHROMBIN TIME: CPT | Performed by: PHARMACIST

## 2025-07-07 PROCEDURE — 99024 POSTOP FOLLOW-UP VISIT: CPT | Performed by: NURSE PRACTITIONER

## 2025-07-07 ASSESSMENT — ENCOUNTER SYMPTOMS
SORE THROAT: 0
RHINORRHEA: 0
SHORTNESS OF BREATH: 0
COUGH: 0

## 2025-07-07 NOTE — PROGRESS NOTES
complication    Smoking    GERD (gastroesophageal reflux disease)    Lumbar radiculopathy    DDD (degenerative disc disease), lumbar    Neuroforaminal stenosis of lumbar spine    Sacroiliac joint pain    Lumbosacral spondylosis without myelopathy    STEMI (ST elevation myocardial infarction) (HCC)    SOB (shortness of breath)    Anti-phospholipid antibody syndrome    Chest pain with high risk for cardiac etiology    Unstable angina (HCC)    Hypogonadism in male    Essential hypertension    Right inguinal hernia    Chronic constipation    Chronic anticoagulation       SURGICAL HISTORY       Past Surgical History:   Procedure Laterality Date    CARDIAC SURGERY      COLONOSCOPY N/A 6/19/2025    COLONOSCOPY DIAGNOSTIC performed by Christophe Harper MD at [facility] ENDO    CORONARY ANGIOPLASTY WITH STENT PLACEMENT  2010    heart stent x 1    CORONARY ANGIOPLASTY WITH STENT PLACEMENT  06/26/2022    2 heart stents placed    HERNIA REPAIR Right 6/20/2025    OPEN RIGHT INGUINAL REPAIR WITH MESH performed by Christophe Harper MD at [facility] OR    PAIN MANAGEMENT PROCEDURE      TONSILLECTOMY         ALLERGIES     Allergies   Allergen Reactions    Statins      \"don't feel right\"       FAMILY HISTORY   family history includes Arrhythmia in his brother; Diabetes in his father and mother.    SOCIAL HISTORY    reports that he quit smoking about 2 years ago. His smoking use included cigarettes. He started smoking about 30 years ago. He has a 14.2 pack-year smoking history. He has been exposed to tobacco smoke. He has never used smokeless tobacco. He reports current alcohol use. He reports that he does not currently use drugs after having used the following drugs: Marijuana (Weed).    MEDICATIONS     Current Outpatient Medications:     enoxaparin (LOVENOX) 80 MG/0.8ML, Inject 0.7 mLs into the skin 2 times daily, Disp: 6 each, Rfl: 0    cephALEXin (KEFLEX) 500 MG capsule, 500 mgTake three times daily, Disp: 21 capsule, Rfl: 0    ondansetron

## 2025-07-07 NOTE — PROGRESS NOTES
Patient seen in person in Medication Management Service.    Patient states compliant all of the time with regimen. Pt states he followed dosing card which does have 3.75 mg Tuesdays only and 7.5 mg all other days    No bleeding side effects noted.  Patient states he did have some bruising from the Lovenox injections which have cleared but are .  No significant med or dietary changes.    No significant recent illness or disease state changes.      Patient acknowledges they are still an active patient of referring provider which is Dr. Grey Yes     PT/INR done via POC meter per protocol.  INR was supratherapeutic at 3.1.  (goal 2 - 3)    Warfarin regimen will be decreased to 3.75 mg Tues/Sat and 7.5 mg all other days.  Will retest in 2 weeks.    Patient understands dosing directions and information discussed.  Dosing schedule and follow up appointment given to patient. Progress note routed to referring physicians office.  Patient acknowledges working in Consult Agreement with Pharmacist as referred by his/her physician/provider.      For Pharmacy Admin Tracking Only    Intervention Detail: Dose Adjustment: 1, reason: Therapy De-escalation  Total # of Interventions Recommended: 1  Total # of Interventions Accepted: 1  Time Spent (min): 20   Casie Patel, Pharm D, BCPS, CACP  [facility]  7/7/2025 7:12 AM

## 2025-07-07 NOTE — PATIENT INSTRUCTIONS
The office will be calling you soon to schedule colonoscopy, please call our office if you do not receive a phone call within 1 week.

## 2025-07-10 DIAGNOSIS — D68.59 PRIMARY HYPERCOAGULABLE STATE: Primary | ICD-10-CM

## 2025-07-16 ENCOUNTER — HOSPITAL ENCOUNTER (OUTPATIENT)
Age: 50
Discharge: HOME | End: 2025-07-16
Payer: COMMERCIAL

## 2025-07-16 DIAGNOSIS — D68.59 PRIMARY HYPERCOAGULABLE STATE: ICD-10-CM

## 2025-07-16 LAB
BASOPHILS # BLD: <0.03 K/UL (ref 0–0.2)
BASOPHILS NFR BLD: 0 % (ref 0–2)
EOSINOPHIL # BLD: 0.2 K/UL (ref 0–0.44)
EOSINOPHILS RELATIVE PERCENT: 4 % (ref 0–4)
ERYTHROCYTE [DISTWIDTH] IN BLOOD BY AUTOMATED COUNT: 13.9 % (ref 11.5–14.9)
HCT VFR BLD AUTO: 38.5 % (ref 41–53)
HGB BLD-MCNC: 12.7 G/DL (ref 13.5–17.5)
IMM GRANULOCYTES # BLD AUTO: <0.03 K/UL (ref 0–0.3)
IMM GRANULOCYTES NFR BLD: 0 %
LYMPHOCYTES NFR BLD: 1.48 K/UL (ref 1.1–3.7)
LYMPHOCYTES RELATIVE PERCENT: 32 % (ref 24–44)
MCH RBC QN AUTO: 29.5 PG (ref 26–34)
MCHC RBC AUTO-ENTMCNC: 33 G/DL (ref 31–37)
MCV RBC AUTO: 89.5 FL (ref 80–100)
MONOCYTES NFR BLD: 0.34 K/UL (ref 0.1–1.2)
MONOCYTES NFR BLD: 7 % (ref 3–12)
NEUTROPHILS NFR BLD: 57 % (ref 36–66)
NEUTS SEG NFR BLD: 2.55 K/UL (ref 1.5–8.1)
NRBC BLD-RTO: 0 PER 100 WBC
PLATELET # BLD AUTO: 341 K/UL (ref 150–450)
PMV BLD AUTO: 8.9 FL (ref 8–13.5)
RBC # BLD AUTO: 4.3 M/UL (ref 4.21–5.77)
WBC OTHER # BLD: 4.6 K/UL (ref 3.5–11)

## 2025-07-16 PROCEDURE — 85025 COMPLETE CBC W/AUTO DIFF WBC: CPT

## 2025-07-16 PROCEDURE — 36415 COLL VENOUS BLD VENIPUNCTURE: CPT

## 2025-07-22 ENCOUNTER — ANTI-COAG VISIT (OUTPATIENT)
Age: 50
End: 2025-07-22
Payer: COMMERCIAL

## 2025-07-22 DIAGNOSIS — D68.61 ANTI-PHOSPHOLIPID ANTIBODY SYNDROME: Primary | ICD-10-CM

## 2025-07-22 LAB
INTERNATIONAL NORMALIZATION RATIO, POC: 1.4
PROTHROMBIN TIME, POC: 16.2

## 2025-07-22 PROCEDURE — 99212 OFFICE O/P EST SF 10 MIN: CPT | Performed by: PHARMACIST

## 2025-07-22 PROCEDURE — 85610 PROTHROMBIN TIME: CPT | Performed by: PHARMACIST

## 2025-07-22 NOTE — PROGRESS NOTES
Patient seen in person in Medication Management Service.    Patient states compliant most of the time with regimen. Uses pill box to make sure he is taking his medication every day.  Recent refill of warfarin, but has not started using the new bottle.   Pt states he thinks the warfarin he has been taking this week look almost orange in color.  States he had an old bottle of 5 mg in his medication basket and is wondering if he has been taking the 5 mg tablets instead of 7.5 mg the past several days. He will double check pill box at home.    No bleeding or thromboembolic side effects noted.    No significant med or dietary changes.    No significant recent illness or disease state changes.      Patient acknowledges they are still an active patient of referring provider which is Dr. Grey Yes     PT/INR done via POC meter per protocol.  INR was subtherapeutic at 1.4.  (goal 2 - 3)    Warfarin regimen will be increased to 3.75 mg Sat and 7.5 mg all other days. Pt will double check box at home to ensure he filled correctly  Will retest in 1 week.    Patient understands dosing directions and information discussed.  Dosing schedule and follow up appointment given to patient. Progress note routed to referring physicians office.  Patient acknowledges working in Consult Agreement with Pharmacist as referred by his/her physician/provider.      For Pharmacy Admin Tracking Only    Intervention Detail: Adherence Monitorin and Dose Adjustment: 1, reason: Therapy Optimization  Total # of Interventions Recommended: 2  Total # of Interventions Accepted: 2  Time Spent (min): 20   Casie Patel, Pharm D, BCPS, CACP  [facility]  2025 7:01 AM

## 2025-07-24 ENCOUNTER — OFFICE VISIT (OUTPATIENT)
Age: 50
End: 2025-07-24

## 2025-07-24 VITALS
BODY MASS INDEX: 25.2 KG/M2 | HEIGHT: 68 IN | SYSTOLIC BLOOD PRESSURE: 148 MMHG | DIASTOLIC BLOOD PRESSURE: 84 MMHG | OXYGEN SATURATION: 98 % | WEIGHT: 166.3 LBS | TEMPERATURE: 98.4 F | HEART RATE: 94 BPM

## 2025-07-24 DIAGNOSIS — Z98.890 STATUS POST RIGHT INGUINAL HERNIA REPAIR: ICD-10-CM

## 2025-07-24 DIAGNOSIS — K59.09 CHRONIC CONSTIPATION: ICD-10-CM

## 2025-07-24 DIAGNOSIS — Z87.19 STATUS POST RIGHT INGUINAL HERNIA REPAIR: ICD-10-CM

## 2025-07-24 DIAGNOSIS — K40.90 RIGHT INGUINAL HERNIA: Primary | ICD-10-CM

## 2025-07-24 PROCEDURE — 99024 POSTOP FOLLOW-UP VISIT: CPT | Performed by: NURSE PRACTITIONER

## 2025-07-24 NOTE — PATIENT INSTRUCTIONS
The office will be calling you soon to schedule colonoscopy, please call our office if you do not receive a phone call within 1 week.    Return to work 8-1-25.

## 2025-07-24 NOTE — PROGRESS NOTES
[facility] General Surgery   Christohpe Harper MD, FACS  Tami LIURosey Bharath, APRN-CNP  [address], [address]  [place] [address]  P: [phone], F: [phone]    General and Robotic Surgery  Post-Op Visit Note               PATIENT NAME: David Singletary   :  1975   MRN: [id number]   PCP:  Danita Ward MD     TODAY'S DATE: 2025    Chief Complaint   Patient presents with    Follow-up     Pt here today for a follow up after RIHR on 25. Pt is scheduled to return on 25 but would like to discuss restrictions due to needing to do heavy lifting. Pt would like to discuss a colonoscopy. Pt had poor prep on last colonoscopy.        History of Present Illness  The patient is a 50-year-old male who presents for a follow-up visit after surgery.    He underwent an open right direct inguinal hernia repair with an extra-large mesh plug and mesh on 2025. He reports that his bowel movements have returned to normal, and he is experiencing less pain, which he describes as nerve-like. He is not experiencing any urinary issues, nausea, vomiting, fever, or chills. He believes he will be ready to return to work on 2025 but expresses concern about the possibility of having to lift heavy objects upon his return.    Regarding his colonoscopy, the patient reports that the procedure had poor preparation despite following instructions, likely due to his chronic constipation. The physician recommended repeating the colonoscopy in the next few months with a two-day prep using Sutab. He is currently on warfarin and Plavix and has previously used Lovenox bridging. He recalls using Suprep for a colonoscopy 10 years ago, which he found beneficial. He also took laxatives due to his chronic constipation.     SOCIAL HISTORY  Occupations: Works in a warehouse, previously drove a big truck     PAST MEDICAL HISTORY     Past Medical History:   Diagnosis Date    Anticoagulant long-term use

## 2025-07-25 ENCOUNTER — TELEPHONE (OUTPATIENT)
Dept: INFUSION THERAPY | Age: 50
End: 2025-07-25

## 2025-07-29 ENCOUNTER — ANTI-COAG VISIT (OUTPATIENT)
Age: 50
End: 2025-07-29
Payer: COMMERCIAL

## 2025-07-29 DIAGNOSIS — D68.61 ANTI-PHOSPHOLIPID ANTIBODY SYNDROME: Primary | ICD-10-CM

## 2025-07-29 LAB
INTERNATIONAL NORMALIZATION RATIO, POC: 1.9
PROTHROMBIN TIME, POC: 22.2

## 2025-07-29 PROCEDURE — 99211 OFF/OP EST MAY X REQ PHY/QHP: CPT | Performed by: PHARMACIST

## 2025-07-29 PROCEDURE — 85610 PROTHROMBIN TIME: CPT | Performed by: PHARMACIST

## 2025-08-12 ENCOUNTER — TELEPHONE (OUTPATIENT)
Age: 50
End: 2025-08-12

## 2025-08-13 ENCOUNTER — ANTI-COAG VISIT (OUTPATIENT)
Age: 50
End: 2025-08-13
Payer: COMMERCIAL

## 2025-08-13 DIAGNOSIS — D68.61 ANTI-PHOSPHOLIPID ANTIBODY SYNDROME: Primary | ICD-10-CM

## 2025-08-13 LAB
INTERNATIONAL NORMALIZATION RATIO, POC: 1.7 (ref 2–3)
PROTHROMBIN TIME, POC: 20.4

## 2025-08-13 PROCEDURE — 85610 PROTHROMBIN TIME: CPT

## 2025-08-13 PROCEDURE — 99211 OFF/OP EST MAY X REQ PHY/QHP: CPT

## 2025-08-21 ENCOUNTER — TELEPHONE (OUTPATIENT)
Age: 50
End: 2025-08-21

## 2025-08-26 PROBLEM — K59.00 CONSTIPATION: Status: ACTIVE | Noted: 2025-08-26

## 2025-08-26 RX ORDER — ONDANSETRON 4 MG/1
4 TABLET, FILM COATED ORAL EVERY 6 HOURS PRN
Qty: 10 TABLET | Refills: 0 | Status: CANCELLED | OUTPATIENT
Start: 2025-08-26

## 2025-08-27 ENCOUNTER — ANTI-COAG VISIT (OUTPATIENT)
Age: 50
End: 2025-08-27
Payer: COMMERCIAL

## 2025-08-27 DIAGNOSIS — D68.61 ANTI-PHOSPHOLIPID ANTIBODY SYNDROME: Primary | ICD-10-CM

## 2025-08-27 LAB
INTERNATIONAL NORMALIZATION RATIO, POC: 4.8
PROTHROMBIN TIME, POC: 57.4

## 2025-08-27 PROCEDURE — 99212 OFFICE O/P EST SF 10 MIN: CPT | Performed by: PHARMACY

## 2025-08-27 PROCEDURE — 85610 PROTHROMBIN TIME: CPT | Performed by: PHARMACY

## 2025-09-02 RX ORDER — ONDANSETRON 4 MG/1
4 TABLET, FILM COATED ORAL EVERY 6 HOURS PRN
Qty: 10 TABLET | Refills: 0 | Status: SHIPPED | OUTPATIENT
Start: 2025-09-02

## 2025-09-03 ENCOUNTER — TELEPHONE (OUTPATIENT)
Dept: INFUSION THERAPY | Age: 50
End: 2025-09-03

## 2025-09-05 ENCOUNTER — ANTI-COAG VISIT (OUTPATIENT)
Age: 50
End: 2025-09-05
Payer: COMMERCIAL

## 2025-09-05 DIAGNOSIS — D68.61 ANTI-PHOSPHOLIPID ANTIBODY SYNDROME: Primary | ICD-10-CM

## 2025-09-05 LAB
INTERNATIONAL NORMALIZATION RATIO, POC: 1.8
PROTHROMBIN TIME, POC: 21.3

## 2025-09-05 PROCEDURE — 85610 PROTHROMBIN TIME: CPT | Performed by: PHARMACY

## 2025-09-05 PROCEDURE — 99212 OFFICE O/P EST SF 10 MIN: CPT | Performed by: PHARMACY

## 2025-09-05 RX ORDER — WARFARIN SODIUM 7.5 MG/1
TABLET ORAL
Qty: 30 TABLET | Refills: 3 | Status: SHIPPED | OUTPATIENT
Start: 2025-09-05

## (undated) DEVICE — GAUZE,SPONGE,4"X4",16PLY,STRL,LF,10/TRAY: Brand: MEDLINE

## (undated) DEVICE — SINGLE USE AIR/WATER, SUCTION AND BIOPSY VALVES SET: Brand: ORCAPOD™

## (undated) DEVICE — SUTURE ETHIBOND SZ 2-0 L18IN NONABSORBABLE GRN L26MM CT-2 1/2 CX26D

## (undated) DEVICE — LIQUIBAND RAPID ADHESIVE 36/CS 0.8ML: Brand: MEDLINE

## (undated) DEVICE — SOLUTION SCRB 4OZ 4% CHG H2O AIDED FOR PREOPERATIVE SKIN

## (undated) DEVICE — GLOVE ORANGE PI 7 1/2   MSG9075

## (undated) DEVICE — GOWN,POLY REINFORCED,LG: Brand: MEDLINE

## (undated) DEVICE — KIT CLN UP LIN W/ STD SAHARA TBL SHT 40X60IN DRAW/LIFT SHT

## (undated) DEVICE — SEALER LAP SM L18.8CM OPN JAW HAND/FOOT SWCH FORCETRIAD

## (undated) DEVICE — PREMIUM DRY TRAY LF: Brand: MEDLINE INDUSTRIES, INC.

## (undated) DEVICE — BLANKET WARMING L 2010 X W 760 MM UPPER BODY 2 HOSE INLET

## (undated) DEVICE — SUTURE VICRYL + SZ 3-0 L36IN ABSRB UD L36MM CT-1 1/2 CIR VCP944H

## (undated) DEVICE — ENDOSCOPIC KIT 1.1+ 10 FT OP4 CA DE

## (undated) DEVICE — GLOVE ORTHO 7 1/2   MSG9475

## (undated) DEVICE — SUTURE PROL SZ 2-0 L30IN NONABSORBABLE BLU L26MM CT-2 1/2 8411H

## (undated) DEVICE — ST CHARLES GEN LAPAROSCOPY: Brand: MEDLINE INDUSTRIES, INC.

## (undated) DEVICE — COVER,MAYO STAND,STERILE: Brand: MEDLINE

## (undated) DEVICE — SHEET, T, LAPAROTOMY, STERILE: Brand: MEDLINE

## (undated) DEVICE — SUTURE MONOCRYL + SZ 4-0 L27IN ABSRB UD L19MM PS-2 3/8 CIR MCP426H

## (undated) DEVICE — SUTURE VICRYL + SZ 2-0 L27IN ABSRB WHT SH 1/2 CIR TAPERCUT VCP417H